# Patient Record
Sex: MALE | Race: WHITE | Employment: FULL TIME | URBAN - METROPOLITAN AREA
[De-identification: names, ages, dates, MRNs, and addresses within clinical notes are randomized per-mention and may not be internally consistent; named-entity substitution may affect disease eponyms.]

---

## 2017-03-14 DIAGNOSIS — J30.9 ALLERGIC RHINITIS, UNSPECIFIED ALLERGIC RHINITIS TRIGGER, UNSPECIFIED RHINITIS SEASONALITY: Primary | ICD-10-CM

## 2017-03-14 NOTE — TELEPHONE ENCOUNTER
desloratadine (CLARINEX) 5 MG tablet      Last Written Prescription Date: 06/10/16  Last Fill Quantity: 90,  # refills: 2   Last Office Visit with [facility], [facility] or [facility] prescribing provider: 08/01/16

## 2017-03-17 RX ORDER — DESLORATADINE 5 MG/1
TABLET ORAL
Qty: 90 TABLET | Refills: 1 | Status: SHIPPED | OUTPATIENT
Start: 2017-03-17 | End: 2017-09-11

## 2017-03-17 NOTE — TELEPHONE ENCOUNTER
Prescription approved per [facility] Refill Protocol.  Asuncion Alcocer RN

## 2017-05-31 ENCOUNTER — TELEPHONE (OUTPATIENT)
Dept: FAMILY MEDICINE | Facility: CLINIC | Age: 62
End: 2017-05-31

## 2017-05-31 ENCOUNTER — OFFICE VISIT (OUTPATIENT)
Dept: FAMILY MEDICINE | Facility: CLINIC | Age: 62
End: 2017-05-31
Payer: COMMERCIAL

## 2017-05-31 ENCOUNTER — RADIANT APPOINTMENT (OUTPATIENT)
Dept: GENERAL RADIOLOGY | Facility: CLINIC | Age: 62
End: 2017-05-31
Payer: COMMERCIAL

## 2017-05-31 VITALS
OXYGEN SATURATION: 95 % | HEIGHT: 70 IN | HEART RATE: 114 BPM | SYSTOLIC BLOOD PRESSURE: 116 MMHG | WEIGHT: 271 LBS | DIASTOLIC BLOOD PRESSURE: 75 MMHG | TEMPERATURE: 101.9 F | BODY MASS INDEX: 38.8 KG/M2

## 2017-05-31 DIAGNOSIS — R50.9 FEVER, UNSPECIFIED: ICD-10-CM

## 2017-05-31 DIAGNOSIS — I10 HYPERTENSION, GOAL BELOW 140/90: ICD-10-CM

## 2017-05-31 DIAGNOSIS — E66.812 OBESITY, CLASS II, BMI 35-39.9: ICD-10-CM

## 2017-05-31 DIAGNOSIS — J84.10 GRANULOMATOUS LUNG DISEASE (H): Primary | ICD-10-CM

## 2017-05-31 DIAGNOSIS — J84.10 GRANULOMATOUS LUNG DISEASE (H): ICD-10-CM

## 2017-05-31 DIAGNOSIS — J18.9 PNEUMONIA OF LEFT LUNG DUE TO INFECTIOUS ORGANISM, UNSPECIFIED PART OF LUNG: Primary | ICD-10-CM

## 2017-05-31 PROCEDURE — 99214 OFFICE O/P EST MOD 30 MIN: CPT | Performed by: PREVENTIVE MEDICINE

## 2017-05-31 PROCEDURE — 71020 XR CHEST 2 VW: CPT

## 2017-05-31 RX ORDER — BENZONATATE 100 MG/1
100 CAPSULE ORAL 3 TIMES DAILY PRN
Qty: 20 CAPSULE | Refills: 0 | Status: SHIPPED | OUTPATIENT
Start: 2017-05-31 | End: 2017-07-11

## 2017-05-31 RX ORDER — AZITHROMYCIN 250 MG/1
TABLET, FILM COATED ORAL
Qty: 6 TABLET | Refills: 0 | Status: ON HOLD | OUTPATIENT
Start: 2017-05-31 | End: 2017-06-30

## 2017-05-31 ASSESSMENT — PAIN SCALES - GENERAL: PAINLEVEL: NO PAIN (0)

## 2017-05-31 NOTE — TELEPHONE ENCOUNTER
Patient was in clinic today but not feeling well so sent copy of ACT form with him and will call in 4 weeks to go over the phone.  Alexandre CHERRY

## 2017-05-31 NOTE — PROGRESS NOTES
SUBJECTIVE:                                                    Arturo Rahman is a 61 year old male who presents to clinic today for the following health issues:    I have reviewed and agree with the documentation by the MA. I updated the history as indicated.  Charlee Crane MD MPH    RESPIRATORY SYMPTOMS      Duration: x 1.5 weeks    Description  sore throat, cough, wheezing, fever, chills, headache, fatigue/malaise and myalgias    Severity: severe    Accompanying signs and symptoms: None    History (predisposing factors):  asthma    Precipitating or alleviating factors: None    Therapies tried and outcome:  OTC NSAID OTC cold and cough     Started with coughing and progressed to cough. Developed into chills. Very fatigued. Fever and chills. Achy all over. No rash. No diarrhea, no emesis, no abdominal pain. No sick contacts. No travel. Tmax at home 100.2 F    Hypertension Follow-up      Outpatient blood pressures are not being checked.    Low Salt Diet: low salt       Problem list and histories reviewed & adjusted, as indicated.  Additional history: as documented    Patient Active Problem List   Diagnosis     Asthma, moderate persistent     Granulomatous lung disease (H)     GERD (gastroesophageal reflux disease)     FH: colon cancer     Hyperlipidemia LDL goal <130     Allergic rhinitis     Advanced directives, counseling/discussion     Hypertension, goal below 140/90     Non morbid obesity due to excess calories     Obesity, Class II, BMI 35-39.9 (H)     Past Surgical History:   Procedure Laterality Date     COLONOSCOPY  11/08    + polyp, +FH, repeat in 5 years     TONSILLECTOMY       VASECTOMY         Social History   Substance Use Topics     Smoking status: Former Smoker     Quit date: 10/28/1991     Smokeless tobacco: Never Used     Alcohol use Yes      Comment: rare     Family History   Problem Relation Age of Onset     Arthritis Mother      Hypertension Father      CEREBROVASCULAR DISEASE Father       Hypertension Sister      Cancer - colorectal Sister      dx age 50     Asthma Daughter      Allergies Daughter          Current Outpatient Prescriptions   Medication Sig Dispense Refill     azithromycin (ZITHROMAX) 250 MG tablet Two tablets first day, then one tablet daily for four days. 6 tablet 0     benzonatate (TESSALON) 100 MG capsule Take 1 capsule (100 mg) by mouth 3 times daily as needed 20 capsule 0     desloratadine (CLARINEX) 5 MG tablet TAKE 1 TABLET (5 MG) BY MOUTH ONCE DAILY 90 tablet 1     fluticasone-salmeterol (ADVAIR DISKUS) 250-50 MCG/DOSE diskus inhaler Inhale 1 puff into the lungs 2 times daily 3 Inhaler 0     losartan-hydrochlorothiazide (HYZAAR) 100-25 MG per tablet Take 1 tablet by mouth daily 90 tablet 1     albuterol (PROAIR HFA/PROVENTIL HFA/VENTOLIN HFA) 108 (90 BASE) MCG/ACT Inhaler Inhale 2 puffs into the lungs every 6 hours as needed for shortness of breath / dyspnea 1 Inhaler 11     simvastatin (ZOCOR) 10 MG tablet Take 1 tablet (10 mg) by mouth At Bedtime 90 tablet 3     esomeprazole (NEXIUM) 40 MG capsule Take 1 capsule (40 mg) by mouth every morning (before breakfast) 90 capsule 2     montelukast (SINGULAIR) 10 MG tablet Take 1 tablet (10 mg) by mouth daily 90 tablet 2     Allergies   Allergen Reactions     Lisinopril Cough     BP Readings from Last 3 Encounters:   05/31/17 116/75   08/01/16 123/84   03/21/16 130/90    Wt Readings from Last 3 Encounters:   05/31/17 271 lb (122.9 kg)   08/01/16 271 lb 6.4 oz (123.1 kg)   03/21/16 274 lb (124.3 kg)                    Reviewed and updated as needed this visit by clinical staff  Tobacco  Allergies       Reviewed and updated as needed this visit by Provider         ROS:  Constitutional, neuro, ENT, endocrine, pulmonary, cardiac, gastrointestinal, genitourinary, musculoskeletal, integument and psychiatric systems are negative, except as otherwise noted.    OBJECTIVE:                                                    /75  Pulse  "114  Temp 101.9  F (38.8  C) (Oral)  Ht 5' 10\" (1.778 m)  Wt 271 lb (122.9 kg)  SpO2 95%  BMI 38.88 kg/m2  Body mass index is 38.88 kg/(m^2).  GENERAL APPEARANCE: healthy and alert  EYES: Eyes grossly normal to inspection and conjunctivae and sclerae normal  HENT: ear canals and TM's normal, nose and mouth without ulcers or lesions and no pharyngeal exudates or pus points, no uvular deviation   NECK: no adenopathy and trachea midline and normal to palpation  RESP: Decreased breath sounds left lung base   CV: regular rates and rhythm and normal S1 S2, no S3 or S4  ABDOMEN: No rebound or guarding   MS: extremities normal- no gross deformities noted  SKIN: no suspicious lesions or rashes  NEURO: Normal strength and tone, mentation intact and speech normal  PSYCH: mentation appears normal    Diagnostic test results:  Diagnostic Test Results:  No results found for this or any previous visit (from the past 24 hour(s)).     Chest X ray:  My independent review of the images shows pre existing granulomatous disease. Increased haziness on left lung field compared to previous X ray, possible infiltrate. Final radiology reading is pending  Charlee Crane MD MPH       ASSESSMENT/PLAN:                                                    1. Pneumonia of left lung due to infectious organism, unspecified part of lung  -ER precautions reviewed in detail, if chest pain, shortness of breath, hemoptysis then needs to be seen in ER   - azithromycin (ZITHROMAX) 250 MG tablet; Two tablets first day, then one tablet daily for four days.  Dispense: 6 tablet; Refill: 0  - benzonatate (TESSALON) 100 MG capsule; Take 1 capsule (100 mg) by mouth 3 times daily as needed  Dispense: 20 capsule; Refill: 0  -Hydration and monitor temperature   -Await final reading for chest X ray     2. Granulomatous lung disease (H)  - XR Chest 2 Views; Future  - azithromycin (ZITHROMAX) 250 MG tablet; Two tablets first day, then one tablet daily for four days.  " Dispense: 6 tablet; Refill: 0  - benzonatate (TESSALON) 100 MG capsule; Take 1 capsule (100 mg) by mouth 3 times daily as needed  Dispense: 20 capsule; Refill: 0    3. Obesity, Class II, BMI 35-39.9 (H)  -Weight stable    4. Fever, unspecified  - XR Chest 2 Views; Future  -Tylenol or Ibuprofen as needed     5. Hypertension, goal below 140/90  -At goal  -Continue current medication   -Avoid decongestants     I ended our visit today by discussing the patient's diagnoses and recommended treatment. Please refer to today's diagnoses and orders for further details. I briefly discussed the pathophysiology of these conditions and outlined their expected course. I discussed the warning symptoms and signs that indicate an atypical course that would need urgent or emergent care. I also discussed self care strategies for symptom relief.  Patient voiced complete understanding of plan of care and was in full agreement to proceed. Common side effects of medications prescribed at this visit were discussed with the patient. Severe side effects, including current applicable black box warnings, were discussed.       Follow up with Provider - If not improving in 5 days otherwise as scheduled with PCP      Charlee Crane MD MPH    [facility]

## 2017-05-31 NOTE — NURSING NOTE
Patient due for ACT form but is ill today so sent with him and will call in 4 weeks to go over the phone.  Alexandre CHERRY

## 2017-05-31 NOTE — NURSING NOTE
"Chief Complaint   Patient presents with     Cough       Initial /75  Pulse 114  Temp 101.9  F (38.8  C) (Oral)  Ht 5' 10\" (1.778 m)  Wt 271 lb (122.9 kg)  SpO2 95%  BMI 38.88 kg/m2 Estimated body mass index is 38.88 kg/(m^2) as calculated from the following:    Height as of this encounter: 5' 10\" (1.778 m).    Weight as of this encounter: 271 lb (122.9 kg).  Medication Reconciliation: complete   Alexandre CHERRY        "

## 2017-05-31 NOTE — PATIENT INSTRUCTIONS
At [facility], we strive to deliver an exceptional experience to you, every time we see you.    If you receive a survey in the mail, please send us back your thoughts. We really do value your feedback.    Thank you for visiting [facility]    Normal or non-critical lab and imaging results will be communicated to you by MyChart, letter or phone within 7 days.  If you do not hear from us within 10 days, please call the clinic. If you have a critical or abnormal lab result, we will notify you by phone as soon as possible.     If you have any questions regarding your visit please contact:     Team Malathi/Spirit  Clinic Hours Telephone Number   Dr. Dominick Dumont   7am-7pm  Monday through Thursday  7am-5pm Friday [phone]  Sue MCDUFFIE RN   Pharmacy 8:30am-9pm Monday-Friday    9am-5pm Saturday-Sunday [phone]   Urgent Care 11am-9pm Monday-Friday        9am-5pm Saturday-Sunday [phone]     After hours, weekend or if you need to make an appointment with your primary provider please call [phone].   After Hours nurse advise: call [facility]: [phone]    Use AquaHydratehart (secure email communication and access to your chart) to send your primary care provider a message or make an appointment. Ask someone on your Team how to sign up for [employer]. To log on to Izzui or for more information in [place] please visit the website at [email or web address].   As of October 8, 2013, all password changes, disabled accounts, or ID changes in [employer]/MyHealth will be done by our Access Services Department.   If you need help with your account or password, call: [phone]. Clinic staff no longer has the ability to change passwords.

## 2017-05-31 NOTE — LETTER
[address] [address]  [phone]  Dept: [phone]      5/31/2017    Re: Arturo STERLING Lacey      TO WHOM IT MAY CONCERN:    Arturo Rahman  was seen on 5/31/17.  Please excuse him  until 6/2/17 due to illness.    Leeann Crane MD  [facility]

## 2017-05-31 NOTE — MR AVS SNAPSHOT
After Visit Summary   5/31/2017    Arturo Rahman    MRN: [id number]           Patient Information     Date Of Birth          1955        Visit Information        Provider Department      5/31/2017 3:20 PM Charlee Crane MD [facility]        Today's Diagnoses     Pneumonia of left lung due to infectious organism, unspecified part of lung    -  1    Granulomatous lung disease (H)        Obesity, Class II, BMI 35-39.9 (H)        Fever, unspecified        Hypertension, goal below 140/90          Care Instructions    At [facility], we strive to deliver an exceptional experience to you, every time we see you.    If you receive a survey in the mail, please send us back your thoughts. We really do value your feedback.    Thank you for visiting [facility]    Normal or non-critical lab and imaging results will be communicated to you by MyChart, letter or phone within 7 days.  If you do not hear from us within 10 days, please call the clinic. If you have a critical or abnormal lab result, we will notify you by phone as soon as possible.     If you have any questions regarding your visit please contact:     Team Malathi/Spirit  Clinic Hours Telephone Number   Dr. Dominick Dumont   7am-7pm  Monday through Thursday  7am-5pm Friday [phone]  Sue MCDUFFIE RN   Pharmacy 8:30am-9pm Monday-Friday    9am-5pm Saturday-Sunday [phone]   Urgent Care 11am-9pm Monday-Friday        9am-5pm Saturday-Sunday [phone]     After hours, weekend or if you need to make an appointment with your primary provider please call [phone].   After Hours nurse advise: call [facility]: [phone]    Use GetAutoBidshart (secure email communication and access to your chart) to send your primary care provider a message or make an appointment. Ask  someone on your Team how to sign up for [employer]. To log on to [place] or for more information in ibabybox please visit the website at [email or web address].   As of October 8, 2013, all password changes, disabled accounts, or ID changes in [employer]/MyHealth will be done by our Access Services Department.   If you need help with your account or password, call: [phone]. Clinic staff no longer has the ability to change passwords.             Follow-ups after your visit        Follow-up notes from your care team     Return if symptoms worsen or fail to improve.      Who to contact     If you have questions or need follow up information about today's clinic visit or your schedule please contact [facility] directly at [phone].  Normal or non-critical lab and imaging results will be communicated to you by Overdoghart, letter or phone within 4 business days after the clinic has received the results. If you do not hear from us within 7 days, please contact the clinic through [employer] or phone. If you have a critical or abnormal lab result, we will notify you by phone as soon as possible.  Submit refill requests through [employer] or call your pharmacy and they will forward the refill request to us. Please allow 3 business days for your refill to be completed.          Additional Information About Your Visit        [employer] Information     [employer] gives you secure access to your electronic health record. If you see a primary care provider, you can also send messages to your care team and make appointments. If you have questions, please call your primary care clinic.  If you do not have a primary care provider, please call [phone] and they will assist you.        Care EveryWhere ID     This is your Care EveryWhere ID. This could be used by other organizations to access your [place] medical records  [id number]        Your Vitals Were     Pulse Temperature Height Pulse Oximetry BMI (Body Mass  "Index)       114 101.9  F (38.8  C) (Oral) 5' 10\" (1.778 m) 95% 38.88 kg/m2        Blood Pressure from Last 3 Encounters:   05/31/17 116/75   08/01/16 123/84   03/21/16 130/90    Weight from Last 3 Encounters:   05/31/17 271 lb (122.9 kg)   08/01/16 271 lb 6.4 oz (123.1 kg)   03/21/16 274 lb (124.3 kg)                 Today's Medication Changes          These changes are accurate as of: 5/31/17  5:18 PM.  If you have any questions, ask your nurse or doctor.               Start taking these medicines.        Dose/Directions    azithromycin 250 MG tablet   Commonly known as:  ZITHROMAX   Used for:  Pneumonia of left lung due to infectious organism, unspecified part of lung, Granulomatous lung disease (H)   Started by:  Charlee Crane MD        Two tablets first day, then one tablet daily for four days.   Quantity:  6 tablet   Refills:  0       benzonatate 100 MG capsule   Commonly known as:  TESSALON   Used for:  Granulomatous lung disease (H), Pneumonia of left lung due to infectious organism, unspecified part of lung   Started by:  Charlee Crane MD        Dose:  100 mg   Take 1 capsule (100 mg) by mouth 3 times daily as needed   Quantity:  20 capsule   Refills:  0         Stop taking these medicines if you haven't already. Please contact your care team if you have questions.     predniSONE 10 MG tablet   Commonly known as:  DELTASONE   Stopped by:  Charlee Crane MD                Where to get your medicines      These medications were sent to [facility] [place], [place] - [address]  [address] [facility] [address]     Phone:  [phone]     azithromycin 250 MG tablet    benzonatate 100 MG capsule                Primary Care Provider Office Phone # Fax #    Martina Leung [phone]       [facility] [address]  [facility] [address]        Thank you!     Thank you for choosing [facility]  for your care. Our " goal is always to provide you with excellent care. Hearing back from our patients is one way we can continue to improve our services. Please take a few minutes to complete the written survey that you may receive in the mail after your visit with us. Thank you!             Your Updated Medication List - Protect others around you: Learn how to safely use, store and throw away your medicines at www.disposemymeds.org.          This list is accurate as of: 5/31/17  5:18 PM.  Always use your most recent med list.                   Brand Name Dispense Instructions for use    albuterol 108 (90 BASE) MCG/ACT Inhaler    PROAIR HFA/PROVENTIL HFA/VENTOLIN HFA    1 Inhaler    Inhale 2 puffs into the lungs every 6 hours as needed for shortness of breath / dyspnea       azithromycin 250 MG tablet    ZITHROMAX    6 tablet    Two tablets first day, then one tablet daily for four days.       benzonatate 100 MG capsule    TESSALON    20 capsule    Take 1 capsule (100 mg) by mouth 3 times daily as needed       desloratadine 5 MG tablet    CLARINEX    90 tablet    TAKE 1 TABLET (5 MG) BY MOUTH ONCE DAILY       esomeprazole 40 MG CR capsule    nexIUM    90 capsule    Take 1 capsule (40 mg) by mouth every morning (before breakfast)       fluticasone-salmeterol 250-50 MCG/DOSE diskus inhaler    ADVAIR DISKUS    3 Inhaler    Inhale 1 puff into the lungs 2 times daily       losartan-hydrochlorothiazide 100-25 MG per tablet    HYZAAR    90 tablet    Take 1 tablet by mouth daily       montelukast 10 MG tablet    SINGULAIR    90 tablet    Take 1 tablet (10 mg) by mouth daily       simvastatin 10 MG tablet    ZOCOR    90 tablet    Take 1 tablet (10 mg) by mouth At Bedtime

## 2017-06-02 NOTE — PROGRESS NOTES
Arturo,     Chest X ray did show a hazy change on the left side as we had discussed in clinic. Please complete the full course of antibiotics. We also would need to do a CT scan of the chest if your symptoms do not improve. Other changes on the X ray are stable compared to previous X rays.    Please do not hesitate to call us at [phone] if you have any questions or concerns.    Thank you,    Charlee Crane MD MPH

## 2017-06-11 DIAGNOSIS — I10 HYPERTENSION, GOAL BELOW 140/90: ICD-10-CM

## 2017-06-13 RX ORDER — LOSARTAN POTASSIUM AND HYDROCHLOROTHIAZIDE 25; 100 MG/1; MG/1
TABLET ORAL
Qty: 90 TABLET | Refills: 0 | Status: SHIPPED | OUTPATIENT
Start: 2017-06-13 | End: 2017-09-11

## 2017-06-13 NOTE — TELEPHONE ENCOUNTER
Prescription approved per [facility] Refill Protocol.  Jenny Adames RN

## 2017-06-20 ENCOUNTER — TELEPHONE (OUTPATIENT)
Dept: FAMILY MEDICINE | Facility: CLINIC | Age: 62
End: 2017-06-20

## 2017-06-20 DIAGNOSIS — K21.00 GASTROESOPHAGEAL REFLUX DISEASE WITH ESOPHAGITIS: ICD-10-CM

## 2017-06-20 RX ORDER — ESOMEPRAZOLE MAGNESIUM 40 MG/1
40 CAPSULE, DELAYED RELEASE ORAL
Qty: 90 CAPSULE | Refills: 2 | Status: SHIPPED | OUTPATIENT
Start: 2017-06-20 | End: 2018-03-10

## 2017-06-20 NOTE — TELEPHONE ENCOUNTER
Prescription approved per [facility] Refill Protocol.  Patient notified Rx sent.   Asuncion Alcocer RN

## 2017-06-20 NOTE — TELEPHONE ENCOUNTER
..Reason for Call:   one med was missed being refilled, over one week    Detailed comments:  Esomeprazole/ nexium 40 mg tab  Cub [phone]      Phone Number Patient can be reached at: Home number on file [phone] (home)    Best Time: anytime    Can we leave a detailed message on this number? YES    Call taken on 6/20/2017 at 7:23 AM by Samara Mejia

## 2017-06-21 NOTE — TELEPHONE ENCOUNTER
Spoke to patient his ACT score is 15 but  is still not feeling 100% from last visit so routed to provider to advise.  Alexandre CHERRY

## 2017-06-21 NOTE — TELEPHONE ENCOUNTER
Since he is not feeling better, we would need to proceed with a CT scan of the Chest. I have placed the order for this and he can call [phone] to make an appointment for this. He should also follow up with his PCP Dr. Leung.  Thanks,  Charlee Crane MD MPH

## 2017-06-21 NOTE — TELEPHONE ENCOUNTER
Advised patient of provider notation below.  Patient states he is off work the week of July 3rd so he may not be able to have CT until this time.  I advised to have CT done as soon as he is able, however if symptoms worsen or has new concerning symptoms, call the clinic back to discuss.  Pt verbalized understanding of plan.  Lam Lara RN

## 2017-06-22 ASSESSMENT — ASTHMA QUESTIONNAIRES: ACT_TOTALSCORE: 15

## 2017-06-26 ENCOUNTER — RADIANT APPOINTMENT (OUTPATIENT)
Dept: CT IMAGING | Facility: CLINIC | Age: 62
End: 2017-06-26
Payer: COMMERCIAL

## 2017-06-26 DIAGNOSIS — J84.10 GRANULOMATOUS LUNG DISEASE (H): ICD-10-CM

## 2017-06-26 LAB — RADIOLOGIST FLAGS: ABNORMAL

## 2017-06-26 PROCEDURE — 71260 CT THORAX DX C+: CPT | Performed by: RADIOLOGY

## 2017-06-26 RX ORDER — IOPAMIDOL 755 MG/ML
125 INJECTION, SOLUTION INTRAVASCULAR ONCE
Status: COMPLETED | OUTPATIENT
Start: 2017-06-26 | End: 2017-06-26

## 2017-06-26 RX ADMIN — IOPAMIDOL 125 ML: 755 INJECTION, SOLUTION INTRAVASCULAR at 15:51

## 2017-06-27 ENCOUNTER — TELEPHONE (OUTPATIENT)
Dept: FAMILY MEDICINE | Facility: CLINIC | Age: 62
End: 2017-06-27

## 2017-06-27 DIAGNOSIS — R91.8 ENDOBRONCHIAL MASS: Primary | ICD-10-CM

## 2017-06-27 NOTE — PROGRESS NOTES
Please CALL patient:    Dear Arturo Rahman,    CT scan of the chest is showing a lesion in the left upper chest area. This needs to be further evaluated by a Lung Specialist.  I have placed a referral for a Pulmonary Specialist. Please call [phone] to make an appointment for this. If you are unable to get in to see them in 2-3 weeks, please call me so I can do a Provider to Provider review at the [facility] and try and have you seen sooner.     If you develop any worsening shortness of breath, difficulty breathing, then please go in to the emergency room.     Thanks,    Charlee Crane MD MPH

## 2017-06-27 NOTE — TELEPHONE ENCOUNTER
Patient called back. He had reviewed the information already via [employer]. Reviewed the results again that are below.    Abi Wray RN, [facility]

## 2017-06-27 NOTE — TELEPHONE ENCOUNTER
Returned call to patient - patient reports that the soonest he can get in to see the pulmonologist in [place] is in September.  Routing update back to provider to review and advise.  Shyanne Gutierrez RN

## 2017-06-27 NOTE — TELEPHONE ENCOUNTER
Patient called the phone # for the Pulmonologist and he cannot get in soon enough and wants to discuss.

## 2017-06-27 NOTE — TELEPHONE ENCOUNTER
Notes Recorded by Charlee Crane MD on 6/27/2017 at 8:16 AM  Please CALL patient:    Dear Arturo STERLING Lacey,    CT scan of the chest is showing a lesion in the left upper chest area. This needs to be further evaluated by a Lung Specialist.  I have placed a referral for a Pulmonary Specialist. Please call [phone] to make an appointment for this. If you are unable to get in to see them in 2-3 weeks, please call me so I can do a Provider to Provider review at the [facility] and try and have you seen sooner.     If you develop any worsening shortness of breath, difficulty breathing, then please go in to the emergency room.     Thanks,    Charlee Crane MD MPH    This writer attempted to contact Arturo on 06/27/17    Reason for call abnormal results and left message to return call.    When patient calls back, please contact clinic RN team. If no one available, document that pt called and route to care team. routine priority.      Abi Wray, RN

## 2017-06-28 ENCOUNTER — TELEPHONE (OUTPATIENT)
Dept: PULMONOLOGY | Facility: CLINIC | Age: 62
End: 2017-06-28

## 2017-06-28 ENCOUNTER — HOSPITAL ENCOUNTER (OUTPATIENT)
Facility: CLINIC | Age: 62
End: 2017-06-28
Attending: INTERNAL MEDICINE | Admitting: INTERNAL MEDICINE

## 2017-06-28 NOTE — TELEPHONE ENCOUNTER
Called pt to discuss CT findings and plan for bronchoscopy. Offered clinic visit versus bronchoscopy.  Offered bronchoscopy tomorrow. Pt unable to make that appointment. He will contact Pulmonary Office when he can come in for bronchoscopy. Pt given contact number.     Albino Crump MD  Pulmonary and Critical Care  [facility]  Pager:  [phone]

## 2017-06-28 NOTE — TELEPHONE ENCOUNTER
I called the LUNG team at the [facility]. They will be calling the patient with an appointment. The appointment will most likely be this week or next week.     Thanks,    Charlee Crane MD MPH

## 2017-06-29 NOTE — TELEPHONE ENCOUNTER
Bronchoscopy scheduled for Friday 6/30/17 at 10:30am (9:30am check-in) with Dr. Crump in endoscopy. Time, location, and prep reviewed with patient. Will e-mail patient details of tomorrows procedure, map, and bronch instruction sheet.

## 2017-06-30 ENCOUNTER — HOSPITAL ENCOUNTER (OUTPATIENT)
Facility: CLINIC | Age: 62
Discharge: HOME | End: 2017-06-30
Attending: INTERNAL MEDICINE | Admitting: INTERNAL MEDICINE
Payer: COMMERCIAL

## 2017-06-30 VITALS
DIASTOLIC BLOOD PRESSURE: 91 MMHG | OXYGEN SATURATION: 99 % | SYSTOLIC BLOOD PRESSURE: 104 MMHG | WEIGHT: 270 LBS | RESPIRATION RATE: 15 BRPM | BODY MASS INDEX: 38.65 KG/M2 | HEIGHT: 70 IN

## 2017-06-30 DIAGNOSIS — R91.8 ENDOBRONCHIAL MASS: Primary | ICD-10-CM

## 2017-06-30 LAB — COPATH REPORT: NORMAL

## 2017-06-30 PROCEDURE — 88305 TISSUE EXAM BY PATHOLOGIST: CPT | Performed by: INTERNAL MEDICINE

## 2017-06-30 PROCEDURE — 31625 BRONCHOSCOPY W/BIOPSY(S): CPT | Performed by: INTERNAL MEDICINE

## 2017-06-30 PROCEDURE — 40000428 ZZHCL STATISTIC R-RUSH PROCESSING: Performed by: INTERNAL MEDICINE

## 2017-06-30 PROCEDURE — 99153 MOD SED SAME PHYS/QHP EA: CPT | Performed by: INTERNAL MEDICINE

## 2017-06-30 PROCEDURE — 00000155 ZZHCL STATISTIC H-CELL BLOCK W/STAIN: Performed by: INTERNAL MEDICINE

## 2017-06-30 PROCEDURE — 88173 CYTOPATH EVAL FNA REPORT: CPT | Performed by: INTERNAL MEDICINE

## 2017-06-30 PROCEDURE — 88172 CYTP DX EVAL FNA 1ST EA SITE: CPT | Performed by: INTERNAL MEDICINE

## 2017-06-30 PROCEDURE — 99152 MOD SED SAME PHYS/QHP 5/>YRS: CPT | Performed by: INTERNAL MEDICINE

## 2017-06-30 PROCEDURE — 25000128 H RX IP 250 OP 636: Performed by: INTERNAL MEDICINE

## 2017-06-30 PROCEDURE — 25000125 ZZHC RX 250: Performed by: INTERNAL MEDICINE

## 2017-06-30 PROCEDURE — 25000132 ZZH RX MED GY IP 250 OP 250 PS 637: Performed by: INTERNAL MEDICINE

## 2017-06-30 RX ORDER — LEVOFLOXACIN 500 MG/1
500 TABLET, FILM COATED ORAL DAILY
Qty: 7 TABLET | Refills: 0 | Status: SHIPPED | OUTPATIENT
Start: 2017-06-30 | End: 2017-07-11

## 2017-06-30 RX ORDER — DIPHENHYDRAMINE HYDROCHLORIDE 50 MG/ML
INJECTION INTRAMUSCULAR; INTRAVENOUS PRN
Status: DISCONTINUED | OUTPATIENT
Start: 2017-06-30 | End: 2017-06-30 | Stop reason: HOSPADM

## 2017-06-30 RX ORDER — FENTANYL CITRATE 50 UG/ML
INJECTION, SOLUTION INTRAMUSCULAR; INTRAVENOUS PRN
Status: DISCONTINUED | OUTPATIENT
Start: 2017-06-30 | End: 2017-06-30 | Stop reason: HOSPADM

## 2017-06-30 RX ORDER — ALBUTEROL SULFATE 0.83 MG/ML
SOLUTION RESPIRATORY (INHALATION) PRN
Status: DISCONTINUED | OUTPATIENT
Start: 2017-06-30 | End: 2017-06-30 | Stop reason: HOSPADM

## 2017-06-30 RX ORDER — LIDOCAINE HYDROCHLORIDE AND EPINEPHRINE 10; 10 MG/ML; UG/ML
INJECTION, SOLUTION INFILTRATION; PERINEURAL PRN
Status: DISCONTINUED | OUTPATIENT
Start: 2017-06-30 | End: 2017-06-30 | Stop reason: HOSPADM

## 2017-06-30 NOTE — DISCHARGE INSTRUCTIONS
Discharge Instructions after Bronchoscopy    Activity  ___ You had medicine to relax and for pain. You may feel dizzy or sleepy.  For 24 hours:    Do not drive or use heavy equipment.    Do not make important decisions.    Do not drink any alcohol.    Diet  ___ When you can swallow easily, you may go back to your regular diet, medicines  and light exercise.    Follow-up  ___ We took small tissue or fluid samples to study. We will call you with the results in about 10 business days.    Call right away if you have:    Unusual chest pain    Temperature above 100.6  F (37.5  C)    Coughing that does not stop.    If you have severe pain, bleeding, or shortness of breath, go to an emergency room.    If you have questions, call:  Monday to Friday, 7 a.m. to 4:30 p.m.  Endoscopy: [phone] (We may have to call you back)    After hours:  Hospital: [phone] (Ask for the pulmonary fellow on call)

## 2017-06-30 NOTE — PROCEDURES
Procedure(s):    Bronchoscopy  Endobronchial biopsy with Stephen Needle, Cryo and  Biopsy (1 sites biopsied, see below for details)    Indication:  HELIO endobronchial lesion    Attending of Record:     Albino Crump MD    Trainees Present:   Trent Alberto MD     Medications:    9 ml 4% lidocaine  9 ml 1% lidocaine  1 spray(s) hurricaine spray  5 mg versed  250 mcg fentanyl  50 mg benadryl    Sedation Time:  Total sedation time was 35 minutes of continuous bedside 1:1 monitoring.     Time Out:  Performed    The patient's medical record has been reviewed.  The indication for the procedure was reviewed.  The necessary history and physical examination was performed and reviewed.  The risks, benefits and alternatives of the procedure were discussed with the the patient in detail and he had the opportunity to ask questions.  I discussed in particular the potential complications including risks of minor or life-threatening bleeding and/or infection, respiratory failure, vocal cord trauma / paralysis, pneumothorax, and discomfort. Sedation risks were also discussed including abnormal heart rhythms, low blood pressure, and respiratory failure. All questions were answered to the best of my ability.  Verbal and written informed consent was obtained.  The proposed procedure and the patient's identification were verified prior to the procedure by the physician and the nurse, respiratory therapist, technician, resident physician (resident / fellow).    The patient was assessed for the adequacy for the procedure and to receive medications.   Mental Status:  Alert and oriented x 3  Airway examination:  Class I (Complete visualization of soft palate)  Pulmonary:  Clear to ausculation bilaterally  CV:  RRR, no murmurs or gallops  ASA Grade:  (I)  Completely healthy fit patient    After clinical evaluation and reviewing the indication, risks, alternatives and benefits of the procedure the patient was deemed to be in satisfactory  condition to undergo the procedure.      Immediately before administration of medications the patient was re-assessed for adequacy to receive sedatives including the heart rate, respiratory rate, mental status, oxygen saturation, blood pressure and adequacy of pulmonary ventilation. These same parameters were continuously monitored throughout the procedure.    Maneuvers / Procedure:     The bronchoscope was inserted through the mouth, the cords were anesthetized with lidocaine. Upper airway structures, vocal cords (anatomy and function) appear to be normal.  The patient was orally intubated with a # 8.5 ETT.  The ETT was inserted with a glidescope.      Airway Examination:  A complete airway examination was performed from the distal trachea to the subsegmental level in each lobe of both lungs with exceptions/pertinent findings noted as follows normal mucosa and anatomy; except for HELIO endobronchial lesion                Endobronchial Biopsies:  Two Sites - The bronchoscope was inserted.  Topical epinephrine was administered.  The biopsy forceps were introduced and endobronchial biopsies were obtained from LC2.  5 biopsies obtained with 19-gauge needle, 4 biopsies with foreceps and 3 biopsies with cryo.     Pertinent Images / special notes:         Biopsy with 19-gauge, foreceps and cryo          Post-biopsy      Any disposable equipment was visually inspected and deemed to be intact immediately post procedure.      Recommendations:     -->  Successful bronchoscopy and biopsy of endobronchial lesion  -->  Await path and cytology  --> Will schedule repeat bronchoscopy and debulking in OR       Trent Alberto MD  Pulmonary, Allergy, Critical Care and Sleep Medicine  [facility]  [phone]         I was present for the entire viewing portion of the endoscopy / bronchoscopy procedure for patient Arturo Rahman. This includes the time-out for patient verification and procedure verification.  I was present for  the entire procedure from scope insertion to scope withdrawal.  I have reviewed the above report and agree with the findings, interpretation, and recommendations.  Any changes have been made direction in the note above before signing.     Albino Crump MD  Pulmonary and Critical Care  [facility]  Pager:  [phone]

## 2017-06-30 NOTE — IP AVS SNAPSHOT
[facility], [place], Endoscopy    [address] [address]    Phone:  [phone]                                       After Visit Summary   6/30/2017    Arturo Rahman    MRN: [id number]           After Visit Summary Signature Page     I have received my discharge instructions, and my questions have been answered. I have discussed any challenges I see with this plan with the nurse or doctor.    ..........................................................................................................................................  Patient/Patient Representative Signature      ..........................................................................................................................................  Patient Representative Print Name and Relationship to Patient    ..................................................               ................................................  Date                                            Time    ..........................................................................................................................................  Reviewed by Signature/Title    ...................................................              ..............................................  Date                                                            Time

## 2017-06-30 NOTE — OR NURSING
Procedure: Bronchoscopy with endobronchial biopsies cryo, holguin needle and biopsy forceps  Sedation: Conscious sedation  O2: 2-6 LPM NC and 100% fiO2 via 8.5 ETT. RR 14-18. 2 LPM NC post, extubated without complications  Tolerated: VS stable during and post procedure. No abd or chest pain noted.   Specimens: Specimen(s) handled by Pathology and RT  Report: Given to recovery RN  Pt to recovery area in stable condition, accompanied by RN    Jenny Anderson RN

## 2017-06-30 NOTE — IP AVS SNAPSHOT
MRN:[id number]                      After Visit Summary   6/30/2017    Arturo Rahman    MRN: [id number]           Thank you!     Thank you for choosing [facility] for your care. Our goal is always to provide you with excellent care. Hearing back from our patients is one way we can continue to improve our services. Please take a few minutes to complete the written survey that you may receive in the mail after you visit with us. Thank you!        Patient Information     Date Of Birth          1955        About your hospital stay     You were admitted on:  June 30, 2017 You last received care in the:  [facility], Endoscopy    You were discharged on:  June 30, 2017       Who to Call     For medical emergencies, please call 911.  For non-urgent questions about your medical care, please call your primary care provider or clinic, [phone]  For questions related to your surgery, please call your surgery clinic        Attending Provider     Provider Specialty    Albino Crump MD Pulmonary       Primary Care Provider Office Phone # Fax #    Martina Kaleigh Leung [phone]      Further instructions from your care team       Discharge Instructions after Bronchoscopy    Activity  ___ You had medicine to relax and for pain. You may feel dizzy or sleepy.  For 24 hours:    Do not drive or use heavy equipment.    Do not make important decisions.    Do not drink any alcohol.    Diet  ___ When you can swallow easily, you may go back to your regular diet, medicines  and light exercise.    Follow-up  ___ We took small tissue or fluid samples to study. We will call you with the results in about 10 business days.    Call right away if you have:    Unusual chest pain    Temperature above 100.6  F (37.5  C)    Coughing that does not stop.    If you have severe pain, bleeding, or shortness of breath, go to an emergency room.    If you have questions, call:  Monday to Friday, 7 a.m.  "to 4:30 p.m.  Endoscopy: [phone] (We may have to call you back)    After hours:  Hospital: [phone] (Ask for the pulmonary fellow on call)    Pending Results     Date and Time Order Name Status Description    6/30/2017 1058 Surgical pathology exam In process             Admission Information     Date & Time Provider Department Dept. Phone    6/30/2017 Albino Crump MD [facility], [facility], Endoscopy [phone]      Your Vitals Were     Blood Pressure Respirations Height Weight Pulse Oximetry BMI (Body Mass Index)    109/78 10 1.778 m (5' 10\") 122.5 kg (270 lb) 96% 38.74 kg/m2      [facility] Information     [facility] gives you secure access to your electronic health record. If you see a primary care provider, you can also send messages to your care team and make appointments. If you have questions, please call your primary care clinic.  If you do not have a primary care provider, please call [phone] and they will assist you.        Care EveryWhere ID     This is your Care EveryWhere ID. This could be used by other organizations to access your [facility] medical records  [id number]        Equal Access to Services     INGA MCCRACKEN : Hadii grey Medrano, waaxda luyoselinadaha, qaybta kaalmada sobia, earl barrow. So [facility] [phone].    ATENCIÓN: Si habla español, tiene a santana disposición servicios gratuitos de asistencia lingüística. Llame al [phone].    We comply with applicable federal civil rights laws and [place] laws. We do not discriminate on the basis of race, color, national origin, age, disability sex, sexual orientation or gender identity.               Review of your medicines      START taking        Dose / Directions    levofloxacin 500 MG tablet   Commonly known as:  LEVAQUIN   Used for:  Endobronchial mass        Dose:  500 mg   Take 1 tablet (500 mg) by mouth daily   Quantity:  7 tablet   Refills:  0         CONTINUE these medicines which have NOT " CHANGED        Dose / Directions    albuterol 108 (90 BASE) MCG/ACT Inhaler   Commonly known as:  PROAIR HFA/PROVENTIL HFA/VENTOLIN HFA   Used for:  Moderate persistent asthma without complication        Dose:  2 puff   Inhale 2 puffs into the lungs every 6 hours as needed for shortness of breath / dyspnea   Quantity:  1 Inhaler   Refills:  11       benzonatate 100 MG capsule   Commonly known as:  TESSALON   Used for:  Granulomatous lung disease (H), Pneumonia of left lung due to infectious organism, unspecified part of lung        Dose:  100 mg   Take 1 capsule (100 mg) by mouth 3 times daily as needed   Quantity:  20 capsule   Refills:  0       desloratadine 5 MG tablet   Commonly known as:  CLARINEX   Used for:  Allergic rhinitis, unspecified allergic rhinitis trigger, unspecified rhinitis seasonality        TAKE 1 TABLET (5 MG) BY MOUTH ONCE DAILY   Quantity:  90 tablet   Refills:  1       esomeprazole 40 MG CR capsule   Commonly known as:  nexIUM   Used for:  Gastroesophageal reflux disease with esophagitis        Dose:  40 mg   Take 1 capsule (40 mg) by mouth every morning (before breakfast)   Quantity:  90 capsule   Refills:  2       fluticasone-salmeterol 250-50 MCG/DOSE diskus inhaler   Commonly known as:  ADVAIR DISKUS   Used for:  Moderate persistent asthma without complication        Dose:  1 puff   Inhale 1 puff into the lungs 2 times daily   Quantity:  3 Inhaler   Refills:  0       losartan-hydrochlorothiazide 100-25 MG per tablet   Commonly known as:  HYZAAR   Used for:  Hypertension, goal below 140/90        TAKE ONE TABLET BY MOUTH ONE TIME DAILY   Quantity:  90 tablet   Refills:  0       montelukast 10 MG tablet   Commonly known as:  SINGULAIR   Used for:  Moderate persistent asthma without complication        Dose:  1 tablet   Take 1 tablet (10 mg) by mouth daily   Quantity:  90 tablet   Refills:  2       simvastatin 10 MG tablet   Commonly known as:  ZOCOR   Used for:  Hyperlipidemia LDL goal  <130        Dose:  10 mg   Take 1 tablet (10 mg) by mouth At Bedtime   Quantity:  90 tablet   Refills:  3            Where to get your medicines      These medications were sent to [id number] - [place], [place] - [address]  [address] [address]     Phone:  [phone]     levofloxacin 500 MG tablet                Protect others around you: Learn how to safely use, store and throw away your medicines at www.disposemymeds.org.             Medication List: This is a list of all your medications and when to take them. Check marks below indicate your daily home schedule. Keep this list as a reference.      Medications           Morning Afternoon Evening Bedtime As Needed    albuterol 108 (90 BASE) MCG/ACT Inhaler   Commonly known as:  PROAIR HFA/PROVENTIL HFA/VENTOLIN HFA   Inhale 2 puffs into the lungs every 6 hours as needed for shortness of breath / dyspnea                                benzonatate 100 MG capsule   Commonly known as:  TESSALON   Take 1 capsule (100 mg) by mouth 3 times daily as needed                                desloratadine 5 MG tablet   Commonly known as:  CLARINEX   TAKE 1 TABLET (5 MG) BY MOUTH ONCE DAILY                                esomeprazole 40 MG CR capsule   Commonly known as:  nexIUM   Take 1 capsule (40 mg) by mouth every morning (before breakfast)                                fluticasone-salmeterol 250-50 MCG/DOSE diskus inhaler   Commonly known as:  ADVAIR DISKUS   Inhale 1 puff into the lungs 2 times daily                                levofloxacin 500 MG tablet   Commonly known as:  LEVAQUIN   Take 1 tablet (500 mg) by mouth daily                                losartan-hydrochlorothiazide 100-25 MG per tablet   Commonly known as:  HYZAAR   TAKE ONE TABLET BY MOUTH ONE TIME DAILY                                montelukast 10 MG tablet   Commonly known as:  SINGULAIR   Take 1 tablet (10 mg) by mouth daily                                 simvastatin 10 MG tablet   Commonly known as:  ZOCOR   Take 1 tablet (10 mg) by mouth At Bedtime

## 2017-07-05 LAB — COPATH REPORT: NORMAL

## 2017-07-06 ENCOUNTER — TELEPHONE (OUTPATIENT)
Dept: OTHER | Facility: CLINIC | Age: 62
End: 2017-07-06

## 2017-07-06 DIAGNOSIS — J98.09 BRONCHIAL OBSTRUCTION: Primary | ICD-10-CM

## 2017-07-06 NOTE — TELEPHONE ENCOUNTER
Called pt. Reviewed pathology and cytology. Plan for debulking July 19.     Albino Crump MD  Pulmonary and Critical Care  [facility]  Pager:  [phone]

## 2017-07-11 ENCOUNTER — OFFICE VISIT (OUTPATIENT)
Dept: FAMILY MEDICINE | Facility: CLINIC | Age: 62
End: 2017-07-11
Payer: COMMERCIAL

## 2017-07-11 VITALS
BODY MASS INDEX: 39.37 KG/M2 | WEIGHT: 275 LBS | HEIGHT: 70 IN | HEART RATE: 60 BPM | TEMPERATURE: 99.2 F | SYSTOLIC BLOOD PRESSURE: 106 MMHG | DIASTOLIC BLOOD PRESSURE: 86 MMHG | OXYGEN SATURATION: 95 %

## 2017-07-11 DIAGNOSIS — E66.01 MORBID OBESITY DUE TO EXCESS CALORIES (H): ICD-10-CM

## 2017-07-11 DIAGNOSIS — R91.8 ENDOBRONCHIAL MASS: ICD-10-CM

## 2017-07-11 DIAGNOSIS — J45.40 MODERATE PERSISTENT ASTHMA WITHOUT COMPLICATION: ICD-10-CM

## 2017-07-11 DIAGNOSIS — J84.10 GRANULOMATOUS LUNG DISEASE (H): ICD-10-CM

## 2017-07-11 DIAGNOSIS — Z01.818 PREOP GENERAL PHYSICAL EXAM: Primary | ICD-10-CM

## 2017-07-11 DIAGNOSIS — R73.9 ELEVATED BLOOD SUGAR: ICD-10-CM

## 2017-07-11 DIAGNOSIS — E78.5 HYPERLIPIDEMIA LDL GOAL <130: ICD-10-CM

## 2017-07-11 DIAGNOSIS — Z11.59 NEED FOR HEPATITIS C SCREENING TEST: ICD-10-CM

## 2017-07-11 DIAGNOSIS — I10 HYPERTENSION, GOAL BELOW 140/90: ICD-10-CM

## 2017-07-11 LAB
ERYTHROCYTE [DISTWIDTH] IN BLOOD BY AUTOMATED COUNT: 15.6 % (ref 10–15)
HBA1C MFR BLD: 6.1 % (ref 4.3–6)
HCT VFR BLD AUTO: 43.7 % (ref 40–53)
HGB BLD-MCNC: 14.2 G/DL (ref 13.3–17.7)
MCH RBC QN AUTO: 28.3 PG (ref 26.5–33)
MCHC RBC AUTO-ENTMCNC: 32.5 G/DL (ref 31.5–36.5)
MCV RBC AUTO: 87 FL (ref 78–100)
PLATELET # BLD AUTO: 260 10E9/L (ref 150–450)
RBC # BLD AUTO: 5.01 10E12/L (ref 4.4–5.9)
WBC # BLD AUTO: 11.2 10E9/L (ref 4–11)

## 2017-07-11 PROCEDURE — 80061 LIPID PANEL: CPT | Performed by: FAMILY MEDICINE

## 2017-07-11 PROCEDURE — 36415 COLL VENOUS BLD VENIPUNCTURE: CPT | Performed by: FAMILY MEDICINE

## 2017-07-11 PROCEDURE — 83036 HEMOGLOBIN GLYCOSYLATED A1C: CPT | Performed by: FAMILY MEDICINE

## 2017-07-11 PROCEDURE — 93000 ELECTROCARDIOGRAM COMPLETE: CPT | Performed by: FAMILY MEDICINE

## 2017-07-11 PROCEDURE — 99214 OFFICE O/P EST MOD 30 MIN: CPT | Performed by: FAMILY MEDICINE

## 2017-07-11 PROCEDURE — 86803 HEPATITIS C AB TEST: CPT | Performed by: FAMILY MEDICINE

## 2017-07-11 PROCEDURE — 85027 COMPLETE CBC AUTOMATED: CPT | Performed by: FAMILY MEDICINE

## 2017-07-11 PROCEDURE — 80053 COMPREHEN METABOLIC PANEL: CPT | Performed by: FAMILY MEDICINE

## 2017-07-11 ASSESSMENT — PAIN SCALES - GENERAL: PAINLEVEL: NO PAIN (0)

## 2017-07-11 NOTE — PROGRESS NOTES
[address] [address]  [phone]  Dept: [phone]    PRE-OP EVALUATION:  Today's date: 2017    Arturo Rahman (: 1955) presents for pre-operative evaluation assessment as requested by Dr. Arturo Rahman.  He requires evaluation and anesthesia risk assessment prior to undergoing surgery/procedure for treatment of non-cancerous endobronchial mass .  Proposed procedure: Flexible, Possible Rigid, Bronchoscopy, Tumor Debulking with Cryoprobe and/or Argon Plasma Coagulation    Date of Surgery/ Procedure: 17  Time of Surgery/ Procedure: 9:10am  Hospital/Surgical Facility: [facility]  Primary Physician: Martina Leung  Type of Anesthesia Anticipated: General    Patient has a Health Care Directive or Living Will:  NO-declined a healthcare directive packet    1. NO - Do you have a history of heart attack, stroke, stent, bypass or surgery on an artery in the head, neck, heart or legs?  2. NO - Do you ever have any pain or discomfort in your chest?  3. NO - Do you have a history of  Heart Failure?  4. YES - Are you troubled by shortness of breath when: walking on the level, up a slight hill or at night? Shortness of breath all the time except just sitting.  5. NO - Do you currently have a cold, bronchitis or other respiratory infection?  6. YES - Do you have a cough, shortness of breath or wheezing? Coughing is better after treated for pneumonia.   7. NO - Do you sometimes get pains in the calves of your legs when you walk?  8. YES - Do you or anyone in your family have previous history of blood clots? Patient's father had strokes.   9. NO - Do you or does anyone in your family have a serious bleeding problem such as prolonged bleeding following surgeries or cuts?  10. NO - Have you ever had problems with anemia or been told to take iron pills?  11. NO - Have you had any abnormal blood loss such as black, tarry or  bloody stools, or abnormal vaginal bleeding?  12. NO - Have you ever had a blood transfusion?  13. NO - Have you or any of your relatives ever had problems with anesthesia?  14. NO - Do you have sleep apnea, excessive snoring or daytime drowsiness?  15. NO - Do you have any prosthetic heart valves?  16. NO - Do you have prosthetic joints?  17. NO - Is there any chance that you may be pregnant?      HPI:                                                      Brief HPI related to upcoming procedure: had pneumonia with an abnormal chest xray and CT scan showed mass in the left lung. Worsening of shortness of breath over the last 18-24 months.       HYPERTENSION - Patient has longstanding history of mod-severe HTN , currently denies any symptoms referable to elevated blood pressure. Specifically denies chest pain, palpitations, dyspnea, orthopnea, PND or peripheral edema. Blood pressure readings have been in normal range. Current medication regimen is as listed below. Patient denies any side effects of medication.                                                                                                                                                                                          .  HYPERLIPIDEMIA - Patient has a long history of significant Hyperlipidemia requiring medication for treatment with recent good control. Patient reports no problems or side effects with the medication.                                                                                                                                                       .  ASTHMA - Patient has a longstanding history of moderate-severe Asthma . Patient has been doing well overall noting SOB and COUGH and continues on medication regimen consisting of inhalers without adverse reactions or side effects.                                                                                                                                                .    MEDICAL HISTORY:                                                      Patient Active Problem List    Diagnosis Date Noted     Endobronchial mass 06/27/2017     Priority: Medium     Obesity, Class II, BMI 35-39.9 05/31/2017     Priority: Medium     Non morbid obesity due to excess calories 03/21/2016     Priority: Medium     Hypertension, goal below 140/90 03/11/2014     Priority: Medium     Advanced directives, counseling/discussion 12/21/2012     Priority: Medium     Advance Care Planning:   ACP Review and Resources Provided:  Reviewed chart for advance care plan.  Arturo STERLING Paulquirino has no plan or code status on file. Discussed available resources on 03/11/2014. Confirmed code status reflects current choices pending further ACP discussions.  Confirmed/documented designated decision maker(s). See permanent comments section of demographics in clinical tab.   Added by Torrie Larry on 3/11/2014  Discussed advance care planning with patient; information given to patient to review.  Catherine Russell MA   12/21/2012   Discussed advance care planning with patient; however, patient declined at this time. March 11, 2014  Diann Ortiz MA         Allergic rhinitis 09/19/2012     Priority: Medium     Problem list name updated by automated process. Provider to review       Hyperlipidemia LDL goal <130 10/31/2010     Priority: Medium     Asthma, moderate persistent      Priority: Medium     Granulomatous lung disease (H)      Priority: Medium     GERD (gastroesophageal reflux disease)      Priority: Medium     FH: colon cancer      Priority: Medium     colonoscopy q 5yr.  last 2008        Past Medical History:   Diagnosis Date     Allergies      Asthma, moderate persistent      FH: colon cancer     colonoscopy q 5yr.  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     HTN (hypertension)      Hyperlipidemia      Past Surgical History:   Procedure Laterality Date      COLONOSCOPY  11/08    + polyp, +FH, repeat in 5 years     TONSILLECTOMY       VASECTOMY       Current Outpatient Prescriptions   Medication Sig Dispense Refill     levofloxacin (LEVAQUIN) 500 MG tablet Take 1 tablet (500 mg) by mouth daily 7 tablet 0     esomeprazole (NEXIUM) 40 MG CR capsule Take 1 capsule (40 mg) by mouth every morning (before breakfast) 90 capsule 2     losartan-hydrochlorothiazide (HYZAAR) 100-25 MG per tablet TAKE ONE TABLET BY MOUTH ONE TIME DAILY  90 tablet 0     benzonatate (TESSALON) 100 MG capsule Take 1 capsule (100 mg) by mouth 3 times daily as needed 20 capsule 0     desloratadine (CLARINEX) 5 MG tablet TAKE 1 TABLET (5 MG) BY MOUTH ONCE DAILY 90 tablet 1     fluticasone-salmeterol (ADVAIR DISKUS) 250-50 MCG/DOSE diskus inhaler Inhale 1 puff into the lungs 2 times daily 3 Inhaler 0     albuterol (PROAIR HFA/PROVENTIL HFA/VENTOLIN HFA) 108 (90 BASE) MCG/ACT Inhaler Inhale 2 puffs into the lungs every 6 hours as needed for shortness of breath / dyspnea 1 Inhaler 11     simvastatin (ZOCOR) 10 MG tablet Take 1 tablet (10 mg) by mouth At Bedtime 90 tablet 3     montelukast (SINGULAIR) 10 MG tablet Take 1 tablet (10 mg) by mouth daily 90 tablet 2     OTC products: None, except as noted above    Allergies   Allergen Reactions     Lisinopril Cough      Latex Allergy: NO    Social History   Substance Use Topics     Smoking status: Former Smoker     Quit date: 10/28/1991     Smokeless tobacco: Never Used     Alcohol use Yes      Comment: rare     History   Drug Use No       REVIEW OF SYSTEMS:                                                    Constitutional, neuro, ENT, endocrine, pulmonary, cardiac, gastrointestinal, genitourinary, musculoskeletal, integument and psychiatric systems are negative, except as otherwise noted. Some sore throat from the bronchoscopy    EXAM:                                                    /86 (BP Location: Left arm, Patient Position: Chair, Cuff Size: Adult  "Large)  Pulse 60  Temp 99.2  F (37.3  C) (Oral)  Ht 5' 9.75\" (1.772 m)  Wt 275 lb (124.7 kg)  SpO2 95%  BMI 39.74 kg/m2    GENERAL APPEARANCE: healthy, alert and no distress     EYES: EOMI,  PERRL     HENT: ear canals and TM's normal and nose and mouth without ulcers or lesions     NECK: no adenopathy, no asymmetry, masses, or scars and thyroid normal to palpation     RESP: lungs clear to auscultation - no rales, rhonchi or wheezes     CV: regular rates and rhythm, normal S1 S2, no S3 or S4 and no murmur, click or rub     ABDOMEN:  soft, nontender, no HSM or masses and bowel sounds normal     MS: extremities normal- no gross deformities noted, no evidence of inflammation in joints, FROM in all extremities.     SKIN: no suspicious lesions or rashes     NEURO: Normal strength and tone, sensory exam grossly normal, mentation intact and speech normal     PSYCH: mentation appears normal. and affect normal/bright     LYMPHATICS: No axillary, cervical, or supraclavicular nodes    DIAGNOSTICS:                                                    EKG: appears normal, NSR, normal axis, normal intervals, no acute ST/T changes c/w ischemia, no LVH by voltage criteria, unchanged from previous tracings    Recent Labs   Lab Test 07/22/16 03/21/16   1351  12/01/15   1552   HGB   --   14.3  14.9   PLT   --   203  237   INR  1.0   --    --    NA   --   140  138   POTASSIUM  4.2  3.2*  3.8   CR  1.13  1.30*  1.45*   A1C   --   6.4*  6.3*        IMPRESSION:                                                    Reason for surgery/procedure: non-cancerous endobronchial mass .  Proposed procedure: Flexible, Possible Rigid, Bronchoscopy, Tumor Debulking with Cryoprobe and/or Argon Plasma Coagulation  Diagnosis/reason for consult: cardiac and anesthesia risk assessment     The proposed surgical procedure is considered INTERMEDIATE risk.    REVISED CARDIAC RISK INDEX  The patient has the following serious cardiovascular risks for " perioperative complications such as (MI, PE, VFib and 3  AV Block):  No serious cardiac risks  INTERPRETATION: 0 risks: Class I (very low risk - 0.4% complication rate)    The patient has the following additional risks for perioperative complications:  No identified additional risks      ICD-10-CM    1. Preop general physical exam Z01.818 EKG 12-lead complete w/read - Clinics   2. Endobronchial mass R22.2 Approved for surgery and anesthesia   3. Moderate persistent asthma without complication J45.40 Stable but some symptoms worse due to pneumonia and mass   4. Granulomatous lung disease (H) J84.10 stable   5. Hyperlipidemia LDL goal <130 E78.5 Lipid panel reflex to direct LDL   6. Hypertension, goal below 140/90- well controlled on medications  I10 Comprehensive metabolic panel     CBC with platelets   7. Need for hepatitis C screening test Z11.59 Hepatitis C Screen Reflex to HCV RNA Quant and Genotype   8. Morbid obesity due to excess calories (H) E66.01 Weight loss counseling done    9. Elevated blood sugar R73.9 Hemoglobin A1c       RECOMMENDATIONS:                                                      --Consult hospital rounder / IM to assist post-op medical management    --Patient is to take all scheduled medications on the day of surgery EXCEPT for modifications listed below.    APPROVAL GIVEN to proceed with proposed procedure, without further diagnostic evaluation       Signed Electronically by: Martina Leung MD    Copy of this evaluation report is provided to requesting physician.    [facility] Preop Guidelines

## 2017-07-11 NOTE — NURSING NOTE
"Chief Complaint   Patient presents with     Pre-Op Exam       Initial /86 (BP Location: Left arm, Patient Position: Chair, Cuff Size: Adult Large)  Pulse 60  Temp 99.2  F (37.3  C) (Oral)  Ht 5' 9.75\" (1.772 m)  Wt 275 lb (124.7 kg)  SpO2 95%  BMI 39.74 kg/m2 Estimated body mass index is 39.74 kg/(m^2) as calculated from the following:    Height as of this encounter: 5' 9.75\" (1.772 m).    Weight as of this encounter: 275 lb (124.7 kg).  Medication Reconciliation: complete     Jevon Whiting CMA    "

## 2017-07-11 NOTE — PATIENT INSTRUCTIONS
How to contact your care team: [phone] Pharmacy [phone]   MD REY JONES PA-C CHRIS JONES, PA-C NAM HO, MD JONATHAN BATES, MD ARVIN VOCAL, MD    Clinic hours M-Th 7am-7pm Fri 7am-5pm.   Urgent care M-F 11am-9pm  Sat/Sun 9am-5pm.   Pharmacy   Mon-Th:  8:00am-8pm   Fri:  8:00am-6:00pm  Sat/Sun  8:00am-5:00 pm           Before Your Surgery      Call your surgeon if there is any change in your health. This includes signs of a cold or flu (such as a sore throat, runny nose, cough, rash or fever).    Do not smoke, drink alcohol or take over the counter medicine (unless your surgeon or primary care doctor tells you to) for the 24 hours before and after surgery.    If you take prescribed drugs: Follow your doctor s orders about which medicines to take and which to stop until after surgery.    Eating and drinking prior to surgery: follow the instructions from your surgeon    Take a shower or bath the night before surgery. Use the soap your surgeon gave you to gently clean your skin. If you do not have soap from your surgeon, use your regular soap. Do not shave or scrub the surgery site.  Wear clean pajamas and have clean sheets on your bed.

## 2017-07-11 NOTE — MR AVS SNAPSHOT
After Visit Summary   7/11/2017    Arturo Rahman    MRN: [id number]           Patient Information     Date Of Birth          1955        Visit Information        Provider Department      7/11/2017 6:40 PM Martina Leung MD [facility]        Today's Diagnoses     Preop general physical exam    -  1    Endobronchial mass        Moderate persistent asthma without complication        Granulomatous lung disease (H)        Hyperlipidemia LDL goal <130        Hypertension, goal below 140/90        Need for hepatitis C screening test        Morbid obesity due to excess calories (H)        Elevated blood sugar          Care Instructions    How to contact your care team: [phone] Pharmacy [phone]   MD REY JONES PA-C CHRIS JONES, PA-C NAM HO, MD JONATHAN BATES, MD ARVIN VOCAL, MD    Clinic hours M-Th 7am-7pm Fri 7am-5pm.   Urgent care M-F 11am-9pm  Sat/Sun 9am-5pm.   Pharmacy   Mon-Th:  8:00am-8pm   Fri:  8:00am-6:00pm  Sat/Sun  8:00am-5:00 pm           Before Your Surgery      Call your surgeon if there is any change in your health. This includes signs of a cold or flu (such as a sore throat, runny nose, cough, rash or fever).    Do not smoke, drink alcohol or take over the counter medicine (unless your surgeon or primary care doctor tells you to) for the 24 hours before and after surgery.    If you take prescribed drugs: Follow your doctor s orders about which medicines to take and which to stop until after surgery.    Eating and drinking prior to surgery: follow the instructions from your surgeon    Take a shower or bath the night before surgery. Use the soap your surgeon gave you to gently clean your skin. If you do not have soap from your surgeon, use your regular soap. Do not shave or scrub the surgery site.  Wear clean pajamas and have clean sheets on your bed.           Follow-ups after your visit        Follow-up notes from your  "care team     Return in about 4 weeks (around 8/8/2017) for Shots, medication follow up, Weight check.      Your next 10 appointments already scheduled     Jul 19, 2017   Procedure with Albino Crump MD   [facility], [place], Same Day Surgery (--)    [address]  [place] [id number] [phone]              Who to contact     If you have questions or need follow up information about today's clinic visit or your schedule please contact [facility] directly at [phone].  Normal or non-critical lab and imaging results will be communicated to you by [employer], letter or phone within 4 business days after the clinic has received the results. If you do not hear from us within 7 days, please contact the clinic through [employer] or phone. If you have a critical or abnormal lab result, we will notify you by phone as soon as possible.  Submit refill requests through [employer] or call your pharmacy and they will forward the refill request to us. Please allow 3 business days for your refill to be completed.          Additional Information About Your Visit        [employer] Information     [employer] gives you secure access to your electronic health record. If you see a primary care provider, you can also send messages to your care team and make appointments. If you have questions, please call your primary care clinic.  If you do not have a primary care provider, please call [phone] and they will assist you.        Care EveryWhere ID     This is your Care EveryWhere ID. This could be used by other organizations to access your [place] medical records  [id number]        Your Vitals Were     Pulse Temperature Height Pulse Oximetry BMI (Body Mass Index)       60 99.2  F (37.3  C) (Oral) 5' 9.75\" (1.772 m) 95% 39.74 kg/m2        Blood Pressure from Last 3 Encounters:   07/11/17 106/86   06/30/17 (!) 104/91   05/31/17 116/75    Weight from Last 3 Encounters:   07/11/17 275 lb (124.7 kg)   06/30/17 270 lb " (122.5 kg)   05/31/17 271 lb (122.9 kg)              We Performed the Following     CBC with platelets     Comprehensive metabolic panel     EKG 12-lead complete w/read - Clinics     Hemoglobin A1c     Hepatitis C Screen Reflex to HCV RNA Quant and Genotype     Lipid panel reflex to direct LDL        Primary Care Provider Office Phone # Fax #    Martina Kaleigh Leung [phone]       [facility] [address]  [facility] [address]        Equal Access to Services     INGA MCCRACKEN : Hadii aad ku hadasho Soomaali, waaxda luqadaha, qaybta kaalmada adeegyada, waxay idiin hayaan adeeg kharash la'karuna . So [facility] [phone].    ATENCIÓN: Si habla español, tiene a santana disposición servicios gratuitos de asistencia lingüística. Llame al [phone].    We comply with applicable federal civil rights laws and [place] laws. We do not discriminate on the basis of race, color, national origin, age, disability sex, sexual orientation or gender identity.            Thank you!     Thank you for choosing [facility]  for your care. Our goal is always to provide you with excellent care. Hearing back from our patients is one way we can continue to improve our services. Please take a few minutes to complete the written survey that you may receive in the mail after your visit with us. Thank you!             Your Updated Medication List - Protect others around you: Learn how to safely use, store and throw away your medicines at www.disposemymeds.org.          This list is accurate as of: 7/11/17  7:14 PM.  Always use your most recent med list.                   Brand Name Dispense Instructions for use Diagnosis    albuterol 108 (90 BASE) MCG/ACT Inhaler    PROAIR HFA/PROVENTIL HFA/VENTOLIN HFA    1 Inhaler    Inhale 2 puffs into the lungs every 6 hours as needed for shortness of breath / dyspnea    Moderate persistent asthma without complication       desloratadine 5 MG tablet     CLARINEX    90 tablet    TAKE 1 TABLET (5 MG) BY MOUTH ONCE DAILY    Allergic rhinitis, unspecified allergic rhinitis trigger, unspecified rhinitis seasonality       esomeprazole 40 MG CR capsule    nexIUM    90 capsule    Take 1 capsule (40 mg) by mouth every morning (before breakfast)    Gastroesophageal reflux disease with esophagitis       fluticasone-salmeterol 250-50 MCG/DOSE diskus inhaler    ADVAIR DISKUS    3 Inhaler    Inhale 1 puff into the lungs 2 times daily    Moderate persistent asthma without complication       losartan-hydrochlorothiazide 100-25 MG per tablet    HYZAAR    90 tablet    TAKE ONE TABLET BY MOUTH ONE TIME DAILY    Hypertension, goal below 140/90       montelukast 10 MG tablet    SINGULAIR    90 tablet    Take 1 tablet (10 mg) by mouth daily    Moderate persistent asthma without complication       simvastatin 10 MG tablet    ZOCOR    90 tablet    Take 1 tablet (10 mg) by mouth At Bedtime    Hyperlipidemia LDL goal <130

## 2017-07-12 LAB
ALBUMIN SERPL-MCNC: 3.4 G/DL (ref 3.4–5)
ALP SERPL-CCNC: 92 U/L (ref 40–150)
ALT SERPL W P-5'-P-CCNC: 37 U/L (ref 0–70)
ANION GAP SERPL CALCULATED.3IONS-SCNC: 9 MMOL/L (ref 3–14)
AST SERPL W P-5'-P-CCNC: 17 U/L (ref 0–45)
BILIRUB SERPL-MCNC: 0.4 MG/DL (ref 0.2–1.3)
BUN SERPL-MCNC: 20 MG/DL (ref 7–30)
CALCIUM SERPL-MCNC: 9.1 MG/DL (ref 8.5–10.1)
CHLORIDE SERPL-SCNC: 102 MMOL/L (ref 94–109)
CHOLEST SERPL-MCNC: 155 MG/DL
CO2 SERPL-SCNC: 30 MMOL/L (ref 20–32)
CREAT SERPL-MCNC: 1.21 MG/DL (ref 0.66–1.25)
GFR SERPL CREATININE-BSD FRML MDRD: 61 ML/MIN/1.7M2
GLUCOSE SERPL-MCNC: 98 MG/DL (ref 70–99)
HCV AB SERPL QL IA: NORMAL
HDLC SERPL-MCNC: 46 MG/DL
LDLC SERPL CALC-MCNC: 88 MG/DL
NONHDLC SERPL-MCNC: 109 MG/DL
POTASSIUM SERPL-SCNC: 3.8 MMOL/L (ref 3.4–5.3)
PROT SERPL-MCNC: 7.2 G/DL (ref 6.8–8.8)
SODIUM SERPL-SCNC: 141 MMOL/L (ref 133–144)
TRIGL SERPL-MCNC: 104 MG/DL

## 2017-07-12 ASSESSMENT — ASTHMA QUESTIONNAIRES: ACT_TOTALSCORE: 7

## 2017-07-16 NOTE — PROGRESS NOTES
Dear Arturo    Your test results are attached. I am happy to let you know that they are stable.    The blood sugar is normal and you do not have diabetes. The cholesterol looks great! The kidneys are healthy. The liver test was good. The test for hepatitis C was normal. The complete blood count was normal and you do not have anemia or signs of infection.      Please contact me by MyChart if you have any questions about your labs or management.    Martina Leung MD

## 2017-07-18 ENCOUNTER — ANESTHESIA EVENT (OUTPATIENT)
Dept: SURGERY | Facility: CLINIC | Age: 62
End: 2017-07-18
Payer: COMMERCIAL

## 2017-07-19 ENCOUNTER — ANESTHESIA (OUTPATIENT)
Dept: SURGERY | Facility: CLINIC | Age: 62
End: 2017-07-19
Payer: COMMERCIAL

## 2017-07-19 ENCOUNTER — HOSPITAL ENCOUNTER (OUTPATIENT)
Facility: CLINIC | Age: 62
Discharge: HOME | End: 2017-07-19
Attending: INTERNAL MEDICINE | Admitting: INTERNAL MEDICINE
Payer: COMMERCIAL

## 2017-07-19 ENCOUNTER — SURGERY (OUTPATIENT)
Age: 62
End: 2017-07-19

## 2017-07-19 VITALS
HEIGHT: 70 IN | TEMPERATURE: 98 F | RESPIRATION RATE: 16 BRPM | SYSTOLIC BLOOD PRESSURE: 133 MMHG | DIASTOLIC BLOOD PRESSURE: 95 MMHG | BODY MASS INDEX: 38.95 KG/M2 | WEIGHT: 272.05 LBS | OXYGEN SATURATION: 96 %

## 2017-07-19 DIAGNOSIS — Q85.9 HAMARTOMA (H): Primary | ICD-10-CM

## 2017-07-19 PROCEDURE — 36000064 ZZH SURGERY LEVEL 4 EA 15 ADDTL MIN - UMMC: Performed by: INTERNAL MEDICINE

## 2017-07-19 PROCEDURE — 25000132 ZZH RX MED GY IP 250 OP 250 PS 637: Performed by: STUDENT IN AN ORGANIZED HEALTH CARE EDUCATION/TRAINING PROGRAM

## 2017-07-19 PROCEDURE — 25000125 ZZHC RX 250: Performed by: ANESTHESIOLOGY

## 2017-07-19 PROCEDURE — 37000008 ZZH ANESTHESIA TECHNICAL FEE, 1ST 30 MIN: Performed by: INTERNAL MEDICINE

## 2017-07-19 PROCEDURE — 27210794 ZZH OR GENERAL SUPPLY STERILE: Performed by: INTERNAL MEDICINE

## 2017-07-19 PROCEDURE — 25000128 H RX IP 250 OP 636: Performed by: ANESTHESIOLOGY

## 2017-07-19 PROCEDURE — 25000128 H RX IP 250 OP 636: Performed by: INTERNAL MEDICINE

## 2017-07-19 PROCEDURE — 40000170 ZZH STATISTIC PRE-PROCEDURE ASSESSMENT II: Performed by: INTERNAL MEDICINE

## 2017-07-19 PROCEDURE — 36000062 ZZH SURGERY LEVEL 4 1ST 30 MIN - UMMC: Performed by: INTERNAL MEDICINE

## 2017-07-19 PROCEDURE — C9399 UNCLASSIFIED DRUGS OR BIOLOG: HCPCS | Performed by: ANESTHESIOLOGY

## 2017-07-19 PROCEDURE — 88305 TISSUE EXAM BY PATHOLOGIST: CPT | Performed by: INTERNAL MEDICINE

## 2017-07-19 PROCEDURE — 25000125 ZZHC RX 250: Performed by: STUDENT IN AN ORGANIZED HEALTH CARE EDUCATION/TRAINING PROGRAM

## 2017-07-19 PROCEDURE — 71000027 ZZH RECOVERY PHASE 2 EACH 15 MINS: Performed by: INTERNAL MEDICINE

## 2017-07-19 PROCEDURE — 37000009 ZZH ANESTHESIA TECHNICAL FEE, EACH ADDTL 15 MIN: Performed by: INTERNAL MEDICINE

## 2017-07-19 PROCEDURE — 71000014 ZZH RECOVERY PHASE 1 LEVEL 2 FIRST HR: Performed by: INTERNAL MEDICINE

## 2017-07-19 PROCEDURE — 25000128 H RX IP 250 OP 636: Performed by: STUDENT IN AN ORGANIZED HEALTH CARE EDUCATION/TRAINING PROGRAM

## 2017-07-19 PROCEDURE — 25000565 ZZH ISOFLURANE, EA 15 MIN: Performed by: INTERNAL MEDICINE

## 2017-07-19 RX ORDER — ONDANSETRON 4 MG/1
4 TABLET, ORALLY DISINTEGRATING ORAL EVERY 30 MIN PRN
Status: DISCONTINUED | OUTPATIENT
Start: 2017-07-19 | End: 2017-07-19 | Stop reason: HOSPADM

## 2017-07-19 RX ORDER — ONDANSETRON 2 MG/ML
4 INJECTION INTRAMUSCULAR; INTRAVENOUS EVERY 30 MIN PRN
Status: DISCONTINUED | OUTPATIENT
Start: 2017-07-19 | End: 2017-07-19 | Stop reason: HOSPADM

## 2017-07-19 RX ORDER — HYDROMORPHONE HYDROCHLORIDE 1 MG/ML
.3-.5 INJECTION, SOLUTION INTRAMUSCULAR; INTRAVENOUS; SUBCUTANEOUS EVERY 10 MIN PRN
Status: DISCONTINUED | OUTPATIENT
Start: 2017-07-19 | End: 2017-07-19 | Stop reason: HOSPADM

## 2017-07-19 RX ORDER — CEFAZOLIN SODIUM 1 G/3ML
INJECTION, POWDER, FOR SOLUTION INTRAMUSCULAR; INTRAVENOUS PRN
Status: DISCONTINUED | OUTPATIENT
Start: 2017-07-19 | End: 2017-07-19

## 2017-07-19 RX ORDER — ONDANSETRON 2 MG/ML
INJECTION INTRAMUSCULAR; INTRAVENOUS PRN
Status: DISCONTINUED | OUTPATIENT
Start: 2017-07-19 | End: 2017-07-19

## 2017-07-19 RX ORDER — PROPOFOL 10 MG/ML
INJECTION, EMULSION INTRAVENOUS PRN
Status: DISCONTINUED | OUTPATIENT
Start: 2017-07-19 | End: 2017-07-19

## 2017-07-19 RX ORDER — NALOXONE HYDROCHLORIDE 0.4 MG/ML
.1-.4 INJECTION, SOLUTION INTRAMUSCULAR; INTRAVENOUS; SUBCUTANEOUS
Status: DISCONTINUED | OUTPATIENT
Start: 2017-07-19 | End: 2017-07-19 | Stop reason: HOSPADM

## 2017-07-19 RX ORDER — FENTANYL CITRATE 50 UG/ML
INJECTION, SOLUTION INTRAMUSCULAR; INTRAVENOUS PRN
Status: DISCONTINUED | OUTPATIENT
Start: 2017-07-19 | End: 2017-07-19

## 2017-07-19 RX ORDER — ALBUTEROL SULFATE 90 UG/1
AEROSOL, METERED RESPIRATORY (INHALATION) PRN
Status: DISCONTINUED | OUTPATIENT
Start: 2017-07-19 | End: 2017-07-19

## 2017-07-19 RX ORDER — FENTANYL CITRATE 50 UG/ML
25-50 INJECTION, SOLUTION INTRAMUSCULAR; INTRAVENOUS
Status: DISCONTINUED | OUTPATIENT
Start: 2017-07-19 | End: 2017-07-19 | Stop reason: HOSPADM

## 2017-07-19 RX ORDER — ALBUTEROL SULFATE 0.83 MG/ML
2.5 SOLUTION RESPIRATORY (INHALATION) EVERY 4 HOURS PRN
Status: DISCONTINUED | OUTPATIENT
Start: 2017-07-19 | End: 2017-07-19 | Stop reason: HOSPADM

## 2017-07-19 RX ORDER — PROPOFOL 10 MG/ML
INJECTION, EMULSION INTRAVENOUS CONTINUOUS PRN
Status: DISCONTINUED | OUTPATIENT
Start: 2017-07-19 | End: 2017-07-19

## 2017-07-19 RX ORDER — SODIUM CHLORIDE, SODIUM LACTATE, POTASSIUM CHLORIDE, CALCIUM CHLORIDE 600; 310; 30; 20 MG/100ML; MG/100ML; MG/100ML; MG/100ML
INJECTION, SOLUTION INTRAVENOUS CONTINUOUS
Status: DISCONTINUED | OUTPATIENT
Start: 2017-07-19 | End: 2017-07-19 | Stop reason: HOSPADM

## 2017-07-19 RX ORDER — SODIUM CHLORIDE, SODIUM LACTATE, POTASSIUM CHLORIDE, CALCIUM CHLORIDE 600; 310; 30; 20 MG/100ML; MG/100ML; MG/100ML; MG/100ML
INJECTION, SOLUTION INTRAVENOUS CONTINUOUS PRN
Status: DISCONTINUED | OUTPATIENT
Start: 2017-07-19 | End: 2017-07-19

## 2017-07-19 RX ADMIN — CEFAZOLIN 2 G: 1 INJECTION, POWDER, FOR SOLUTION INTRAMUSCULAR; INTRAVENOUS at 09:40

## 2017-07-19 RX ADMIN — PROPOFOL 30 MG: 10 INJECTION, EMULSION INTRAVENOUS at 09:37

## 2017-07-19 RX ADMIN — SUCCINYLCHOLINE CHLORIDE 100 MG: 20 INJECTION, SOLUTION INTRAMUSCULAR; INTRAVENOUS at 09:33

## 2017-07-19 RX ADMIN — PROPOFOL 50 MG: 10 INJECTION, EMULSION INTRAVENOUS at 09:35

## 2017-07-19 RX ADMIN — EPINEPHRINE 10 ML: 1 INJECTION INTRAMUSCULAR; INTRAVENOUS; SUBCUTANEOUS at 10:18

## 2017-07-19 RX ADMIN — ALBUTEROL SULFATE 6 PUFF: 90 AEROSOL, METERED RESPIRATORY (INHALATION) at 10:24

## 2017-07-19 RX ADMIN — FENTANYL CITRATE 100 MCG: 50 INJECTION, SOLUTION INTRAMUSCULAR; INTRAVENOUS at 09:33

## 2017-07-19 RX ADMIN — SODIUM CHLORIDE, POTASSIUM CHLORIDE, SODIUM LACTATE AND CALCIUM CHLORIDE: 600; 310; 30; 20 INJECTION, SOLUTION INTRAVENOUS at 09:29

## 2017-07-19 RX ADMIN — SUGAMMADEX 200 MG: 100 INJECTION, SOLUTION INTRAVENOUS at 10:20

## 2017-07-19 RX ADMIN — PROPOFOL 150 MG: 10 INJECTION, EMULSION INTRAVENOUS at 09:33

## 2017-07-19 RX ADMIN — ROCURONIUM BROMIDE 40 MG: 10 INJECTION INTRAVENOUS at 09:35

## 2017-07-19 RX ADMIN — ALBUTEROL SULFATE 2.5 MG: 2.5 SOLUTION RESPIRATORY (INHALATION) at 11:06

## 2017-07-19 RX ADMIN — PROPOFOL 150 MCG/KG/MIN: 10 INJECTION, EMULSION INTRAVENOUS at 09:42

## 2017-07-19 RX ADMIN — ONDANSETRON 4 MG: 2 INJECTION INTRAMUSCULAR; INTRAVENOUS at 10:17

## 2017-07-19 NOTE — IP AVS SNAPSHOT
MRN:[id number]                      After Visit Summary   7/19/2017    Arturo Rahman    MRN: [id number]           Thank you!     Thank you for choosing [facility] for your care. Our goal is always to provide you with excellent care. Hearing back from our patients is one way we can continue to improve our services. Please take a few minutes to complete the written survey that you may receive in the mail after you visit with us. Thank you!        Patient Information     Date Of Birth          1955        About your hospital stay     You were admitted on:  July 19, 2017 You last received care in the:  Post Anesthesia Care Unit [facility]    You were discharged on:  July 19, 2017       Who to Call     For medical emergencies, please call 911.  For non-urgent questions about your medical care, please call your primary care provider or clinic, [phone]  For questions related to your surgery, please call your surgery clinic        Attending Provider     Provider Specialty    Albino Crump MD Pulmonary       Primary Care Provider Office Phone # Fax #    Martina Kaleigh Leung [phone]      Further instructions from your care team       [facility]  Same-Day Surgery   Adult Discharge Orders & Instructions     For 24 hours after surgery    1. Get plenty of rest.  A responsible adult must stay with you for at least 24 hours after you leave the hospital.   2. Do not drive or use heavy equipment.  If you have weakness or tingling, don't drive or use heavy equipment until this feeling goes away.  3. Do not drink alcohol.  4. Avoid strenuous or risky activities.  Ask for help when climbing stairs.   5. You may feel lightheaded.  IF so, sit for a few minutes before standing.  Have someone help you get up.   6. If you have nausea (feel sick to your stomach): Drink only clear liquids such as apple juice, ginger ale, broth or 7-Up.  Rest  may also help.  Be sure to drink enough fluids.  Move to a regular diet as you feel able.  7. You may have a slight fever. Call the doctor if your fever is over 100 F (37.7 C) (taken under the tongue) or lasts longer than 24 hours.  8. You may have a dry mouth, a sore throat, muscle aches or trouble sleeping.  These should go away after 24 hours.  9. Do not make important or legal decisions.   Call your doctor for any of the followin.  Signs of infection (fever, growing tenderness at the surgery site, a large amount of drainage or bleeding, severe pain, foul-smelling drainage, redness, swelling).    2. It has been over 8 to 10 hours since surgery and you are still not able to urinate (pass water).    3.  Headache for over 24 hours.    To contact a doctor, call Dr Crump's office at [phone] or:        [phone] and ask for the resident on call for pulmonology (answered 24 hours a day)      Emergency Department:    [facility]: [phone]       (TTY for hearing impaired: [phone])          In addition to any other recommendations provided to you by nursing staff or your doctor, here are some additional post procedure recommendations:     Bronchoscopy: Following your bronchoscopy please follow these general guidelines.   1.  You may cough up a small amount of blood which should be dark in color. If you cough up bright red blood or larger amounts of blood then you should seek immediate medical attention.   2.  Monitor for increasing shortness of breath, chest pressure, or chest pain.   3.  Monitor for low grade fever which is normal up to 100.5 but this fever should not persistent for greater than 24 hours.  Unless other contra-indications exist you may treat with tylenol. If fever persists seek medical attention.   4.  If you have any concerns there is a pulmonologist on call 24 hours a day at [phone].   5.  During business hours and if there is no emergency you can also call the  "[facility] Office at [phone].  6.  Have CT scan in 6 months with clinic visit with Dr. Crump          Pending Results     Date and Time Order Name Status Description    7/19/2017 0958 Surgical pathology exam In process             Admission Information     Date & Time Provider Department Dept. Phone    7/19/2017 Albino Crump MD Post Anesthesia Care Unit [facility] [phone]      Your Vitals Were     Blood Pressure Temperature Respirations Height Weight Pulse Oximetry    127/79 97.8  F (36.6  C) (Axillary) 16 1.778 m (5' 10\") 123.4 kg (272 lb 0.8 oz) 100%    BMI (Body Mass Index)                   39.03 kg/m2           [employer] Information     [employer] gives you secure access to your electronic health record. If you see a primary care provider, you can also send messages to your care team and make appointments. If you have questions, please call your primary care clinic.  If you do not have a primary care provider, please call [phone] and they will assist you.        Care EveryWhere ID     This is your Care EveryWhere ID. This could be used by other organizations to access your [place] medical records  [id number]        Equal Access to Services     INGA MCCRACKEN AH: Sheri Medrano, waclair arcos, qamelissa kaalmada sobia, earl barrow. So [facility] [phone].    ATENCIÓN: Si habla español, tiene a santana disposición servicios gratuitos de asistencia lingüística. Llame al [phone].    We comply with applicable federal civil rights laws and [place] laws. We do not discriminate on the basis of race, color, national origin, age, disability sex, sexual orientation or gender identity.               Review of your medicines      CONTINUE these medicines which have NOT CHANGED        Dose / Directions    albuterol 108 (90 BASE) MCG/ACT Inhaler   Commonly known as:  PROAIR HFA/PROVENTIL HFA/VENTOLIN HFA   Used for:  Moderate " persistent asthma without complication        Dose:  2 puff   Inhale 2 puffs into the lungs every 6 hours as needed for shortness of breath / dyspnea   Quantity:  1 Inhaler   Refills:  11       desloratadine 5 MG tablet   Commonly known as:  CLARINEX   Used for:  Allergic rhinitis, unspecified allergic rhinitis trigger, unspecified rhinitis seasonality        TAKE 1 TABLET (5 MG) BY MOUTH ONCE DAILY   Quantity:  90 tablet   Refills:  1       esomeprazole 40 MG CR capsule   Commonly known as:  nexIUM   Used for:  Gastroesophageal reflux disease with esophagitis        Dose:  40 mg   Take 1 capsule (40 mg) by mouth every morning (before breakfast)   Quantity:  90 capsule   Refills:  2       fluticasone-salmeterol 250-50 MCG/DOSE diskus inhaler   Commonly known as:  ADVAIR DISKUS   Used for:  Moderate persistent asthma without complication        Dose:  1 puff   Inhale 1 puff into the lungs 2 times daily   Quantity:  3 Inhaler   Refills:  0       losartan-hydrochlorothiazide 100-25 MG per tablet   Commonly known as:  HYZAAR   Used for:  Hypertension, goal below 140/90        TAKE ONE TABLET BY MOUTH ONE TIME DAILY   Quantity:  90 tablet   Refills:  0       montelukast 10 MG tablet   Commonly known as:  SINGULAIR   Used for:  Moderate persistent asthma without complication        Dose:  1 tablet   Take 1 tablet (10 mg) by mouth daily   Quantity:  90 tablet   Refills:  2       simvastatin 10 MG tablet   Commonly known as:  ZOCOR   Used for:  Hyperlipidemia LDL goal <130        Dose:  10 mg   Take 1 tablet (10 mg) by mouth At Bedtime   Quantity:  90 tablet   Refills:  3                Protect others around you: Learn how to safely use, store and throw away your medicines at www.disposemymeds.org.             Medication List: This is a list of all your medications and when to take them. Check marks below indicate your daily home schedule. Keep this list as a reference.      Medications           Morning Afternoon Evening  Bedtime As Needed    albuterol 108 (90 BASE) MCG/ACT Inhaler   Commonly known as:  PROAIR HFA/PROVENTIL HFA/VENTOLIN HFA   Inhale 2 puffs into the lungs every 6 hours as needed for shortness of breath / dyspnea   Last time this was given:  6 puffs on 7/19/2017 10:24 AM                                desloratadine 5 MG tablet   Commonly known as:  CLARINEX   TAKE 1 TABLET (5 MG) BY MOUTH ONCE DAILY                                esomeprazole 40 MG CR capsule   Commonly known as:  nexIUM   Take 1 capsule (40 mg) by mouth every morning (before breakfast)                                fluticasone-salmeterol 250-50 MCG/DOSE diskus inhaler   Commonly known as:  ADVAIR DISKUS   Inhale 1 puff into the lungs 2 times daily                                losartan-hydrochlorothiazide 100-25 MG per tablet   Commonly known as:  HYZAAR   TAKE ONE TABLET BY MOUTH ONE TIME DAILY                                montelukast 10 MG tablet   Commonly known as:  SINGULAIR   Take 1 tablet (10 mg) by mouth daily                                simvastatin 10 MG tablet   Commonly known as:  ZOCOR   Take 1 tablet (10 mg) by mouth At Bedtime

## 2017-07-19 NOTE — PROGRESS NOTES
Order placed for ct    Albino Crump MD  Pulmonary and Critical Care  [facility]  Pager:  [phone]

## 2017-07-19 NOTE — ADDENDUM NOTE
Addendum  created 07/19/17 1148 by Abdiel Pabon MD    Anesthesia Review and Sign - Ready for Procedure

## 2017-07-19 NOTE — OR NURSING
Dr. Pabon at bedside to assess pt. MD states to give neb for wheezing and send to phase 2. MD will place sign out when able.

## 2017-07-19 NOTE — ANESTHESIA PREPROCEDURE EVALUATION
Anesthesia Evaluation     . Pt has had prior anesthetic. Type: General           ROS/MED HX    ENT/Pulmonary:     (+)CALDERON risk factors Moderate Persistent asthma Treatment: Inhaler daily,  , . .    Neurologic:  - neg neurologic ROS     Cardiovascular:     (+) Dyslipidemia, hypertension----. : . . . :. . Previous cardiac testing date:results:date: results:ECG reviewed date: results:NSR date: results:          METS/Exercise Tolerance:  1 - Eating, dressing   Hematologic:  - neg hematologic  ROS       Musculoskeletal:  - neg musculoskeletal ROS       GI/Hepatic:     (+) GERD       Renal/Genitourinary:  - ROS Renal section negative       Endo:     (+) Obesity, .      Psychiatric:  - neg psychiatric ROS       Infectious Disease:         Malignancy:         Other:                     Physical Exam  Normal systems: dental    Airway   Mallampati: III  TM distance: >3 FB  Neck ROM: full    Dental     Cardiovascular   Rhythm and rate: regular and normal      Pulmonary    breath sounds clear to auscultation                    Anesthesia Plan      History & Physical Review  History and physical reviewed and following examination; no interval change.    ASA Status:  3 .    NPO Status:  > 8 hours    Plan for General, ETT and RSI with Intravenous induction. Maintenance will be TIVA.    PONV prophylaxis:  Ondansetron (or other 5HT-3) and Dexamethasone or Solumedrol  Additional equipment: Videolaryngoscope and 2nd IV ASA3  Pre-op/intraoperative albuterol  TIVA with propofol  8.5 ETT    Fer Escobedo MD, CA-1    Plan discussed with Dr. Pabon, attending anesthesiologist.  Final plan to be determined on day of procedure by attending anesthesiologist.        Postoperative Care  Postoperative pain management:  IV analgesics and Multi-modal analgesia.      Consents  Anesthetic plan, risks, benefits and alternatives discussed with:  Patient and Spouse..            Anesthesia Pre-op Note    Arturo Rahman is a 61 year old male with  past medical as described below is scheduled for Procedure(s):  Flexible, Possible Rigid, Bronchoscopy, Tumor Debulking with Cryoprobe and/or Argon Plasma Coagulation - Wound Class: II-Clean Contaminated   - Wound Class: II-Clean Contaminated    Past Medical History:   Diagnosis Date     Allergies      Asthma, moderate persistent      FH: colon cancer     colonoscopy q 5yr.  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     HTN (hypertension)      Hyperlipidemia      Past Surgical History:   Procedure Laterality Date     COLONOSCOPY  11/08    + polyp, +FH, repeat in 5 years     TONSILLECTOMY       VASECTOMY       Family History   Problem Relation Age of Onset     Arthritis Mother      Hypertension Father      CEREBROVASCULAR DISEASE Father      Asthma Daughter      Allergies Daughter      Hypertension Sister      Cancer - colorectal Sister      dx age 50     Social History     Social History     Marital status:      Spouse name: N/A     Number of children: 2     Years of education: N/A     Occupational History      [employer]     Social History Main Topics     Smoking status: Former Smoker     Quit date: 10/28/1991     Smokeless tobacco: Never Used     Alcohol use Yes      Comment: rare     Drug use: No     Sexual activity: Yes     Partners: Female     Birth control/ protection: None     Other Topics Concern     Not on file     Social History Narrative     No current outpatient prescriptions on file.     Current Facility-Administered Medications   Medication     No Pre Procedure Antibiotic Needed        Allergies   Allergen Reactions     Lisinopril Cough         Lab:     CBC RESULTS:   Recent Labs   Lab Test  07/11/17 1919   WBC  11.2*   RBC  5.01   HGB  14.2   HCT  43.7   MCV  87   MCH  28.3   MCHC  32.5   RDW  15.6*   PLT  260     Recent Labs   Lab Test  07/11/17 1919 07/22/16 03/21/16   1351   NA  141   --   140   POTASSIUM  3.8   4.2  3.2*   CHLORIDE  102   --   103   CO2  30   --   31   ANIONGAP  9   --   6   GLC  98  156*  136*   BUN  20   --   16   CR  1.21  1.13  1.30*   TATIANNA  9.1   --   8.4*     The laboratory has evaluated your INR and PTT and the results are as listed below.    Lab Results   Component Value Date    INR 1.0 07/22/2016     No results found for: PTT    Comments:     Physician Attestation   I saw and evaluated Arturo Rahman. I have reviewed and edited the documentation of the training physician (resident/fellow) and agree with the history, physical, and plan.     I personally reviewed the vital signs, medications and labs.      Abdiel Pabon MD, PhD  Date of Service (when I saw the patient): 07/19/17    SAME DAY SURGERY [address] [address]  [phone]  Dept: [phone]                                                     .

## 2017-07-19 NOTE — DISCHARGE INSTRUCTIONS
[facility]  Same-Day Surgery   Adult Discharge Orders & Instructions     For 24 hours after surgery    1. Get plenty of rest.  A responsible adult must stay with you for at least 24 hours after you leave the hospital.   2. Do not drive or use heavy equipment.  If you have weakness or tingling, don't drive or use heavy equipment until this feeling goes away.  3. Do not drink alcohol.  4. Avoid strenuous or risky activities.  Ask for help when climbing stairs.   5. You may feel lightheaded.  IF so, sit for a few minutes before standing.  Have someone help you get up.   6. If you have nausea (feel sick to your stomach): Drink only clear liquids such as apple juice, ginger ale, broth or 7-Up.  Rest may also help.  Be sure to drink enough fluids.  Move to a regular diet as you feel able.  7. You may have a slight fever. Call the doctor if your fever is over 100 F (37.7 C) (taken under the tongue) or lasts longer than 24 hours.  8. You may have a dry mouth, a sore throat, muscle aches or trouble sleeping.  These should go away after 24 hours.  9. Do not make important or legal decisions.   Call your doctor for any of the followin.  Signs of infection (fever, growing tenderness at the surgery site, a large amount of drainage or bleeding, severe pain, foul-smelling drainage, redness, swelling).    2. It has been over 8 to 10 hours since surgery and you are still not able to urinate (pass water).    3.  Headache for over 24 hours.    To contact a doctor, call Dr Crump's office at [phone] or:        [phone] and ask for the resident on call for pulmonology (answered 24 hours a day)      Emergency Department:    [facility]: [phone]       (TTY for hearing impaired: [phone])          In addition to any other recommendations provided to you by nursing staff or your doctor, here are some additional post procedure recommendations:     Bronchoscopy: Following  your bronchoscopy please follow these general guidelines.   1.  You may cough up a small amount of blood which should be dark in color. If you cough up bright red blood or larger amounts of blood then you should seek immediate medical attention.   2.  Monitor for increasing shortness of breath, chest pressure, or chest pain.   3.  Monitor for low grade fever which is normal up to 100.5 but this fever should not persistent for greater than 24 hours.  Unless other contra-indications exist you may treat with tylenol. If fever persists seek medical attention.   4.  If you have any concerns there is a pulmonologist on call 24 hours a day at [phone].   5.  During business hours and if there is no emergency you can also call the [facility] Office at [phone].  6.  Have CT scan in 6 months with clinic visit with Dr. Crump

## 2017-07-19 NOTE — PROCEDURES
Procedure(s):    Bronchoscopy  Tumor Debulking  APC    Indication:  Endobronchial mass / hamartoma    Attending of Record:     Albino Crump MD    Trainees Present:   Liat Gonzalez MD    Medications:    General Anesthesia - See anesthesia flowsheet for details    Sedation Time:  Per Anesthesia Care Provider    Time Out:  Performed    The patient's medical record has been reviewed.  The indication for the procedure was reviewed.  The necessary history and physical examination was performed and reviewed.  The risks, benefits and alternatives of the procedure were discussed with the the patient in detail and he had the opportunity to ask questions.  I discussed in particular the potential complications including risks of minor or life-threatening bleeding and/or infection, respiratory failure, vocal cord trauma / paralysis, pneumothorax, and discomfort. Sedation risks were also discussed including abnormal heart rhythms, low blood pressure, and respiratory failure. All questions were answered to the best of my ability.  Verbal and written informed consent was obtained.  The proposed procedure and the patient's identification were verified prior to the procedure by the physician and the nurse, technician, resident physician (resident / fellow).    The patient was assessed for the adequacy for the procedure and to receive medications.   Mental Status:  Alert and oriented x 3  Airway examination:  n/a  Pulmonary:  Clear to ausculation bilaterally  CV:  RRR, no murmurs or gallops  ASA Grade:  (II)  Mild systemic disease    After clinical evaluation and reviewing the indication, risks, alternatives and benefits of the procedure the patient was deemed to be in satisfactory condition to undergo the procedure.           Maneuvers / Procedure:     The bronchoscope was inserted through the mouth via ETT.      Airway Examination:  A complete airway examination was performed from the distal trachea to the subsegmental  level in each lobe of both lungs with exceptions/pertinent findings noted as follows :     There right side to the subsegmental level had normal mucosa and increased thin secretions throughout. The left side had an obstructing lesion to the left upper lobe (98% obstruction).  Airway distal to the obstructing lesion could be partially visualized.  The 2.4 mm cryoprobe was then inserted and several passes made to debulk the obstructing lesion after application of 5 ml of epinephrine x 2.  There was minimal bleeding.  After the vast majority of the tumor was removed the broad based stalk of the tumor was investigated.  There was not a clear delineation of the stalk of the broad based tumor and the bronchial wall therefore further debulking was deferred.  APC performed over the remaining stalk of the tumor.  The bronchoscope (therapeutic) could easily pass distal to the area of the lesion.                Pertinent Images / special notes:     Obstructing Left Upper Lobe Lesion:       Left Upper Lobe Airway after Debulking:       After APC:       Any disposable equipment was visually inspected and deemed to be intact immediately post procedure.      Recommendations:     -->  6 month follow up CT.       I was present for the entire viewing portion of the endoscopy / bronchoscopy procedure for patient Arturo Rahman. This includes the time-out for patient verification and procedure verification.  I was present for the entire procedure from scope insertion to scope withdrawal.  I have reviewed the above report and agree with the findings, interpretation, and recommendations.  Any changes have been made direction in the note above before signing.     Albino Crump MD  Pulmonary and Critical Care  [facility]  Pager:  [phone]

## 2017-07-19 NOTE — ANESTHESIA CARE TRANSFER NOTE
Patient: Arturo Rahman    Procedure(s):   Bronchoscopy, Tumor Debulking with Cryoprobe and Argon Plasma Coagulation - Wound Class: II-Clean Contaminated   - Wound Class: II-Clean Contaminated    Diagnosis: Examine Airway, Clear Tissue out of Airway to Improve Breathing  Diagnosis Additional Information: No value filed.    Anesthesia Type:   General, ETT, RSI     Note:  Airway :Face Mask  Patient transferred to:PACU  Comments: Transferred to PACU.  O2 via facemask, VSS.  Appears comfortable.  Wheeze present R>L.  Albuterol given prior to transport.  Report given to RN.        Vitals: (Last set prior to Anesthesia Care Transfer)    CRNA VITALS  7/19/2017 0959 - 7/19/2017 1037      7/19/2017             Pulse: 75    Temp 2: (!)  20.1  C (68.2  F)    SpO2: 99 %    Resp Rate (observed): 13    Resp Rate (set): 10                Electronically Signed By: Fer Escobedo MD  July 19, 2017  10:37 AM

## 2017-07-19 NOTE — ANESTHESIA POSTPROCEDURE EVALUATION
Patient: Arturo Rahman    Procedure(s):   Bronchoscopy, Tumor Debulking with Cryoprobe and Argon Plasma Coagulation - Wound Class: II-Clean Contaminated   - Wound Class: II-Clean Contaminated    Diagnosis:Examine Airway, Clear Tissue out of Airway to Improve Breathing  Diagnosis Additional Information: No value filed.    Anesthesia Type:  General, ETT, RSI    Note:  Anesthesia Post Evaluation    Patient location during evaluation: PACU  Patient participation: Able to fully participate in evaluation  Level of consciousness: awake and alert  Pain management: adequate  Airway patency: patent  Cardiovascular status: blood pressure returned to baseline  Respiratory status: acceptable  Hydration status: acceptable  PONV: none             Last vitals:  Vitals:    07/19/17 1100 07/19/17 1110 07/19/17 1131   BP: 117/79 127/79 (!) 132/98   Resp: 16 16 16   Temp: 36.6  C (97.8  F) 36.6  C (97.8  F) 36.6  C (97.9  F)   SpO2: 99% 100% 99%         Electronically Signed By: Abdiel Pabon MD  July 19, 2017  11:47 AM

## 2017-07-21 LAB — COPATH REPORT: NORMAL

## 2017-08-08 ENCOUNTER — OFFICE VISIT (OUTPATIENT)
Dept: FAMILY MEDICINE | Facility: CLINIC | Age: 62
End: 2017-08-08
Payer: COMMERCIAL

## 2017-08-08 VITALS
OXYGEN SATURATION: 93 % | HEIGHT: 70 IN | DIASTOLIC BLOOD PRESSURE: 89 MMHG | HEART RATE: 93 BPM | WEIGHT: 280 LBS | SYSTOLIC BLOOD PRESSURE: 130 MMHG | BODY MASS INDEX: 40.09 KG/M2 | TEMPERATURE: 99 F

## 2017-08-08 DIAGNOSIS — J45.40 MODERATE PERSISTENT ASTHMA WITHOUT COMPLICATION: ICD-10-CM

## 2017-08-08 DIAGNOSIS — H02.9 EYELID LESION: ICD-10-CM

## 2017-08-08 DIAGNOSIS — Z23 NEED FOR HEPATITIS B VACCINATION: ICD-10-CM

## 2017-08-08 DIAGNOSIS — E78.5 HYPERLIPIDEMIA LDL GOAL <130: ICD-10-CM

## 2017-08-08 DIAGNOSIS — I10 HYPERTENSION, GOAL BELOW 140/90: ICD-10-CM

## 2017-08-08 DIAGNOSIS — J84.10 GRANULOMATOUS LUNG DISEASE (H): ICD-10-CM

## 2017-08-08 DIAGNOSIS — Q85.9 HAMARTOMA OF LEFT LUNG (H): Primary | ICD-10-CM

## 2017-08-08 DIAGNOSIS — E66.01 MORBID OBESITY DUE TO EXCESS CALORIES (H): ICD-10-CM

## 2017-08-08 DIAGNOSIS — Z23 NEED FOR PROPHYLACTIC VACCINATION AGAINST STREPTOCOCCUS PNEUMONIAE (PNEUMOCOCCUS): ICD-10-CM

## 2017-08-08 PROBLEM — E66.812 OBESITY, CLASS II, BMI 35-39.9: Status: RESOLVED | Noted: 2017-05-31 | Resolved: 2017-08-08

## 2017-08-08 PROCEDURE — 90746 HEPB VACCINE 3 DOSE ADULT IM: CPT | Performed by: FAMILY MEDICINE

## 2017-08-08 PROCEDURE — 90472 IMMUNIZATION ADMIN EACH ADD: CPT | Performed by: FAMILY MEDICINE

## 2017-08-08 PROCEDURE — 99214 OFFICE O/P EST MOD 30 MIN: CPT | Mod: 25 | Performed by: FAMILY MEDICINE

## 2017-08-08 PROCEDURE — 90732 PPSV23 VACC 2 YRS+ SUBQ/IM: CPT | Performed by: FAMILY MEDICINE

## 2017-08-08 PROCEDURE — 90471 IMMUNIZATION ADMIN: CPT | Performed by: FAMILY MEDICINE

## 2017-08-08 ASSESSMENT — PAIN SCALES - GENERAL: PAINLEVEL: NO PAIN (0)

## 2017-08-08 NOTE — NURSING NOTE
"Chief Complaint   Patient presents with     Surgical Followup       Initial /89 (BP Location: Right arm, Patient Position: Chair, Cuff Size: Adult Large)  Pulse 93  Temp 99  F (37.2  C) (Oral)  Ht 5' 9.75\" (1.772 m)  Wt 280 lb (127 kg)  SpO2 93%  BMI 40.46 kg/m2 Estimated body mass index is 40.46 kg/(m^2) as calculated from the following:    Height as of this encounter: 5' 9.75\" (1.772 m).    Weight as of this encounter: 280 lb (127 kg).  Medication Reconciliation: complete     Jevon Whiting CMA    "

## 2017-08-08 NOTE — PATIENT INSTRUCTIONS
How to contact your care team: [phone] Pharmacy [phone]   MD REY JONES PA-C CHRIS JONES, PA-C NAM HO, MD JONATHAN BATES, MD ARVIN VOCAL, MD    Clinic hours M-Th 7am-7pm Fri 7am-5pm.   Urgent care M-F 11am-9pm  Sat/Sun 9am-5pm.   Pharmacy   Mon-Th:  8:00am-8pm   Fri:  8:00am-6:00pm  Sat/Sun  8:00am-5:00 pm       Exercise for a Healthier Heart  You may wonder how you can improve the health of your heart. If you re thinking about exercise, you re on the right track. You don t need to become an athlete, but you do need a certain amount of brisk exercise to help strengthen your heart. If you have been diagnosed with a heart condition, your doctor may recommend exercise to help stabilize your condition. To help make exercise a habit, choose safe, fun activities.     Exercise with a friend. When activity is fun, you're more likely to stick with it.     Be sure to check with your healthcare provider before starting an exercise program.   Why exercise?  Exercising regularly offers many healthy rewards. It can help you do all of the following:    Improve your blood cholesterol level to help prevent further heart trouble    Lower your blood pressure to help prevent a stroke or heart attack    Control diabetes, or reduce your risk of getting this disease    Improve your heart and lung function    Reach and maintain a healthy weight    Make your muscles stronger and more limber so you can stay active    Prevent falls and fractures by slowing the loss of bone mass (osteoporosis)    Manage stress better    Reduce your blood pressure    Improve your sense of self and your body image  Exercise tips  Ease into your routine. Set small goals. Then build on them.  Exercise on most days. Aim for a total of 150 or more minutes of moderate to  vigorous intensity activity each week. Consider 40 minutes, 3 to 4 times a week. For best results, activity should last for 40 minutes on average. It is OK to  work up to the 40 minute period over time. Examples of moderate-intensity activity is walking 1 mile in 15 minutes or 30 to 45 minutes of yard work.  Step up your daily activity level. Along with your exercise program, try being more active throughout the day. Walk instead of drive. Do more household tasks or yard work.  Choose one or more activities you enjoy. Walking is one of the easiest things you can do. You can also try swimming, riding a bike, dancing, or taking an exercise class.  Stop exercising and call your doctor if you:    Have chest pain or feel dizzy or lightheaded    Feel burning, tightness, pressure, or heaviness in your chest, neck, shoulders, back, or arms    Have unusual shortness of breath    Have increased joint or muscle pain    Have palpitations or an irregular heartbeat   Date Last Reviewed: 5/1/2016 2000-2017 [employer]. [address] [place] [address], [place] [address]. All rights reserved. This information is not intended as a substitute for professional medical care. Always follow your healthcare professional's instructions.
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
## 2017-08-08 NOTE — MR AVS SNAPSHOT
After Visit Summary   8/8/2017    Arturo Rahman    MRN: [id number]           Patient Information     Date Of Birth          1955        Visit Information        Provider Department      8/8/2017 6:40 PM Martina Leung MD [facility]        Today's Diagnoses     Hamartoma of left lung (H)    -  1    Moderate persistent asthma without complication        Hypertension, goal below 140/90        Hyperlipidemia LDL goal <130        Granulomatous lung disease (H)        Eyelid lesion          Care Instructions    How to contact your care team: [phone] Pharmacy [phone]   MD REY JONES, PAJIM ATKINS MD JONATHAN BATES, MD ARVIN VOCAL, MD    Clinic hours M-Th 7am-7pm Fri 7am-5pm.   Urgent care M-F 11am-9pm  Sat/Sun 9am-5pm.   Pharmacy   Mon-Th:  8:00am-8pm   Fri:  8:00am-6:00pm  Sat/Sun  8:00am-5:00 pm       Exercise for a Healthier Heart  You may wonder how you can improve the health of your heart. If you re thinking about exercise, you re on the right track. You don t need to become an athlete, but you do need a certain amount of brisk exercise to help strengthen your heart. If you have been diagnosed with a heart condition, your doctor may recommend exercise to help stabilize your condition. To help make exercise a habit, choose safe, fun activities.     Exercise with a friend. When activity is fun, you're more likely to stick with it.     Be sure to check with your healthcare provider before starting an exercise program.   Why exercise?  Exercising regularly offers many healthy rewards. It can help you do all of the following:    Improve your blood cholesterol level to help prevent further heart trouble    Lower your blood pressure to help prevent a stroke or heart attack    Control diabetes, or reduce your risk of getting this disease    Improve your heart and lung function    Reach and maintain a healthy  weight    Make your muscles stronger and more limber so you can stay active    Prevent falls and fractures by slowing the loss of bone mass (osteoporosis)    Manage stress better    Reduce your blood pressure    Improve your sense of self and your body image  Exercise tips  Ease into your routine. Set small goals. Then build on them.  Exercise on most days. Aim for a total of 150 or more minutes of moderate to  vigorous intensity activity each week. Consider 40 minutes, 3 to 4 times a week. For best results, activity should last for 40 minutes on average. It is OK to work up to the 40 minute period over time. Examples of moderate-intensity activity is walking 1 mile in 15 minutes or 30 to 45 minutes of yard work.  Step up your daily activity level. Along with your exercise program, try being more active throughout the day. Walk instead of drive. Do more household tasks or yard work.  Choose one or more activities you enjoy. Walking is one of the easiest things you can do. You can also try swimming, riding a bike, dancing, or taking an exercise class.  Stop exercising and call your doctor if you:    Have chest pain or feel dizzy or lightheaded    Feel burning, tightness, pressure, or heaviness in your chest, neck, shoulders, back, or arms    Have unusual shortness of breath    Have increased joint or muscle pain    Have palpitations or an irregular heartbeat   Date Last Reviewed: 5/1/2016 2000-2017 [employer]. [address] [place] [id number]. All rights reserved. This information is not intended as a substitute for professional medical care. Always follow your healthcare professional's instructions.                Follow-ups after your visit        Additional Services     DERMATOLOGY REFERRAL       Your provider has referred you to: [facility] [phone]   [email or web address]    Please be aware that coverage of these services is  subject to the terms and limitations of your health insurance plan.  Call member services at your health plan with any benefit or coverage questions.      Please bring the following with you to your appointment:    (1) Any X-Rays, CTs or MRIs which have been performed.  Contact the facility where they were done to arrange for  prior to your scheduled appointment.    (2) List of current medications  (3) This referral request   (4) Any documents/labs given to you for this referral                  Follow-up notes from your care team     Return in about 3 months (around 11/8/2017) for medication follow up, Physical Exam.      Your next 10 appointments already scheduled     Dec 11, 2017  3:00 PM CST   CT CHEST W/O CONTRAST with MGCT1   [facility])    [address] [address] [place] [address] [phone]           Please bring any scans or X-rays taken at other hospitals, if similar tests were done. Also bring a list of your medicines, including vitamins, minerals and over-the-counter drugs. It is safest to leave personal items at home.  Be sure to tell your doctor:   If you have any allergies.   If there s any chance you are pregnant.   If you are breastfeeding.   If you have any special needs.  You do not need to do anything special to prepare.  Please wear loose clothing, such as a sweat suit or jogging clothes. Avoid snaps, zippers and other metal. We may ask you to undress and put on a hospital gown.            Dec 11, 2017  3:30 PM CST   Return Visit with Albino Crump MD   [facility])    [address] [address] [facility] [address] [phone]              Who to contact     If you have questions or need follow up information about today's clinic visit or your schedule please contact [facility]ARETT CELESTE directly at [phone].  Normal or non-critical lab and imaging results will  "be communicated to you by MyChart, letter or phone within 4 business days after the clinic has received the results. If you do not hear from us within 7 days, please contact the clinic through [facility] or phone. If you have a critical or abnormal lab result, we will notify you by phone as soon as possible.  Submit refill requests through [employer] or call your pharmacy and they will forward the refill request to us. Please allow 3 business days for your refill to be completed.          Additional Information About Your Visit        [facility] Information     [employer] gives you secure access to your electronic health record. If you see a primary care provider, you can also send messages to your care team and make appointments. If you have questions, please call your primary care clinic.  If you do not have a primary care provider, please call [phone] and they will assist you.        Care EveryWhere ID     This is your Care EveryWhere ID. This could be used by other organizations to access your [facility] medical records  [id number]        Your Vitals Were     Pulse Temperature Height Pulse Oximetry BMI (Body Mass Index)       93 99  F (37.2  C) (Oral) 5' 9.75\" (1.772 m) 93% 40.46 kg/m2        Blood Pressure from Last 3 Encounters:   08/08/17 130/89   07/19/17 (!) 133/95   07/11/17 106/86    Weight from Last 3 Encounters:   08/08/17 280 lb (127 kg)   07/19/17 272 lb 0.8 oz (123.4 kg)   07/11/17 275 lb (124.7 kg)              We Performed the Following     DERMATOLOGY REFERRAL        Primary Care Provider Office Phone # Fax #    Martinanilda Leung [phone]       [address]  [facility] [address]        Equal Access to Services     [facility]: Hadjohnny Medrano, waclair arcos, ivett kaalmada sobia, earl barrow. So [facility] [phone].    ATENCIÓN: Si habla español, tiene a santana disposición servicios gratuitos de asistencia lingüística. Llame al " [phone].    We comply with applicable federal civil rights laws and [place] laws. We do not discriminate on the basis of race, color, national origin, age, disability sex, sexual orientation or gender identity.            Thank you!     Thank you for choosing [facility]  for your care. Our goal is always to provide you with excellent care. Hearing back from our patients is one way we can continue to improve our services. Please take a few minutes to complete the written survey that you may receive in the mail after your visit with us. Thank you!             Your Updated Medication List - Protect others around you: Learn how to safely use, store and throw away your medicines at www.disposemymeds.org.          This list is accurate as of: 8/8/17  6:48 PM.  Always use your most recent med list.                   Brand Name Dispense Instructions for use Diagnosis    albuterol 108 (90 BASE) MCG/ACT Inhaler    PROAIR HFA/PROVENTIL HFA/VENTOLIN HFA    1 Inhaler    Inhale 2 puffs into the lungs every 6 hours as needed for shortness of breath / dyspnea    Moderate persistent asthma without complication       desloratadine 5 MG tablet    CLARINEX    90 tablet    TAKE 1 TABLET (5 MG) BY MOUTH ONCE DAILY    Allergic rhinitis, unspecified allergic rhinitis trigger, unspecified rhinitis seasonality       esomeprazole 40 MG CR capsule    nexIUM    90 capsule    Take 1 capsule (40 mg) by mouth every morning (before breakfast)    Gastroesophageal reflux disease with esophagitis       fluticasone-salmeterol 250-50 MCG/DOSE diskus inhaler    ADVAIR DISKUS    3 Inhaler    Inhale 1 puff into the lungs 2 times daily    Moderate persistent asthma without complication       losartan-hydrochlorothiazide 100-25 MG per tablet    HYZAAR    90 tablet    TAKE ONE TABLET BY MOUTH ONE TIME DAILY    Hypertension, goal below 140/90       montelukast 10 MG tablet    SINGULAIR    90 tablet    Take 1 tablet (10 mg) by mouth  daily    Moderate persistent asthma without complication       simvastatin 10 MG tablet    ZOCOR    90 tablet    Take 1 tablet (10 mg) by mouth At Bedtime    Hyperlipidemia LDL goal <130

## 2017-08-09 ASSESSMENT — ASTHMA QUESTIONNAIRES: ACT_TOTALSCORE: 19

## 2017-09-11 DIAGNOSIS — J45.40 MODERATE PERSISTENT ASTHMA WITHOUT COMPLICATION: ICD-10-CM

## 2017-09-11 DIAGNOSIS — J30.9 ALLERGIC RHINITIS: ICD-10-CM

## 2017-09-11 DIAGNOSIS — I10 HYPERTENSION, GOAL BELOW 140/90: ICD-10-CM

## 2017-09-12 NOTE — TELEPHONE ENCOUNTER
losartan-hydrochlorothiazide (HYZAAR) 100-25 MG per tablet      Last Written Prescription Date: 6/13/17  Last Fill Quantity: 90, # refills: 0  Last Office Visit with [facility], [facility] or  Health prescribing provider: 8/8/17       Potassium   Date Value Ref Range Status   07/11/2017 3.8 3.4 - 5.3 mmol/L Final     Creatinine   Date Value Ref Range Status   07/11/2017 1.21 0.66 - 1.25 mg/dL Final     BP Readings from Last 3 Encounters:   08/08/17 130/89   07/19/17 (!) 133/95   07/11/17 106/86         fluticasone-salmeterol (ADVAIR DISKUS) 250-50 MCG/DOSE diskus inhaler       Last Written Prescription Date: 12/27/16  Last Fill Quantity: 3, # refills: 0    Last Office Visit with [facility], [facility] or [facility] prescribing provider:  8/8/17   Future Office Visit:       Date of Last Asthma Action Plan Letter:   Asthma Action Plan Q1 Year    Topic Date Due     Asthma Action Plan - yearly  12/01/2016      Asthma Control Test:   ACT Total Scores 8/8/2017   ACT TOTAL SCORE -   ASTHMA ER VISITS -   ASTHMA HOSPITALIZATIONS -   ACT TOTAL SCORE (Goal Greater than or Equal to 20) 19   In the past 12 months, how many times did you visit the emergency room for your asthma without being admitted to the hospital? 0   In the past 12 months, how many times were you hospitalized overnight because of your asthma? 0       Date of Last Spirometry Test:   No results found for this or any previous visit.      montelukast (SINGULAIR) 10 MG tablet      Last Written Prescription Date: 9/9/16  Last Fill Quantity: 90,  # refills: 2   Last Office Visit with [facility] [place] [facility], [facility] or  Health prescribing provider: 8/8/17      desloratadine (CLARINEX) 5 MG tablet      Last Written Prescription Date: 3/17/17  Last Fill Quantity: 90,  # refills: 0   Last Office Visit with [facility] [place] [facility], [facility] or  Health prescribing provider: 8/8/17      Stephanie Patrick   Radiology

## 2017-09-13 RX ORDER — DESLORATADINE 5 MG/1
TABLET ORAL
Qty: 90 TABLET | Refills: 2 | Status: SHIPPED | OUTPATIENT
Start: 2017-09-13 | End: 2018-06-07

## 2017-09-13 RX ORDER — MONTELUKAST SODIUM 10 MG/1
TABLET ORAL
Qty: 90 TABLET | Refills: 1 | Status: SHIPPED | OUTPATIENT
Start: 2017-09-13 | End: 2018-03-10

## 2017-09-13 RX ORDER — LOSARTAN POTASSIUM AND HYDROCHLOROTHIAZIDE 25; 100 MG/1; MG/1
TABLET ORAL
Qty: 90 TABLET | Refills: 2 | Status: SHIPPED | OUTPATIENT
Start: 2017-09-13 | End: 2018-06-07

## 2017-12-08 DIAGNOSIS — E78.5 HYPERLIPIDEMIA LDL GOAL <130: ICD-10-CM

## 2017-12-08 NOTE — TELEPHONE ENCOUNTER
Requested Prescriptions   Pending Prescriptions Disp Refills     simvastatin (ZOCOR) 10 MG tablet [Pharmacy Med Name: Simvastatin Oral Tablet 10 MG]  Last Written Prescription Date:  12/15/16  Last Fill Quantity: 90,  # refills: 3   Last Office Visit with ROBBY, JENNIFER or [facility] prescribing provider:  08/08/17   Future Office Visit:    Next 5 appointments (look out 90 days)     Dec 11, 2017  3:30 PM CST   [facility] Visit with Albino Crump MD   [facility]    [address] [place] [id number]   [phone]                90 tablet 2     Sig: take 1 tablet by mouth at bedtime    Statins Protocol Passed    12/8/2017 10:16 AM       Passed - LDL on file in past 12 months    Recent Labs   Lab Test  07/11/17   1919   LDL  88            Passed - No abnormal creatine kinase in past 12 months    No lab results found.         Passed - Recent or future visit with authorizing provider    Patient had office visit in the last year or has a visit in the next 30 days with authorizing provider.  See chart review.              Passed - Patient is age 18 or older

## 2017-12-11 ENCOUNTER — OFFICE VISIT (OUTPATIENT)
Dept: PULMONOLOGY | Facility: CLINIC | Age: 62
End: 2017-12-11
Payer: COMMERCIAL

## 2017-12-11 ENCOUNTER — RADIANT APPOINTMENT (OUTPATIENT)
Dept: CT IMAGING | Facility: CLINIC | Age: 62
End: 2017-12-11
Attending: INTERNAL MEDICINE
Payer: COMMERCIAL

## 2017-12-11 VITALS
OXYGEN SATURATION: 96 % | DIASTOLIC BLOOD PRESSURE: 85 MMHG | HEART RATE: 100 BPM | SYSTOLIC BLOOD PRESSURE: 124 MMHG | WEIGHT: 270 LBS | BODY MASS INDEX: 39.02 KG/M2

## 2017-12-11 DIAGNOSIS — Q85.9 HAMARTOMA (H): ICD-10-CM

## 2017-12-11 DIAGNOSIS — R06.00 DYSPNEA, UNSPECIFIED TYPE: Primary | ICD-10-CM

## 2017-12-11 PROCEDURE — 71250 CT THORAX DX C-: CPT | Performed by: RADIOLOGY

## 2017-12-11 PROCEDURE — 99214 OFFICE O/P EST MOD 30 MIN: CPT | Performed by: INTERNAL MEDICINE

## 2017-12-11 RX ORDER — SIMVASTATIN 10 MG
TABLET ORAL
Qty: 90 TABLET | Refills: 1 | Status: SHIPPED | OUTPATIENT
Start: 2017-12-11 | End: 2018-06-07

## 2017-12-11 NOTE — PATIENT INSTRUCTIONS
1.  Plan for bronchoscopy in Feb / March  2.  Have breathing tests to assess lung function.   3.  Have echocardiogram of the lung (ultrasound)  4.  Follow up in 1-2 months after these tests completed.

## 2017-12-11 NOTE — NURSING NOTE
"Arturo Rahman's goals for this visit include: Lung Nodule  He requests these members of his care team be copied on today's visit information: yes    PCP: Martina Leung    Referring Provider:  No referring provider defined for this encounter.    Chief Complaint   Patient presents with     Lung Nodule       Initial /85 (BP Location: Left arm, Patient Position: Chair, Cuff Size: Adult Large)  Pulse 100  Wt 122.5 kg (270 lb)  SpO2 96%  BMI 39.02 kg/m2 Estimated body mass index is 39.02 kg/(m^2) as calculated from the following:    Height as of 8/8/17: 1.772 m (5' 9.75\").    Weight as of this encounter: 122.5 kg (270 lb).  Medication Reconciliation: complete    Do you need any medication refills at today's visit? no    "

## 2017-12-11 NOTE — TELEPHONE ENCOUNTER
Last office visit with PCP 8/8/17.  Refilled per [facility] RN Refill Protocol.  Zoila Espinosa RN

## 2017-12-11 NOTE — PROGRESS NOTES
Pulmonary Clinic     We have been asked by Dr. Leung to evaluate this patient in regards to   Chief Complaint   Patient presents with     Lung Nodule         HPI:      this is a 62-year-old male who I initially saw in consultation  For endobronchial lesion and postobstructive pneumonia concerning for malignancy.  He had persistent pneumonia and a CT scan which demonstrated a possible endobronchial lesion with postoperative atelectasis.   He underwent a bronchoscopy in July 2017 for endobronchial mass reduction for hamartoma.  We were able to free the majority of the endobronchial lesion but the lingula continue to have some obstruction and for this who recommended a short-term follow-up with most likely repeat tumor debulking/hamartoma  Debulking to follow.  Since her last visit the patient has continued to have some dyspnea on exertion and limitation of physical exertion though overall has been doing well.  He has had no other persistent infections.    His CT scan today demonstrates improvement in the lingula atelectasis but some persistent endobronchial lesion, again which is attrributted to the hamartoma.       Review of Systems:   10 point ROS performed with pertinent +/- noted in the HPI.  The remainder of the ROS was otherwise negative.        Pertinent Medications     Current Outpatient Prescriptions   Medication     simvastatin (ZOCOR) 10 MG tablet     montelukast (SINGULAIR) 10 MG tablet     desloratadine (CLARINEX) 5 MG tablet     losartan-hydrochlorothiazide (HYZAAR) 100-25 MG per tablet     ADVAIR DISKUS 250-50 MCG/DOSE diskus inhaler     esomeprazole (NEXIUM) 40 MG CR capsule     albuterol (PROAIR HFA/PROVENTIL HFA/VENTOLIN HFA) 108 (90 BASE) MCG/ACT Inhaler     No current facility-administered medications for this visit.         Allergies:      Allergies   Allergen Reactions     Lisinopril Cough          Past Medical Hx:   Patient Active Problem List    Hamartoma of left lung (H)         Priority:  Medium [2]         Date Noted: 08/08/2017      Morbid obesity (H)         Priority: Medium [2]         Date Noted: 07/11/2017      Endobronchial mass         Priority: Medium [2]         Date Noted: 06/27/2017      Hypertension, goal below 140/90         Priority: Medium [2]         Date Noted: 03/11/2014      Advanced directives, counseling/discussion         Priority: Medium [2]         Date Noted: 12/21/2012            Advance Care Planning:             ACP Review and Resources Provided:  Reviewed chart            for advance care plan.  Arturo STERLING Paulquirino has no            plan or code status on file. Discussed available            resources on 03/11/2014. Confirmed code status            reflects current choices pending further ACP            discussions.  Confirmed/documented designated            decision maker(s). See permanent comments section            of demographics in clinical tab.             Added by Torrie Larry on 3/11/2014            Discussed advance care planning with patient;            information given to patient to review.            Catherine Russell MA             12/21/2012             Discussed advance care planning with patient;            however, patient declined at             this time. March 11, 2014            Diann Ortiz MA                  Allergic rhinitis         Priority: Medium [2]         Date Noted: 09/19/2012            Problem list name updated by automated process.            Provider to review      Hyperlipidemia LDL goal <130         Priority: Medium [2]         Date Noted: 10/31/2010      Asthma, moderate persistent         Priority: Medium [2]      Granulomatous lung disease (H)         Priority: Medium [2]      GERD (gastroesophageal reflux disease)         Priority: Medium [2]      FH: colon cancer         Priority: Medium [2]            colonoscopy q 5yr.  last 2008           Family Hx:     Family History   Problem Relation Age of Onset     Arthritis Mother       Hypertension Father      CEREBROVASCULAR DISEASE Father      Asthma Daughter      Allergies Daughter      Hypertension Sister      Cancer - colorectal Sister      dx age 50          Objective   Vitals:  /85 (BP Location: Left arm, Patient Position: Chair, Cuff Size: Adult Large)  Pulse 100  Wt 122.5 kg (270 lb)  SpO2 96%  BMI 39.02 kg/m2    General:  Adult male;appears stated age; no acute distress; the patient is a good historian  HEENT:  NCAT; EOMI; No icterus; no injection; MMM  Neck: Supple, full range of motion, no lymphadenopathy  Pulm: CTAB, normal to percussion  CV: RRR, no murmurs, rubs or gallops  Abdomen: Obese, soft, NT, ND, +BS        Labs / Imaging/Studies     Imaging:   CT:   1. Residual area of nodular soft tissue thickening within the lingular  bronchus concerning for residual mass. Recommend direct visualization.  There is postobstructive mild atelectasis of the lingula, decreased  since prior.  2. Sequela of prior chronic granulomatous disease.         Assessment and Plan:   Assessment:   This is a 62-year-old male with an endobronchial hamartoma now status post partial resection with some residual lesion does remain.  There has been significant improvement in the postoperative atelectasis but there remains some persistent hamartoma and atelectasis of the lingular.  We discussed continued conservative management versus repeat a bronchoscopy with endobronchial tumor debulking.  Plan at this point will be to repeat a bronchoscopy with cryoprobe tumor debulking of the lingula.     In regards to his persistent dyspnea I would like to get an echocardiogram as well as pulmonary function tests to better assess his pulmonary function and cardiac fxn.     1. Dyspnea, unspecified type  See above  - General PFT Lab (Please always keep checked); Future  - Pulmonary Function Test; Future  - Echocardiogram Complete; Future    I spent 30 minutes with direct face to face interaction with this patient  and provided at least 50% of this time counseling and coordinating care for hamartoma of airway and dyspnea on exertion as noted above in the assessment and plan.        Albino Crump MD  Pulmonary and Critical Care  [facility]  Pager:  [phone]

## 2017-12-11 NOTE — MR AVS SNAPSHOT
After Visit Summary   12/11/2017    Arturo Rahman    MRN: [id number]           Patient Information     Date Of Birth          1955        Visit Information        Provider Department      12/11/2017 3:30 PM Albino Crump MD [facility]        Today's Diagnoses     Dyspnea, unspecified type    -  1      Care Instructions    1.  Plan for bronchoscopy in Feb / March  2.  Have breathing tests to assess lung function.   3.  Have echocardiogram of the lung (ultrasound)  4.  Follow up in 1-2 months after these tests completed.           Follow-ups after your visit        Your next 10 appointments already scheduled     Dec 20, 2017  2:30 PM CST   Ech Complete with MGECHR1, MG ECHO TECH   [facility])    [address] [place] [address] [phone]           1. Please bring or wear a comfortable two-piece outfit. 2. You may eat, drink and take your normal medicines. 3. For any questions that cannot be answered, please contact the ordering physician            Dec 20, 2017  3:30 PM CST   Office Visit with PFT LAB   [facility])    [address] [place] [address] [phone]           Bring a current list of meds and any records pertaining to this visit. For Physicals, please bring immunization records and any forms needing to be filled out. Please arrive 10 minutes early to complete paperwork.              Future tests that were ordered for you today     Open Future Orders        Priority Expected Expires Ordered    Echocardiogram Complete Routine  12/11/2018 12/11/2017    General PFT Lab (Please always keep checked) Routine  12/11/2018 12/11/2017    Pulmonary Function Test Routine  2/27/2027 12/11/2017            Who to contact     If you have questions or need follow up information about today's clinic visit or your schedule please contact [facility] directly at [phone].  Normal or non-critical lab and imaging results will be communicated to you by Element Robothart, letter or phone within 4 business days after the clinic has received the results. If you do not hear from us within 7 days, please contact the clinic through [employer] or phone. If you have a critical or abnormal lab result, we will notify you by phone as soon as possible.  Submit refill requests through DNAnexus or call your pharmacy and they will forward the refill request to us. Please allow 3 business days for your refill to be completed.          Additional Information About Your Visit        DNAnexus Information     DNAnexus gives you secure access to your electronic health record. If you see a primary care provider, you can also send messages to your care team and make appointments. If you have questions, please call your primary care clinic.  If you do not have a primary care provider, please call [phone] and they will assist you.      DNAnexus is an electronic gateway that provides easy, online access to your medical records. With DNAnexus, you can request a clinic appointment, read your test results, renew a prescription or communicate with your care team.     To access your existing account, please contact your [facility] or call [phone] for assistance.        Care EveryWhere ID     This is your Care EveryWhere ID. This could be used by other organizations to access your [facility] medical records  [id number]        Your Vitals Were     Pulse Pulse Oximetry BMI (Body Mass Index)             100 96% 39.02 kg/m2          Blood Pressure from Last 3 Encounters:   12/11/17 124/85   08/08/17 130/89   07/19/17 (!) 133/95    Weight from Last 3 Encounters:   12/11/17 122.5 kg (270 lb)   08/08/17 127 kg (280 lb)   07/19/17 123.4 kg (272 lb 0.8 oz)               Primary Care Provider Office Phone # Fax #    Martina Leung [phone]  [phone]       [address]  [place] [address]        Equal Access to Services     INGA MCCRACKEN : Hadii aad ku hadquinno Sojamesali, waaxda luqadaha, qaybta kaalmada adeegyada, earl jonathanin hayaalisy loyaestrella price demetrio barrow. So [facility] [phone].    ATENCIÓN: Si habla español, tiene a santana disposición servicios gratuitos de asistencia lingüística. Llame al [phone].    We comply with applicable federal civil rights laws and [place] laws. We do not discriminate on the basis of race, color, national origin, age, disability, sex, sexual orientation, or gender identity.            Thank you!     Thank you for choosing [facility]  for your care. Our goal is always to provide you with excellent care. Hearing back from our patients is one way we can continue to improve our services. Please take a few minutes to complete the written survey that you may receive in the mail after your visit with us. Thank you!             Your Updated Medication List - Protect others around you: Learn how to safely use, store and throw away your medicines at www.disposemymeds.org.          This list is accurate as of: 12/11/17  4:11 PM.  Always use your most recent med list.                   Brand Name Dispense Instructions for use Diagnosis    ADVAIR DISKUS 250-50 MCG/DOSE diskus inhaler   Generic drug:  fluticasone-salmeterol     3 Inhaler    inhale 1 puff into the lungs 2 times a day    Moderate persistent asthma without complication       albuterol 108 (90 BASE) MCG/ACT Inhaler    PROAIR HFA/PROVENTIL HFA/VENTOLIN HFA    1 Inhaler    Inhale 2 puffs into the lungs every 6 hours as needed for shortness of breath / dyspnea    Moderate persistent asthma without complication       desloratadine 5 MG tablet    CLARINEX    90 tablet    TAKE ONE TABLET BY MOUTH ONE TIME DAILY    Allergic rhinitis       esomeprazole 40 MG CR capsule    nexIUM    90 capsule    Take 1 capsule (40 mg) by mouth every morning (before  breakfast)    Gastroesophageal reflux disease with esophagitis       losartan-hydrochlorothiazide 100-25 MG per tablet    HYZAAR    90 tablet    TAKE ONE TABLET BY MOUTH ONE TIME DAILY    Hypertension, goal below 140/90       montelukast 10 MG tablet    SINGULAIR    90 tablet    TAKE ONE TABLET BY MOUTH ONE TIME DAILY    Moderate persistent asthma without complication       simvastatin 10 MG tablet    ZOCOR    90 tablet    take 1 tablet by mouth at bedtime    Hyperlipidemia LDL goal <130

## 2017-12-12 DIAGNOSIS — Q85.9 HAMARTOMA OF LUNG (H): Primary | ICD-10-CM

## 2017-12-20 ENCOUNTER — RADIANT APPOINTMENT (OUTPATIENT)
Dept: CARDIOLOGY | Facility: CLINIC | Age: 62
End: 2017-12-20
Attending: INTERNAL MEDICINE
Payer: COMMERCIAL

## 2017-12-20 ENCOUNTER — OFFICE VISIT (OUTPATIENT)
Dept: NURSING | Facility: CLINIC | Age: 62
End: 2017-12-20
Payer: COMMERCIAL

## 2017-12-20 DIAGNOSIS — R06.00 DYSPNEA, UNSPECIFIED TYPE: ICD-10-CM

## 2017-12-20 LAB
DLCOUNC-%PRED-PRE: 75 %
DLCOUNC-PRE: 21.34 ML/MIN/MMHG
DLCOUNC-PRED: 28.18 ML/MIN/MMHG
ERV-%PRED-PRE: 27 %
ERV-PRE: 0.15 L
ERV-PRED: 0.53 L
EXPTIME-PRE: 7.04 SEC
FEF2575-%PRED-POST: 113 %
FEF2575-%PRED-PRE: 100 %
FEF2575-POST: 3.35 L/SEC
FEF2575-PRE: 2.96 L/SEC
FEF2575-PRED: 2.94 L/SEC
FEFMAX-%PRED-PRE: 74 %
FEFMAX-PRE: 6.81 L/SEC
FEFMAX-PRED: 9.1 L/SEC
FEV1-%PRED-PRE: 87 %
FEV1-PRE: 3.07 L
FEV1FEV6-PRE: 84 %
FEV1FEV6-PRED: 79 %
FEV1FVC-PRE: 84 %
FEV1FVC-PRED: 78 %
FEV1SVC-PRE: 76 %
FEV1SVC-PRED: 72 %
FIFMAX-PRE: 4.38 L/SEC
FRCPLETH-%PRED-PRE: 59 %
FRCPLETH-PRE: 2.12 L
FRCPLETH-PRED: 3.58 L
FVC-%PRED-PRE: 80 %
FVC-PRE: 3.67 L
FVC-PRED: 4.56 L
IC-%PRED-PRE: 88 %
IC-PRE: 3.87 L
IC-PRED: 4.37 L
RVPLETH-%PRED-PRE: 81 %
RVPLETH-PRE: 1.97 L
RVPLETH-PRED: 2.41 L
TLCPLETH-%PRED-PRE: 85 %
TLCPLETH-PRE: 5.99 L
TLCPLETH-PRED: 7.02 L
VA-%PRED-PRE: 75 %
VA-PRE: 5.07 L
VC-%PRED-PRE: 82 %
VC-PRE: 4.02 L
VC-PRED: 4.9 L

## 2017-12-20 PROCEDURE — 94060 EVALUATION OF WHEEZING: CPT | Performed by: INTERNAL MEDICINE

## 2017-12-20 PROCEDURE — 94729 DIFFUSING CAPACITY: CPT | Performed by: INTERNAL MEDICINE

## 2017-12-20 PROCEDURE — 93306 TTE W/DOPPLER COMPLETE: CPT

## 2017-12-20 PROCEDURE — 94726 PLETHYSMOGRAPHY LUNG VOLUMES: CPT | Performed by: INTERNAL MEDICINE

## 2017-12-20 NOTE — MR AVS SNAPSHOT
After Visit Summary   12/20/2017    Arturo Rahman    MRN: [id number]           Patient Information     Date Of Birth          1955        Visit Information        Provider Department      12/20/2017 3:30 PM PFT LAB [facility]        Today's Diagnoses     Dyspnea, unspecified type           Follow-ups after your visit        Your next 10 appointments already scheduled     Dec 22, 2017  7:40 AM CST   Office Visit with Martina Leung MD   [facility]    [address] [facility] [address] [phone]           Bring a current list of meds and any records pertaining to this visit. For Physicals, please bring immunization records and any forms needing to be filled out. Please arrive 10 minutes early to complete paperwork.            Jan 11, 2018   Procedure with Albino Crump MD   [facility] [place], Same Day Surgery (--)    [address]  [place] [address] [phone]              Who to contact     If you have questions or need follow up information about today's clinic visit or your schedule please contact [facility] directly at [phone].  Normal or non-critical lab and imaging results will be communicated to you by MyChart, letter or phone within 4 business days after the clinic has received the results. If you do not hear from us within 7 days, please contact the clinic through MyChart or phone. If you have a critical or abnormal lab result, we will notify you by phone as soon as possible.  Submit refill requests through Klip or call your pharmacy and they will forward the refill request to us. Please allow 3 business days for your refill to be completed.          Additional Information About Your Visit        Lion[employer] Information     Klip gives you secure access to your electronic health record. If you see a primary care provider, you can also send messages to  your care team and make appointments. If you have questions, please call your primary care clinic.  If you do not have a primary care provider, please call [phone] and they will assist you.      DxNA is an electronic gateway that provides easy, online access to your medical records. With DxNA, you can request a clinic appointment, read your test results, renew a prescription or communicate with your care team.     To access your existing account, please contact your [facility] or call [phone] for assistance.        Care EveryWhere ID     This is your Care EveryWhere ID. This could be used by other organizations to access your [facility] medical records  [id number]         Blood Pressure from Last 3 Encounters:   12/11/17 124/85   08/08/17 130/89   07/19/17 (!) 133/95    Weight from Last 3 Encounters:   12/11/17 122.5 kg (270 lb)   08/08/17 127 kg (280 lb)   07/19/17 123.4 kg (272 lb 0.8 oz)              We Performed the Following     General PFT Lab (Please always keep checked)     HC DIFFUSING CAPACITY     HC PLETHYSMOGRAPHY LUNG VOLUMES W/WO AIRWAY RESIST     Pulmonary Function Test     RESPIRATORY FLOW VOLUME LOOP        Primary Care Provider Office Phone # Fax #    Martina Kaleigh Leung [phone]       [address]  [facility] [address]        Equal Access to Services     INGA MCCRACKEN AH: Hadii aad ku hadasho Soomaali, waaxda luqadaha, qaybta kaalmada adeegyada, earl barrow. So [facility] [phone].    ATENCIÓN: Si habla español, tiene a santana disposición servicios gratuitos de asistencia lingüística. Llame al [phone].    We comply with applicable federal civil rights laws and [place] laws. We do not discriminate on the basis of race, color, national origin, age, disability, sex, sexual orientation, or gender identity.            Thank you!     Thank you for choosing [facility]  for your care. Our  goal is always to provide you with excellent care. Hearing back from our patients is one way we can continue to improve our services. Please take a few minutes to complete the written survey that you may receive in the mail after your visit with us. Thank you!             Your Updated Medication List - Protect others around you: Learn how to safely use, store and throw away your medicines at www.disposemymeds.org.          This list is accurate as of: 12/20/17  4:13 PM.  Always use your most recent med list.                   Brand Name Dispense Instructions for use Diagnosis    ADVAIR DISKUS 250-50 MCG/DOSE diskus inhaler   Generic drug:  fluticasone-salmeterol     3 Inhaler    inhale 1 puff into the lungs 2 times a day    Moderate persistent asthma without complication       albuterol 108 (90 BASE) MCG/ACT Inhaler    PROAIR HFA/PROVENTIL HFA/VENTOLIN HFA    1 Inhaler    Inhale 2 puffs into the lungs every 6 hours as needed for shortness of breath / dyspnea    Moderate persistent asthma without complication       desloratadine 5 MG tablet    CLARINEX    90 tablet    TAKE ONE TABLET BY MOUTH ONE TIME DAILY    Allergic rhinitis       esomeprazole 40 MG CR capsule    nexIUM    90 capsule    Take 1 capsule (40 mg) by mouth every morning (before breakfast)    Gastroesophageal reflux disease with esophagitis       losartan-hydrochlorothiazide 100-25 MG per tablet    HYZAAR    90 tablet    TAKE ONE TABLET BY MOUTH ONE TIME DAILY    Hypertension, goal below 140/90       montelukast 10 MG tablet    SINGULAIR    90 tablet    TAKE ONE TABLET BY MOUTH ONE TIME DAILY    Moderate persistent asthma without complication       simvastatin 10 MG tablet    ZOCOR    90 tablet    take 1 tablet by mouth at bedtime    Hyperlipidemia LDL goal <130

## 2017-12-22 ENCOUNTER — OFFICE VISIT (OUTPATIENT)
Dept: FAMILY MEDICINE | Facility: CLINIC | Age: 62
End: 2017-12-22
Payer: COMMERCIAL

## 2017-12-22 VITALS
OXYGEN SATURATION: 94 % | HEART RATE: 76 BPM | HEIGHT: 68 IN | WEIGHT: 278 LBS | BODY MASS INDEX: 42.13 KG/M2 | DIASTOLIC BLOOD PRESSURE: 84 MMHG | SYSTOLIC BLOOD PRESSURE: 116 MMHG | RESPIRATION RATE: 20 BRPM | TEMPERATURE: 98.4 F

## 2017-12-22 DIAGNOSIS — J45.40 MODERATE PERSISTENT ASTHMA WITHOUT COMPLICATION: ICD-10-CM

## 2017-12-22 DIAGNOSIS — Z01.818 PREOP GENERAL PHYSICAL EXAM: Primary | ICD-10-CM

## 2017-12-22 DIAGNOSIS — E66.01 MORBID OBESITY (H): ICD-10-CM

## 2017-12-22 DIAGNOSIS — I10 HYPERTENSION, GOAL BELOW 140/90: ICD-10-CM

## 2017-12-22 DIAGNOSIS — J84.10 GRANULOMATOUS LUNG DISEASE (H): ICD-10-CM

## 2017-12-22 DIAGNOSIS — E78.5 HYPERLIPIDEMIA LDL GOAL <130: ICD-10-CM

## 2017-12-22 DIAGNOSIS — Q85.9 HAMARTOMA OF LEFT LUNG (H): ICD-10-CM

## 2017-12-22 LAB
ALBUMIN SERPL-MCNC: 3.1 G/DL (ref 3.4–5)
ANION GAP SERPL CALCULATED.3IONS-SCNC: 5 MMOL/L (ref 3–14)
BUN SERPL-MCNC: 23 MG/DL (ref 7–30)
CALCIUM SERPL-MCNC: 8.7 MG/DL (ref 8.5–10.1)
CHLORIDE SERPL-SCNC: 106 MMOL/L (ref 94–109)
CO2 SERPL-SCNC: 31 MMOL/L (ref 20–32)
CREAT SERPL-MCNC: 1.13 MG/DL (ref 0.66–1.25)
ERYTHROCYTE [DISTWIDTH] IN BLOOD BY AUTOMATED COUNT: 15.2 % (ref 10–15)
GFR SERPL CREATININE-BSD FRML MDRD: 66 ML/MIN/1.7M2
GLUCOSE SERPL-MCNC: 113 MG/DL (ref 70–99)
HCT VFR BLD AUTO: 43.3 % (ref 40–53)
HGB BLD-MCNC: 14.1 G/DL (ref 13.3–17.7)
MCH RBC QN AUTO: 28.3 PG (ref 26.5–33)
MCHC RBC AUTO-ENTMCNC: 32.6 G/DL (ref 31.5–36.5)
MCV RBC AUTO: 87 FL (ref 78–100)
PHOSPHATE SERPL-MCNC: 3.2 MG/DL (ref 2.5–4.5)
PLATELET # BLD AUTO: 219 10E9/L (ref 150–450)
POTASSIUM SERPL-SCNC: 3.9 MMOL/L (ref 3.4–5.3)
RBC # BLD AUTO: 4.98 10E12/L (ref 4.4–5.9)
SODIUM SERPL-SCNC: 142 MMOL/L (ref 133–144)
WBC # BLD AUTO: 7 10E9/L (ref 4–11)

## 2017-12-22 PROCEDURE — 80069 RENAL FUNCTION PANEL: CPT | Performed by: FAMILY MEDICINE

## 2017-12-22 PROCEDURE — 85027 COMPLETE CBC AUTOMATED: CPT | Performed by: FAMILY MEDICINE

## 2017-12-22 PROCEDURE — 36415 COLL VENOUS BLD VENIPUNCTURE: CPT | Performed by: FAMILY MEDICINE

## 2017-12-22 PROCEDURE — 99214 OFFICE O/P EST MOD 30 MIN: CPT | Performed by: FAMILY MEDICINE

## 2017-12-22 PROCEDURE — 93000 ELECTROCARDIOGRAM COMPLETE: CPT | Performed by: FAMILY MEDICINE

## 2017-12-22 ASSESSMENT — PAIN SCALES - GENERAL: PAINLEVEL: NO PAIN (0)

## 2017-12-22 NOTE — PATIENT INSTRUCTIONS
At [facility], we strive to deliver an exceptional experience to you, every time we see you.  If you receive a survey in the mail, please send us back your thoughts. We really do value your feedback.    Based on your medical history, these are the current health maintenance/preventive care services that you are due for (some may have been done at this visit.)  Health Maintenance Due   Topic Date Due     ASTHMA ACTION PLAN Q1 YR  12/01/2016     INFLUENZA VACCINE (SYSTEM ASSIGNED)  09/01/2017         Suggested websites for health information:  Www.Figleaves.com.Scandit : Up to date and easily searchable information on multiple topics.  Www.[employer].gov : medication info, interactive tutorials, watch real surgeries online  Www.familydoctor.org : good info from the Academy of Family Physicians  Www.cdc.gov : public health info, travel advisories, epidemics (H1N1)  Www.aap.org : children's health info, normal development, vaccinations  Www.health.[facility].[place].us : [place] dept of health, public health issues in [place], N1N1    Your care team:                            Family Medicine Internal Medicine   MD Jae An MD Shantel Branch-Fleming, MD Katya Georgiev PA-C Nam Ho, MD Pediatrics   JIM Brady, PEREZ Dumont APRN CNP   MD Savnanah Mcfarland MD Deborah Mielke, MD Kim Thein, APRN [facility]      Clinic hours: Monday - Thursday 7 am-7 pm; Fridays 7 am-5 pm.   Urgent care: Monday - Friday 11 am-9 pm; Saturday and Sunday 9 am-5 pm.  Pharmacy : Monday -Thursday 8 am-8 pm; Friday 8 am-6 pm; Saturday and Sunday 9 am-5 pm.     Clinic: [phone]   Pharmacy: [phone]    Before Your Surgery      Call your surgeon if there is any change in your health. This includes signs of a cold or flu (such as a sore throat, runny nose, cough, rash or fever).    Do not smoke, drink alcohol or take over the counter medicine (unless your surgeon or primary  care doctor tells you to) for the 24 hours before and after surgery.    If you take prescribed drugs: Follow your doctor s orders about which medicines to take and which to stop until after surgery.    Eating and drinking prior to surgery: follow the instructions from your surgeon    Take a shower or bath the night before surgery. Use the soap your surgeon gave you to gently clean your skin. If you do not have soap from your surgeon, use your regular soap. Do not shave or scrub the surgery site.  Wear clean pajamas and have clean sheets on your bed.

## 2017-12-22 NOTE — PROGRESS NOTES
[address] [address]  [phone]  Dept: [phone]    PRE-OP EVALUATION:  Today's date: 2017    Arturo Rahman (: 1955) presents for pre-operative evaluation assessment as requested by Dr. Arturo Rahman.  He requires evaluation and anesthesia risk assessment prior to undergoing surgery/procedure for treatment of residual hamartoma of left lung with blockage of bronchial tube .  Proposed procedure: Flexible Bronchoscopy Tumor Debulking With Cryoprobe and/or Argon Plasma Coagulation    Date of Surgery/ Procedure: 18  Time of Surgery/ Procedure: 10:30am  Hospital/Surgical Facility: [facility]  Fax number for surgical facility: NA  Primary Physician: Martina Leung  Type of Anesthesia Anticipated: General    Patient has a Health Care Directive or Living Will:  NO    1. NO - Do you have a history of heart attack, stroke, stent, bypass or surgery on an artery in the head, neck, heart or legs?  2. NO - Do you ever have any pain or discomfort in your chest?  3. NO - Do you have a history of  Heart Failure?  4. YES - Are you troubled by shortness of breath when: walking on the level, up a slight hill or at night?  5. NO - Do you currently have a cold, bronchitis or other respiratory infection?  6. YES - Do you have a cough, shortness of breath or wheezing? Asthma symptoms with some wheezing but symptoms are stable controlled mostly with inhaler use. Echocardiogram and pulmonary function tests done.   7. NO - Do you sometimes get pains in the calves of your legs when you walk?  8. NO - Do you or anyone in your family have previous history of blood clots?  9. NO - Do you or does anyone in your family have a serious bleeding problem such as prolonged bleeding following surgeries or cuts?  10. NO - Have you ever had problems with anemia or been told to take iron pills?  11. NO - Have you had any abnormal blood loss  such as black, tarry or bloody stools, or abnormal vaginal bleeding?  12. NO - Have you ever had a blood transfusion?  13. NO - Have you or any of your relatives ever had problems with anesthesia?  14. NO - Do you have sleep apnea, excessive snoring or daytime drowsiness?  15. NO - Do you have any prosthetic heart valves?  16. NO - Do you have prosthetic joints?  17. NO - Is there any chance that you may be pregnant?        HPI:                                                      Brief HPI related to upcoming procedure: Hamartoma with residual tumor in lungs that needs to be removed. Lungs and heart stable per PFT and echocardiogram reports but still having shortness of breath with activity due to asthma.       HYPERTENSION - Patient has longstanding history of mod-severe HTN , currently denies any symptoms referable to elevated blood pressure. Specifically denies chest pain, palpitations, dyspnea, orthopnea, PND or peripheral edema. Blood pressure readings have been in normal range. Current medication regimen is as listed below. Patient denies any side effects of medication.                                                                                                                                                                                          .  HYPERLIPIDEMIA - Patient has a long history of significant Hyperlipidemia requiring medication for treatment with recent good control. Patient reports no problems or side effects with the medication.                                                                                                                                                       .  ASTHMA - Patient has a longstanding history of moderate-severe Asthma . Patient has been doing well overall noting SOB and COOK and continues on medication regimen consisting of inhalers without adverse reactions or side effects.                                                                                                                                                .    MEDICAL HISTORY:                                                    Patient Active Problem List    Diagnosis Date Noted     Hamartoma of left lung (H) 08/08/2017     Priority: Medium     Morbid obesity (H) 07/11/2017     Priority: Medium     Endobronchial mass 06/27/2017     Priority: Medium     Hypertension, goal below 140/90 03/11/2014     Priority: Medium     Advanced directives, counseling/discussion 12/21/2012     Priority: Medium     Advance Care Planning:   ACP Review and Resources Provided:  Reviewed chart for advance care plan.  Arturo STERLING Lacey has no plan or code status on file. Discussed available resources on 03/11/2014. Confirmed code status reflects current choices pending further ACP discussions.  Confirmed/documented designated decision maker(s). See permanent comments section of demographics in clinical tab.   Added by Torrie Larry on 3/11/2014  Discussed advance care planning with patient; information given to patient to review.  Catherine Russell MA   12/21/2012   Discussed advance care planning with patient; however, patient declined at this time. March 11, 2014  Diann Ortiz MA         Allergic rhinitis 09/19/2012     Priority: Medium     Problem list name updated by automated process. Provider to review       Hyperlipidemia LDL goal <130 10/31/2010     Priority: Medium     Asthma, moderate persistent      Priority: Medium     Granulomatous lung disease (H)      Priority: Medium     GERD (gastroesophageal reflux disease)      Priority: Medium     FH: colon cancer      Priority: Medium     colonoscopy q 5yr.  last 2008        Past Medical History:   Diagnosis Date     Allergies      Asthma, moderate persistent      FH: colon cancer     colonoscopy q 5yr.  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     HTN (hypertension)      Hyperlipidemia      Past Surgical History:    Procedure Laterality Date     BRONCHOSCOPY FLEXIBLE AND RIGID N/A 7/19/2017    Procedure: BRONCHOSCOPY FLEXIBLE AND RIGID;   Bronchoscopy, Tumor Debulking with Cryoprobe and Argon Plasma Coagulation;  Surgeon: Albino Crump MD;  Location: [facility] OR     COLONOSCOPY  11/08    + polyp, +FH, repeat in 5 years     COMBINED ENDOSCOPY UPPER WITH ARGON PLASMA COAGULATOR (APC) N/A 7/19/2017    Procedure: COMBINED ENDOSCOPY UPPER WITH ARGON PLASMA COAGULATOR (APC);;  Surgeon: Albino Crump MD;  Location: [facility] OR     TONSILLECTOMY       VASECTOMY       Current Outpatient Prescriptions   Medication Sig Dispense Refill     simvastatin (ZOCOR) 10 MG tablet take 1 tablet by mouth at bedtime  90 tablet 1     montelukast (SINGULAIR) 10 MG tablet TAKE ONE TABLET BY MOUTH ONE TIME DAILY  90 tablet 1     desloratadine (CLARINEX) 5 MG tablet TAKE ONE TABLET BY MOUTH ONE TIME DAILY  90 tablet 2     losartan-hydrochlorothiazide (HYZAAR) 100-25 MG per tablet TAKE ONE TABLET BY MOUTH ONE TIME DAILY  90 tablet 2     ADVAIR DISKUS 250-50 MCG/DOSE diskus inhaler inhale 1 puff into the lungs 2 times a day  3 Inhaler 1     esomeprazole (NEXIUM) 40 MG CR capsule Take 1 capsule (40 mg) by mouth every morning (before breakfast) 90 capsule 2     albuterol (PROAIR HFA/PROVENTIL HFA/VENTOLIN HFA) 108 (90 BASE) MCG/ACT Inhaler Inhale 2 puffs into the lungs every 6 hours as needed for shortness of breath / dyspnea 1 Inhaler 11     OTC products: None, except as noted above    Allergies   Allergen Reactions     Lisinopril Cough      Latex Allergy: NO    Social History   Substance Use Topics     Smoking status: Former Smoker     Quit date: 10/28/1991     Smokeless tobacco: Never Used     Alcohol use Yes      Comment: rare     History   Drug Use No       REVIEW OF SYSTEMS:                                                    Constitutional, neuro, ENT, endocrine, pulmonary, cardiac, gastrointestinal, genitourinary, musculoskeletal, integument and  "psychiatric systems are negative, except as otherwise noted.      EXAM:                                                    /84 (BP Location: Right arm, Patient Position: Chair, Cuff Size: Adult Large)  Pulse 76  Temp 98.4  F (36.9  C) (Oral)  Resp 20  Ht 5' 8.25\" (1.734 m)  Wt 278 lb (126.1 kg)  SpO2 94%  BMI 41.96 kg/m2    GENERAL APPEARANCE: healthy, alert and no distress, obese     EYES: EOMI,  PERRL     HENT: ear canals and TM's normal and nose and mouth without ulcers or lesions     NECK: no adenopathy, no asymmetry, masses, or scars and thyroid normal to palpation     RESP: lungs clear to auscultation - no rales, rhonchi or wheezes     CV: regular rates and rhythm, normal S1 S2, no S3 or S4 and no murmur, click or rub     ABDOMEN:  soft, nontender, no HSM or masses and bowel sounds normal     MS: extremities normal- no gross deformities noted, no evidence of inflammation in joints, FROM in all extremities.     SKIN: no suspicious lesions or rashes     NEURO: Normal strength and tone, sensory exam grossly normal, mentation intact and speech normal     PSYCH: mentation appears normal. and affect normal/bright     LYMPHATICS: No axillary, cervical, or supraclavicular nodes    DIAGNOSTICS:                                                    EKG: appears normal, NSR, normal axis, normal intervals, no acute ST/T changes c/w ischemia, no LVH by voltage criteria, unchanged from previous tracings    Recent Labs   Lab Test  07/11/17   1919 07/22/16 03/21/16   1351   HGB  14.2   --   14.3   PLT  260   --   203   INR   --   1.0   --    NA  141   --   140   POTASSIUM  3.8  4.2  3.2*   CR  1.21  1.13  1.30*   A1C  6.1*   --   6.4*        IMPRESSION:                                                    Reason for surgery/procedure: residual hamartoma of left lung .  Proposed procedure: Flexible Bronchoscopy Tumor Debulking With Cryoprobe and/or Argon Plasma Coagulation  Diagnosis/reason for consult: cardiac and " anesthesia risk assessment     The proposed surgical procedure is considered INTERMEDIATE risk.    REVISED CARDIAC RISK INDEX  The patient has the following serious cardiovascular risks for perioperative complications such as (MI, PE, VFib and 3  AV Block):  No serious cardiac risks  INTERPRETATION: 0 risks: Class I (very low risk - 0.4% complication rate)    The patient has the following additional risks for perioperative complications:  No identified additional risks      ICD-10-CM    1. Preop general physical exam Z01.818 EKG 12-lead complete w/read - Clinics normal, approved for surgery and anesthesia without further diagnostic evaluation.   2. Hamartoma of left lung (H) Q85.9 Procedure for removal scheduled.    3. Moderate persistent asthma without complication J45.40 Continue to use inhalers as prescribed.    4. Granulomatous lung disease (H) J84.10 stable   5. Hypertension, goal below 140/90- well controlled on medications  I10 Renal panel     CBC with platelets   6. Hyperlipidemia LDL goal <130 E78.5 Stable    7. Morbid obesity (H) E66.01 Weight loss counseling done        RECOMMENDATIONS:                                                      --Consult hospital rounder / IM to assist post-op medical management if needed    --Patient is to take all scheduled medications on the day of surgery EXCEPT for modifications listed below.    ACE Inhibitor or Angiotensin Receptor Blocker (ARB) Use  Ace inhibitor or Angiotensin Receptor Blocker (ARB) OK to take before surgery because renal function is good.         APPROVAL GIVEN to proceed with proposed procedure, without further diagnostic evaluation       Signed Electronically by: Martina Leung MD    Copy of this evaluation report is provided to requesting physician.    [place] Preop Guidelines

## 2017-12-22 NOTE — MR AVS SNAPSHOT
After Visit Summary   12/22/2017    Arturo Rahman    MRN: [id number]           Patient Information     Date Of Birth          1955        Visit Information        Provider Department      12/22/2017 7:40 AM Martina Leung MD [facility]        Today's Diagnoses     Preop general physical exam    -  1    Hamartoma of left lung (H)        Moderate persistent asthma without complication        Granulomatous lung disease (H)        Hypertension, goal below 140/90        Hyperlipidemia LDL goal <130        Morbid obesity (H)          Care Instructions    At [facility], we strive to deliver an exceptional experience to you, every time we see you.  If you receive a survey in the mail, please send us back your thoughts. We really do value your feedback.    Based on your medical history, these are the current health maintenance/preventive care services that you are due for (some may have been done at this visit.)  Health Maintenance Due   Topic Date Due     ASTHMA ACTION PLAN Q1 YR  12/01/2016     INFLUENZA VACCINE (SYSTEM ASSIGNED)  09/01/2017         Suggested websites for health information:  Www.Insmed : Up to date and easily searchable information on multiple topics.  Www.medlineplus.gov : medication info, interactive tutorials, watch real surgeries online  Www.familydoctor.org : good info from the Academy of Family Physicians  Www.cdc.gov : public health info, travel advisories, epidemics (H1N1)  Www.aap.org : children's health info, normal development, vaccinations  Www.health.[facility].[place].us : [place] dept of health, public health issues in [place], N1N1    Your care team:                            Family Medicine Internal Medicine   MD Jae An MD Shantel Branch-Fleming, MD Katya Georgiev PA-C Nam Ho, MD Pediatrics   JIM Brady, CNP China Dumont APRN CNP   MD Savannah Mcfarland MD    MD Kaleigh Bañuelos APRN [facility]      Clinic hours: Monday - Thursday 7 am-7 pm; Fridays 7 am-5 pm.   Urgent care: Monday - Friday 11 am-9 pm; Saturday and Sunday 9 am-5 pm.  Pharmacy : Monday -Thursday 8 am-8 pm; Friday 8 am-6 pm; Saturday and Sunday 9 am-5 pm.     Clinic: [phone]   Pharmacy: [phone]    Before Your Surgery      Call your surgeon if there is any change in your health. This includes signs of a cold or flu (such as a sore throat, runny nose, cough, rash or fever).    Do not smoke, drink alcohol or take over the counter medicine (unless your surgeon or primary care doctor tells you to) for the 24 hours before and after surgery.    If you take prescribed drugs: Follow your doctor s orders about which medicines to take and which to stop until after surgery.    Eating and drinking prior to surgery: follow the instructions from your surgeon    Take a shower or bath the night before surgery. Use the soap your surgeon gave you to gently clean your skin. If you do not have soap from your surgeon, use your regular soap. Do not shave or scrub the surgery site.  Wear clean pajamas and have clean sheets on your bed.           Follow-ups after your visit        Follow-up notes from your care team     Return in about 6 months (around 6/22/2018) for Physical Exam, Lab Work, medication follow up.      Your next 10 appointments already scheduled     Jan 11, 2018   Procedure with Albino Crump MD   [facility], [place], Same Day Surgery (--)    [address] [address] [phone]              Who to contact     If you have questions or need follow up information about today's clinic visit or your schedule please contact [facility] directly at [phone].  Normal or non-critical lab and imaging results will be communicated to you by MyChart, letter or phone within 4 business days after the clinic has received the results. If you do not hear from us within 7  "days, please contact the clinic through Qoof or phone. If you have a critical or abnormal lab result, we will notify you by phone as soon as possible.  Submit refill requests through Qoof or call your pharmacy and they will forward the refill request to us. Please allow 3 business days for your refill to be completed.          Additional Information About Your Visit        [employer] Information     Qoof gives you secure access to your electronic health record. If you see a primary care provider, you can also send messages to your care team and make appointments. If you have questions, please call your primary care clinic.  If you do not have a primary care provider, please call [phone] and they will assist you.        Care EveryWhere ID     This is your Care EveryWhere ID. This could be used by other organizations to access your [place] medical records  [id number]        Your Vitals Were     Pulse Temperature Respirations Height Pulse Oximetry BMI (Body Mass Index)    76 98.4  F (36.9  C) (Oral) 20 5' 8.25\" (1.734 m) 94% 41.96 kg/m2       Blood Pressure from Last 3 Encounters:   12/22/17 116/84   12/11/17 124/85   08/08/17 130/89    Weight from Last 3 Encounters:   12/22/17 278 lb (126.1 kg)   12/11/17 270 lb (122.5 kg)   08/08/17 280 lb (127 kg)              We Performed the Following     CBC with platelets     EKG 12-lead complete w/read - Clinics     Renal panel        Primary Care Provider Office Phone # Fax #    Martina Kaleigh Leung [phone]       [address]  [facility] [address]        Equal Access to Services     [facility]: Hadii grey Medrano, waaxda luqadaha, qaybta kaalearl sam. So [facility] [phone].    ATENCIÓN: Si habla español, tiene a santana disposición servicios gratuitos de asistencia lingüística. Eric al [phone].    We comply with applicable federal civil rights laws and [place] laws. We do " not discriminate on the basis of race, color, national origin, age, disability, sex, sexual orientation, or gender identity.            Thank you!     Thank you for choosing [facility]  for your care. Our goal is always to provide you with excellent care. Hearing back from our patients is one way we can continue to improve our services. Please take a few minutes to complete the written survey that you may receive in the mail after your visit with us. Thank you!             Your Updated Medication List - Protect others around you: Learn how to safely use, store and throw away your medicines at www.disposemymeds.org.          This list is accurate as of: 12/22/17  8:22 AM.  Always use your most recent med list.                   Brand Name Dispense Instructions for use Diagnosis    ADVAIR DISKUS 250-50 MCG/DOSE diskus inhaler   Generic drug:  fluticasone-salmeterol     3 Inhaler    inhale 1 puff into the lungs 2 times a day    Moderate persistent asthma without complication       albuterol 108 (90 BASE) MCG/ACT Inhaler    PROAIR HFA/PROVENTIL HFA/VENTOLIN HFA    1 Inhaler    Inhale 2 puffs into the lungs every 6 hours as needed for shortness of breath / dyspnea    Moderate persistent asthma without complication       desloratadine 5 MG tablet    CLARINEX    90 tablet    TAKE ONE TABLET BY MOUTH ONE TIME DAILY    Allergic rhinitis       esomeprazole 40 MG CR capsule    nexIUM    90 capsule    Take 1 capsule (40 mg) by mouth every morning (before breakfast)    Gastroesophageal reflux disease with esophagitis       losartan-hydrochlorothiazide 100-25 MG per tablet    HYZAAR    90 tablet    TAKE ONE TABLET BY MOUTH ONE TIME DAILY    Hypertension, goal below 140/90       montelukast 10 MG tablet    SINGULAIR    90 tablet    TAKE ONE TABLET BY MOUTH ONE TIME DAILY    Moderate persistent asthma without complication       simvastatin 10 MG tablet    ZOCOR    90 tablet    take 1 tablet by mouth at bedtime     Hyperlipidemia LDL goal <130

## 2017-12-26 NOTE — PROGRESS NOTES
Dear Arturo    Your test results are attached. I am happy to let you know that they are stable.    The blood sugar is normal and you do not have diabetes. The kidneys are healthy. The hemoglobin looks good.     Please contact me by [employer] if you have any questions about your labs or management.    Martina Leung MD

## 2018-01-11 ENCOUNTER — ANESTHESIA (OUTPATIENT)
Dept: SURGERY | Facility: CLINIC | Age: 63
End: 2018-01-11
Payer: COMMERCIAL

## 2018-01-11 ENCOUNTER — HOSPITAL ENCOUNTER (OUTPATIENT)
Facility: CLINIC | Age: 63
Discharge: HOME HEALTH | End: 2018-01-11
Attending: INTERNAL MEDICINE | Admitting: INTERNAL MEDICINE
Payer: COMMERCIAL

## 2018-01-11 ENCOUNTER — ANESTHESIA EVENT (OUTPATIENT)
Dept: SURGERY | Facility: CLINIC | Age: 63
End: 2018-01-11
Payer: COMMERCIAL

## 2018-01-11 ENCOUNTER — APPOINTMENT (OUTPATIENT)
Dept: GENERAL RADIOLOGY | Facility: CLINIC | Age: 63
End: 2018-01-11
Attending: INTERNAL MEDICINE
Payer: COMMERCIAL

## 2018-01-11 VITALS
SYSTOLIC BLOOD PRESSURE: 135 MMHG | HEIGHT: 68 IN | DIASTOLIC BLOOD PRESSURE: 89 MMHG | BODY MASS INDEX: 42.47 KG/M2 | RESPIRATION RATE: 16 BRPM | TEMPERATURE: 98.1 F | OXYGEN SATURATION: 99 % | HEART RATE: 97 BPM | WEIGHT: 280.2 LBS

## 2018-01-11 LAB — GLUCOSE BLDC GLUCOMTR-MCNC: 124 MG/DL (ref 70–99)

## 2018-01-11 PROCEDURE — 25000125 ZZHC RX 250: Performed by: NURSE ANESTHETIST, CERTIFIED REGISTERED

## 2018-01-11 PROCEDURE — 40000170 ZZH STATISTIC PRE-PROCEDURE ASSESSMENT II: Performed by: INTERNAL MEDICINE

## 2018-01-11 PROCEDURE — 71000013 ZZH RECOVERY PHASE 1 LEVEL 1 EA ADDTL HR: Performed by: INTERNAL MEDICINE

## 2018-01-11 PROCEDURE — 40000985 XR CHEST PORT 1 VW

## 2018-01-11 PROCEDURE — C9399 UNCLASSIFIED DRUGS OR BIOLOG: HCPCS | Performed by: NURSE ANESTHETIST, CERTIFIED REGISTERED

## 2018-01-11 PROCEDURE — 36000059 ZZH SURGERY LEVEL 3 EA 15 ADDTL MIN UMMC: Performed by: INTERNAL MEDICINE

## 2018-01-11 PROCEDURE — 37000009 ZZH ANESTHESIA TECHNICAL FEE, EACH ADDTL 15 MIN: Performed by: INTERNAL MEDICINE

## 2018-01-11 PROCEDURE — 88305 TISSUE EXAM BY PATHOLOGIST: CPT | Performed by: INTERNAL MEDICINE

## 2018-01-11 PROCEDURE — 25000128 H RX IP 250 OP 636: Performed by: NURSE ANESTHETIST, CERTIFIED REGISTERED

## 2018-01-11 PROCEDURE — 25000132 ZZH RX MED GY IP 250 OP 250 PS 637: Performed by: NURSE ANESTHETIST, CERTIFIED REGISTERED

## 2018-01-11 PROCEDURE — 82962 GLUCOSE BLOOD TEST: CPT

## 2018-01-11 PROCEDURE — 25000128 H RX IP 250 OP 636: Performed by: INTERNAL MEDICINE

## 2018-01-11 PROCEDURE — 36000061 ZZH SURGERY LEVEL 3 W FLUORO 1ST 30 MIN - UMMC: Performed by: INTERNAL MEDICINE

## 2018-01-11 PROCEDURE — 71000012 ZZH RECOVERY PHASE 1 LEVEL 1 FIRST HR: Performed by: INTERNAL MEDICINE

## 2018-01-11 PROCEDURE — 37000008 ZZH ANESTHESIA TECHNICAL FEE, 1ST 30 MIN: Performed by: INTERNAL MEDICINE

## 2018-01-11 PROCEDURE — 71000027 ZZH RECOVERY PHASE 2 EACH 15 MINS: Performed by: INTERNAL MEDICINE

## 2018-01-11 RX ORDER — FENTANYL CITRATE 50 UG/ML
25-50 INJECTION, SOLUTION INTRAMUSCULAR; INTRAVENOUS
Status: DISCONTINUED | OUTPATIENT
Start: 2018-01-11 | End: 2018-01-11 | Stop reason: HOSPADM

## 2018-01-11 RX ORDER — SODIUM CHLORIDE, SODIUM LACTATE, POTASSIUM CHLORIDE, CALCIUM CHLORIDE 600; 310; 30; 20 MG/100ML; MG/100ML; MG/100ML; MG/100ML
INJECTION, SOLUTION INTRAVENOUS CONTINUOUS PRN
Status: DISCONTINUED | OUTPATIENT
Start: 2018-01-11 | End: 2018-01-11

## 2018-01-11 RX ORDER — NALOXONE HYDROCHLORIDE 0.4 MG/ML
.1-.4 INJECTION, SOLUTION INTRAMUSCULAR; INTRAVENOUS; SUBCUTANEOUS
Status: DISCONTINUED | OUTPATIENT
Start: 2018-01-11 | End: 2018-01-11 | Stop reason: HOSPADM

## 2018-01-11 RX ORDER — PROPOFOL 10 MG/ML
INJECTION, EMULSION INTRAVENOUS CONTINUOUS PRN
Status: DISCONTINUED | OUTPATIENT
Start: 2018-01-11 | End: 2018-01-11

## 2018-01-11 RX ORDER — SODIUM CHLORIDE, SODIUM LACTATE, POTASSIUM CHLORIDE, CALCIUM CHLORIDE 600; 310; 30; 20 MG/100ML; MG/100ML; MG/100ML; MG/100ML
INJECTION, SOLUTION INTRAVENOUS CONTINUOUS
Status: DISCONTINUED | OUTPATIENT
Start: 2018-01-11 | End: 2018-01-11 | Stop reason: HOSPADM

## 2018-01-11 RX ORDER — LIDOCAINE HYDROCHLORIDE 10 MG/ML
INJECTION, SOLUTION INFILTRATION; PERINEURAL PRN
Status: DISCONTINUED | OUTPATIENT
Start: 2018-01-11 | End: 2018-01-11

## 2018-01-11 RX ORDER — PROPOFOL 10 MG/ML
INJECTION, EMULSION INTRAVENOUS PRN
Status: DISCONTINUED | OUTPATIENT
Start: 2018-01-11 | End: 2018-01-11

## 2018-01-11 RX ORDER — ONDANSETRON 2 MG/ML
4 INJECTION INTRAMUSCULAR; INTRAVENOUS EVERY 30 MIN PRN
Status: DISCONTINUED | OUTPATIENT
Start: 2018-01-11 | End: 2018-01-11 | Stop reason: HOSPADM

## 2018-01-11 RX ORDER — ONDANSETRON 4 MG/1
4 TABLET, ORALLY DISINTEGRATING ORAL EVERY 30 MIN PRN
Status: DISCONTINUED | OUTPATIENT
Start: 2018-01-11 | End: 2018-01-11 | Stop reason: HOSPADM

## 2018-01-11 RX ORDER — FENTANYL CITRATE 50 UG/ML
INJECTION, SOLUTION INTRAMUSCULAR; INTRAVENOUS PRN
Status: DISCONTINUED | OUTPATIENT
Start: 2018-01-11 | End: 2018-01-11

## 2018-01-11 RX ORDER — ONDANSETRON 2 MG/ML
INJECTION INTRAMUSCULAR; INTRAVENOUS PRN
Status: DISCONTINUED | OUTPATIENT
Start: 2018-01-11 | End: 2018-01-11

## 2018-01-11 RX ORDER — METOPROLOL TARTRATE 1 MG/ML
1-2 INJECTION, SOLUTION INTRAVENOUS EVERY 5 MIN PRN
Status: DISCONTINUED | OUTPATIENT
Start: 2018-01-11 | End: 2018-01-11 | Stop reason: HOSPADM

## 2018-01-11 RX ORDER — CITRIC ACID/SODIUM CITRATE 334-500MG
SOLUTION, ORAL ORAL PRN
Status: DISCONTINUED | OUTPATIENT
Start: 2018-01-11 | End: 2018-01-11

## 2018-01-11 RX ADMIN — ROCURONIUM BROMIDE 50 MG: 10 INJECTION INTRAVENOUS at 10:40

## 2018-01-11 RX ADMIN — MIDAZOLAM 2 MG: 1 INJECTION INTRAMUSCULAR; INTRAVENOUS at 10:17

## 2018-01-11 RX ADMIN — LIDOCAINE HYDROCHLORIDE 100 MG: 10 INJECTION, SOLUTION INFILTRATION; PERINEURAL at 10:40

## 2018-01-11 RX ADMIN — FENTANYL CITRATE 50 MCG: 50 INJECTION, SOLUTION INTRAMUSCULAR; INTRAVENOUS at 10:58

## 2018-01-11 RX ADMIN — ROCURONIUM BROMIDE 10 MG: 10 INJECTION INTRAVENOUS at 11:23

## 2018-01-11 RX ADMIN — PROPOFOL 160 MG: 10 INJECTION, EMULSION INTRAVENOUS at 10:40

## 2018-01-11 RX ADMIN — SODIUM CITRATE AND CITRIC ACID MONOHYDRATE 30 ML: 500; 334 SOLUTION ORAL at 10:35

## 2018-01-11 RX ADMIN — FENTANYL CITRATE 100 MCG: 50 INJECTION, SOLUTION INTRAMUSCULAR; INTRAVENOUS at 10:40

## 2018-01-11 RX ADMIN — PROPOFOL 150 MCG/KG/MIN: 10 INJECTION, EMULSION INTRAVENOUS at 10:40

## 2018-01-11 RX ADMIN — SUGAMMADEX 140 MG: 100 INJECTION, SOLUTION INTRAVENOUS at 11:25

## 2018-01-11 RX ADMIN — SODIUM CHLORIDE, POTASSIUM CHLORIDE, SODIUM LACTATE AND CALCIUM CHLORIDE: 600; 310; 30; 20 INJECTION, SOLUTION INTRAVENOUS at 10:00

## 2018-01-11 RX ADMIN — ONDANSETRON 4 MG: 2 INJECTION INTRAMUSCULAR; INTRAVENOUS at 10:17

## 2018-01-11 NOTE — IP AVS SNAPSHOT
Post Anesthesia Care Unit [address] [address]    Phone:  [phone]                                       After Visit Summary   1/11/2018    Arturo Rahman    MRN: [id number]           After Visit Summary Signature Page     I have received my discharge instructions, and my questions have been answered. I have discussed any challenges I see with this plan with the nurse or doctor.    ..........................................................................................................................................  Patient/Patient Representative Signature      ..........................................................................................................................................  Patient Representative Print Name and Relationship to Patient    ..................................................               ................................................  Date                                            Time    ..........................................................................................................................................  Reviewed by Signature/Title    ...................................................              ..............................................  Date                                                            Time

## 2018-01-11 NOTE — PROGRESS NOTES
CXR reviewed. No ptx.     Albino Crump MD  Pulmonary and Critical Care  [facility]  Pager:  [phone]

## 2018-01-11 NOTE — PROCEDURES
Procedure(s):    Bronchoscopy, Flexible and Rigid  Tumor Debulking    Indication:  Hamartoma, airway obstruction    Attending of Record:     MD Trent Rodriguez MD Erhan Dincer, MD    Trainees Present:   None    Medications:    General Anesthesia - See anesthesia flowsheet for details    Sedation Time:  Per Anesthesia Care Provider    Time Out:  Performed    The patient's medical record has been reviewed.  The indication for the procedure was reviewed.  The necessary history and physical examination was performed and reviewed.  The risks, benefits and alternatives of the procedure were discussed with the the patient in detail and he had the opportunity to ask questions.  I discussed in particular the potential complications including risks of minor or life-threatening bleeding and/or infection, respiratory failure, vocal cord trauma / paralysis, pneumothorax, and discomfort. Sedation risks were also discussed including abnormal heart rhythms, low blood pressure, and respiratory failure. All questions were answered to the best of my ability.  Verbal and written informed consent was obtained.  The proposed procedure and the patient's identification were verified prior to the procedure by the physician and the nurse, technician.    The patient was assessed for the adequacy for the procedure and to receive medications.   Mental Status:  Alert and oriented x 3  Airway examination:  Class III (Visualization of only the base of uvula)  Pulmonary:  Clear to ausculation bilaterally  CV:  RRR, no murmurs or gallops  ASA Grade:  (I)  Completely healthy fit patient    After clinical evaluation and reviewing the indication, risks, alternatives and benefits of the procedure the patient was deemed to be in satisfactory condition to undergo the procedure.      Immediately before administration of medications the patient was re-assessed for adequacy to receive sedatives including the heart rate, respiratory rate,  mental status, oxygen saturation, blood pressure and adequacy of pulmonary ventilation. These same parameters were continuously monitored throughout the procedure.    Maneuvers / Procedure:     The rigid bronchoscope was inserted through the mouth (8.5) and advanced into the trachea.  Oral packing was placed and the nares were packed for airway seal.  Next the therapeutic bronchoscope was inserted and there was near complete obstruction of the lingula.  Topical epinephrine was injected topically on the lingula and the cryoprobe was used to debulk the suspected hamartoma. Samples were sent to pathology. Mild bleeding throughout the case and the airways were therapeutically suctioned.     Pertinent Images / special notes:     Tumor obstructing lingula:       Post debulking:       Any disposable equipment was visually inspected and deemed to be intact immediately post procedure.      Recommendations:     -->  CXR post procedure  -->  Follow up in 3-6 months in clinic      Albino Crump MD  Pulmonary and Critical Care  [facility]  Pager:  [phone]

## 2018-01-11 NOTE — OR NURSING
"Writer spoke with Dr. Crump, he stated, \"His chest x ray looks good.\" Pt will proceed to Phase II recovery.   "

## 2018-01-11 NOTE — ANESTHESIA POSTPROCEDURE EVALUATION
Patient: Arturo Rahman    Procedure(s):  Flexible Bronchoscopy, Rigid Bronchoscopy, Tumor Debulking With Cryoprobe - Wound Class: II-Clean Contaminated    Diagnosis:Hamartoma Of Lung   Diagnosis Additional Information: No value filed.    Anesthesia Type:  General, RSI, ETT    Note:  Anesthesia Post Evaluation    Patient location during evaluation: PACU  Patient participation: Able to fully participate in evaluation  Level of consciousness: awake and alert  Pain management: adequate  Airway patency: patent  Cardiovascular status: acceptable  Respiratory status: acceptable  Hydration status: acceptable  PONV: none     Anesthetic complications: None          Last vitals:  Vitals:    01/11/18 1215 01/11/18 1230 01/11/18 1245   BP: 107/75 112/74 109/74   Pulse:      Resp: 16 16 16   Temp: 36.7  C (98.1  F)  36.7  C (98  F)   SpO2: 100% 99% 100%         Electronically Signed By: Julienne Winchester MD  January 11, 2018  1:14 PM

## 2018-01-11 NOTE — ANESTHESIA PREPROCEDURE EVALUATION
Anesthesia Evaluation     . Pt has had prior anesthetic. Type: General and MAC           ROS/MED HX    ENT/Pulmonary:       Neurologic:  - neg neurologic ROS     Cardiovascular:         METS/Exercise Tolerance:     Hematologic:  - neg hematologic  ROS       Musculoskeletal:  - neg musculoskeletal ROS       GI/Hepatic:         Renal/Genitourinary:  - ROS Renal section negative       Endo:         Psychiatric:         Infectious Disease:         Malignancy:      - no malignancy   Other:    - neg other ROS                 Physical Exam  Normal systems: cardiovascular, pulmonary and dental    Airway   Mallampati: III  TM distance: >3 FB  Neck ROM: full    Dental     Cardiovascular       Pulmonary                     Anesthesia Plan      History & Physical Review  History and physical reviewed and following examination; no interval change.    ASA Status:  3 .    NPO Status:  > 8 hours    Plan for General, RSI and ETT with Propofol induction. Maintenance will be Balanced.           Postoperative Care      Consents  Anesthetic plan, risks, benefits and alternatives discussed with:  Patient and Spouse.  Use of blood products discussed: No .   .                          .

## 2018-01-11 NOTE — IP AVS SNAPSHOT
MRN:[id number]                      After Visit Summary   1/11/2018    Arturo Rahman    MRN: [id number]           Thank you!     Thank you for choosing [facility] for your care. Our goal is always to provide you with excellent care. Hearing back from our patients is one way we can continue to improve our services. Please take a few minutes to complete the written survey that you may receive in the mail after you visit with us. Thank you!        Patient Information     Date Of Birth          1955        About your hospital stay     You were admitted on:  January 11, 2018 You last received care in the:  Post Anesthesia Care Unit [facility]    You were discharged on:  January 11, 2018       Who to Call     For medical emergencies, please call 911.  For non-urgent questions about your medical care, please call your primary care provider or clinic, [phone]  For questions related to your surgery, please call your surgery clinic        Attending Provider     Provider Specialty    Albino Crump MD Pulmonary       Primary Care Provider Office Phone # Fax #    Martina Kaleigh Leung [phone]      Further instructions from your care team       [facility]  Same-Day Surgery   Adult Discharge Orders & Instructions     For 24 hours after surgery    1. Get plenty of rest.  A responsible adult must stay with you for at least 24 hours after you leave the hospital.   2. Do not drive or use heavy equipment.  If you have weakness or tingling, don't drive or use heavy equipment until this feeling goes away.  3. Do not drink alcohol.  4. Avoid strenuous or risky activities.  Ask for help when climbing stairs.   5. You may feel lightheaded.  IF so, sit for a few minutes before standing.  Have someone help you get up.   6. If you have nausea (feel sick to your stomach): Drink only clear liquids such as apple juice, ginger ale, broth or 7-Up.   Rest may also help.  Be sure to drink enough fluids.  Move to a regular diet as you feel able.  7. You may have a slight fever. Call the doctor if your fever is over 100 F (37.7 C) (taken under the tongue) or lasts longer than 24 hours.  8. You may have a dry mouth, a sore throat, muscle aches or trouble sleeping.  These should go away after 24 hours.  9. Do not make important or legal decisions.   Call your doctor for any of the followin.  Signs of infection (fever, growing tenderness at the surgery site, a large amount of drainage or bleeding, severe pain, foul-smelling drainage, redness, swelling).    2. It has been over 8 to 10 hours since surgery and you are still not able to urinate (pass water).    3.  Headache for over 24 hours.    4.  Numbness, tingling or weakness the day after surgery (if you had spinal anesthesia).    In addition to any other recommendations provided to you by nursing staff or your doctor, here are some additional post procedure recommendations:     Bronchoscopy: Following your bronchoscopy please follow these general guidelines.   1.  You may cough up a small amount of blood which should be dark in color. If you cough up bright red blood or larger amounts of blood then you should seek immediate medical attention.   2.  Monitor for increasing shortness of breath, chest pressure, or chest pain.   3.  Monitor for low grade fever which is normal up to 100.5 but this fever should not persistent for greater than 24 hours.  Unless other contra-indications exist you may treat with tylenol. If fever persists seek medical attention.   4.  If you have any concerns there is a pulmonologist on call 24 hours a day at [phone].   5.  During business hours and if there is no emergency you can also call the [facility] Office at [phone].          Pending Results     Date and Time Order Name Status Description    2018 1207 XR Chest Port 1 View In process      "1/11/2018 1102 Surgical pathology exam In process             Admission Information     Date & Time Provider Department Dept. Phone    1/11/2018 Albino Crump MD Post Anesthesia Care Unit [facility] [phone]      Your Vitals Were     Blood Pressure Pulse Temperature Respirations Height Weight    109/74 97 98.1  F (36.7  C) (Oral) 16 1.734 m (5' 8.27\") 127.1 kg (280 lb 3.3 oz)    Pulse Oximetry BMI (Body Mass Index)                100% 42.27 kg/m2          MyChart Information     [employer] gives you secure access to your electronic health record. If you see a primary care provider, you can also send messages to your care team and make appointments. If you have questions, please call your primary care clinic.  If you do not have a primary care provider, please call [phone] and they will assist you.        Care EveryWhere ID     This is your Care EveryWhere ID. This could be used by other organizations to access your [place] medical records  [id number]        Equal Access to Services     INGA MCCRACKEN : Hadii grey Medrano, waaxda luqadaha, qaybta kaalsammy ramsay, earl barrow. So [facility] [phone].    ATENCIÓN: Si habla español, tiene a santana disposición servicios gratuitos de asistencia lingüística. Llame al [phone].    We comply with applicable federal civil rights laws and [place] laws. We do not discriminate on the basis of race, color, national origin, age, disability, sex, sexual orientation, or gender identity.               Review of your medicines      CONTINUE these medicines which have NOT CHANGED        Dose / Directions    ADVAIR DISKUS 250-50 MCG/DOSE diskus inhaler   Used for:  Moderate persistent asthma without complication   Generic drug:  fluticasone-salmeterol        inhale 1 puff into the lungs 2 times a day   Quantity:  3 Inhaler   Refills:  1       albuterol 108 (90 BASE) MCG/ACT Inhaler   Commonly known as:  PROAIR HFA/PROVENTIL " HFA/VENTOLIN HFA   Used for:  Moderate persistent asthma without complication        Dose:  2 puff   Inhale 2 puffs into the lungs every 6 hours as needed for shortness of breath / dyspnea   Quantity:  1 Inhaler   Refills:  11       desloratadine 5 MG tablet   Commonly known as:  CLARINEX   Used for:  Allergic rhinitis        TAKE ONE TABLET BY MOUTH ONE TIME DAILY   Quantity:  90 tablet   Refills:  2       esomeprazole 40 MG CR capsule   Commonly known as:  nexIUM   Used for:  Gastroesophageal reflux disease with esophagitis        Dose:  40 mg   Take 1 capsule (40 mg) by mouth every morning (before breakfast)   Quantity:  90 capsule   Refills:  2       losartan-hydrochlorothiazide 100-25 MG per tablet   Commonly known as:  HYZAAR   Used for:  Hypertension, goal below 140/90        TAKE ONE TABLET BY MOUTH ONE TIME DAILY   Quantity:  90 tablet   Refills:  2       montelukast 10 MG tablet   Commonly known as:  SINGULAIR   Used for:  Moderate persistent asthma without complication        TAKE ONE TABLET BY MOUTH ONE TIME DAILY   Quantity:  90 tablet   Refills:  1       MULTIVITAL PO        Dose:  1 tablet   Take 1 tablet by mouth daily   Refills:  0       simvastatin 10 MG tablet   Commonly known as:  ZOCOR   Used for:  Hyperlipidemia LDL goal <130        take 1 tablet by mouth at bedtime   Quantity:  90 tablet   Refills:  1                Protect others around you: Learn how to safely use, store and throw away your medicines at www.disposemymeds.org.             Medication List: This is a list of all your medications and when to take them. Check marks below indicate your daily home schedule. Keep this list as a reference.      Medications           Morning Afternoon Evening Bedtime As Needed    ADVAIR DISKUS 250-50 MCG/DOSE diskus inhaler   inhale 1 puff into the lungs 2 times a day   Generic drug:  fluticasone-salmeterol                                albuterol 108 (90 BASE) MCG/ACT Inhaler   Commonly known as:   PROAIR HFA/PROVENTIL HFA/VENTOLIN HFA   Inhale 2 puffs into the lungs every 6 hours as needed for shortness of breath / dyspnea                                desloratadine 5 MG tablet   Commonly known as:  CLARINEX   TAKE ONE TABLET BY MOUTH ONE TIME DAILY                                esomeprazole 40 MG CR capsule   Commonly known as:  nexIUM   Take 1 capsule (40 mg) by mouth every morning (before breakfast)                                losartan-hydrochlorothiazide 100-25 MG per tablet   Commonly known as:  HYZAAR   TAKE ONE TABLET BY MOUTH ONE TIME DAILY                                montelukast 10 MG tablet   Commonly known as:  SINGULAIR   TAKE ONE TABLET BY MOUTH ONE TIME DAILY                                MULTIVITAL PO   Take 1 tablet by mouth daily                                simvastatin 10 MG tablet   Commonly known as:  ZOCOR   take 1 tablet by mouth at bedtime

## 2018-01-11 NOTE — DISCHARGE INSTRUCTIONS
[facility] [place]  Same-Day Surgery   Adult Discharge Orders & Instructions     For 24 hours after surgery    1. Get plenty of rest.  A responsible adult must stay with you for at least 24 hours after you leave the hospital.   2. Do not drive or use heavy equipment.  If you have weakness or tingling, don't drive or use heavy equipment until this feeling goes away.  3. Do not drink alcohol.  4. Avoid strenuous or risky activities.  Ask for help when climbing stairs.   5. You may feel lightheaded.  IF so, sit for a few minutes before standing.  Have someone help you get up.   6. If you have nausea (feel sick to your stomach): Drink only clear liquids such as apple juice, ginger ale, broth or 7-Up.  Rest may also help.  Be sure to drink enough fluids.  Move to a regular diet as you feel able.  7. You may have a slight fever. Call the doctor if your fever is over 100 F (37.7 C) (taken under the tongue) or lasts longer than 24 hours.  8. You may have a dry mouth, a sore throat, muscle aches or trouble sleeping.  These should go away after 24 hours.  9. Do not make important or legal decisions.   Call your doctor for any of the followin.  Signs of infection (fever, growing tenderness at the surgery site, a large amount of drainage or bleeding, severe pain, foul-smelling drainage, redness, swelling).    2. It has been over 8 to 10 hours since surgery and you are still not able to urinate (pass water).    3.  Headache for over 24 hours.    4.  Numbness, tingling or weakness the day after surgery (if you had spinal anesthesia).    In addition to any other recommendations provided to you by nursing staff or your doctor, here are some additional post procedure recommendations:     Bronchoscopy: Following your bronchoscopy please follow these general guidelines.   1.  You may cough up a small amount of blood which should be dark in color. If you cough up bright red blood or larger amounts  of blood then you should seek immediate medical attention.   2.  Monitor for increasing shortness of breath, chest pressure, or chest pain.   3.  Monitor for low grade fever which is normal up to 100.5 but this fever should not persistent for greater than 24 hours.  Unless other contra-indications exist you may treat with tylenol. If fever persists seek medical attention.   4.  If you have any concerns there is a pulmonologist on call 24 hours a day at [phone].   5.  During business hours and if there is no emergency you can also call the [facility] Office at [phone].

## 2018-01-11 NOTE — ANESTHESIA CARE TRANSFER NOTE
Patient: Arturo Rahman    Procedure(s):  Flexible Bronchoscopy, Rigid Bronchoscopy, Tumor Debulking With Cryoprobe - Wound Class: II-Clean Contaminated    Diagnosis: Hamartoma Of Lung   Diagnosis Additional Information: No value filed.    Anesthesia Type:   General, RSI, ETT     Note:  Airway :Face Mask  Patient transferred to:PACU  Comments: Spont resp, VSS, report to RNHandoff Report: Identifed the Patient, Identified the Reponsible Provider, Reviewed the pertinent medical history, Discussed the surgical course, Reviewed Intra-OP anesthesia mangement and issues during anesthesia, Set expectations for post-procedure period and Allowed opportunity for questions and acknowledgement of understanding      Vitals: (Last set prior to Anesthesia Care Transfer)    CRNA VITALS  1/11/2018 1106 - 1/11/2018 1147      1/11/2018             Pulse: 77    SpO2: 94 %                Electronically Signed By: JOSH Velazquez CRNA  January 11, 2018  11:47 AM

## 2018-01-14 ENCOUNTER — OFFICE VISIT (OUTPATIENT)
Dept: URGENT CARE | Facility: URGENT CARE | Age: 63
End: 2018-01-14
Payer: COMMERCIAL

## 2018-01-14 VITALS
OXYGEN SATURATION: 97 % | DIASTOLIC BLOOD PRESSURE: 92 MMHG | SYSTOLIC BLOOD PRESSURE: 138 MMHG | TEMPERATURE: 97.8 F | WEIGHT: 279 LBS | BODY MASS INDEX: 42.09 KG/M2 | HEART RATE: 104 BPM

## 2018-01-14 DIAGNOSIS — H69.93 DYSFUNCTION OF BOTH EUSTACHIAN TUBES: Primary | ICD-10-CM

## 2018-01-14 PROCEDURE — 99213 OFFICE O/P EST LOW 20 MIN: CPT | Performed by: PHYSICIAN ASSISTANT

## 2018-01-14 RX ORDER — FLUTICASONE PROPIONATE 50 MCG
1 SPRAY, SUSPENSION (ML) NASAL
Qty: 1 BOTTLE | Refills: 1 | Status: SHIPPED | OUTPATIENT
Start: 2018-01-14 | End: 2018-09-21

## 2018-01-14 ASSESSMENT — ENCOUNTER SYMPTOMS
FEVER: 0
BLURRED VISION: 0
EYE REDNESS: 0
WHEEZING: 0
SHORTNESS OF BREATH: 0
COUGH: 1
ABDOMINAL PAIN: 0
CHILLS: 0
EYE DISCHARGE: 0
NAUSEA: 0
DIARRHEA: 0
MYALGIAS: 0
HEADACHES: 0
VOMITING: 0
SORE THROAT: 0
PALPITATIONS: 0

## 2018-01-14 NOTE — MR AVS SNAPSHOT
After Visit Summary   1/14/2018    Arturo Rahman    MRN: [id number]           Patient Information     Date Of Birth          1955        Visit Information        Provider Department      1/14/2018 4:15 PM Kalie Candelaria PA-C [facility]        Today's Diagnoses     Dysfunction of both eustachian tubes    -  1       Follow-ups after your visit        Follow-up notes from your care team     Return if symptoms worsen or fail to improve.      Who to contact     If you have questions or need follow up information about today's clinic visit or your schedule please contact [facility] directly at [phone].  Normal or non-critical lab and imaging results will be communicated to you by MyChart, letter or phone within 4 business days after the clinic has received the results. If you do not hear from us within 7 days, please contact the clinic through [email or web address] or phone. If you have a critical or abnormal lab result, we will notify you by phone as soon as possible.  Submit refill requests through [employer] or call your pharmacy and they will forward the refill request to us. Please allow 3 business days for your refill to be completed.          Additional Information About Your Visit        MyChart Information     [employer] gives you secure access to your electronic health record. If you see a primary care provider, you can also send messages to your care team and make appointments. If you have questions, please call your primary care clinic.  If you do not have a primary care provider, please call [phone] and they will assist you.        Care EveryWhere ID     This is your Care EveryWhere ID. This could be used by other organizations to access your [facility] medical records  [id number]        Your Vitals Were     Pulse Temperature Pulse Oximetry BMI (Body Mass Index)          104 97.8  F (36.6  C) (Oral) 97% 42.09 kg/m2         Blood Pressure from Last 3  Encounters:   01/14/18 (!) 138/92   01/11/18 135/89   12/22/17 116/84    Weight from Last 3 Encounters:   01/14/18 279 lb (126.6 kg)   01/11/18 280 lb 3.3 oz (127.1 kg)   12/22/17 278 lb (126.1 kg)              Today, you had the following     No orders found for display         Today's Medication Changes          These changes are accurate as of: 1/14/18  5:55 PM.  If you have any questions, ask your nurse or doctor.               Start taking these medicines.        Dose/Directions    fluticasone 50 MCG/ACT spray   Commonly known as:  FLONASE   Used for:  Dysfunction of both eustachian tubes   Started by:  Kalie Candelaria PA-C        Dose:  1 spray   Spray 1 spray into both nostrils 2 times daily   Quantity:  1 Bottle   Refills:  1            Where to get your medicines      These medications were sent to [id number] - [place], [place] - [address]  [address] [address]     Phone:  [phone]     fluticasone 50 MCG/ACT spray                Primary Care Provider Office Phone # Fax #    Martina Kaleigh Leung [phone]       [address]  [facility] [address]        Equal Access to Services     LORETTA MCCRACKEN AH: Hadii grey ku hadasho Soomaali, waaxda luqadaha, qaybta kaalmada adeegyada, earl jordan haykaruna barrow. So [facility] [phone].    ATENCIÓN: Si habla español, tiene a santana disposición servicios gratuitos de asistencia lingüística. Llame al [phone].    We comply with applicable federal civil rights laws and [place] laws. We do not discriminate on the basis of race, color, national origin, age, disability, sex, sexual orientation, or gender identity.            Thank you!     Thank you for choosing [facility]  for your care. Our goal is always to provide you with excellent care. Hearing back from our patients is one way we can continue to improve our services. Please take a few minutes to complete the written  survey that you may receive in the mail after your visit with us. Thank you!             Your Updated Medication List - Protect others around you: Learn how to safely use, store and throw away your medicines at www.[employer] Medoraemymeds.org.          This list is accurate as of: 1/14/18  5:55 PM.  Always use your most recent med list.                   Brand Name Dispense Instructions for use Diagnosis    ADVAIR DISKUS 250-50 MCG/DOSE diskus inhaler   Generic drug:  fluticasone-salmeterol     3 Inhaler    inhale 1 puff into the lungs 2 times a day    Moderate persistent asthma without complication       albuterol 108 (90 BASE) MCG/ACT Inhaler    PROAIR HFA/PROVENTIL HFA/VENTOLIN HFA    1 Inhaler    Inhale 2 puffs into the lungs every 6 hours as needed for shortness of breath / dyspnea    Moderate persistent asthma without complication       desloratadine 5 MG tablet    CLARINEX    90 tablet    TAKE ONE TABLET BY MOUTH ONE TIME DAILY    Allergic rhinitis       esomeprazole 40 MG CR capsule    nexIUM    90 capsule    Take 1 capsule (40 mg) by mouth every morning (before breakfast)    Gastroesophageal reflux disease with esophagitis       fluticasone 50 MCG/ACT spray    FLONASE    1 Bottle    Spray 1 spray into both nostrils 2 times daily    Dysfunction of both eustachian tubes       losartan-hydrochlorothiazide 100-25 MG per tablet    HYZAAR    90 tablet    TAKE ONE TABLET BY MOUTH ONE TIME DAILY    Hypertension, goal below 140/90       montelukast 10 MG tablet    SINGULAIR    90 tablet    TAKE ONE TABLET BY MOUTH ONE TIME DAILY    Moderate persistent asthma without complication       MULTIVITAL PO      Take 1 tablet by mouth daily        simvastatin 10 MG tablet    ZOCOR    90 tablet    take 1 tablet by mouth at bedtime    Hyperlipidemia LDL goal <130

## 2018-01-14 NOTE — NURSING NOTE
"Chief Complaint   Patient presents with     URI     1 week       Initial BP (!) 138/92 (BP Location: Left arm, Patient Position: Chair, Cuff Size: Adult Large)  Pulse 104  Temp 97.8  F (36.6  C) (Oral)  Wt 279 lb (126.6 kg)  SpO2 97%  BMI 42.09 kg/m2 Estimated body mass index is 42.09 kg/(m^2) as calculated from the following:    Height as of 1/11/18: 5' 8.27\" (1.734 m).    Weight as of this encounter: 279 lb (126.6 kg).  Medication Reconciliation: complete   Abbey Hargrove CMA      "

## 2018-01-14 NOTE — PROGRESS NOTES
SUBJECTIVE:   Arturo Rahman is a 62 year old male presenting with a chief complaint of   Chief Complaint   Patient presents with     URI     1 week   .    Onset of symptoms was 1 week(s) ago.  Course of illness is worsening.    Severity moderate  Current and Associated symptoms: runny nose, cough - non-productive, ear pain and popping bilateral, sore throat and sinus drainage  Treatment measures tried include Tylenol/Ibuprofen and Decongestants.  Predisposing factors include None.        Review of Systems   Constitutional: Negative for chills, fever and malaise/fatigue.   HENT: Positive for congestion and ear pain. Negative for sore throat.    Eyes: Negative for blurred vision, discharge and redness.   Respiratory: Positive for cough. Negative for shortness of breath and wheezing.    Cardiovascular: Negative for chest pain and palpitations.   Gastrointestinal: Negative for abdominal pain, diarrhea, nausea and vomiting.   Musculoskeletal: Negative for joint pain and myalgias.   Skin: Negative for rash.   Neurological: Negative for headaches.         Past Medical History:   Diagnosis Date     Allergies      Asthma, moderate persistent      FH: colon cancer     colonoscopy q 5yr.  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     HTN (hypertension)      Hyperlipidemia      Current Outpatient Prescriptions   Medication Sig Dispense Refill     fluticasone (FLONASE) 50 MCG/ACT spray Spray 1 spray into both nostrils 2 times daily 1 Bottle 1     Multiple Vitamins-Minerals (MULTIVITAL PO) Take 1 tablet by mouth daily       simvastatin (ZOCOR) 10 MG tablet take 1 tablet by mouth at bedtime  90 tablet 1     montelukast (SINGULAIR) 10 MG tablet TAKE ONE TABLET BY MOUTH ONE TIME DAILY  90 tablet 1     desloratadine (CLARINEX) 5 MG tablet TAKE ONE TABLET BY MOUTH ONE TIME DAILY  90 tablet 2     losartan-hydrochlorothiazide (HYZAAR) 100-25 MG per tablet TAKE ONE  TABLET BY MOUTH ONE TIME DAILY  90 tablet 2     ADVAIR DISKUS 250-50 MCG/DOSE diskus inhaler inhale 1 puff into the lungs 2 times a day  3 Inhaler 1     esomeprazole (NEXIUM) 40 MG CR capsule Take 1 capsule (40 mg) by mouth every morning (before breakfast) 90 capsule 2     albuterol (PROAIR HFA/PROVENTIL HFA/VENTOLIN HFA) 108 (90 BASE) MCG/ACT Inhaler Inhale 2 puffs into the lungs every 6 hours as needed for shortness of breath / dyspnea 1 Inhaler 11     Social History   Substance Use Topics     Smoking status: Former Smoker     Quit date: 10/28/1991     Smokeless tobacco: Never Used     Alcohol use Yes      Comment: rare       OBJECTIVE  BP (!) 138/92 (BP Location: Left arm, Patient Position: Chair, Cuff Size: Adult Large)  Pulse 104  Temp 97.8  F (36.6  C) (Oral)  Wt 279 lb (126.6 kg)  SpO2 97%  BMI 42.09 kg/m2    Physical Exam   Constitutional: He is well-developed, well-nourished, and in no distress.   HENT:   Head: Normocephalic.   Right Ear: Tympanic membrane and ear canal normal.   Left Ear: Tympanic membrane and ear canal normal.   Mouth/Throat: Oropharynx is clear and moist.   Eyes: Conjunctivae are normal. Pupils are equal, round, and reactive to light.   Cardiovascular: Normal rate, regular rhythm and normal heart sounds.    Pulmonary/Chest: Effort normal and breath sounds normal.   Skin: No rash noted.   Psychiatric:   Alert and cooperative       Labs:  No results found for this or any previous visit (from the past 24 hour(s)).    X-Ray was not done.    ASSESSMENT:      ICD-10-CM    1. Dysfunction of both eustachian tubes H69.83 fluticasone (FLONASE) 50 MCG/ACT spray        Medical Decision Making:    Differential Diagnosis:  URI Adult/Peds:  Acute right otitis media, Acute left otitis media, Viral upper respiratory illness and eustachian tube dysfunction    Serious Comorbid Conditions:  Adult:  None    PLAN:    Prescribed flonase x 1-2 weeks. Continue with supportive care. Get plenty of rest and  push fluids. Can use Tylenol and/or ibuprofen as needed for pain and/or fever control. Allow 7-10 days for symptoms to improve. Follow up as needed.      Followup:    If not improving or if condition worsens, follow up with your Primary Care Provider    There are no Patient Instructions on file for this visit.

## 2018-01-15 LAB — COPATH REPORT: NORMAL

## 2018-02-02 ENCOUNTER — OFFICE VISIT (OUTPATIENT)
Dept: FAMILY MEDICINE | Facility: CLINIC | Age: 63
End: 2018-02-02
Payer: COMMERCIAL

## 2018-02-02 VITALS
HEIGHT: 68 IN | SYSTOLIC BLOOD PRESSURE: 123 MMHG | WEIGHT: 282 LBS | OXYGEN SATURATION: 96 % | TEMPERATURE: 98.9 F | DIASTOLIC BLOOD PRESSURE: 85 MMHG | BODY MASS INDEX: 42.74 KG/M2 | RESPIRATION RATE: 20 BRPM | HEART RATE: 84 BPM

## 2018-02-02 DIAGNOSIS — J01.10 ACUTE NON-RECURRENT FRONTAL SINUSITIS: Primary | ICD-10-CM

## 2018-02-02 DIAGNOSIS — E66.01 MORBID OBESITY (H): ICD-10-CM

## 2018-02-02 DIAGNOSIS — I10 HYPERTENSION, GOAL BELOW 140/90: ICD-10-CM

## 2018-02-02 DIAGNOSIS — H69.93 DYSFUNCTION OF BOTH EUSTACHIAN TUBES: ICD-10-CM

## 2018-02-02 DIAGNOSIS — J45.40 MODERATE PERSISTENT ASTHMA WITHOUT COMPLICATION: ICD-10-CM

## 2018-02-02 PROCEDURE — 99214 OFFICE O/P EST MOD 30 MIN: CPT | Performed by: FAMILY MEDICINE

## 2018-02-02 RX ORDER — DOXYCYCLINE 100 MG/1
100 CAPSULE ORAL 2 TIMES DAILY
Qty: 20 CAPSULE | Refills: 0 | Status: SHIPPED | OUTPATIENT
Start: 2018-02-02 | End: 2018-07-12

## 2018-02-02 NOTE — NURSING NOTE
"Chief Complaint   Patient presents with     URI       Initial /85 (BP Location: Right arm, Patient Position: Sitting, Cuff Size: Adult Large)  Pulse 84  Temp 98.9  F (37.2  C) (Oral)  Resp 20  Ht 5' 8.25\" (1.734 m)  Wt 282 lb (127.9 kg)  SpO2 96%  BMI 42.56 kg/m2 Estimated body mass index is 42.56 kg/(m^2) as calculated from the following:    Height as of this encounter: 5' 8.25\" (1.734 m).    Weight as of this encounter: 282 lb (127.9 kg).  Medication Reconciliation: romero Leigh        "

## 2018-02-02 NOTE — PROGRESS NOTES
SUBJECTIVE:   Arturo Rahman is a 62 year old male who presents to clinic today for the following health issues:      Acute Illness   Acute illness concerns: Possible Sinus infection, started 4-5 days before his surgery with nose feeling plugged.   Onset: about a month    Fever: no    Chills/Sweats: YES- chills    Headache (location?): YES    Sinus Pressure:no    Conjunctivitis:  no    Ear Pain: no    Rhinorrhea: YES    Congestion: YES    Sore Throat: no     Cough: no    Wheeze: no    Decreased Appetite: no    Nausea: no    Vomiting: no    Diarrhea:  no    Dysuria/Freq.: no    Fatigue/Achiness: no    Sick/Strep Exposure: no     Therapies Tried and outcome: flonase but that didn't seem to help    Ears have trouble decompressing when landing and will be flying in 4-5 days. Discussed ways to decrease pressure build up like pseudoephedrine and wearing ear plugs for descent.     Hypertension Follow-up      Outpatient blood pressures are not being checked.    Low Salt Diet: no added salt    Asthma Follow-Up    Was ACT completed today?    Yes    ACT Total Scores 2/2/2018   ACT TOTAL SCORE -   ASTHMA ER VISITS -   ASTHMA HOSPITALIZATIONS -   ACT TOTAL SCORE (Goal Greater than or Equal to 20) 20   In the past 12 months, how many times did you visit the emergency room for your asthma without being admitted to the hospital? 0   In the past 12 months, how many times were you hospitalized overnight because of your asthma? 0       Recent asthma triggers that patient is dealing with: None         Problem list and histories reviewed & adjusted, as indicated.  Additional history: as documented    Patient Active Problem List   Diagnosis     Asthma, moderate persistent     Granulomatous lung disease (H)     GERD (gastroesophageal reflux disease)     FH: colon cancer     Hyperlipidemia LDL goal <130     Allergic rhinitis     Advanced directives, counseling/discussion     Hypertension, goal below 140/90     Endobronchial mass      Morbid obesity (H)     Hamartoma of left lung (H)     Past Surgical History:   Procedure Laterality Date     BRONCHOSCOPY FLEXIBLE AND RIGID N/A 7/19/2017    Procedure: BRONCHOSCOPY FLEXIBLE AND RIGID;   Bronchoscopy, Tumor Debulking with Cryoprobe and Argon Plasma Coagulation;  Surgeon: Albino Crump MD;  Location: [facility] OR     BRONCHOSCOPY FLEXIBLE,L CRYOBIOPSY N/A 1/11/2018    Procedure: BRONCHOSCOPY FLEXIBLE, CRYOBIOPSY;  Flexible Bronchoscopy, Rigid Bronchoscopy, Tumor Debulking With Cryoprobe;  Surgeon: Albino Crump MD;  Location: [facility] OR     COLONOSCOPY  11/08    + polyp, +FH, repeat in 5 years     COMBINED ENDOSCOPY UPPER WITH ARGON PLASMA COAGULATOR (APC) N/A 7/19/2017    Procedure: COMBINED ENDOSCOPY UPPER WITH ARGON PLASMA COAGULATOR (APC);;  Surgeon: Albino Crump MD;  Location: [facility] OR     TONSILLECTOMY       VASECTOMY         Social History   Substance Use Topics     Smoking status: Former Smoker     Quit date: 10/28/1991     Smokeless tobacco: Never Used     Alcohol use Yes      Comment: rare     Family History   Problem Relation Age of Onset     Arthritis Mother      Hypertension Father      CEREBROVASCULAR DISEASE Father      Asthma Daughter      Allergies Daughter      Hypertension Sister      Cancer - colorectal Sister      dx age 50         Current Outpatient Prescriptions   Medication Sig Dispense Refill     doxycycline (VIBRAMYCIN) 100 MG capsule Take 1 capsule (100 mg) by mouth 2 times daily 20 capsule 0     fluticasone (FLONASE) 50 MCG/ACT spray Spray 1 spray into both nostrils 2 times daily 1 Bottle 1     Multiple Vitamins-Minerals (MULTIVITAL PO) Take 1 tablet by mouth daily       simvastatin (ZOCOR) 10 MG tablet take 1 tablet by mouth at bedtime  90 tablet 1     montelukast (SINGULAIR) 10 MG tablet TAKE ONE TABLET BY MOUTH ONE TIME DAILY  90 tablet 1     desloratadine (CLARINEX) 5 MG tablet TAKE ONE TABLET BY MOUTH ONE TIME DAILY  90 tablet 2      losartan-hydrochlorothiazide (HYZAAR) 100-25 MG per tablet TAKE ONE TABLET BY MOUTH ONE TIME DAILY  90 tablet 2     ADVAIR DISKUS 250-50 MCG/DOSE diskus inhaler inhale 1 puff into the lungs 2 times a day  3 Inhaler 1     esomeprazole (NEXIUM) 40 MG CR capsule Take 1 capsule (40 mg) by mouth every morning (before breakfast) 90 capsule 2     albuterol (PROAIR HFA/PROVENTIL HFA/VENTOLIN HFA) 108 (90 BASE) MCG/ACT Inhaler Inhale 2 puffs into the lungs every 6 hours as needed for shortness of breath / dyspnea 1 Inhaler 11     Allergies   Allergen Reactions     Lisinopril Cough     Recent Labs   Lab Test  12/22/17   0827  07/11/17   1919   03/21/16   1351  12/01/15   1552   06/19/13   1017  10/23/12   0710  09/07/11   1036   A1C   --   6.1*   --   6.4*  6.3*   --    --    --    --    LDL   --   88   --    --    --    --    --   104  110   HDL   --   46   --    --    --    --    --   36*  41   TRIG   --   104   --    --    --    --    --   102  91   ALT   --   37   --    --   40   --    --   49  26   CR  1.13  1.21   < >  1.30*  1.45*   < >  1.14  1.12  1.13   GFRESTIMATED  66  61   < >  57*  50*   < >  67  68  68   GFRESTBLACK  80  74   < >  69  60*   < >  81  82  82   POTASSIUM  3.9  3.8   < >  3.2*  3.8   < >  4.0  3.6  4.0   TSH   --    --    --    --    --    --   0.71   --    --     < > = values in this interval not displayed.      BP Readings from Last 3 Encounters:   02/02/18 123/85   01/14/18 (!) 138/92   01/11/18 135/89    Wt Readings from Last 3 Encounters:   02/02/18 282 lb (127.9 kg)   01/14/18 279 lb (126.6 kg)   01/11/18 280 lb 3.3 oz (127.1 kg)                  Labs reviewed in EPIC    Reviewed and updated as needed this visit by clinical staff  Tobacco  Allergies  Meds  Med Hx  Surg Hx  Fam Hx  Soc Hx      Reviewed and updated as needed this visit by Provider         ROS:  Constitutional, HEENT, cardiovascular, pulmonary, gi and gu systems are negative, except as otherwise noted.    OBJECTIVE:     BP  "123/85 (BP Location: Right arm, Patient Position: Sitting, Cuff Size: Adult Large)  Pulse 84  Temp 98.9  F (37.2  C) (Oral)  Resp 20  Ht 5' 8.25\" (1.734 m)  Wt 282 lb (127.9 kg)  SpO2 96%  BMI 42.56 kg/m2  Body mass index is 42.56 kg/(m^2).  GENERAL: mild acutely ill, alert and no distress  EYES: Eyes grossly normal to inspection, PERRL and conjunctivae and sclerae normal  HENT: ear canals and TM's normal, nose and mouth without ulcers or lesions, throat is erythematous with no exudate. Sinuses tender to percussion over maxillary sinus area. Nasal discharge present.  NECK: anterior cervical adenopathy, no asymmetry, masses, or scars and thyroid normal to palpation  RESP: lungs clear to auscultation - no rales, rhonchi or wheezes  CV: regular rate and rhythm, normal S1 S2, no S3 or S4, no murmur, click or rub, no peripheral edema and peripheral pulses strong  ABDOMEN: soft, nontender, no hepatosplenomegaly, no masses and bowel sounds normal  MS: no gross musculoskeletal defects noted, no edema  SKIN: no suspicious lesions or rashes  NEURO: Normal strength and tone, mentation intact and speech normal  PSYCH: mentation appears normal, affect normal/bright     Diagnostic Test Results:  none     ASSESSMENT/PLAN:         Tobacco Cessation:   reports that he quit smoking about 26 years ago. He has never used smokeless tobacco.      BMI:   Estimated body mass index is 42.56 kg/(m^2) as calculated from the following:    Height as of this encounter: 5' 8.25\" (1.734 m).    Weight as of this encounter: 282 lb (127.9 kg).   Weight management plan: Discussed healthy diet and exercise guidelines and patient will follow up in 3 months in clinic to re-evaluate.        ICD-10-CM    1. Acute non-recurrent frontal sinusitis J01.10 doxycycline (VIBRAMYCIN) 100 MG capsule twice a day for 10 days for empiric treatment. Discussed risks and benefits.    2. Hypertension, goal below 140/90 I10 Well controlled on medications    3. " Moderate persistent asthma without complication J45.40 Much better now that hamartoma has been removed. ACT at 20    4. Morbid obesity (H) E66.01 Weight loss counseling done    5. Dysfunction of both eustachian tubes H69.83 Dysfunction while flying. Recommend Claritin-D and ear plugs.        FUTURE APPOINTMENTS:       - Follow-up visit in 3-4 months or sooner if any questions or concerns.   Work on weight loss  Regular exercise  See Patient Instructions    Martina Leung MD  [facility]

## 2018-02-02 NOTE — MR AVS SNAPSHOT
After Visit Summary   2/2/2018    rAturo Rahman    MRN: [id number]           Patient Information     Date Of Birth          1955        Visit Information        Provider Department      2/2/2018 11:00 AM Martina Leung MD [facility]        Today's Diagnoses     Acute non-recurrent frontal sinusitis    -  1    Hypertension, goal below 140/90        Moderate persistent asthma without complication        Morbid obesity (H)        Dysfunction of both eustachian tubes           Follow-ups after your visit        Follow-up notes from your care team     Return in about 3 months (around 5/2/2018) for medication follow up, recheck.      Who to contact     If you have questions or need follow up information about today's clinic visit or your schedule please contact [facility] directly at [phone].  Normal or non-critical lab and imaging results will be communicated to you by MyChart, letter or phone within 4 business days after the clinic has received the results. If you do not hear from us within 7 days, please contact the clinic through MyChart or phone. If you have a critical or abnormal lab result, we will notify you by phone as soon as possible.  Submit refill requests through [employer] [facility] or call your pharmacy and they will forward the refill request to us. Please allow 3 business days for your refill to be completed.          Additional Information About Your Visit        MyChart Information     [employer] gives you secure access to your electronic health record. If you see a primary care provider, you can also send messages to your care team and make appointments. If you have questions, please call your primary care clinic.  If you do not have a primary care provider, please call [phone] and they will assist you.        Care EveryWhere ID     This is your Care EveryWhere ID. This could be used by other organizations to access your [facility]  "records  [id number]        Your Vitals Were     Pulse Temperature Respirations Height Pulse Oximetry BMI (Body Mass Index)    84 98.9  F (37.2  C) (Oral) 20 5' 8.25\" (1.734 m) 96% 42.56 kg/m2       Blood Pressure from Last 3 Encounters:   02/02/18 123/85   01/14/18 (!) 138/92   01/11/18 135/89    Weight from Last 3 Encounters:   02/02/18 282 lb (127.9 kg)   01/14/18 279 lb (126.6 kg)   01/11/18 280 lb 3.3 oz (127.1 kg)              Today, you had the following     No orders found for display         Today's Medication Changes          These changes are accurate as of 2/2/18  7:59 PM.  If you have any questions, ask your nurse or doctor.               Start taking these medicines.        Dose/Directions    doxycycline 100 MG capsule   Commonly known as:  VIBRAMYCIN   Used for:  Acute non-recurrent frontal sinusitis   Started by:  Martina eLung MD        Dose:  100 mg   Take 1 capsule (100 mg) by mouth 2 times daily   Quantity:  20 capsule   Refills:  0            Where to get your medicines      These medications were sent to [id number] - [place], [place] - [address] [facility]  [address] [place] [address]     Phone:  [phone]     doxycycline 100 MG capsule                Primary Care Provider Office Phone # Fax #    Martina Leung [phone] [address]  [facility] [address]        Equal Access to Services     [facility] AH: Hadii aad ku hadasho Sojamesali, waaxda luqadaha, qaybta kaalmada adeegyada, earl barrow. So [facility] [phone].    ATENCIÓN: Si habla español, tiene a santana disposición servicios gratuitos de asistencia lingüística. Llame al [phone].    We comply with applicable federal civil rights laws and [place] laws. We do not discriminate on the basis of race, color, national origin, age, disability, sex, sexual orientation, or gender identity.            Thank you!     Thank you for choosing [facility]  [facility]. Our goal is always to provide you with excellent care. Hearing back from our patients is one way we can continue to improve our services. Please take a few minutes to complete the written survey that you may receive in the mail after your visit with us. Thank you!             Your Updated Medication List - Protect others around you: Learn how to safely use, store and throw away your medicines at [email or web address].          This list is accurate as of 2/2/18  7:59 PM.  Always use your most recent med list.                   Brand Name Dispense Instructions for use Diagnosis    ADVAIR DISKUS 250-50 MCG/DOSE diskus inhaler   Generic drug:  fluticasone-salmeterol     3 Inhaler    inhale 1 puff into the lungs 2 times a day    Moderate persistent asthma without complication       albuterol 108 (90 BASE) MCG/ACT Inhaler    PROAIR HFA/PROVENTIL HFA/VENTOLIN HFA    1 Inhaler    Inhale 2 puffs into the lungs every 6 hours as needed for shortness of breath / dyspnea    Moderate persistent asthma without complication       desloratadine 5 MG tablet    CLARINEX    90 tablet    TAKE ONE TABLET BY MOUTH ONE TIME DAILY    Allergic rhinitis       doxycycline 100 MG capsule    VIBRAMYCIN    20 capsule    Take 1 capsule (100 mg) by mouth 2 times daily    Acute non-recurrent frontal sinusitis       esomeprazole 40 MG CR capsule    nexIUM    90 capsule    Take 1 capsule (40 mg) by mouth every morning (before breakfast)    Gastroesophageal reflux disease with esophagitis       fluticasone 50 MCG/ACT spray    FLONASE    1 Bottle    Spray 1 spray into both nostrils 2 times daily    Dysfunction of both eustachian tubes       losartan-hydrochlorothiazide 100-25 MG per tablet    HYZAAR    90 tablet    TAKE ONE TABLET BY MOUTH ONE TIME DAILY    Hypertension, goal below 140/90       montelukast 10 MG tablet    SINGULAIR    90 tablet    TAKE ONE TABLET BY MOUTH ONE TIME DAILY    Moderate persistent  asthma without complication       MULTIVITAL PO      Take 1 tablet by mouth daily        simvastatin 10 MG tablet    ZOCOR    90 tablet    take 1 tablet by mouth at bedtime    Hyperlipidemia LDL goal <130

## 2018-02-03 ASSESSMENT — ASTHMA QUESTIONNAIRES: ACT_TOTALSCORE: 20

## 2018-03-10 DIAGNOSIS — J45.40 MODERATE PERSISTENT ASTHMA WITHOUT COMPLICATION: ICD-10-CM

## 2018-03-10 DIAGNOSIS — K21.00 GASTROESOPHAGEAL REFLUX DISEASE WITH ESOPHAGITIS: ICD-10-CM

## 2018-03-10 NOTE — TELEPHONE ENCOUNTER
"Requested Prescriptions   Pending Prescriptions Disp Refills     esomeprazole (NEXIUM) 40 MG CR capsule [Pharmacy Med Name: Esomeprazole Magnesium Oral Capsule Delayed Release 40 MG] 90 capsule 1    Last Written Prescription Date:  6/20/17  Last Fill Quantity: 90,  # refills: 2   Last Office Visit with [facility], [facility] or [facility] prescribing provider:  2/2/2018     Future Office Visit:      Sig: TAKE ONE CAPSULE BY MOUTH IN THE MORNING BEFORE BREAKFAST    PPI Protocol Passed    3/10/2018 10:41 AM       Passed - Not on Clopidogrel (unless Pantoprazole ordered)       Passed - No diagnosis of osteoporosis on record       Passed - Recent (12 mo) or future (30 days) visit within the authorizing provider's specialty    Patient had office visit in the last 12 months or has a visit in the next 30 days with authorizing provider or within the authorizing provider's specialty.  See \"Patient Info\" tab in inbasket, or \"Choose Columns\" in Meds & Orders section of the refill encounter.           Passed - Patient is age 18 or older        montelukast (SINGULAIR) 10 MG tablet [Pharmacy Med Name: Montelukast Sodium Oral Tablet 10 MG] 90 tablet 0    Last Written Prescription Date:  9/13/17  Last Fill Quantity: 90,  # refills: 1   Last Office Visit with [facility], [facility] or [facility] prescribing provider:  2/2/2018     Future Office Visit:      Sig: TAKE ONE TABLET BY MOUTH ONE TIME DAILY.    Leukotriene Inhibitors Protocol Passed    3/10/2018 10:41 AM       Passed - Patient is age 12 or older    If patient is under 16, ok to refill using age based dosing.          Passed - Asthma control assessment score within normal limits in last 6 months    Please review ACT score.          Passed - Recent (6 mo) or future (30 days) visit within the authorizing provider's specialty    Patient had office visit in the last 6 months or has a visit in the next 30 days with authorizing provider or within the authorizing provider's specialty.  See \"Patient Info\" tab in " "inbasket, or \"Choose Columns\" in Meds & Orders section of the refill encounter.              "

## 2018-03-13 ENCOUNTER — TELEPHONE (OUTPATIENT)
Dept: PULMONOLOGY | Facility: CLINIC | Age: 63
End: 2018-03-13

## 2018-03-13 RX ORDER — MONTELUKAST SODIUM 10 MG/1
TABLET ORAL
Qty: 90 TABLET | Refills: 2 | Status: SHIPPED | OUTPATIENT
Start: 2018-03-13 | End: 2018-12-08

## 2018-03-13 RX ORDER — ESOMEPRAZOLE MAGNESIUM 40 MG/1
CAPSULE, DELAYED RELEASE ORAL
Qty: 90 CAPSULE | Refills: 2 | Status: SHIPPED | OUTPATIENT
Start: 2018-03-13 | End: 2018-12-08

## 2018-03-13 NOTE — TELEPHONE ENCOUNTER
"Patient returned call to clinic and states he did not receive a call regarding bronchoscopy results, he only was able to \"look at some on MyChart\". Discussed with patient Dr. Crump's recommendations per LOV note and pt in agreement with this plan. Pt scheduled for follow up with Dr. Crump 4/23/18 per pt request. Patient states he would also like to discuss PFT and echocardiogram results at follow up.     Discussed with pt that message will be sent to Dr. Crump to make him aware of appt arranged to discuss testing results and follow up plan, and that our clinic will contact him if Dr. Crump has any recommendations prior to appt. Patient in agreement with this plan and denies any further questions or concerns at this time.    Janet SUN RN, BSN  Pulmonary Care Coordinator     "

## 2018-03-13 NOTE — TELEPHONE ENCOUNTER
Prescription approved per [facility] Refill Protocol.    Tito Mirza RN, BSN

## 2018-03-13 NOTE — TELEPHONE ENCOUNTER
Upon review of patient's chart, Dr. Crump recommended follow up appt 1-2 months after bronchoscopy procedure completed 1/11/18 during LOV 12/11/17. No appt scheduled at this time and no documentation of patient being contacted since bronchoscopy. Attempted to contact patient to discuss and possibly schedule follow up and left message for call back to clinic.    Janet SUN RN, BSN  Pulmonary Care Coordinator

## 2018-04-23 ENCOUNTER — OFFICE VISIT (OUTPATIENT)
Dept: PULMONOLOGY | Facility: CLINIC | Age: 63
End: 2018-04-23
Payer: COMMERCIAL

## 2018-04-23 VITALS
HEART RATE: 85 BPM | RESPIRATION RATE: 18 BRPM | BODY MASS INDEX: 43.14 KG/M2 | SYSTOLIC BLOOD PRESSURE: 127 MMHG | OXYGEN SATURATION: 96 % | WEIGHT: 285.8 LBS | DIASTOLIC BLOOD PRESSURE: 88 MMHG

## 2018-04-23 DIAGNOSIS — Q85.9 HAMARTOMA (H): Primary | ICD-10-CM

## 2018-04-23 PROCEDURE — 99214 OFFICE O/P EST MOD 30 MIN: CPT | Performed by: INTERNAL MEDICINE

## 2018-04-23 ASSESSMENT — PAIN SCALES - GENERAL: PAINLEVEL: NO PAIN (0)

## 2018-04-23 NOTE — PATIENT INSTRUCTIONS
1.  Increase your physical activity and exercise  2.  Have a CT scan in September 2018 for follow up with CT scan of the chest.   3.  Follow up with Dr. Alberto.

## 2018-04-23 NOTE — MR AVS SNAPSHOT
After Visit Summary   4/23/2018    Arturo Rahman    MRN: [id number]           Patient Information     Date Of Birth          1955        Visit Information        Provider Department      4/23/2018 3:30 PM Albino Crump MD [facility]        Today's Diagnoses     Hamartoma (H)    -  1      Care Instructions    1.  Increase your physical activity and exercise  2.  Have a CT scan in September 2018 for follow up with CT scan of the chest.   3.  Follow up with Dr. Alberto.           Follow-ups after your visit        Follow-up notes from your care team     Return in about 5 months (around 9/17/2018).      Future tests that were ordered for you today     Open Future Orders        Priority Expected Expires Ordered    CT Chest w/o contrast Routine  4/23/2019 4/23/2018            Who to contact     If you have questions or need follow up information about today's clinic visit or your schedule please contact [facility] directly at [phone].  Normal or non-critical lab and imaging results will be communicated to you by [employer], letter or phone within 4 business days after the clinic has received the results. If you do not hear from us within 7 days, please contact the clinic through BBE[employer] or phone. If you have a critical or abnormal lab result, we will notify you by phone as soon as possible.  Submit refill requests through [employer] or call your pharmacy and they will forward the refill request to us. Please allow 3 business days for your refill to be completed.          Additional Information About Your Visit        [employer] Information     [employer] gives you secure access to your electronic health record. If you see a primary care provider, you can also send messages to your care team and make appointments. If you have questions, please call your primary care clinic.  If you do not have a primary care provider, please call [phone] and they will assist  you.      Tebla is an electronic gateway that provides easy, online access to your medical records. With Te[employer], you can request a clinic appointment, read your test results, renew a prescription or communicate with your care team.     To access your existing account, please contact your [facility] or call [phone] for assistance.        Care EveryWhere ID     This is your Care EveryWhere ID. This could be used by other organizations to access your [facility] medical records  [id number]        Your Vitals Were     Pulse Respirations Pulse Oximetry BMI (Body Mass Index)          85 18 96% 43.14 kg/m2         Blood Pressure from Last 3 Encounters:   04/23/18 127/88   02/02/18 123/85   01/14/18 (!) 138/92    Weight from Last 3 Encounters:   04/23/18 129.6 kg (285 lb 12.8 oz)   02/02/18 127.9 kg (282 lb)   01/14/18 126.6 kg (279 lb)               Primary Care Provider Office Phone # Fax #    Martina Kaleigh Leung [phone]       [id number] [facility] [address]        Equal Access to Services     [facility]: Hadii aad ku hadasho Soomaali, waaxda luqadaha, qaybta kaalmada adeegyada, waxay idiin haymacn artis atkinson . So [facility] [phone].    ATENCIÓN: Si habla español, tiene a santana disposición servicios gratuitos de asistencia lingüística. Llame al [phone].    We comply with applicable federal civil rights laws and [place] laws. We do not discriminate on the basis of race, color, national origin, age, disability, sex, sexual orientation, or gender identity.            Thank you!     Thank you for choosing [facility]  for your care. Our goal is always to provide you with excellent care. Hearing back from our patients is one way we can continue to improve our services. Please take a few minutes to complete the written survey that you may receive in the mail after your visit with us. Thank you!             Your Updated  Medication List - Protect others around you: Learn how to safely use, store and throw away your medicines at www.disposemymeds.org.          This list is accurate as of 4/23/18  3:40 PM.  Always use your most recent med list.                   Brand Name Dispense Instructions for use Diagnosis    ADVAIR DISKUS 250-50 MCG/DOSE diskus inhaler   Generic drug:  fluticasone-salmeterol     3 Inhaler    inhale 1 puff into the lungs 2 times a day    Moderate persistent asthma without complication       albuterol 108 (90 Base) MCG/ACT Inhaler    PROAIR HFA/PROVENTIL HFA/VENTOLIN HFA    1 Inhaler    Inhale 2 puffs into the lungs every 6 hours as needed for shortness of breath / dyspnea    Moderate persistent asthma without complication       desloratadine 5 MG tablet    CLARINEX    90 tablet    TAKE ONE TABLET BY MOUTH ONE TIME DAILY    Allergic rhinitis       doxycycline 100 MG capsule    VIBRAMYCIN    20 capsule    Take 1 capsule (100 mg) by mouth 2 times daily    Acute non-recurrent frontal sinusitis       esomeprazole 40 MG CR capsule    nexIUM    90 capsule    TAKE ONE CAPSULE BY MOUTH IN THE MORNING BEFORE BREAKFAST    Gastroesophageal reflux disease with esophagitis       fluticasone 50 MCG/ACT spray    FLONASE    1 Bottle    Spray 1 spray into both nostrils 2 times daily    Dysfunction of both eustachian tubes       losartan-hydrochlorothiazide 100-25 MG per tablet    HYZAAR    90 tablet    TAKE ONE TABLET BY MOUTH ONE TIME DAILY    Hypertension, goal below 140/90       montelukast 10 MG tablet    SINGULAIR    90 tablet    TAKE ONE TABLET BY MOUTH ONE TIME DAILY.    Moderate persistent asthma without complication       MULTIVITAL PO      Take 1 tablet by mouth daily        simvastatin 10 MG tablet    ZOCOR    90 tablet    take 1 tablet by mouth at bedtime    Hyperlipidemia LDL goal <130

## 2018-04-23 NOTE — PROGRESS NOTES
Pulmonary Clinic     We have been asked by Dr. 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  His most recent tumor debulking was performed in January 2018.         Patient had a echocardiogram in December which was essentially negative.        Review of Systems:   10 point ROS performed with pertinent +/- noted in the HPI.  The remainder of the ROS was otherwise negative.        Pertinent Medications     Current Outpatient Prescriptions   Medication     ADVAIR DISKUS 250-50 MCG/DOSE diskus inhaler     albuterol (PROAIR HFA/PROVENTIL HFA/VENTOLIN HFA) 108 (90 BASE) MCG/ACT Inhaler     desloratadine (CLARINEX) 5 MG tablet     doxycycline (VIBRAMYCIN) 100 MG capsule     esomeprazole (NEXIUM) 40 MG CR capsule     fluticasone (FLONASE) 50 MCG/ACT spray     losartan-hydrochlorothiazide (HYZAAR) 100-25 MG per tablet     montelukast (SINGULAIR) 10 MG tablet     Multiple Vitamins-Minerals (MULTIVITAL PO)     simvastatin (ZOCOR) 10 MG tablet     No current facility-administered medications for this visit.         Allergies:      Allergies   Allergen Reactions     Lisinopril Cough          Past Medical Hx:       Hamartoma of left lung (H)      Morbid obesity (H)      Hypertension, goal below 140/90      Allergic rhinitis      Hyperlipidemia LDL goal <130      Asthma, moderate persistent      Granulomatous lung disease (H)      GERD (gastroesophageal reflux disease)      FH: colon cancer            colonoscopy q 5yr.  last 2008       Family Hx:     Family History   Problem  Relation Age of Onset     Arthritis Mother      Hypertension Father      CEREBROVASCULAR DISEASE Father      Asthma Daughter      Allergies Daughter      Hypertension Sister      Cancer - colorectal Sister      dx age 50          Objective   Vitals:  Wt 129.6 kg (285 lb 12.8 oz)  BMI 43.14 kg/m2    General:  Adult male;appears stated age; no acute distress; the patient is a good historian  HEENT:  NCAT; EOMI; No icterus; no injection; MMM  Pulm: CTAB, normal to percussion  CV: RRR, no murmurs, rubs or gallops  Abdomen: Obese, soft, NT, ND, +BS        Labs / Imaging/Studies     Imaging:   No new imaging         Assessment and Plan:   Assessment: This is a 18-year-old male with a history of endobronchial hamartom who biopsy but with significant bleeding had difficult time removing the entire lesion.  He had a follow-up CT scan and follow-up procedure with more definitive had a partial resection of the time of resection of the endobronchial hamartoma.  He has had some slight increase in his respiratory status but overall does continue to have some dyspnea in particular with exertion but he is overweight and he is working on increasing his exercise tolerance.  He had a echocardiogram for further evaluation which was not of concern.  At this time I recommend he have follow-up imaging in September 2018.  If stable with a patent airway will likely not need any additional follow-up.    1. Hamartoma (H)  See above  - CT Chest w/o contrast; Future    I spent 250 minutes with direct face to face interaction with this patient and provided at least 50% of this time counseling and coordinating care for hamartoma of airway and dyspnea on exertion as noted above in the assessment and plan.        Albino Crump MD  Pulmonary and Critical Care  [facility]  Pager:  [phone]

## 2018-04-23 NOTE — NURSING NOTE
"Arturo Rahman's goals for this visit include: follow up post Bronch and review test results  He requests these members of his care team be copied on today's visit information:     PCP: Martina Leung    Referring Provider:  No referring provider defined for this encounter.    Chief Complaint   Patient presents with     Follow Up For     post Bronch and review test results       Initial /88 (BP Location: Left arm, Patient Position: Sitting, Cuff Size: Adult Large)  Pulse 85  Resp 18  Wt 129.6 kg (285 lb 12.8 oz)  SpO2 96%  BMI 43.14 kg/m2 Estimated body mass index is 43.14 kg/(m^2) as calculated from the following:    Height as of 2/2/18: 1.734 m (5' 8.25\").    Weight as of this encounter: 129.6 kg (285 lb 12.8 oz).  Medication Reconciliation: complete    Do you need any medication refills at today's visit? No    Moni Tinsley LPN      "

## 2018-06-07 DIAGNOSIS — E78.5 HYPERLIPIDEMIA LDL GOAL <130: ICD-10-CM

## 2018-06-07 DIAGNOSIS — I10 HYPERTENSION, GOAL BELOW 140/90: ICD-10-CM

## 2018-06-07 DIAGNOSIS — J30.9 ALLERGIC RHINITIS: ICD-10-CM

## 2018-06-07 DIAGNOSIS — J45.40 MODERATE PERSISTENT ASTHMA WITHOUT COMPLICATION: ICD-10-CM

## 2018-06-07 NOTE — TELEPHONE ENCOUNTER
"Requested Prescriptions   Pending Prescriptions Disp Refills     losartan-hydrochlorothiazide (HYZAAR) 100-25 MG per tablet [Pharmacy Med Name: Losartan Potassium-HCTZ Oral Tablet 100-25 MG]  Last Written Prescription Date:  09/13/17  Last Fill Quantity: 90,  # refills: 2   Last Office Visit with [facility] or  Health prescribing provider:  02/02/18   Future Office Visit:    90 tablet 1     Sig: TAKE ONE TABLET BY MOUTH ONE TIME DAILY    Angiotensin-II Receptors Passed    6/7/2018  9:24 AM       Passed - Blood pressure under 140/90 in past 12 months    BP Readings from Last 3 Encounters:   04/23/18 127/88   02/02/18 123/85   01/14/18 (!) 138/92                Passed - Recent (12 mo) or future (30 days) visit within the authorizing provider's specialty    Patient had office visit in the last 12 months or has a visit in the next 30 days with authorizing provider or within the authorizing provider's specialty.  See \"Patient Info\" tab in inbasket, or \"Choose Columns\" in Meds & Orders section of the refill encounter.           Passed - Patient is age 18 or older       Passed - Normal serum creatinine on file in past 12 months    Recent Labs   Lab Test  12/22/17   0827   CR  1.13            Passed - Normal serum potassium on file in past 12 months    Recent Labs   Lab Test  12/22/17   0827   POTASSIUM  3.9                    simvastatin (ZOCOR) 10 MG tablet [Pharmacy Med Name: Simvastatin Oral Tablet 10 MG]  Last Written Prescription Date:  12/11/17  Last Fill Quantity: 90,  # refills: 1   Last Office Visit with [facility] or [facility] prescribing provider:  02/02/18   Future Office Visit:    90 tablet 0     Sig: take 1 tablet by mouth at bedtime    Statins Protocol Passed    6/7/2018  9:24 AM       Passed - LDL on file in past 12 months    Recent Labs   Lab Test  07/11/17   1919   LDL  88            Passed - No abnormal creatine kinase in past 12 months    Recent Labs   Lab Test  10/23/12   0710   CKT  109               " "Passed - Recent (12 mo) or future (30 days) visit within the authorizing provider's specialty    Patient had office visit in the last 12 months or has a visit in the next 30 days with authorizing provider or within the authorizing provider's specialty.  See \"Patient Info\" tab in inbasket, or \"Choose Columns\" in Meds & Orders section of the refill encounter.           Passed - Patient is age 18 or older        desloratadine (CLARINEX) 5 MG tablet [Pharmacy Med Name: Desloratadine Oral Tablet 5 MG]  Last Written Prescription Date:  09/13/17  Last Fill Quantity: 90,  # refills: 2   Last Office Visit with [facility], [facility] or [facility] prescribing provider:  02/02/18   Future Office Visit:    90 tablet 1     Sig: TAKE ONE TABLET BY MOUTH ONE TIME DAILY.    Antihistamines Protocol Passed    6/7/2018  9:24 AM       Passed - Patient is 3-64 years of age    Apply weight-based dosing for peds patients age 3 - 12 years of age.    Forward request to provider for patients under the age of 3 or over the age of 64.         Passed - Recent (12 mo) or future (30 days) visit within the authorizing provider's specialty    Patient had office visit in the last 12 months or has a visit in the next 30 days with authorizing provider or within the authorizing provider's specialty.  See \"Patient Info\" tab in inbasket, or \"Choose Columns\" in Meds & Orders section of the refill encounter.            VENTOLIN  (90 Base) MCG/ACT Inhaler [Pharmacy Med Name: Ventolin HFA Inhalation Aerosol Solution 108 (90 Base) MCG/ACT]  Last Written Prescription Date:  12/15/16  Last Fill Quantity: 1,  # refills: 11   Last Office Visit with [facility], [facility] or  [employer] prescribing provider:  02/02/18   Future Office Visit:    18 g 10     Sig: inhale 2 puffs by mouth every 6 hours as needed for shortness of breath/dyspnea    Asthma Maintenance Inhalers - Anticholinergics Passed    6/7/2018  9:24 AM       Passed - Patient is age 12 years or older       Passed - Asthma " "control assessment score within normal limits in last 6 months    Please review ACT score.          Passed - Recent (6 mo) or future (30 days) visit within the authorizing provider's specialty    Patient had office visit in the last 6 months or has a visit in the next 30 days with authorizing provider or within the authorizing provider's specialty.  See \"Patient Info\" tab in inbasket, or \"Choose Columns\" in Meds & Orders section of the refill encounter.              "

## 2018-06-11 RX ORDER — SIMVASTATIN 10 MG
TABLET ORAL
Qty: 30 TABLET | Refills: 0 | Status: SHIPPED | OUTPATIENT
Start: 2018-06-11 | End: 2018-07-12

## 2018-06-11 RX ORDER — ALBUTEROL SULFATE 90 UG/1
AEROSOL, METERED RESPIRATORY (INHALATION)
Qty: 18 G | Refills: 2 | Status: SHIPPED | OUTPATIENT
Start: 2018-06-11 | End: 2019-08-19

## 2018-06-11 RX ORDER — DESLORATADINE 5 MG/1
TABLET ORAL
Qty: 90 TABLET | Refills: 1 | Status: SHIPPED | OUTPATIENT
Start: 2018-06-11 | End: 2018-07-12

## 2018-06-11 RX ORDER — LOSARTAN POTASSIUM AND HYDROCHLOROTHIAZIDE 25; 100 MG/1; MG/1
TABLET ORAL
Qty: 90 TABLET | Refills: 1 | Status: SHIPPED | OUTPATIENT
Start: 2018-06-11 | End: 2018-12-08

## 2018-06-11 NOTE — TELEPHONE ENCOUNTER
Prescriptions approved per [facility] Refill Protocol.  Simvastatin filled as 30 day refill, due for fasting lipids by 7/11/18.    Maddy Lynn RN  [facility] Triage

## 2018-07-12 ENCOUNTER — OFFICE VISIT (OUTPATIENT)
Dept: FAMILY MEDICINE | Facility: CLINIC | Age: 63
End: 2018-07-12
Payer: COMMERCIAL

## 2018-07-12 ENCOUNTER — RADIANT APPOINTMENT (OUTPATIENT)
Dept: GENERAL RADIOLOGY | Facility: CLINIC | Age: 63
End: 2018-07-12
Attending: FAMILY MEDICINE
Payer: COMMERCIAL

## 2018-07-12 VITALS
SYSTOLIC BLOOD PRESSURE: 124 MMHG | WEIGHT: 286 LBS | BODY MASS INDEX: 43.35 KG/M2 | DIASTOLIC BLOOD PRESSURE: 89 MMHG | OXYGEN SATURATION: 94 % | TEMPERATURE: 98.5 F | HEART RATE: 91 BPM | HEIGHT: 68 IN | RESPIRATION RATE: 18 BRPM

## 2018-07-12 DIAGNOSIS — E66.01 MORBID OBESITY (H): ICD-10-CM

## 2018-07-12 DIAGNOSIS — Q85.9 HAMARTOMA OF LEFT LUNG (H): ICD-10-CM

## 2018-07-12 DIAGNOSIS — M25.561 RIGHT KNEE PAIN, UNSPECIFIED CHRONICITY: ICD-10-CM

## 2018-07-12 DIAGNOSIS — I10 HYPERTENSION, GOAL BELOW 140/90: ICD-10-CM

## 2018-07-12 DIAGNOSIS — J45.40 MODERATE PERSISTENT ASTHMA WITHOUT COMPLICATION: Primary | ICD-10-CM

## 2018-07-12 DIAGNOSIS — Z12.11 SPECIAL SCREENING FOR MALIGNANT NEOPLASMS, COLON: ICD-10-CM

## 2018-07-12 DIAGNOSIS — E78.5 HYPERLIPIDEMIA LDL GOAL <130: ICD-10-CM

## 2018-07-12 DIAGNOSIS — D23.5 BENIGN NEOPLASM OF SKIN OF TRUNK, EXCEPT SCROTUM: ICD-10-CM

## 2018-07-12 DIAGNOSIS — J30.1 CHRONIC SEASONAL ALLERGIC RHINITIS DUE TO POLLEN: ICD-10-CM

## 2018-07-12 DIAGNOSIS — J84.10 GRANULOMATOUS LUNG DISEASE (H): ICD-10-CM

## 2018-07-12 PROBLEM — G89.29 CHRONIC PAIN OF RIGHT KNEE: Status: ACTIVE | Noted: 2018-07-12

## 2018-07-12 PROBLEM — R91.8 ENDOBRONCHIAL MASS: Status: RESOLVED | Noted: 2017-06-27 | Resolved: 2018-07-12

## 2018-07-12 LAB
ALBUMIN SERPL-MCNC: 3.2 G/DL (ref 3.4–5)
ANION GAP SERPL CALCULATED.3IONS-SCNC: 7 MMOL/L (ref 3–14)
BUN SERPL-MCNC: 26 MG/DL (ref 7–30)
CALCIUM SERPL-MCNC: 9 MG/DL (ref 8.5–10.1)
CHLORIDE SERPL-SCNC: 106 MMOL/L (ref 94–109)
CHOLEST SERPL-MCNC: 141 MG/DL
CO2 SERPL-SCNC: 29 MMOL/L (ref 20–32)
CREAT SERPL-MCNC: 1.25 MG/DL (ref 0.66–1.25)
ERYTHROCYTE [DISTWIDTH] IN BLOOD BY AUTOMATED COUNT: 15.7 % (ref 10–15)
GFR SERPL CREATININE-BSD FRML MDRD: 59 ML/MIN/1.7M2
GLUCOSE SERPL-MCNC: 120 MG/DL (ref 70–99)
HCT VFR BLD AUTO: 43.6 % (ref 40–53)
HDLC SERPL-MCNC: 45 MG/DL
HGB BLD-MCNC: 13.9 G/DL (ref 13.3–17.7)
LDLC SERPL CALC-MCNC: 81 MG/DL
MCH RBC QN AUTO: 27.9 PG (ref 26.5–33)
MCHC RBC AUTO-ENTMCNC: 31.9 G/DL (ref 31.5–36.5)
MCV RBC AUTO: 88 FL (ref 78–100)
NONHDLC SERPL-MCNC: 96 MG/DL
PHOSPHATE SERPL-MCNC: 3.5 MG/DL (ref 2.5–4.5)
PLATELET # BLD AUTO: 246 10E9/L (ref 150–450)
POTASSIUM SERPL-SCNC: 3.9 MMOL/L (ref 3.4–5.3)
RBC # BLD AUTO: 4.98 10E12/L (ref 4.4–5.9)
SODIUM SERPL-SCNC: 142 MMOL/L (ref 133–144)
TRIGL SERPL-MCNC: 75 MG/DL
WBC # BLD AUTO: 8 10E9/L (ref 4–11)

## 2018-07-12 PROCEDURE — 80061 LIPID PANEL: CPT | Performed by: FAMILY MEDICINE

## 2018-07-12 PROCEDURE — 99214 OFFICE O/P EST MOD 30 MIN: CPT | Performed by: FAMILY MEDICINE

## 2018-07-12 PROCEDURE — 36415 COLL VENOUS BLD VENIPUNCTURE: CPT | Performed by: FAMILY MEDICINE

## 2018-07-12 PROCEDURE — 73562 X-RAY EXAM OF KNEE 3: CPT | Mod: RT

## 2018-07-12 PROCEDURE — 85027 COMPLETE CBC AUTOMATED: CPT | Performed by: FAMILY MEDICINE

## 2018-07-12 PROCEDURE — 80069 RENAL FUNCTION PANEL: CPT | Performed by: FAMILY MEDICINE

## 2018-07-12 RX ORDER — DESLORATADINE 5 MG/1
5 TABLET ORAL 2 TIMES DAILY PRN
Qty: 180 TABLET | Refills: 1 | Status: SHIPPED | OUTPATIENT
Start: 2018-07-12 | End: 2018-12-08

## 2018-07-12 RX ORDER — SIMVASTATIN 10 MG
10 TABLET ORAL AT BEDTIME
Qty: 90 TABLET | Refills: 3 | Status: SHIPPED | OUTPATIENT
Start: 2018-07-12 | End: 2019-06-14

## 2018-07-12 RX ORDER — MELOXICAM 15 MG/1
15 TABLET ORAL DAILY
Qty: 30 TABLET | Refills: 1 | Status: SHIPPED | OUTPATIENT
Start: 2018-07-12 | End: 2018-11-02

## 2018-07-12 ASSESSMENT — PAIN SCALES - GENERAL: PAINLEVEL: MILD PAIN (2)

## 2018-07-12 NOTE — PATIENT INSTRUCTIONS
At [facility], we strive to deliver an exceptional experience to you, every time we see you.  If you receive a survey in the mail, please send us back your thoughts. We really do value your feedback.    Based on your medical history, these are the current health maintenance/preventive care services that you are due for (some may have been done at this visit.)  Health Maintenance Due   Topic Date Due     HIV SCREEN (SYSTEM ASSIGNED)  10/10/1975     ASTHMA ACTION PLAN Q1 YR  12/01/2016     LIPID MONITORING Q1 YEAR  07/11/2018     ASTHMA CONTROL TEST Q6 MOS  08/02/2018         Suggested websites for health information:  Www.[facility].org : Up to date and easily searchable information on multiple topics.  Www.medlineplus.gov : medication info, interactive tutorials, watch real surgeries online  Www.familydoctor.org : good info from the Academy of Family Physicians  Www.cdc.gov : public health info, travel advisories, epidemics (H1N1)  Www.aap.org : children's health info, normal development, vaccinations  Www.health.[facility].[place].us : [place] dept of health, public health issues in [place], N1N1    Your care team:                            Family Medicine Internal Medicine   MD Jae An MD Shantel Branch-Fleming, MD Katya Georgiev PA-C Nam Ho, MD Pediatrics   JIM Brady, PEREZ Dumont APRJERRY CNP   MD Savannah Mcfarland MD Deborah Mielke, MD Kim Thein, APRN [facility]      Clinic hours: Monday - Thursday 7 am-7 pm; Fridays 7 am-5 pm.   Urgent care: Monday - Friday 11 am-9 pm; Saturday and Sunday 9 am-5 pm.  Pharmacy : Monday -Thursday 8 am-8 pm; Friday 8 am-6 pm; Saturday and Sunday 9 am-5 pm.     Clinic: [phone]   Pharmacy: [phone]    RICE     Rest an injury, elevate it, and use ice and compression as directed.   RICE stands for rest, ice, compression, and elevation. These can limit pain and swelling after an injury. RICE may be  recommended to help treat fractures, sprains, strains, and bruises or bumps.   Home care  The following explain the details of RICE:    Rest. Limit the use of the injured body part. This helps prevent further damage to the body part and gives it time to heal. In some cases, you may need a sling, brace, splint, or cast to help keep the body part still until it has healed.    Ice. Applying ice right after an injury helps relieve pain and swelling. Wrap a bag of ice in a thin towel. Then, place it over the injured area. Do this for 10 to 15 minutes every 3 to 4 hours. Continue for the next 1 to 3 days or until your symptoms improve. Never put ice directly on your skin or ice an area longer than 15 minutes at a time.    Compression. Putting pressure on an injury helps reduce swelling and provides support. Wrap the injured area firmly with an elastic bandage/wrap. Make sure not to wrap the bandage too tightly or you will cut off blood flow to the injured area. If your bandage loosens, rewrap it.    Elevation. Keeping an injury raised above the level of your heart reduces swelling, pain, and throbbing. For instance, if you have a broken leg, it may help to rest your leg on several pillows when sitting or lying down. Try to keep the injured area elevated for at least 2 to 3 hours per day.  Follow-up care  Follow up with your healthcare provider, or as advised.  When to seek medical advice  Call your healthcare provider right away if any of these occur:    Fever of 100.4 F (38 C) or higher, or as directed by your healthcare provider    Increased pain or swelling in the injured body part    Injured body part becomes cold, blue, numb, or tingly    Signs of infection. These include warmth in the skin, redness, drainage, or bad smell coming from the injured body part.  Date Last Reviewed: 1/18/2016 2000-2017 [employer]. [address] [place] [address], [place] [address]. All rights reserved. This information is not  intended as a substitute for professional medical care. Always follow your healthcare professional's instructions.

## 2018-07-12 NOTE — LETTER
My Asthma Action Plan  Name: Arturo Rahman   YOB: 1955  Date: 7/12/2018   My doctor: Martina Leung MD   My clinic: [facility]        My Control Medicine: Montelukast (Singulair) -  10 mg daily  My Rescue Medicine: Albuterol (Proair/Ventolin/Proventil) inhaler 2 puffs bid as needed.    My Asthma Severity: mild persistent  Avoid your asthma triggers: upper respiratory infections, pollens and humidity  None            GREEN ZONE   Good Control    I feel good    No cough or wheeze    Can work, sleep and play without asthma symptoms       Take your asthma control medicine every day.     1. If exercise triggers your asthma, take your rescue medication    15 minutes before exercise or sports, and    During exercise if you have asthma symptoms  2. Spacer to use with inhaler: If you have a spacer, make sure to use it with your inhaler             YELLOW ZONE Getting Worse  I have ANY of these:    I do not feel good    Cough or wheeze    Chest feels tight    Wake up at night   1. Keep taking your Green Zone medications  2. Start taking your rescue medicine:    every 20 minutes for up to 1 hour. Then every 4 hours for 24-48 hours.  3. If you stay in the Yellow Zone for more than 12-24 hours, contact your doctor.  4. If you do not return to the Green Zone in 12-24 hours or you get worse, start taking your oral steroid medicine if prescribed by your provider.           RED ZONE Medical Alert - Get Help  I have ANY of these:    I feel awful    Medicine is not helping    Breathing getting harder    Trouble walking or talking    Nose opens wide to breathe       1. Take your rescue medicine NOW  2. If your provider has prescribed an oral steroid medicine, start taking it NOW  3. Call your doctor NOW  4. If you are still in the Red Zone after 20 minutes and you have not reached your doctor:    Take your rescue medicine again and    Call 911 or go to the emergency room right away    See  your regular doctor within 2 weeks of an Emergency Room or Urgent Care visit for follow-up treatment.          Annual Reminders:  Meet with Asthma Educator,  Flu Shot in the Fall, consider Pneumonia Vaccination for patients with asthma (aged 19 and older).    Pharmacy: [id number] [address]                      Asthma Triggers  How To Control Things That Make Your Asthma Worse    Triggers are things that make your asthma worse.  Look at the list below to help you find your triggers and what you can do about them.  You can help prevent asthma flare-ups by staying away from your triggers.      Trigger                                                          What you can do   Cigarette Smoke  Tobacco smoke can make asthma worse. Do not allow smoking in your home, car or around you.  Be sure no one smokes at a child s day care or school.  If you smoke, ask your health care provider for ways to help you quit.  Ask family members to quit too.  Ask your health care provider for a referral to Quit Plan to help you quit smoking, or call 9-634-150-PLAN.     Colds, Flu, Bronchitis  These are common triggers of asthma. Wash your hands often.  Don t touch your eyes, nose or mouth.  Get a flu shot every year.     Dust Mites  These are tiny bugs that live in cloth or carpet. They are too small to see. Wash sheets and blankets in hot water every week.   Encase pillows and mattress in dust mite proof covers.  Avoid having carpet if you can. If you have carpet, vacuum weekly.   Use a dust mask and HEPA vacuum.   Pollen and Outdoor Mold  Some people are allergic to trees, grass, or weed pollen, or molds. Try to keep your windows closed.  Limit time out doors when pollen count is high.   Ask you health care provider about taking medicine during allergy season.     Animal Dander  Some people are allergic to skin flakes, urine or saliva from pets with fur or feathers. Keep pets with fur or feathers  out of your home.    If you can t keep the pet outdoors, then keep the pet out of your bedroom.  Keep the bedroom door closed.  Keep pets off cloth furniture and away from stuffed toys.     Mice, Rats, and Cockroaches  Some people are allergic to the waste from these pests.   Cover food and garbage.  Clean up spills and food crumbs.  Store grease in the refrigerator.   Keep food out of the bedroom.   Indoor Mold  This can be a trigger if your home has high moisture. Fix leaking faucets, pipes, or other sources of water.   Clean moldy surfaces.  Dehumidify basement if it is damp and smelly.   Smoke, Strong Odors, and Sprays  These can reduce air quality. Stay away from strong odors and sprays, such as perfume, powder, hair spray, paints, smoke incense, paint, cleaning products, candles and new carpet.   Exercise or Sports  Some people with asthma have this trigger. Be active!  Ask your doctor about taking medicine before sports or exercise to prevent symptoms.    Warm up for 5-10 minutes before and after sports or exercise.     Other Triggers of Asthma  Cold air:  Cover your nose and mouth with a scarf.  Sometimes laughing or crying can be a trigger.  Some medicines and food can trigger asthma.

## 2018-07-12 NOTE — PROGRESS NOTES
SUBJECTIVE:   Arturo Rahman is a 62 year old male who presents to clinic today for the following health issues:    ENT Symptoms             Symptoms: cc Present Absent Comment   Fever/Chills   x    Fatigue   x    Muscle Aches   x    Eye Irritation  x     Sneezing   x    Nasal Mario Alberto/Drg   x    Sinus Pressure/Pain   x    Loss of smell   x    Dental pain   x    Sore Throat   x    Swollen Glands   x    Ear Pain/Fullness  x  Bilateral ear echoing, Hx of ear infection with same symptom in the past, chewing causes more noise.    Cough   x    Wheeze   x    Chest Pain   x    Shortness of breath   x    Rash   x    Other   x      Symptom duration:  1 week   Symptom severity:  1week   Treatments tried:  Flonase   Contacts:  None         Joint Pain Right knee    Onset: years of lose knee caps but worse in past week.     Description:   Location: Right knee  Character: Dull ache with intermittent stabbing pains in past week.     Intensity: mild, 2/10    Progression of Symptoms: same    Accompanying Signs & Symptoms:  Other symptoms: none    History:     Previous similar pain: no but Hx of knee cap issue    Precipitating factors:   Trauma or overuse: no     Alleviating factors:  Improved by: nothing    Therapies Tried and outcome: Ibuprofen did not help much.     Hyperlipidemia Follow-Up      Rate your low fat/cholesterol diet?: good    Taking statin?  Yes, no muscle aches from statin    Other lipid medications/supplements?:  none    Hypertension Follow-up      Outpatient blood pressures are not being checked.    Low Salt Diet: no added salt    Asthma Follow-Up    Was ACT completed today?    Yes    ACT Total Scores 2/2/2018   ACT TOTAL SCORE -   ASTHMA ER VISITS -   ASTHMA HOSPITALIZATIONS -   ACT TOTAL SCORE (Goal Greater than or Equal to 20) 20   In the past 12 months, how many times did you visit the emergency room for your asthma without being admitted to the hospital? 0   In the past 12 months, how many times were you  hospitalized overnight because of your asthma? 0       Recent asthma triggers that patient is dealing with: None          Problem list and histories reviewed & adjusted, as indicated.  Additional history: as documented    Patient Active Problem List   Diagnosis     Asthma, moderate persistent     Granulomatous lung disease (H)     GERD (gastroesophageal reflux disease)     FH: colon cancer     Hyperlipidemia LDL goal <130     Allergic rhinitis     Advanced directives, counseling/discussion     Hypertension, goal below 140/90     Morbid obesity (H)     Hamartoma of left lung (H)     Past Surgical History:   Procedure Laterality Date     BRONCHOSCOPY FLEXIBLE AND RIGID N/A 7/19/2017    Procedure: BRONCHOSCOPY FLEXIBLE AND RIGID;   Bronchoscopy, Tumor Debulking with Cryoprobe and Argon Plasma Coagulation;  Surgeon: Albino Crump MD;  Location: [facility] OR     BRONCHOSCOPY FLEXIBLE,L CRYOBIOPSY N/A 1/11/2018    Procedure: BRONCHOSCOPY FLEXIBLE, CRYOBIOPSY;  Flexible Bronchoscopy, Rigid Bronchoscopy, Tumor Debulking With Cryoprobe;  Surgeon: Albino Crump MD;  Location: [facility] OR     COLONOSCOPY  11/08    + polyp, +FH, repeat in 5 years     COMBINED ENDOSCOPY UPPER WITH ARGON PLASMA COAGULATOR (APC) N/A 7/19/2017    Procedure: COMBINED ENDOSCOPY UPPER WITH ARGON PLASMA COAGULATOR (APC);;  Surgeon: Albino Crump MD;  Location: [facility] OR     TONSILLECTOMY       VASECTOMY         Social History   Substance Use Topics     Smoking status: Former Smoker     Quit date: 10/28/1991     Smokeless tobacco: Never Used     Alcohol use Yes      Comment: rare     Family History   Problem Relation Age of Onset     Arthritis Mother      Hypertension Father      Cerebrovascular Disease Father      Asthma Daughter      Allergies Daughter      Hypertension Sister      Cancer - colorectal Sister      dx age 50         Current Outpatient Prescriptions   Medication Sig Dispense Refill     desloratadine (CLARINEX) 5 MG tablet TAKE ONE  TABLET BY MOUTH ONE TIME DAILY. 90 tablet 1     esomeprazole (NEXIUM) 40 MG CR capsule TAKE ONE CAPSULE BY MOUTH IN THE MORNING BEFORE BREAKFAST 90 capsule 2     fluticasone (FLONASE) 50 MCG/ACT spray Spray 1 spray into both nostrils 2 times daily 1 Bottle 1     losartan-hydrochlorothiazide (HYZAAR) 100-25 MG per tablet TAKE ONE TABLET BY MOUTH ONE TIME DAILY 90 tablet 1     montelukast (SINGULAIR) 10 MG tablet TAKE ONE TABLET BY MOUTH ONE TIME DAILY. 90 tablet 2     Multiple Vitamins-Minerals (MULTIVITAL PO) Take 1 tablet by mouth daily       simvastatin (ZOCOR) 10 MG tablet take 1 tablet by mouth at bedtime 30 tablet 0     VENTOLIN  (90 Base) MCG/ACT Inhaler inhale 2 puffs by mouth every 6 hours as needed for shortness of breath/dyspnea 18 g 2     Allergies   Allergen Reactions     Lisinopril Cough     Recent Labs   Lab Test  12/22/17   0827  07/11/17   1919   03/21/16   1351  12/01/15   1552   06/19/13   1017  10/23/12   0710  09/07/11   1036   A1C   --   6.1*   --   6.4*  6.3*   --    --    --    --    LDL   --   88   --    --    --    --    --   104  110   HDL   --   46   --    --    --    --    --   36*  41   TRIG   --   104   --    --    --    --    --   102  91   ALT   --   37   --    --   40   --    --   49  26   CR  1.13  1.21   < >  1.30*  1.45*   < >  1.14  1.12  1.13   GFRESTIMATED  66  61   < >  57*  50*   < >  67  68  68   GFRESTBLACK  80  74   < >  69  60*   < >  81  82  82   POTASSIUM  3.9  3.8   < >  3.2*  3.8   < >  4.0  3.6  4.0   TSH   --    --    --    --    --    --   0.71   --    --     < > = values in this interval not displayed.      BP Readings from Last 3 Encounters:   07/12/18 124/89   04/23/18 127/88   02/02/18 123/85    Wt Readings from Last 3 Encounters:   07/12/18 286 lb (129.7 kg)   04/23/18 285 lb 12.8 oz (129.6 kg)   02/02/18 282 lb (127.9 kg)                  Labs reviewed in EPIC    Reviewed and updated as needed this visit by clinical staff       Reviewed and updated as  "needed this visit by Provider         ROS:  Constitutional, HEENT, cardiovascular, pulmonary, gi and gu systems are negative, except as otherwise noted.    OBJECTIVE:     /89 (BP Location: Right arm, Patient Position: Chair, Cuff Size: Adult Large)  Pulse 91  Temp 98.5  F (36.9  C) (Oral)  Resp 18  Ht 5' 8.25\" (1.734 m)  Wt 286 lb (129.7 kg)  SpO2 94%  BMI 43.17 kg/m2  Body mass index is 43.17 kg/(m^2).  GENERAL: healthy, alert, well nourished, well hydrated, no distress, obese  HENT: ear canals- normal; TMs- normal; Nose- normal; Mouth- no ulcers, no lesions, throat is clear with no erythema or exudate.   NECK: no tenderness, no adenopathy, no asymmetry, no masses, no stiffness; thyroid- normal to palpation  RESP: lungs clear to auscultation - no rales, no rhonchi, no wheezes  CV: regular rates and rhythm, normal S1 S2, no S3 or S4 and no murmur, no click or rub -  ABDOMEN: soft, no tenderness, no  hepatosplenomegaly, no masses, normal bowel sounds  MS: extremities- no gross deformities noted, no edema  SKIN: no suspicious lesions, no rashes  NEURO: strength and tone- normal, sensory exam- grossly normal, mentation- intact, speech- normal, reflexes- symmetric  BACK: no CVA tenderness, no paralumbar tenderness  PSYCH: Alert and oriented times 3; speech- coherent , normal rate and volume; able to articulate logical thoughts, able to abstract reason, no tangential thoughts, no hallucinations or delusions, affect- normal     Diagnostic Test Results:  Results for orders placed or performed during the hospital encounter of 01/11/18   XR Chest Port 1 View    Narrative    Exam: XR CHEST PORT 1 VW, 1/11/2018 12:36 PM    Indication: eval for left ptx, s/p endobronchial debulking;     Comparison: 5/31/2017. Correlations made with CT dated 12/11/2017    Findings:   Single portable view of the chest. Cardiac mediastinal silhouette is  large appearing likely related to technique. The upper abdomen " "is  unremarkable.    The left costophrenic sulcus is partially collimated out of view. No  visible pleural effusion. No pneumothorax. No focal airspace  abnormalities.      Impression    Impression: No pneumothorax. No acute cardial pulmonary findings.    I have personally reviewed the examination and initial interpretation  and I agree with the findings.    NELL CHAVES MD   Glucose by meter   Result Value Ref Range    Glucose 124 (H) 70 - 99 mg/dL   Surgical pathology exam   Result Value Ref Range    Copath Report       Patient Name: VALERIE CHO  MR#: [id number]  Specimen #:   Collected: 1/11/2018  Received: 1/11/2018  Reported: 1/15/2018 09:32  Ordering Phy(s): PANDA TORRES    For improved result formatting, select 'View Enhanced Report Format' under   Linked Documents section.    SPECIMEN(S):  Lingular mass    FINAL DIAGNOSIS:  Lung, lingular mass:  - Benign pulmonary hamartoma    I have personally reviewed all specimens  and/or slides, including the   listed special stains, and used them  with my medical judgement to determine or confirm the final diagnosis.    Electronically signed out by:  Ramonita Ruffin M.D., PhD, [facility]    CLINICAL HISTORY:  62-year-old male with lung lesion.  GROSS:  The specimen is received in formalin with proper patient identification,   labeled \"lingular mass\", and consists  of multiple irregular portions of variegated tan-pink to red, glistening   soft tissue, ranging from 0.2 cm to  0.7 cm in greatest dimension coming collectively 1.2 x 0.7 x 0.3 c m.  The   largest portion is bisected, and the  specimen is wrapped and entirely submitted in cassette A1. (Dictated by:   Rosalie Sosa 1/11/2018 04:53 PM)    MICROSCOPIC:  Microscopic examination was performed.    CPT Codes:  A: 66758-GF6    TESTING LAB LOCATION:  [facility], [address]   [id number] [phone]    COLLECTION " "SITE:  Client: [facility], [place]  Location: [facility])           ASSESSMENT/PLAN:         Tobacco Cessation:   reports that he quit smoking about 26 years ago. He has never used smokeless tobacco.      BMI:   Estimated body mass index is 43.17 kg/(m^2) as calculated from the following:    Height as of this encounter: 5' 8.25\" (1.734 m).    Weight as of this encounter: 286 lb (129.7 kg).   Weight management plan: Discussed healthy diet and exercise guidelines and patient will follow up in 3 months in clinic to re-evaluate.        ICD-10-CM    1. Moderate persistent asthma without complication J45.40 Doing much better now that hamartoma has been removed. Taken off Advair by pulmonary. Using Ventolin as needed. ASTHMA ACTION PLAN completed.    2. Granulomatous lung disease (H) J84.10 Stable    3. Hyperlipidemia LDL goal <130 E78.5 Lipid panel reflex to direct LDL Fasting     simvastatin (ZOCOR) 10 MG tablet   4. Hypertension, goal below 140/90 I10 CBC with platelets     Renal panel   5. Hamartoma of left lung (H) Q85.9 Removed surgically.    6. Morbid obesity (H) E66.01 Weight loss counseling done, exercise difficult with right knee pain.    7. Special screening for malignant neoplasms, colon Z12.11 GASTROENTEROLOGY ADULT REF PROCEDURE ONLY [facility] [phone]; [facility]   8. Benign neoplasm of skin of trunk, except scrotum D23.5 DERMATOLOGY REFERRAL for mole check.    9. Right knee pain, unspecified chronicity M25.561 ORTHOPEDICS ADULT REFERRAL- chronic and acute right knee pain seems to be related to meniscus damage.      XR Knee Right 3 Views     [facility], AND CHIROPRACTIC REFERRAL     meloxicam (MOBIC) 15 MG tablet daily but stop any ibuprofen or naproxen use   10. Chronic seasonal allergic rhinitis due to pollen J30.1 desloratadine (CLARINEX) 5 MG tablet- may take twice a day if needed in hot weather.        FUTURE LABS:       - Schedule fasting labs in 3 " months  FUTURE APPOINTMENTS:       - Follow-up visit in 3 months or sooner if any questions or concerns.   Work on weight loss  Regular exercise  See Patient Instructions    Martina Leung MD  [facility]

## 2018-07-12 NOTE — NURSING NOTE
Composite Cancer Quality Initiative    Colon Cancer Initiative satisfied?  NO - Referral ordered for colonoscopy by provider     Updated by: Jevon Whiting CMA

## 2018-07-12 NOTE — MR AVS SNAPSHOT
After Visit Summary   7/12/2018    Arturo Rahman    MRN: [id number]           Patient Information     Date Of Birth          1955        Visit Information        Provider Department      7/12/2018 7:00 AM Martina Leung MD [facility]        Today's Diagnoses     Screening for HIV (human immunodeficiency virus)    -  1    Moderate persistent asthma without complication        Granulomatous lung disease (H)        Hyperlipidemia LDL goal <130        Hypertension, goal below 140/90        Hamartoma of left lung (H)        Morbid obesity (H)        Special screening for malignant neoplasms, colon        Benign neoplasm of skin of trunk, except scrotum        Right knee pain, unspecified chronicity        Chronic seasonal allergic rhinitis due to pollen          Care Instructions    At [facility], we strive to deliver an exceptional experience to you, every time we see you.  If you receive a survey in the mail, please send us back your thoughts. We really do value your feedback.    Based on your medical history, these are the current health maintenance/preventive care services that you are due for (some may have been done at this visit.)  Health Maintenance Due   Topic Date Due     HIV SCREEN (SYSTEM ASSIGNED)  10/10/1975     ASTHMA ACTION PLAN Q1 YR  12/01/2016     LIPID MONITORING Q1 YEAR  07/11/2018     ASTHMA CONTROL TEST Q6 MOS  08/02/2018         Suggested websites for health information:  Www.7Summits.org : Up to date and easily searchable information on multiple topics.  Www.medlineplus.gov : medication info, interactive tutorials, watch real surgeries online  Www.familydoctor.org : good info from the Academy of Family Physicians  Www.cdc.gov : public health info, travel advisories, epidemics (H1N1)  Www.aap.org : children's health info, normal development, vaccinations  Www.health.state.[place].us : [place] dept of health, public health issues in [place],  N1N1    Your care team:                            Family Medicine Internal Medicine   MD Jae An MD Shantel Branch-Fleming, MD Katya Georgiev PA-C Nam Ho, MD Pediatrics   JIM Brady, PEREZ Dumont APRN MD Savannah Nuñez MD Deborah Mielke, MD Kim Thein, APRN [facility]      Clinic hours: Monday - Thursday 7 am-7 pm; Fridays 7 am-5 pm.   Urgent care: Monday - Friday 11 am-9 pm; Saturday and Sunday 9 am-5 pm.  Pharmacy : Monday -Thursday 8 am-8 pm; Friday 8 am-6 pm; Saturday and Sunday 9 am-5 pm.     Clinic: [phone]   Pharmacy: [phone]    RICE     Rest an injury, elevate it, and use ice and compression as directed.   RICE stands for rest, ice, compression, and elevation. These can limit pain and swelling after an injury. RICE may be recommended to help treat fractures, sprains, strains, and bruises or bumps.   Home care  The following explain the details of RICE:    Rest. Limit the use of the injured body part. This helps prevent further damage to the body part and gives it time to heal. In some cases, you may need a sling, brace, splint, or cast to help keep the body part still until it has healed.    Ice. Applying ice right after an injury helps relieve pain and swelling. Wrap a bag of ice in a thin towel. Then, place it over the injured area. Do this for 10 to 15 minutes every 3 to 4 hours. Continue for the next 1 to 3 days or until your symptoms improve. Never put ice directly on your skin or ice an area longer than 15 minutes at a time.    Compression. Putting pressure on an injury helps reduce swelling and provides support. Wrap the injured area firmly with an elastic bandage/wrap. Make sure not to wrap the bandage too tightly or you will cut off blood flow to the injured area. If your bandage loosens, rewrap it.    Elevation. Keeping an injury raised above the level of your heart reduces swelling, pain, and throbbing.  For instance, if you have a broken leg, it may help to rest your leg on several pillows when sitting or lying down. Try to keep the injured area elevated for at least 2 to 3 hours per day.  Follow-up care  Follow up with your healthcare provider, or as advised.  When to seek medical advice  Call your healthcare provider right away if any of these occur:    Fever of 100.4 F (38 C) or higher, or as directed by your healthcare provider    Increased pain or swelling in the injured body part    Injured body part becomes cold, blue, numb, or tingly    Signs of infection. These include warmth in the skin, redness, drainage, or bad smell coming from the injured body part.  Date Last Reviewed: 1/18/2016 2000-2017 [employer]. [address] [place] [address]. All rights reserved. This information is not intended as a substitute for professional medical care. Always follow your healthcare professional's instructions.                Follow-ups after your visit        Additional Services     DERMATOLOGY REFERRAL       Your provider has referred you to: [facility]: [facility] [place] [phone]   [email or web address]    Please be aware that coverage of these services is subject to the terms and limitations of your health insurance plan.  Call member services at your health plan with any benefit or coverage questions.      Please bring the following with you to your appointment:    (1) Any X-Rays, CTs or MRIs which have been performed.  Contact the facility where they were done to arrange for  prior to your scheduled appointment.    (2) List of current medications  (3) This referral request   (4) Any documents/labs given to you for this referral            GASTROENTEROLOGY ADULT REF PROCEDURE ONLY [facility] [phone]; [facility]       Last Lab Result: Creatinine (mg/dL)       Date                      Value                 12/22/2017               1.13             ----------  Body mass index is 43.17 kg/(m^2).      Patient will be contacted to schedule procedure.     Please be aware that coverage of these services is subject to the terms and limitations of your health insurance plan.  Call member services at your health plan with any benefit or coverage questions.  Any procedures must be performed at a [place] facility OR coordinated by your clinic's referral office.    Please bring the following with you to your appointment:    (1) Any X-Rays, CTs or MRIs which have been performed.  Contact the facility where they were done to arrange for  prior to your scheduled appointment.    (2) List of current medications   (3) This referral request   (4) Any documents/labs given to you for this referral            [facility] PT, HAND, AND CHIROPRACTIC REFERRAL       **This order will print in the [facility] Scheduling Office**    Physical Therapy, Hand Therapy and Chiropractic Care are available through:    *[facility]  *[facility]  *[facility]    Call one number to schedule at any of the above locations: [phone].    Your provider has referred you to: Physical Therapy at [facility] or [facility]    Indication/Reason for Referral: Knee Pain Right  Onset of Illness: 1 week worsening pain but several years problems with knee cap and chronic pain.   Therapy Orders: Evaluate and Treat  Special Programs: None  Special Request: None    Pilar Quezada      Additional Comments for the Therapist or Chiropractor: referred to orthopedics for evaluation.    Please be aware that coverage of these services is subject to the terms and limitations of your health insurance plan.  Call member services at your health plan with any benefit or coverage questions.      Please bring the following to your appointment:    *Your personal calendar for scheduling future appointments  *Comfortable clothing             ORTHOPEDICS ADULT REFERRAL       Your provider has referred you to: [facility] [phone]    [email or web address]    Please be aware that coverage of these services is subject to the terms and limitations of your health insurance plan.  Call member services at your health plan with any benefit or coverage questions.      Please bring the following to your appointment:    >>   Any x-rays, CTs or MRIs which have been performed.  Contact the facility where they were done to arrange for  prior to your scheduled appointment.    >>   List of current medications   >>   This referral request   >>   Any documents/labs given to you for this referral                  Follow-up notes from your care team     Return in about 6 months (around 1/12/2019) for BP Recheck.      Your next 10 appointments already scheduled     Sep 10, 2018  3:15 PM CDT   CT CHEST W/O CONTRAST with [facility]   [facility]    [address] [place] [id number] [phone]           Please bring any scans or X-rays taken at other hospitals, if similar tests were done. Also bring a list of your medicines, including vitamins, minerals and over-the-counter drugs. It is safest to leave personal items at home.  Be sure to tell your doctor:   If you have any allergies.   If there s any chance you are pregnant.   If you are breastfeeding.  You do not need to do anything special to prepare for this exam.  Please wear loose clothing, such as a sweat suit or jogging clothes. Avoid snaps, zippers and other metal. We may ask you to undress and put on a hospital gown.            Sep 10, 2018  3:30 PM CDT   New Visit with Trent Alberto MD   [facility]    [id number] [place] [address] [phone]              Future tests that were ordered for you today      "Open Future Orders        Priority Expected Expires Ordered    XR Knee Right 3 Views Routine 7/12/2018 7/12/2019 7/12/2018            Who to contact     If you have questions or need follow up information about today's clinic visit or your schedule please contact [facility] directly at [phone].  Normal or non-critical lab and imaging results will be communicated to you by MyChart, letter or phone within 4 business days after the clinic has received the results. If you do not hear from us within 7 days, please contact the clinic through [employer] or phone. If you have a critical or abnormal lab result, we will notify you by phone as soon as possible.  Submit refill requests through [employer] or call your pharmacy and they will forward the refill request to us. Please allow 3 business days for your refill to be completed.          Additional Information About Your Visit        MyChart Information     [employer] gives you secure access to your electronic health record. If you see a primary care provider, you can also send messages to your care team and make appointments. If you have questions, please call your primary care clinic.  If you do not have a primary care provider, please call [phone] and they will assist you.        Care EveryWhere ID     This is your Care EveryWhere ID. This could be used by other organizations to access your [facility] medical records  [id number]        Your Vitals Were     Pulse Temperature Respirations Height Pulse Oximetry BMI (Body Mass Index)    91 98.5  F (36.9  C) (Oral) 18 5' 8.25\" (1.734 m) 94% 43.17 kg/m2       Blood Pressure from Last 3 Encounters:   07/12/18 124/89   04/23/18 127/88   02/02/18 123/85    Weight from Last 3 Encounters:   07/12/18 286 lb (129.7 kg)   04/23/18 285 lb 12.8 oz (129.6 kg)   02/02/18 282 lb (127.9 kg)              We Performed the Following     CBC with platelets     DERMATOLOGY REFERRAL     GASTROENTEROLOGY ADULT REF PROCEDURE " ONLY [facility] [phone]; [facility]     [facility] PT, HAND, AND CHIROPRACTIC REFERRAL     Lipid panel reflex to direct LDL Fasting     ORTHOPEDICS ADULT REFERRAL     Renal panel          Today's Medication Changes          These changes are accurate as of 7/12/18  7:39 AM.  If you have any questions, ask your nurse or doctor.               Start taking these medicines.        Dose/Directions    meloxicam 15 MG tablet   Commonly known as:  MOBIC   Used for:  Right knee pain, unspecified chronicity   Started by:  Martina Leung MD        Dose:  15 mg   Take 1 tablet (15 mg) by mouth daily   Quantity:  30 tablet   Refills:  1         These medicines have changed or have updated prescriptions.        Dose/Directions    desloratadine 5 MG tablet   Commonly known as:  CLARINEX   This may have changed:  See the new instructions.   Used for:  Chronic seasonal allergic rhinitis due to pollen   Changed by:  Martina Leung MD        Dose:  5 mg   Take 1 tablet (5 mg) by mouth 2 times daily as needed for allergies   Quantity:  180 tablet   Refills:  1       simvastatin 10 MG tablet   Commonly known as:  ZOCOR   This may have changed:  See the new instructions.   Used for:  Hyperlipidemia LDL goal <130   Changed by:  Martina Leung MD        Dose:  10 mg   Take 1 tablet (10 mg) by mouth At Bedtime   Quantity:  90 tablet   Refills:  3            Where to get your medicines      These medications were sent to [id number] - [place], [place] - [address]  [address] [address]     Phone:  [phone]     desloratadine 5 MG tablet    meloxicam 15 MG tablet    simvastatin 10 MG tablet                Primary Care Provider Office Phone # Fax #    Martina Leung [phone]       [address]  [facility] [address]        Equal Access to Services     INGA MCCRACKEN AH: haritha Edgar, ivett ayala  earl ramsayestrella guyaan ah. Thalia [facility] [phone].    ATENCIÓN: Si sandra childers, tiene a santana disposición servicios gratuitos de asistencia lingüística. Eric al [phone].    We comply with applicable federal civil rights laws and [place] laws. We do not discriminate on the basis of race, color, national origin, age, disability, sex, sexual orientation, or gender identity.            Thank you!     Thank you for choosing [facility]  for your care. Our goal is always to provide you with excellent care. Hearing back from our patients is one way we can continue to improve our services. Please take a few minutes to complete the written survey that you may receive in the mail after your visit with us. Thank you!             Your Updated Medication List - Protect others around you: Learn how to safely use, store and throw away your medicines at www.disposemymeds.org.          This list is accurate as of 7/12/18  7:39 AM.  Always use your most recent med list.                   Brand Name Dispense Instructions for use Diagnosis    desloratadine 5 MG tablet    CLARINEX    180 tablet    Take 1 tablet (5 mg) by mouth 2 times daily as needed for allergies    Chronic seasonal allergic rhinitis due to pollen       esomeprazole 40 MG CR capsule    nexIUM    90 capsule    TAKE ONE CAPSULE BY MOUTH IN THE MORNING BEFORE BREAKFAST    Gastroesophageal reflux disease with esophagitis       fluticasone 50 MCG/ACT spray    FLONASE    1 Bottle    Spray 1 spray into both nostrils 2 times daily    Dysfunction of both eustachian tubes       losartan-hydrochlorothiazide 100-25 MG per tablet    HYZAAR    90 tablet    TAKE ONE TABLET BY MOUTH ONE TIME DAILY    Hypertension, goal below 140/90       meloxicam 15 MG tablet    MOBIC    30 tablet    Take 1 tablet (15 mg) by mouth daily    Right knee pain, unspecified chronicity       montelukast 10 MG tablet    SINGULAIR    90 tablet    TAKE ONE  TABLET BY MOUTH ONE TIME DAILY.    Moderate persistent asthma without complication       MULTIVITAL PO      Take 1 tablet by mouth daily        simvastatin 10 MG tablet    ZOCOR    90 tablet    Take 1 tablet (10 mg) by mouth At Bedtime    Hyperlipidemia LDL goal <130       VENTOLIN  (90 Base) MCG/ACT Inhaler   Generic drug:  albuterol     18 g    inhale 2 puffs by mouth every 6 hours as needed for shortness of breath/dyspnea    Moderate persistent asthma without complication

## 2018-07-13 ASSESSMENT — ASTHMA QUESTIONNAIRES: ACT_TOTALSCORE: 21

## 2018-07-14 NOTE — PROGRESS NOTES
Dear Arturo    Your test results are attached. I am happy to let you know that they are stable.    The blood sugar is normal and you do not have diabetes but it is in the pre-diabetes range. Kidney test is stable. The cholesterol looks great! We can recheck labs in 1 year.     Please contact me by Bauzaarhart if you have any questions about your labs or management.    Martina Leung MD

## 2018-07-14 NOTE — PROGRESS NOTES
Dear Arturo Rahman,    Your test results are attached.    The x ray shows some effusion. Dr. Augustin will follow up with you on your knee exam and treatment.    Please call me if you have any questions about these test results or about your care.    Sincerely,    Martina Leung MD

## 2018-07-16 ENCOUNTER — HEALTH MAINTENANCE LETTER (OUTPATIENT)
Age: 63
End: 2018-07-16

## 2018-07-18 ENCOUNTER — OFFICE VISIT (OUTPATIENT)
Dept: ORTHOPEDICS | Facility: CLINIC | Age: 63
End: 2018-07-18
Payer: COMMERCIAL

## 2018-07-18 VITALS
WEIGHT: 290.2 LBS | BODY MASS INDEX: 43.98 KG/M2 | DIASTOLIC BLOOD PRESSURE: 102 MMHG | SYSTOLIC BLOOD PRESSURE: 155 MMHG | HEIGHT: 68 IN

## 2018-07-18 DIAGNOSIS — M25.561 ACUTE PAIN OF RIGHT KNEE: Primary | ICD-10-CM

## 2018-07-18 PROCEDURE — 99204 OFFICE O/P NEW MOD 45 MIN: CPT | Performed by: ORTHOPAEDIC SURGERY

## 2018-07-18 ASSESSMENT — PAIN SCALES - GENERAL: PAINLEVEL: NO PAIN (1)

## 2018-07-18 NOTE — PROGRESS NOTES
CHIEF COMPLAINT:   Chief Complaint   Patient presents with     Right Knee - Pain     Right knee pain. Onset: beginning of July, 2018 with NKI. Will get occasional stabbing sensation in the knee with certain movements. He has been taking Mobic for 1 week and has noticed that it helps a little, but not too much.    .  Arturo Rahman is seen today in the [facility] for evaluation of right knee pain at the request of Martina Emerson        HISTORY OF PRESENT ILLNESS    Arturo Rahman is a 62 year old male seen for evaluation of ongoing right knee pain with no known injury.   Pain has been present for about 1 year, however worsened early July 2018. He reports stabbing pain of the medial and lateral aspects of the knee, intermittent. Constant throbbing. Aggravated with twisting of the knee. Has noticed some stiffness in the knee with walking. Today he has minimal pain, rated a 1/10.  He has been taking Mobic for pain for about 1 week now and has noticed some improvements. No other treatments    Present symptoms: minimal pain.    Pain severity: 1/10  Frequency of symptoms: frequently  Exacerbating Factors: weight bearing, twisting  Relieving Factors: rest, Mobic  Night Pain: No  Pain while at rest: No   Numbness or tingling: No   Patient has tried:     NSAIDS: Yes , Mobic     Physical Therapy: No      Activity modification: Yes      Bracing: No      Injections: No     Ice: No      Assistive device:  No     Other: one    Orthopaedic PMH: none    Other PMH:  has a past medical history of Allergies; Asthma, moderate persistent; FH: colon cancer; GERD (gastroesophageal reflux disease); Granulomatous lung disease (H); HTN (hypertension); and Hyperlipidemia.  Patient Active Problem List   Diagnosis     Asthma, moderate persistent     Granulomatous lung disease (H)     GERD (gastroesophageal reflux disease)     FH: colon cancer     Hyperlipidemia LDL goal <130     Allergic rhinitis      Advanced directives, counseling/discussion     Hypertension, goal below 140/90     Morbid obesity (H)     Hamartoma of left lung (H)     Right knee pain, unspecified chronicity       Surgical Hx:  has a past surgical history that includes tonsillectomy; colonoscopy (11/08); vasectomy; Bronchoscopy flexible and rigid (N/A, 7/19/2017); Combined Endoscopy Upper With Argon Plasma Coagulator (Apc) (N/A, 7/19/2017); and Bronchoscopy Flexible,L Cryobiopsy (N/A, 1/11/2018).    Medications:   Current Outpatient Prescriptions:      desloratadine (CLARINEX) 5 MG tablet, Take 1 tablet (5 mg) by mouth 2 times daily as needed for allergies, Disp: 180 tablet, Rfl: 1     esomeprazole (NEXIUM) 40 MG CR capsule, TAKE ONE CAPSULE BY MOUTH IN THE MORNING BEFORE BREAKFAST, Disp: 90 capsule, Rfl: 2     fluticasone (FLONASE) 50 MCG/ACT spray, Spray 1 spray into both nostrils 2 times daily, Disp: 1 Bottle, Rfl: 1     losartan-hydrochlorothiazide (HYZAAR) 100-25 MG per tablet, TAKE ONE TABLET BY MOUTH ONE TIME DAILY, Disp: 90 tablet, Rfl: 1     meloxicam (MOBIC) 15 MG tablet, Take 1 tablet (15 mg) by mouth daily, Disp: 30 tablet, Rfl: 1     montelukast (SINGULAIR) 10 MG tablet, TAKE ONE TABLET BY MOUTH ONE TIME DAILY., Disp: 90 tablet, Rfl: 2     Multiple Vitamins-Minerals (MULTIVITAL PO), Take 1 tablet by mouth daily, Disp: , Rfl:      simvastatin (ZOCOR) 10 MG tablet, Take 1 tablet (10 mg) by mouth At Bedtime, Disp: 90 tablet, Rfl: 3     VENTOLIN  (90 Base) MCG/ACT Inhaler, inhale 2 puffs by mouth every 6 hours as needed for shortness of breath/dyspnea, Disp: 18 g, Rfl: 2    Allergies:   Allergies   Allergen Reactions     Lisinopril Cough       Social Hx: .   reports that he quit smoking about 26 years ago. He has never used smokeless tobacco. He reports that he drinks alcohol. He reports that he does not use illicit drugs.    Family Hx: family history includes Allergies in his daughter; Arthritis in his  "mother; Asthma in his daughter; Cancer - colorectal in his sister; Cerebrovascular Disease in his father; Hypertension in his father and sister.    REVIEW OF SYSTEMS: 10 point ROS neg other than the symptoms noted above in the HPI and PMH. Notables include  CONSTITUTIONAL:NEGATIVE for fever, chills, change in weight  INTEGUMENTARY/SKIN: NEGATIVE for worrisome rashes, moles or lesions  MUSCULOSKELETAL:See HPI above  NEURO: NEGATIVE for weakness, dizziness or paresthesias    This document serves as a record of the services and decisions personally performed and made by Manoj Augustin MD. It was created on his behalf by Robyn Quarles, a trained medical scribe. The creation of this document is based the provider's statements to the medical scribe.    Scribe Robyn Quarles 4:27 PM 7/18/2018    PHYSICAL EXAM:  BP (!) 155/102  Ht 5' 8.25\" (1.734 m)  Wt 290 lb 3.2 oz (131.6 kg)  BMI 43.8 kg/m2   GENERAL APPEARANCE: healthy, alert, no distress  SKIN: no suspicious lesions or rashes  NEURO: Normal strength and tone, mentation intact and speech normal  PSYCH:  mentation appears normal and affect normal, not anxious  RESPIRATORY: No increased work of breathing.  HANDS: no clubbing, nail pitting.    BILATERAL LOWER EXTREMITIES:  Gait: normal  Alignment: varus  No gross deformities or masses.  No Quad atrophy, strength normal.  Intact sensation deep peroneal nerve, superficial peroneal nerve, med/lat tibial nerve, sural nerve, saphenous nerve  Intact EHL, EDL, TA, FHL, GS, quadriceps hamstrings and hip flexors  Toes warm and well perfused, brisk capillary refill. Palpable 2+ dp pulses.  Bilateral calf soft and nttp or squeeze.  No palpable popliteal lymphadenopathy.  DTRs: achilles 2+, patella 2+.  Edema: 2+ pitting, bilateral.   Hips with full, pain-free motion. No irritability with flexion, adduction, and internal rotation.    RIGHT KNEE EXAM:    Skin: intact, no ecchymosis or erythema  Squat: 100 %, tight     ROM: 1 hyperextension " to 115 flexion  Tight hamstrings on straight leg raise.  Effusion: none  Tender: iliotibial band, lateral joint line    NTTP medial  joint line, anterior or posterior knee  McMurrays: negative    MCL: stable, and non-painful at both 0 and 30 degrees knee flexion  Varus stress: stable, and non-painful at both 0 and 30 degrees knee flexion  Lachmans: neg, firm endpoint  Posterior Drawer stable  Patellofemoral joint:                Apprehension: negative              Crepitations: mild   Grind: positive      LEFT KNEE EXAM:    Skin: intact, no ecchymosis or erythema, callus over the anterior knee  ROM: 1 hyperextension to 115 flexion  Tight hamstrings on straight leg raise.  Effusion: none  Tender: NTTP med/lat joint line, anterior or posterior knee  McMurrays: negative    MCL: stable, and non-painful at both 0 and 30 degrees knee flexion  Varus stress: stable, and non-painful at both 0 and 30 degrees knee flexion  Lachmans: neg, firm endpoint  Posterior Drawer stable  Patellofemoral joint:                Apprehension: negative              Crepitations: minimal   Grind: negative     X-RAY:  3 views right knee from 7/12/2018 were reviewed in clinic today. On my review, no obvious fractures or dislocations. Joint spaces are preserved. No significant marginal osteophyte formation. Minimal chondrocalcinosis. No acute fracture or malalignment. Small joint effusion.      Impression:   62 year old male with acute on chronic right knee pain    Plan:   * Reviewed imaging with patient. Also, clinical exam findings.  * Discussed with patient that based on physical exam findings, it is not possible to completely rule out meniscus tear.  * An MRI of the right knee was ordered for further evaluation.     * Rest  * Activity modification - avoid activities that aggravate symptoms.  * NSAIDS - regular use for inflammation, with food, as long as no contra-indications. Please discuss with pcp if needed.  * Ice twice daily to three times  daily.  * Compression wrap  * Elevation of extremity to reduce swelling  * Tylenol as needed for pain  * Physical therapy ordered for strengthening and stretching  * Patient to follow up with Primary Care provider regarding elevated blood pressure     * After having the MRI, I would like the patient to call me so that we can discuss the results as well as how to proceed.       The information in this document, created by a scribe for me, accurately reflects the services I personally performed and the decisions made by me. I have reviewed and approved this document for accuracy.     Manoj Augustin M.D., M.S.  Dept. of Orthopaedic Surgery  [facility]

## 2018-07-18 NOTE — LETTER
7/18/2018         RE: Arturo Rahman  [address]  [place] [address]        Dear Colleague,    Thank you for referring your patient, Arturo Rahman, to the [facility]. Please see a copy of my visit note below.    CHIEF COMPLAINT:   Chief Complaint   Patient presents with     Right Knee - Pain     Right knee pain. Onset: beginning of July, 2018 with NKI. Will get occasional stabbing sensation in the knee with certain movements. He has been taking Mobic for 1 week and has noticed that it helps a little, but not too much.    .  Arturo Rahman is seen today in the [facility] for evaluation of right knee pain at the request of Martina Emerson        HISTORY OF PRESENT ILLNESS    Arturo Rahman is a 62 year old male seen for evaluation of ongoing right knee pain with no known injury.   Pain has been present for about 1 year, however worsened early July 2018. He reports stabbing pain of the medial and lateral aspects of the knee, intermittent. Constant throbbing. Aggravated with twisting of the knee. Has noticed some stiffness in the knee with walking. Today he has minimal pain, rated a 1/10.  He has been taking Mobic for pain for about 1 week now and has noticed some improvements. No other treatments    Present symptoms: minimal pain.    Pain severity: 1/10  Frequency of symptoms: frequently  Exacerbating Factors: weight bearing, twisting  Relieving Factors: rest, Mobic  Night Pain: No  Pain while at rest: No   Numbness or tingling: No   Patient has tried:     NSAIDS: Yes , Mobic     Physical Therapy: No      Activity modification: Yes      Bracing: No      Injections: No     Ice: No      Assistive device:  No     Other: one    Orthopaedic PMH: none    Other PMH:  has a past medical history of Allergies; Asthma, moderate persistent; FH: colon cancer; GERD (gastroesophageal reflux disease); Granulomatous lung disease (H); HTN  (hypertension); and Hyperlipidemia.  Patient Active Problem List   Diagnosis     Asthma, moderate persistent     Granulomatous lung disease (H)     GERD (gastroesophageal reflux disease)     FH: colon cancer     Hyperlipidemia LDL goal <130     Allergic rhinitis     Advanced directives, counseling/discussion     Hypertension, goal below 140/90     Morbid obesity (H)     Hamartoma of left lung (H)     Right knee pain, unspecified chronicity       Surgical Hx:  has a past surgical history that includes tonsillectomy; colonoscopy (11/08); vasectomy; Bronchoscopy flexible and rigid (N/A, 7/19/2017); Combined Endoscopy Upper With Argon Plasma Coagulator (Apc) (N/A, 7/19/2017); and Bronchoscopy Flexible,L Cryobiopsy (N/A, 1/11/2018).    Medications:   Current Outpatient Prescriptions:      desloratadine (CLARINEX) 5 MG tablet, Take 1 tablet (5 mg) by mouth 2 times daily as needed for allergies, Disp: 180 tablet, Rfl: 1     esomeprazole (NEXIUM) 40 MG CR capsule, TAKE ONE CAPSULE BY MOUTH IN THE MORNING BEFORE BREAKFAST, Disp: 90 capsule, Rfl: 2     fluticasone (FLONASE) 50 MCG/ACT spray, Spray 1 spray into both nostrils 2 times daily, Disp: 1 Bottle, Rfl: 1     losartan-hydrochlorothiazide (HYZAAR) 100-25 MG per tablet, TAKE ONE TABLET BY MOUTH ONE TIME DAILY, Disp: 90 tablet, Rfl: 1     meloxicam (MOBIC) 15 MG tablet, Take 1 tablet (15 mg) by mouth daily, Disp: 30 tablet, Rfl: 1     montelukast (SINGULAIR) 10 MG tablet, TAKE ONE TABLET BY MOUTH ONE TIME DAILY., Disp: 90 tablet, Rfl: 2     Multiple Vitamins-Minerals (MULTIVITAL PO), Take 1 tablet by mouth daily, Disp: , Rfl:      simvastatin (ZOCOR) 10 MG tablet, Take 1 tablet (10 mg) by mouth At Bedtime, Disp: 90 tablet, Rfl: 3     VENTOLIN  (90 Base) MCG/ACT Inhaler, inhale 2 puffs by mouth every 6 hours as needed for shortness of breath/dyspnea, Disp: 18 g, Rfl: 2    Allergies:   Allergies   Allergen Reactions     Lisinopril Cough       Social Hx: quality  ".   reports that he quit smoking about 26 years ago. He has never used smokeless tobacco. He reports that he drinks alcohol. He reports that he does not use illicit drugs.    Family Hx: family history includes Allergies in his daughter; Arthritis in his mother; Asthma in his daughter; Cancer - colorectal in his sister; Cerebrovascular Disease in his father; Hypertension in his father and sister.    REVIEW OF SYSTEMS: 10 point ROS neg other than the symptoms noted above in the HPI and PMH. Notables include  CONSTITUTIONAL:NEGATIVE for fever, chills, change in weight  INTEGUMENTARY/SKIN: NEGATIVE for worrisome rashes, moles or lesions  MUSCULOSKELETAL:See HPI above  NEURO: NEGATIVE for weakness, dizziness or paresthesias    This document serves as a record of the services and decisions personally performed and made by Manoj Augustin MD. It was created on his behalf by Robyn Quarles, a trained medical scribe. The creation of this document is based the provider's statements to the medical scribe.    Scribe Robyn Quarles 4:27 PM 7/18/2018    PHYSICAL EXAM:  BP (!) 155/102  Ht 5' 8.25\" (1.734 m)  Wt 290 lb 3.2 oz (131.6 kg)  BMI 43.8 kg/m2   GENERAL APPEARANCE: healthy, alert, no distress  SKIN: no suspicious lesions or rashes  NEURO: Normal strength and tone, mentation intact and speech normal  PSYCH:  mentation appears normal and affect normal, not anxious  RESPIRATORY: No increased work of breathing.  HANDS: no clubbing, nail pitting.    BILATERAL LOWER EXTREMITIES:  Gait: normal  Alignment: varus  No gross deformities or masses.  No Quad atrophy, strength normal.  Intact sensation deep peroneal nerve, superficial peroneal nerve, med/lat tibial nerve, sural nerve, saphenous nerve  Intact EHL, EDL, TA, FHL, GS, quadriceps hamstrings and hip flexors  Toes warm and well perfused, brisk capillary refill. Palpable 2+ dp pulses.  Bilateral calf soft and nttp or squeeze.  No palpable popliteal " lymphadenopathy.  DTRs: achilles 2+, patella 2+.  Edema: 2+ pitting, bilateral.   Hips with full, pain-free motion. No irritability with flexion, adduction, and internal rotation.    RIGHT KNEE EXAM:    Skin: intact, no ecchymosis or erythema  Squat: 100 %, tight     ROM: 1 hyperextension to 115 flexion  Tight hamstrings on straight leg raise.  Effusion: none  Tender: iliotibial band, lateral joint line    NTTP medial  joint line, anterior or posterior knee  McMurrays: negative    MCL: stable, and non-painful at both 0 and 30 degrees knee flexion  Varus stress: stable, and non-painful at both 0 and 30 degrees knee flexion  Lachmans: neg, firm endpoint  Posterior Drawer stable  Patellofemoral joint:                Apprehension: negative              Crepitations: mild   Grind: positive      LEFT KNEE EXAM:    Skin: intact, no ecchymosis or erythema, callus over the anterior knee  ROM: 1 hyperextension to 115 flexion  Tight hamstrings on straight leg raise.  Effusion: none  Tender: NTTP med/lat joint line, anterior or posterior knee  McMurrays: negative    MCL: stable, and non-painful at both 0 and 30 degrees knee flexion  Varus stress: stable, and non-painful at both 0 and 30 degrees knee flexion  Lachmans: neg, firm endpoint  Posterior Drawer stable  Patellofemoral joint:                Apprehension: negative              Crepitations: minimal   Grind: negative     X-RAY:  3 views right knee from 7/12/2018 were reviewed in clinic today. On my review, no obvious fractures or dislocations. Joint spaces are preserved. No significant marginal osteophyte formation. Minimal chondrocalcinosis. No acute fracture or malalignment. Small joint effusion.      Impression:   62 year old male with acute on chronic right knee pain    Plan:   * Reviewed imaging with patient. Also, clinical exam findings.  * Discussed with patient that based on physical exam findings, it is not possible to completely rule out meniscus tear.  * An MRI  of the right knee was ordered for further evaluation.     * Rest  * Activity modification - avoid activities that aggravate symptoms.  * NSAIDS - regular use for inflammation, with food, as long as no contra-indications. Please discuss with pcp if needed.  * Ice twice daily to three times daily.  * Compression wrap  * Elevation of extremity to reduce swelling  * Tylenol as needed for pain  * Physical therapy ordered for strengthening and stretching  * Patient to follow up with Primary Care provider regarding elevated blood pressure     * After having the MRI, I would like the patient to call me so that we can discuss the results as well as how to proceed.       The information in this document, created by a scribe for me, accurately reflects the services I personally performed and the decisions made by me. I have reviewed and approved this document for accuracy.     Manoj Augustin M.D., M.S.  Dept. of Orthopaedic Surgery  [facility]        Again, thank you for allowing me to participate in the care of your patient.        Sincerely,        Manoj Augustin MD

## 2018-07-18 NOTE — MR AVS SNAPSHOT
After Visit Summary   7/18/2018    Arturo Rahman    MRN: [id number]           Patient Information     Date Of Birth          1955        Visit Information        Provider Department      7/18/2018 4:00 PM Manoj Augustin MD [facility]        Today's Diagnoses     Acute pain of right knee    -  1      Care Instructions    Patient to follow up with Primary Care provider regarding elevated blood pressure.            Follow-ups after your visit        Follow-up notes from your care team     Return if symptoms worsen or fail to improve.      Your next 10 appointments already scheduled     Jul 23, 2018  7:00 AM CDT   MR KNEE RIGHT W/O CONTRAST with [facility]   [facility]    [address] [place] [id number] [phone]           Take your medicines as usual, unless your doctor tells you not to. Bring a list of your current medicines to your exam (including vitamins, minerals and over-the-counter drugs). Also bring the results of similar scans you may have had.  Please remove any body piercings and hair extensions before you arrive.  Follow your doctor s orders. If you do not, we may have to postpone your exam.  You may or may not receive IV contrast for this exam pending the discretion of the Radiologist.  You do not need to do anything special to prepare.  The MRI machine uses a strong magnet. Please wear clothes without metal (snaps, zippers). A sweatsuit works well, or we may give you a hospital gown.   **IMPORTANT** THE INSTRUCTIONS BELOW ARE ONLY FOR THOSE PATIENTS WHO HAVE BEEN PRESCRIBED SEDATION OR GENERAL ANESTHESIA DURING THEIR MRI PROCEDURE:  IF YOUR DOCTOR PRESCRIBED ORAL SEDATION (take medicine to help you relax during your exam):   You must get the medicine from your doctor (oral medication) before you arrive. Bring the medicine to the exam. Do not take it at home. You ll be told when to take it upon  arriving for your exam.   Arrive one hour early. Bring someone who can take you home after the test. Your medicine will make you sleepy. After the exam, you may not drive, take a bus or take a taxi by yourself.  IF YOUR DOCTOR PRESCRIBED IV SEDATION:   Arrive one hour early. Bring someone who can take you home after the test. Your medicine will make you sleepy. After the exam, you may not drive, take a bus or take a taxi by yourself.   No eating 6 hours before your exam. You may have clear liquids up until 4 hours before your exam. (Clear liquids include water, clear tea, black coffee and fruit juice without pulp.)  IF YOUR DOCTOR PRESCRIBED ANESTHESIA (be asleep for your exam):   Arrive 1 1/2 hours early. Bring someone who can take you home after the test. You may not drive, take a bus or take a taxi by yourself.   No eating 8 hours before your exam. You may have clear liquids up until 4 hours before your exam. (Clear liquids include water, clear tea, black coffee and fruit juice without pulp.)   You will spend four to five hours in the recovery room.  Please call the Imaging Department at your exam site with any questions.            Sep 10, 2018  3:15 PM CDT   CT CHEST W/O CONTRAST with MGCT1   [facility]    [address] [place] [id number] [phone]           Please bring any scans or X-rays taken at other hospitals, if similar tests were done. Also bring a list of your medicines, including vitamins, minerals and over-the-counter drugs. It is safest to leave personal items at home.  Be sure to tell your doctor:   If you have any allergies.   If there s any chance you are pregnant.   If you are breastfeeding.  You do not need to do anything special to prepare for this exam.  Please wear loose clothing, such as a sweat suit or jogging clothes. Avoid snaps, zippers and other metal. We may ask you to undress and put on a hospital gown.             "Sep 10, 2018  3:30 PM CDT   [facility]    [address] [place] [id number] [phone]              Future tests that were ordered for you today     Open Future Orders        Priority Expected Expires Ordered    MR Knee Right w/o Contrast Routine  7/18/2019 7/18/2018            Who to contact     If you have questions or need follow up information about today's clinic visit or your schedule please contact [facility] directly at [phone].  Normal or non-critical lab and imaging results will be communicated to you by My[employer], letter or phone within 4 business days after the clinic has received the results. If you do not hear from us within 7 days, please contact the clinic through [employer] or phone. If you have a critical or abnormal lab result, we will notify you by phone as soon as possible.  Submit refill requests through [employer] or call your pharmacy and they will forward the refill request to us. Please allow 3 business days for your refill to be completed.          Additional Information About Your Visit        MyTr[employer]hart Information     [employer] gives you secure access to your electronic health record. If you see a primary care provider, you can also send messages to your care team and make appointments. If you have questions, please call your primary care clinic.  If you do not have a primary care provider, please call [phone] and they will assist you.        Care EveryWhere ID     This is your Care EveryWhere ID. This could be used by other organizations to access your [facility] medical records  [id number]        Your Vitals Were     Height BMI (Body Mass Index)                5' 8.25\" (1.734 m) 43.8 kg/m2           Blood Pressure from Last 3 Encounters:   07/18/18 (!) 155/102   07/12/18 124/89   04/23/18 127/88    Weight from Last 3 Encounters:   07/18/18 290 lb 3.2 oz (131.6 kg) "   07/12/18 286 lb (129.7 kg)   04/23/18 285 lb 12.8 oz (129.6 kg)               Primary Care Provider Office Phone # Fax #    Martina Kaleigh Leung [phone]       [address]  [facility] [address]        Equal Access to Services     INGA MCCRACKEN : Hadii aad ku hadasho Soomaali, waaxda luqadaha, qaybta kaalmada adeegyada, waxay idiin hayaan adeeg kharash la'aan . So [facility] [phone].    ATENCIÓN: Si habla español, tiene a santana disposición servicios gratuitos de asistencia lingüística. Llame al [phone].    We comply with applicable federal civil rights laws and [place] laws. We do not discriminate on the basis of race, color, national origin, age, disability, sex, sexual orientation, or gender identity.            Thank you!     Thank you for choosing [facility]  for your care. Our goal is always to provide you with excellent care. Hearing back from our patients is one way we can continue to improve our services. Please take a few minutes to complete the written survey that you may receive in the mail after your visit with us. Thank you!             Your Updated Medication List - Protect others around you: Learn how to safely use, store and throw away your medicines at www.disposemymeds.org.          This list is accurate as of 7/18/18 11:59 PM.  Always use your most recent med list.                   Brand Name Dispense Instructions for use Diagnosis    desloratadine 5 MG tablet    CLARINEX    180 tablet    Take 1 tablet (5 mg) by mouth 2 times daily as needed for allergies    Chronic seasonal allergic rhinitis due to pollen       esomeprazole 40 MG CR capsule    nexIUM    90 capsule    TAKE ONE CAPSULE BY MOUTH IN THE MORNING BEFORE BREAKFAST    Gastroesophageal reflux disease with esophagitis       fluticasone 50 MCG/ACT spray    FLONASE    1 Bottle    Spray 1 spray into both nostrils 2 times daily    Dysfunction of both eustachian tubes        losartan-hydrochlorothiazide 100-25 MG per tablet    HYZAAR    90 tablet    TAKE ONE TABLET BY MOUTH ONE TIME DAILY    Hypertension, goal below 140/90       meloxicam 15 MG tablet    MOBIC    30 tablet    Take 1 tablet (15 mg) by mouth daily    Right knee pain, unspecified chronicity       montelukast 10 MG tablet    SINGULAIR    90 tablet    TAKE ONE TABLET BY MOUTH ONE TIME DAILY.    Moderate persistent asthma without complication       MULTIVITAL PO      Take 1 tablet by mouth daily        simvastatin 10 MG tablet    ZOCOR    90 tablet    Take 1 tablet (10 mg) by mouth At Bedtime    Hyperlipidemia LDL goal <130       VENTOLIN  (90 Base) MCG/ACT Inhaler   Generic drug:  albuterol     18 g    inhale 2 puffs by mouth every 6 hours as needed for shortness of breath/dyspnea    Moderate persistent asthma without complication

## 2018-07-23 ENCOUNTER — RADIANT APPOINTMENT (OUTPATIENT)
Dept: MRI IMAGING | Facility: CLINIC | Age: 63
End: 2018-07-23
Attending: PHYSICIAN ASSISTANT
Payer: COMMERCIAL

## 2018-07-23 DIAGNOSIS — M25.561 ACUTE PAIN OF RIGHT KNEE: ICD-10-CM

## 2018-07-23 PROCEDURE — 73721 MRI JNT OF LWR EXTRE W/O DYE: CPT | Mod: RT | Performed by: RADIOLOGY

## 2018-07-30 ENCOUNTER — MYC MEDICAL ADVICE (OUTPATIENT)
Dept: ORTHOPEDICS | Facility: CLINIC | Age: 63
End: 2018-07-30

## 2018-07-30 NOTE — TELEPHONE ENCOUNTER
* discussed injury with patient, what appears to be a complex medial meniscal tear on MRI, as well as some underlying arthritic changes, which is consistent with symptoms and physical examination findings.     * Discussed treatment options including nonoperative treatment with continued rest, ice, elevation, activity modification, NSAIDS and Physical Therapy, bracing and potential injections versus surgical treatment with arthroscopy and meniscal repair versus debridement, possible chondral debridement. Risks and benefits of each discussed in detail.    * in the setting of underlying chondrosis, predictability of arthroscopy is uncertain, unless mechanical symptoms present due to the meniscus tear.    * surgical risks discussed: bleeding, infection, pain, scar, damage to adjacent structures (nerve, vessels, cartilage), stiffness, post-traumatic arthritis, failure to relieve symptoms, recurrence of symptoms, blood clots (DVT), pulmonary emolism, risks of anesthesia and death. This surgery is not intended nor expected to alleviate arthritic pain symptoms, nor will it treat or correct underlying arthritic changes. Arthritis and symptoms related to arthritis could worsen with arthroscopy and meniscal and/or chondral debridement. Patient understands.    * understanding the risks of surgery, patient elects to consider his options but likely will call to set up appointment for an injection.    He was appreciative of the call back.    Manoj Augustin M.D., M.S.  Dept. of Orthopaedic Surgery  [facility]

## 2018-07-30 NOTE — TELEPHONE ENCOUNTER
Patient requesting call to discuss treatment options. Patient phone number [phone].    Radha Burgos RN

## 2018-07-31 ENCOUNTER — OFFICE VISIT (OUTPATIENT)
Dept: ORTHOPEDICS | Facility: CLINIC | Age: 63
End: 2018-07-31
Payer: COMMERCIAL

## 2018-07-31 VITALS
SYSTOLIC BLOOD PRESSURE: 132 MMHG | HEART RATE: 112 BPM | DIASTOLIC BLOOD PRESSURE: 93 MMHG | WEIGHT: 290 LBS | TEMPERATURE: 98.5 F | BODY MASS INDEX: 43.77 KG/M2 | OXYGEN SATURATION: 96 %

## 2018-07-31 DIAGNOSIS — S83.231D COMPLEX TEAR OF MEDIAL MENISCUS OF RIGHT KNEE AS CURRENT INJURY, SUBSEQUENT ENCOUNTER: ICD-10-CM

## 2018-07-31 DIAGNOSIS — M17.11 PRIMARY OSTEOARTHRITIS OF RIGHT KNEE: Primary | ICD-10-CM

## 2018-07-31 PROCEDURE — 20610 DRAIN/INJ JOINT/BURSA W/O US: CPT | Mod: RT | Performed by: ORTHOPAEDIC SURGERY

## 2018-07-31 PROCEDURE — 99213 OFFICE O/P EST LOW 20 MIN: CPT | Mod: 25 | Performed by: ORTHOPAEDIC SURGERY

## 2018-07-31 RX ORDER — METHYLPREDNISOLONE ACETATE 80 MG/ML
80 INJECTION, SUSPENSION INTRA-ARTICULAR; INTRALESIONAL; INTRAMUSCULAR; SOFT TISSUE
Status: DISCONTINUED | OUTPATIENT
Start: 2018-07-31 | End: 2021-06-10

## 2018-07-31 RX ORDER — LIDOCAINE HYDROCHLORIDE 10 MG/ML
4 INJECTION, SOLUTION EPIDURAL; INFILTRATION; INTRACAUDAL; PERINEURAL
Status: SHIPPED | OUTPATIENT
Start: 2018-07-31

## 2018-07-31 RX ADMIN — METHYLPREDNISOLONE ACETATE 80 MG: 80 INJECTION, SUSPENSION INTRA-ARTICULAR; INTRALESIONAL; INTRAMUSCULAR; SOFT TISSUE at 14:22

## 2018-07-31 RX ADMIN — LIDOCAINE HYDROCHLORIDE 4 ML: 10 INJECTION, SOLUTION EPIDURAL; INFILTRATION; INTRACAUDAL; PERINEURAL at 14:22

## 2018-07-31 ASSESSMENT — PAIN SCALES - GENERAL: PAINLEVEL: MODERATE PAIN (5)

## 2018-07-31 NOTE — PATIENT INSTRUCTIONS
Non-surgical treatment for knee arthritis includes:    * rest.  For acute flares. (Periodic).    * Activity modification - avoid impact activities or activities that aggravate symptoms.   Keeping joints moving may be the most important aspect of joint health.    * Tylenol as needed for pain, consider Tylenol arthritis or similar.  (no more than 3000mg of acetaminophen in a 24 hour period)    * NSAIDS (non-steroidal anti-inflammatory medications; e.g. Aleve, advil, motrin, ibuprofen, etc) - regular use for inflammation ( twice daily or three times daily), with food, as long as there are no contra-indications to using these medications. (if you have stomach ulcers, reflux, or kidney dysfunction, you should talk to your primary care provider to discuss whether these medications are right for your).  Please discuss other concerns with your primary care doctor if needed.    *Glucosamine-Chondroitin (1500 mg per day. Available over the counter)  has not been proven to help arthritis, yet some patients claim that it does help them with their arthritis pain.    * ice, 15-20 minutes at a time several times a day or as needed if there is swelling, or after an acute injury.  * Strengthening of quadriceps muscles  * Physical Therapy for strengthening, stretching and range of motion exercises of legs    * Weight loss: Weight loss:  Your body mass index is 43.77 kg/(m^2).. A healthy BMI is below 25.  Weight loss benefits, not only the current pain symptoms, but also overall health. Recommend a good diet plan that works for the patient, with the assistance of a dietician or primary care doctor as needed. Also, a good, low-impact exercise program for at least 20 minutes per day, 3 times per week, such as exercise bike, elliptical , or pool.    *DIET:  Although there is no set diet that will eliminate/cure arthritis, there are some foods that may help inflammation, which is the cause of the pain in arthritis, such as  "concentrated cherry juice, Tumeric, and Fish Oil.      Check out the Arthritis Foundation website for further diet suggestions to potentially reduce inflammation and pain:    http://www.arthritis.org/living-with-arthritis/arthritis-diet/best-foods-for-arthritis/    * Exercise: low impact such as stationary bike, elliptical, pool.  Some Knee exercises can be found at the Orthoinfo website:  https://orthoinfo.aaos.org/en/staying-healthy/knee-exercises/     * Injections: cortisone, versus viscosupplementation (hyaluronic acid, \"rooster comb\", \"gel shots\")  * Bracing: bracing the knee may offer some relief of symptoms when worn and provide some stability.  These can be over the counter, or custom-made \"\" braces that take the pressure off of the worn portion of the knee.  * over the counter supplements such as glucosamine-chondroitin sulfate may help with joint pain.  * topical ointments may help as well with pain    *Accupuncture may be helpful to control the pain.    *Surgical options include arthroscopy, osteotomy (correcting the alignment of the bone by cutting it), biologic resurfacing, cadaver partial transplant, partial or total knee replacement.  This should be discussed with Dr. Ortiz.    Cortisone Injections  Cortisone is a type of steroid. It can greatly reduce inflammation (swelling, redness, and irritation). Being injected with cortisone is simple and doesn t take long. But your doctor may ask you questions about your health. Certain medical conditions, such as diabetes, can be affected by cortisone.     Your pain may be relieved by a cortisone injection.    Why Have a Cortisone Injection?  Injecting cortisone can relieve pain for anything from a sports injury to arthritis. Your doctor may suggest an injection if rest, splints, or oral medication doesn t relieve your pain. Injecting cortisone is simpler than having surgery. And cortisone may provide the lasting pain relief that can help you get out " "and enjoy life again.  Getting the Injection  Your injection will  start by cleaning and numbing your skin at the injection site. Next, you ll be injected with local anesthetics (for short-term pain relief) and cortisone. The injection may last a few moments. A small bandage will be applied over the injection site. You ll then be ready to go home.  After Your Injection  After being injected, make sure you don t injure the treated region. But stay active. Enjoy a walk or some other mild activity. Just be careful not to strain the region that gave you trouble.  The Next Day or Two  Some patients feel more pain after being injected. This is normal, and it will go away soon. Applying ice for 20 minutes at a time to your injury may reduce the increased pain. Rest for the first day or two. You don t need to stay in bed. But avoid tasks that may strain the injured region.  If You Have Diabetes  Cortisone injections can cause blood sugar to be increased for several days after the injection. Follow your regular plan for what to do when your blood sugar is elevated.     You may have the area injected, in general, every three to four months.  This is the estimated amount of time that the body takes to metabolize the \"cortisone.\"  Getting injections too frequently may result in a softening of the cartilage and cause the joint to actually wear out more quickly.  "

## 2018-07-31 NOTE — PROGRESS NOTES
Patient to follow up with Primary Care provider regarding elevated blood pressure.    Chhaya Cormier, CHANDRIKA  7/31/2018  1:58 PM

## 2018-07-31 NOTE — PROGRESS NOTES
SUBJECTIVE:   Arturo Rahman is a 62 year old male with right knee pain and is a patient of Dr. Augustin. He discussed the MRI and options with Dr. Augustin and they decided that a steroid injection would be best. Since Dr. Augustin is out of town, he is here for the injection.     Present symptoms: minimal right knee pain   Symptoms occur when: weightbearing and twisting    Treatments tried to this point: Mobic for pain    Orthopedic PMH: None    Review of Systems:  Constitutional:  NEGATIVE for fever, chills, change in weight  Integumentary/Skin:  NEGATIVE for worrisome rashes, moles or lesions  Eyes:  NEGATIVE for vision changes or irritation  ENT/Mouth:  NEGATIVE for ear, mouth and throat problems  Resp:  NEGATIVE for significant cough or SOB  Breast:  NEGATIVE for masses, tenderness or discharge  CV:  NEGATIVE for chest pain, palpitations or peripheral edema  GI:  NEGATIVE for nausea, abdominal pain, heartburn, or change in bowel habits  :  Negative   Musculoskeletal:  See HPI above  Neuro:  NEGATIVE for weakness, dizziness or paresthesias  Endocrine:  NEGATIVE for temperature intolerance, skin/hair changes  Heme/allergy/immune:  NEGATIVE for bleeding problems  Psychiatric:  NEGATIVE for changes in mood or affect    Past Medical History:   Past Medical History:   Diagnosis Date     Allergies      Asthma, moderate persistent      FH: colon cancer     colonoscopy q 5yr.  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     HTN (hypertension)      Hyperlipidemia      Past Surgical History:   Past Surgical History:   Procedure Laterality Date     BRONCHOSCOPY FLEXIBLE AND RIGID N/A 7/19/2017    Procedure: BRONCHOSCOPY FLEXIBLE AND RIGID;   Bronchoscopy, Tumor Debulking with Cryoprobe and Argon Plasma Coagulation;  Surgeon: Albino Crump MD;  Location: [facility] OR     BRONCHOSCOPY FLEXIBLE,L CRYOBIOPSY N/A 1/11/2018    Procedure: BRONCHOSCOPY FLEXIBLE,  CRYOBIOPSY;  Flexible Bronchoscopy, Rigid Bronchoscopy, Tumor Debulking With Cryoprobe;  Surgeon: Albino Crump MD;  Location: [facility] OR     COLONOSCOPY  11/08    + polyp, +FH, repeat in 5 years     COMBINED ENDOSCOPY UPPER WITH ARGON PLASMA COAGULATOR (APC) N/A 7/19/2017    Procedure: COMBINED ENDOSCOPY UPPER WITH ARGON PLASMA COAGULATOR ([facility]);;  Surgeon: Albino Crump MD;  Location: [facility] OR     TONSILLECTOMY       VASECTOMY       Family History:   Family History   Problem Relation Age of Onset     Arthritis Mother      Hypertension Father      Cerebrovascular Disease Father      Asthma Daughter      Allergies Daughter      Hypertension Sister      Cancer - colorectal Sister      dx age 50     Social History:   Social History   Substance Use Topics     Smoking status: Former Smoker     Quit date: 10/28/1991     Smokeless tobacco: Never Used     Alcohol use Yes      Comment: rare     OBJECTIVE:  Physical Exam:  BP (!) 132/93 (BP Location: Left arm, Patient Position: Sitting, Cuff Size: Adult Large)  Pulse 112  Temp 98.5  F (36.9  C)  Wt 131.5 kg (290 lb)  SpO2 96%  BMI 43.77 kg/m2  General Appearance: healthy, alert and no distress   Skin: no suspicious lesions or rashes  Neuro: Normal strength and tone, mentation intact and speech normal  Vascular: good pulses, and cappillary refill   Lymph: no lymphadenopathy   Psych:  mentation appears normal and affect normal/bright  Resp: no increased work of breathing     Right Knee Exam:  Patellofemoral joint: mild crepitations in the patellofemoral joint.  Effusion: mild  ROM: 0-125 degrees   Tender: medial joint line and lateral joint line  Ligaments:   Lachman's: stable   Anterior/Posterior drawer: stable,   Varus/Valgus stress: stable to varus and valgus stress    MRI:    From 07/23/18 of the RIGHT KNEE was reviewed personally by myself and show:  1. Complex medial meniscus tear in the body and posterior horn with increased signal at the meniscocapsular  junction indicating possible meniscocapsular tear versus stress in this area from the tear of the body and posterior horn the medial meniscus.  2. Increased signal in the articular cartilage of the medial femoral condyle consistent with cartilage fissuring.   3. Irregularity of the lateral patellar facet cartilage. Abnormal signal over the medial patellar facet with evidence of cartilage thinning.   4. Moderate joint effusion.  5. 3.5 x 1 x 8 mm likely ganglion cyst tracking along the pcl.     ASSESSMENT:   Right knee osteoarthritis  Medial meniscus tear     PLAN:   In accordance with Dr. Augustin's plan, we decided to proceed with a steroid injection of the right knee today. With the patient's consent, right knee injected intra-articularly with 80mg of Depomedrol and 4cc of local anesthetic after sterile prep. The patient should follow up with Dr. Augustin when the injection wears off.     In addition, we emphasized that the patient should follow up with his PCP regarding elevated blood pressure.     Return to clinic: SHANTHI Ortiz MD  Dept. Orthopedic Surgery  [facility]     This document serves as a record of the services and decisions personally performed and made by Dr. VANDA Ortiz MD. It was created on his behalf by Ravindra Rahman, a trained medical scribe. The creation of this record is based on the provider's personal observations and the statements of the patient. This document has been checked and approved by the attending provider.   Ravindra Rahman July 31, 2018 2:17 PM

## 2018-07-31 NOTE — LETTER
7/31/2018         RE: Arturo Rahman  [address] [place] [address]        Dear Colleague,    Thank you for referring your patient, Arturo Rahman, to the [facility]. Please see a copy of my visit note below.    Patient to follow up with Primary Care provider regarding elevated blood pressure.    Chhaya Cormier, [facility]  7/31/2018  1:58 PM        SUBJECTIVE:   Arturo Rahman is a 62 year old male with right knee pain and is a patient of Dr. Augustin. He discussed the MRI and options with Dr. Augustin and they decided that a steroid injection would be best. Since Dr. Augustin is out of town, he is here for the injection.     Present symptoms: minimal right knee pain   Symptoms occur when: weightbearing and twisting    Treatments tried to this point: Mobic for pain    Orthopedic PMH: None    Review of Systems:  Constitutional:  NEGATIVE for fever, chills, change in weight  Integumentary/Skin:  NEGATIVE for worrisome rashes, moles or lesions  Eyes:  NEGATIVE for vision changes or irritation  ENT/Mouth:  NEGATIVE for ear, mouth and throat problems  Resp:  NEGATIVE for significant cough or SOB  Breast:  NEGATIVE for masses, tenderness or discharge  CV:  NEGATIVE for chest pain, palpitations or peripheral edema  GI:  NEGATIVE for nausea, abdominal pain, heartburn, or change in bowel habits  :  Negative   Musculoskeletal:  See HPI above  Neuro:  NEGATIVE for weakness, dizziness or paresthesias  Endocrine:  NEGATIVE for temperature intolerance, skin/hair changes  Heme/allergy/immune:  NEGATIVE for bleeding problems  Psychiatric:  NEGATIVE for changes in mood or affect    Past Medical History:   Past Medical History:   Diagnosis Date     Allergies      Asthma, moderate persistent      FH: colon cancer     colonoscopy q 5yr.  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     HTN (hypertension)      Hyperlipidemia      Past  Surgical History:   Past Surgical History:   Procedure Laterality Date     BRONCHOSCOPY FLEXIBLE AND RIGID N/A 7/19/2017    Procedure: BRONCHOSCOPY FLEXIBLE AND RIGID;   Bronchoscopy, Tumor Debulking with Cryoprobe and Argon Plasma Coagulation;  Surgeon: Albino Crump MD;  Location: [facility] OR     BRONCHOSCOPY FLEXIBLE,L CRYOBIOPSY N/A 1/11/2018    Procedure: BRONCHOSCOPY FLEXIBLE, CRYOBIOPSY;  Flexible Bronchoscopy, Rigid Bronchoscopy, Tumor Debulking With Cryoprobe;  Surgeon: Albino Crump MD;  Location: [facility] OR     COLONOSCOPY  11/08    + polyp, +FH, repeat in 5 years     COMBINED ENDOSCOPY UPPER WITH ARGON PLASMA COAGULATOR (APC) N/A 7/19/2017    Procedure: COMBINED ENDOSCOPY UPPER WITH ARGON PLASMA COAGULATOR (APC);;  Surgeon: Albino Crump MD;  Location: [facility] OR     TONSILLECTOMY       VASECTOMY       Family History:   Family History   Problem Relation Age of Onset     Arthritis Mother      Hypertension Father      Cerebrovascular Disease Father      Asthma Daughter      Allergies Daughter      Hypertension Sister      Cancer - colorectal Sister      dx age 50     Social History:   Social History   Substance Use Topics     Smoking status: Former Smoker     Quit date: 10/28/1991     Smokeless tobacco: Never Used     Alcohol use Yes      Comment: rare     OBJECTIVE:  Physical Exam:  BP (!) 132/93 (BP Location: Left arm, Patient Position: Sitting, Cuff Size: Adult Large)  Pulse 112  Temp 98.5  F (36.9  C)  Wt 131.5 kg (290 lb)  SpO2 96%  BMI 43.77 kg/m2  General Appearance: healthy, alert and no distress   Skin: no suspicious lesions or rashes  Neuro: Normal strength and tone, mentation intact and speech normal  Vascular: good pulses, and cappillary refill   Lymph: no lymphadenopathy   Psych:  mentation appears normal and affect normal/bright  Resp: no increased work of breathing     Right Knee Exam:  Patellofemoral joint: mild crepitations in the patellofemoral joint.  Effusion: mild  ROM:  0-125 degrees   Tender: medial joint line and lateral joint line  Ligaments:   Lachman's: stable   Anterior/Posterior drawer: stable,   Varus/Valgus stress: stable to varus and valgus stress    MRI:    From 07/23/18 of the RIGHT KNEE was reviewed personally by myself and show:  1. Complex medial meniscus tear in the body and posterior horn with increased signal at the meniscocapsular junction indicating possible meniscocapsular tear versus stress in this area from the tear of the body and posterior horn the medial meniscus.  2. Increased signal in the articular cartilage of the medial femoral condyle consistent with cartilage fissuring.   3. Irregularity of the lateral patellar facet cartilage. Abnormal signal over the medial patellar facet with evidence of cartilage thinning.   4. Moderate joint effusion.  5. 3.5 x 1 x 8 mm likely ganglion cyst tracking along the pcl.     ASSESSMENT:   Right knee OA    PLAN:   In accordance with Dr. Augustin's plan, we decided to proceed with a steroid injection of the right knee today. With the patient's consent, right knee injected intra-articularly with 80mg of Depomedrol and 4cc of local anesthetic after sterile prep. The patient should follow up with Dr. Augustin when the injection wears off.     In addition, we emphasized that the patient should follow up with his PCP regarding elevated blood pressure.     Return to clinic: SHANTHI Ortiz MD  Dept. Orthopedic Surgery  [facility]     This document serves as a record of the services and decisions personally performed and made by Dr. VANDA Ortiz MD. It was created on his behalf by Ravindra Rahman, a trained medical scribe. The creation of this record is based on the provider's personal observations and the statements of the patient. This document has been checked and approved by the attending provider.   Ravindra Rahman July 31, 2018 2:17 PM    Large Joint Injection/Arthocentesis  Date/Time: 7/31/2018 2:22  PM  Performed by: MAYTE ORTIZ  Authorized by: MAYTE ORTIZ     Indications:  Osteoarthritis and pain  Needle Size:  22 G  Guidance: landmark guided    Approach:  Anterolateral  Location:  Knee  Site:  R knee joint  Medications:  80 mg methylPREDNISolone acetate 80 MG/ML; 4 mL lidocaine (PF) 1 %  Outcome:  Tolerated well, no immediate complications  Consent Given by:  Patient  Prep: patient was prepped and draped in usual sterile fashion            Again, thank you for allowing me to participate in the care of your patient.        Sincerely,        Mayte Ortiz MD

## 2018-07-31 NOTE — MR AVS SNAPSHOT
After Visit Summary   7/31/2018    Arturo Rahman    MRN: [id number]           Patient Information     Date Of Birth          1955        Visit Information        Provider Department      7/31/2018 2:00 PM Bill Ortiz MD [facility]        Today's Diagnoses     Primary osteoarthritis of right knee    -  1      Care Instructions    Non-surgical treatment for knee arthritis includes:    * rest.  For acute flares. (Periodic).    * Activity modification - avoid impact activities or activities that aggravate symptoms.   Keeping joints moving may be the most important aspect of joint health.    * Tylenol as needed for pain, consider Tylenol arthritis or similar.  (no more than 3000mg of acetaminophen in a 24 hour period)    * NSAIDS (non-steroidal anti-inflammatory medications; e.g. Aleve, advil, motrin, ibuprofen, etc) - regular use for inflammation ( twice daily or three times daily), with food, as long as there are no contra-indications to using these medications. (if you have stomach ulcers, reflux, or kidney dysfunction, you should talk to your primary care provider to discuss whether these medications are right for your).  Please discuss other concerns with your primary care doctor if needed.    *Glucosamine-Chondroitin (1500 mg per day. Available over the counter)  has not been proven to help arthritis, yet some patients claim that it does help them with their arthritis pain.    * ice, 15-20 minutes at a time several times a day or as needed if there is swelling, or after an acute injury.  * Strengthening of quadriceps muscles  * Physical Therapy for strengthening, stretching and range of motion exercises of legs    * Weight loss: Weight loss:  Your body mass index is 43.77 kg/(m^2).. A healthy BMI is below 25.  Weight loss benefits, not only the current pain symptoms, but also overall health. Recommend a good diet plan that works for the patient, with the assistance of  "a dietician or primary care doctor as needed. Also, a good, low-impact exercise program for at least 20 minutes per day, 3 times per week, such as exercise bike, elliptical , or pool.    *DIET:  Although there is no set diet that will eliminate/cure arthritis, there are some foods that may help inflammation, which is the cause of the pain in arthritis, such as concentrated cherry juice, Tumeric, and Fish Oil.      Check out the Arthritis Foundation website for further diet suggestions to potentially reduce inflammation and pain:    http://www.arthritis.org/living-with-arthritis/arthritis-diet/best-foods-for-arthritis/    * Exercise: low impact such as stationary bike, elliptical, pool.  Some Knee exercises can be found at the [employer] website:  https://orthoinfo.[employer].org/en/staying-healthy/knee-exercises/     * Injections: cortisone, versus viscosupplementation (hyaluronic acid, \"rooster comb\", \"gel shots\")  * Bracing: bracing the knee may offer some relief of symptoms when worn and provide some stability.  These can be over the counter, or custom-made \"\" braces that take the pressure off of the worn portion of the knee.  * over the counter supplements such as glucosamine-chondroitin sulfate may help with joint pain.  * topical ointments may help as well with pain    *Accupuncture may be helpful to control the pain.    *Surgical options include arthroscopy, osteotomy (correcting the alignment of the bone by cutting it), biologic resurfacing, cadaver partial transplant, partial or total knee replacement.  This should be discussed with Dr. Ortiz.    Cortisone Injections  Cortisone is a type of steroid. It can greatly reduce inflammation (swelling, redness, and irritation). Being injected with cortisone is simple and doesn t take long. But your doctor may ask you questions about your health. Certain medical conditions, such as diabetes, can be affected by cortisone.     Your pain may be relieved by a " "cortisone injection.    Why Have a Cortisone Injection?  Injecting cortisone can relieve pain for anything from a sports injury to arthritis. Your doctor may suggest an injection if rest, splints, or oral medication doesn t relieve your pain. Injecting cortisone is simpler than having surgery. And cortisone may provide the lasting pain relief that can help you get out and enjoy life again.  Getting the Injection  Your injection will  start by cleaning and numbing your skin at the injection site. Next, you ll be injected with local anesthetics (for short-term pain relief) and cortisone. The injection may last a few moments. A small bandage will be applied over the injection site. You ll then be ready to go home.  After Your Injection  After being injected, make sure you don t injure the treated region. But stay active. Enjoy a walk or some other mild activity. Just be careful not to strain the region that gave you trouble.  The Next Day or Two  Some patients feel more pain after being injected. This is normal, and it will go away soon. Applying ice for 20 minutes at a time to your injury may reduce the increased pain. Rest for the first day or two. You don t need to stay in bed. But avoid tasks that may strain the injured region.  If You Have Diabetes  Cortisone injections can cause blood sugar to be increased for several days after the injection. Follow your regular plan for what to do when your blood sugar is elevated.     You may have the area injected, in general, every three to four months.  This is the estimated amount of time that the body takes to metabolize the \"cortisone.\"  Getting injections too frequently may result in a softening of the cartilage and cause the joint to actually wear out more quickly.          Follow-ups after your visit        Your next 10 appointments already scheduled     Sep 10, 2018  3:15 PM CDT   CT CHEST W/O CONTRAST with MGCT1   [facility] ([facility]    [address] [place] [address] [phone]           Please bring any scans or X-rays taken at other hospitals, if similar tests were done. Also bring a list of your medicines, including vitamins, minerals and over-the-counter drugs. It is safest to leave personal items at home.  Be sure to tell your doctor:   If you have any allergies.   If there s any chance you are pregnant.   If you are breastfeeding.  You do not need to do anything special to prepare for this exam.  Please wear loose clothing, such as a sweat suit or jogging clothes. Avoid snaps, zippers and other metal. We may ask you to undress and put on a hospital gown.            Sep 10, 2018  3:30 PM CDT   Return Visit with Trent Alberto MD   [facility]    [address] [place] [address] [phone]              Who to contact     If you have questions or need follow up information about today's clinic visit or your schedule please contact [facility] directly at [phone].  Normal or non-critical lab and imaging results will be communicated to you by MyChart, letter or phone within 4 business days after the clinic has received the results. If you do not hear from us within 7 days, please contact the clinic through [employer] or phone. If you have a critical or abnormal lab result, we will notify you by phone as soon as possible.  Submit refill requests through [employer] or call your pharmacy and they will forward the refill request to us. Please allow 3 business days for your refill to be completed.          Additional Information About Your Visit        [employer] Information     [employer] gives you secure access to your electronic health record. If you see a primary care provider, you can also send messages to your care team and make appointments. If you have questions, please call your primary care clinic.  If you do not have a primary care  provider, please call [phone] and they will assist you.        Care EveryWhere ID     This is your Care EveryWhere ID. This could be used by other organizations to access your [facility] medical records  [id number]        Your Vitals Were     Pulse Temperature Pulse Oximetry BMI (Body Mass Index)          112 98.5  F (36.9  C) 96% 43.77 kg/m2         Blood Pressure from Last 3 Encounters:   07/31/18 (!) 132/93   07/18/18 (!) 155/102   07/12/18 124/89    Weight from Last 3 Encounters:   07/31/18 131.5 kg (290 lb)   07/18/18 131.6 kg (290 lb 3.2 oz)   07/12/18 129.7 kg (286 lb)              We Performed the Following     Large Joint Injection/Arthocentesis        Primary Care Provider Office Phone # Fax #    Martina Kaleigh Leung [phone]       [address]  [facility] [address]        Equal Access to Services     [facility]: Hadii aad ku hadasho Soomaali, waaxda luqadaha, qaybta kaalmada adeegyada, waxay idiin haykaruna atkinson . So [facility] [phone].    ATENCIÓN: Si habla español, tiene a santana disposición servicios gratuitos de asistencia lingüística. [facility] [phone].    We comply with applicable federal civil rights laws and [place] laws. We do not discriminate on the basis of race, color, national origin, age, disability, sex, sexual orientation, or gender identity.            Thank you!     Thank you for choosing [facility]  for your care. Our goal is always to provide you with excellent care. Hearing back from our patients is one way we can continue to improve our services. Please take a few minutes to complete the written survey that you may receive in the mail after your visit with us. Thank you!             Your Updated Medication List - Protect others around you: Learn how to safely use, store and throw away your medicines at www.disposemymeds.org.          This list is accurate as of 7/31/18  2:26 PM.  Always use your most recent med list.                    Brand Name Dispense Instructions for use Diagnosis    desloratadine 5 MG tablet    CLARINEX    180 tablet    Take 1 tablet (5 mg) by mouth 2 times daily as needed for allergies    Chronic seasonal allergic rhinitis due to pollen       esomeprazole 40 MG CR capsule    nexIUM    90 capsule    TAKE ONE CAPSULE BY MOUTH IN THE MORNING BEFORE BREAKFAST    Gastroesophageal reflux disease with esophagitis       fluticasone 50 MCG/ACT spray    FLONASE    1 Bottle    Spray 1 spray into both nostrils 2 times daily    Dysfunction of both eustachian tubes       losartan-hydrochlorothiazide 100-25 MG per tablet    HYZAAR    90 tablet    TAKE ONE TABLET BY MOUTH ONE TIME DAILY    Hypertension, goal below 140/90       meloxicam 15 MG tablet    MOBIC    30 tablet    Take 1 tablet (15 mg) by mouth daily    Right knee pain, unspecified chronicity       montelukast 10 MG tablet    SINGULAIR    90 tablet    TAKE ONE TABLET BY MOUTH ONE TIME DAILY.    Moderate persistent asthma without complication       MULTIVITAL PO      Take 1 tablet by mouth daily        simvastatin 10 MG tablet    ZOCOR    90 tablet    Take 1 tablet (10 mg) by mouth At Bedtime    Hyperlipidemia LDL goal <130       VENTOLIN  (90 Base) MCG/ACT Inhaler   Generic drug:  albuterol     18 g    inhale 2 puffs by mouth every 6 hours as needed for shortness of breath/dyspnea    Moderate persistent asthma without complication

## 2018-07-31 NOTE — PROGRESS NOTES
Large Joint Injection/Arthocentesis  Date/Time: 7/31/2018 2:22 PM  Performed by: MAYTE OCONNOR  Authorized by: MAYTE OCONNOR     Indications:  Osteoarthritis and pain  Needle Size:  22 G  Guidance: landmark guided    Approach:  Anterolateral  Location:  Knee  Site:  R knee joint  Medications:  80 mg methylPREDNISolone acetate 80 MG/ML; 4 mL lidocaine (PF) 1 %  Outcome:  Tolerated well, no immediate complications  Consent Given by:  Patient  Prep: patient was prepped and draped in usual sterile fashion

## 2018-09-10 ENCOUNTER — RADIANT APPOINTMENT (OUTPATIENT)
Dept: CT IMAGING | Facility: CLINIC | Age: 63
End: 2018-09-10
Attending: INTERNAL MEDICINE
Payer: COMMERCIAL

## 2018-09-10 ENCOUNTER — OFFICE VISIT (OUTPATIENT)
Dept: PULMONOLOGY | Facility: CLINIC | Age: 63
End: 2018-09-10
Payer: COMMERCIAL

## 2018-09-10 VITALS
WEIGHT: 287.1 LBS | OXYGEN SATURATION: 97 % | SYSTOLIC BLOOD PRESSURE: 127 MMHG | TEMPERATURE: 99 F | BODY MASS INDEX: 41.1 KG/M2 | RESPIRATION RATE: 18 BRPM | HEIGHT: 70 IN | HEART RATE: 99 BPM | DIASTOLIC BLOOD PRESSURE: 91 MMHG

## 2018-09-10 DIAGNOSIS — Q85.9 HAMARTOMA (H): ICD-10-CM

## 2018-09-10 DIAGNOSIS — R91.8 ENDOBRONCHIAL MASS: Primary | ICD-10-CM

## 2018-09-10 PROCEDURE — 71250 CT THORAX DX C-: CPT | Performed by: RADIOLOGY

## 2018-09-10 PROCEDURE — 99214 OFFICE O/P EST MOD 30 MIN: CPT | Performed by: INTERNAL MEDICINE

## 2018-09-10 ASSESSMENT — PAIN SCALES - GENERAL: PAINLEVEL: NO PAIN (0)

## 2018-09-10 NOTE — MR AVS SNAPSHOT
After Visit Summary   9/10/2018    Artruo Rahman    MRN: [id number]           Patient Information     Date Of Birth          1955        Visit Information        Provider Department      9/10/2018 3:30 PM Trent Alberto MD [facility]        Today's Diagnoses     Endobronchial mass    -  1       Follow-ups after your visit        Follow-up notes from your care team     Return in about 6 months (around 3/10/2019) for Physical Exam.      Future tests that were ordered for you today     Open Future Orders        Priority Expected Expires Ordered    General PFT Lab (Please always keep checked) Routine  9/10/2019 9/10/2018    Pulmonary Function Test Routine  9/10/2019 9/10/2018    CT Chest w/o Contrast Routine  9/10/2019 9/10/2018            Who to contact     If you have questions or need follow up information about today's clinic visit or your schedule please contact [facility] directly at [phone].  Normal or non-critical lab and imaging results will be communicated to you by [employer], letter or phone within 4 business days after the clinic has received the results. If you do not hear from us within 7 days, please contact the clinic through [email or web address] or phone. If you have a critical or abnormal lab result, we will notify you by phone as soon as possible.  Submit refill requests through [email or web address] or call your pharmacy and they will forward the refill request to us. Please allow 3 business days for your refill to be completed.          Additional Information About Your Visit        [employer] Information     [email or web address] gives you secure access to your electronic health record. If you see a primary care provider, you can also send messages to your care team and make appointments. If you have questions, please call your primary care clinic.  If you do not have a primary care provider, please call [phone] and they will assist you.      [email or web address] is an electronic  "gateway that provides easy, online access to your medical records. With [employer], you can request a clinic appointment, read your test results, renew a prescription or communicate with your care team.     To access your existing account, please contact your [facility] or call [phone] for assistance.        Care EveryWhere ID     This is your Care EveryWhere ID. This could be used by other organizations to access your [facility] medical records  [id number]        Your Vitals Were     Pulse Temperature Respirations Height Pulse Oximetry BMI (Body Mass Index)    99 99  F (37.2  C) 18 1.778 m (5' 10\") 97% 41.19 kg/m2       Blood Pressure from Last 3 Encounters:   09/10/18 (!) 127/91   07/31/18 (!) 132/93   07/18/18 (!) 155/102    Weight from Last 3 Encounters:   09/10/18 130.2 kg (287 lb 1.6 oz)   07/31/18 131.5 kg (290 lb)   07/18/18 131.6 kg (290 lb 3.2 oz)               Primary Care Provider Office Phone # Fax #    Martina Kaleigh Leung [phone]       [id number] [facility] [address]        Equal Access to Services     INGA MCCRACKEN AH: Hadii aad ku hadasho Soomaali, waaxda luqadaha, qaybta kaalmada adeegyada, waxay idiin hayaan adeeg kharash la'macn ah. So [facility] [phone].    ATENCIÓN: Si habla español, tiene a santana disposición servicios gratuitos de asistencia lingüística. Llame al [phone].    We comply with applicable federal civil rights laws and [place] laws. We do not discriminate on the basis of race, color, national origin, age, disability, sex, sexual orientation, or gender identity.            Thank you!     Thank you for choosing [facility]  for your care. Our goal is always to provide you with excellent care. Hearing back from our patients is one way we can continue to improve our services. Please take a few minutes to complete the written survey that you may receive in the mail after your visit with us. Thank you!           "   Your Updated Medication List - Protect others around you: Learn how to safely use, store and throw away your medicines at www.disposemymeds.org.          This list is accurate as of 9/10/18  3:50 PM.  Always use your most recent med list.                   Brand Name Dispense Instructions for use Diagnosis    desloratadine 5 MG tablet    CLARINEX    180 tablet    Take 1 tablet (5 mg) by mouth 2 times daily as needed for allergies    Chronic seasonal allergic rhinitis due to pollen       esomeprazole 40 MG CR capsule    nexIUM    90 capsule    TAKE ONE CAPSULE BY MOUTH IN THE MORNING BEFORE BREAKFAST    Gastroesophageal reflux disease with esophagitis       fluticasone 50 MCG/ACT spray    FLONASE    1 Bottle    Spray 1 spray into both nostrils 2 times daily    Dysfunction of both eustachian tubes       losartan-hydrochlorothiazide 100-25 MG per tablet    HYZAAR    90 tablet    TAKE ONE TABLET BY MOUTH ONE TIME DAILY    Hypertension, goal below 140/90       meloxicam 15 MG tablet    MOBIC    30 tablet    Take 1 tablet (15 mg) by mouth daily    Right knee pain, unspecified chronicity       montelukast 10 MG tablet    SINGULAIR    90 tablet    TAKE ONE TABLET BY MOUTH ONE TIME DAILY.    Moderate persistent asthma without complication       MULTIVITAL PO      Take 1 tablet by mouth daily        simvastatin 10 MG tablet    ZOCOR    90 tablet    Take 1 tablet (10 mg) by mouth At Bedtime    Hyperlipidemia LDL goal <130       VENTOLIN  (90 Base) MCG/ACT inhaler   Generic drug:  albuterol     18 g    inhale 2 puffs by mouth every 6 hours as needed for shortness of breath/dyspnea    Moderate persistent asthma without complication

## 2018-09-10 NOTE — PROGRESS NOTES
LUNG NODULE & INTERVENTIONAL PULMONARY CLINIC  [facility]     Arturo Rahman MRN# [id number]   Age: 62 year old YOB: 1955     Reason for Consultation: endobronchial hamartoma    Assessment and Plan:    1. HELIO endobronchial hamartoma s/p cryorecanalization. On CT today, all left bronchi look patent. Will wait for official report. Plan repeat CT in 6mo with PFT.      Billing: I spent more than 30 minutes face to face and greater than 50% of time was for counseling and coordination of care about the issues above.    Trent Alberto MD   of Medicine  Interventional Pulmonology  Department of Pulmonary, Allergy, Critical Care and Sleep Medicine   [facility]  Pager: [phone]          History:     Arturo Rahman is a 62 year old male with sig h/o for HELIO endobronchial hamartoma, HTN, hyperlipidemia who is here for evaluation/followup of endobronchial lesion.    - No new resp sx or complaints. Some tightness in the chest during exertion, worse in last wk. No chest pain. No cough, fever, chills.   - Had endobronchial hamartoma of HELIO take-off s/p cryo-recanalization in 1/2018.   - Personal hx of cancer: no. Up-to-date on c-scope.   - Family hx of cancer: no lung cancer.   - Exposure hx: Denies asbestos or radon exposure   - Tobacco hx: Past Smoker: 0.5ppd for 12years. Quit 27yrs ago.   - My interpretation of the images relevant for this visit includes: left mainstem and HELIO take-off look patent.    - My interpretation of the PFT's relevant for this visit includes: Normal     Other active medical problems include:   - HTN and hyperlipidemia. stable            Past Medical History:      Past Medical History:   Diagnosis Date     Allergies      Asthma, moderate persistent      FH: colon cancer     colonoscopy q 5yr.  last 2008     GERD (gastroesophageal reflux disease)      Granulomatous lung  disease (H)     ? histoplasmosis     HTN (hypertension)      Hyperlipidemia            Past Surgical History:      Past Surgical History:   Procedure Laterality Date     BRONCHOSCOPY FLEXIBLE AND RIGID N/A 7/19/2017    Procedure: BRONCHOSCOPY FLEXIBLE AND RIGID;   Bronchoscopy, Tumor Debulking with Cryoprobe and Argon Plasma Coagulation;  Surgeon: Albino Crump MD;  Location: [facility] OR     BRONCHOSCOPY FLEXIBLE,L CRYOBIOPSY N/A 1/11/2018    Procedure: BRONCHOSCOPY FLEXIBLE, CRYOBIOPSY;  Flexible Bronchoscopy, Rigid Bronchoscopy, Tumor Debulking With Cryoprobe;  Surgeon: Albino Crump MD;  Location: [facility] OR     COLONOSCOPY  11/08    + polyp, +FH, repeat in 5 years     COMBINED ENDOSCOPY UPPER WITH ARGON PLASMA COAGULATOR (APC) N/A 7/19/2017    Procedure: COMBINED ENDOSCOPY UPPER WITH ARGON PLASMA COAGULATOR (APC);;  Surgeon: Albino Crump MD;  Location: [facility] OR     TONSILLECTOMY       VASECTOMY            Social History:     Social History   Substance Use Topics     Smoking status: Former Smoker     Quit date: 10/28/1991     Smokeless tobacco: Never Used     Alcohol use Yes      Comment: rare          Family History:     Family History   Problem Relation Age of Onset     Arthritis Mother      Hypertension Father      Cerebrovascular Disease Father      Asthma Daughter      Allergies Daughter      Hypertension Sister      Cancer - colorectal Sister      dx age 50           Allergies:      Allergies   Allergen Reactions     Lisinopril Cough          Medications:     Current Outpatient Prescriptions   Medication Sig     desloratadine (CLARINEX) 5 MG tablet Take 1 tablet (5 mg) by mouth 2 times daily as needed for allergies     esomeprazole (NEXIUM) 40 MG CR capsule TAKE ONE CAPSULE BY MOUTH IN THE MORNING BEFORE BREAKFAST     fluticasone (FLONASE) 50 MCG/ACT spray Spray 1 spray into both nostrils 2 times daily     losartan-hydrochlorothiazide (HYZAAR) 100-25 MG per tablet TAKE ONE TABLET BY MOUTH ONE  TIME DAILY     montelukast (SINGULAIR) 10 MG tablet TAKE ONE TABLET BY MOUTH ONE TIME DAILY.     Multiple Vitamins-Minerals (MULTIVITAL PO) Take 1 tablet by mouth daily     simvastatin (ZOCOR) 10 MG tablet Take 1 tablet (10 mg) by mouth At Bedtime     VENTOLIN  (90 Base) MCG/ACT Inhaler inhale 2 puffs by mouth every 6 hours as needed for shortness of breath/dyspnea     meloxicam (MOBIC) 15 MG tablet Take 1 tablet (15 mg) by mouth daily (Patient not taking: Reported on 7/31/2018)     Current Facility-Administered Medications   Medication     lidocaine (PF) (XYLOCAINE) 1 % injection 4 mL     methylPREDNISolone acetate (DEPO-MEDROL) injection 80 mg          Review of Systems:     CONSTITUTIONAL: negative for fever, chills, change in weight  INTEGUMENTARY/SKIN: no rash or obvious new lesions  ENT/MOUTH: no sore throat, new sinus pain or nasal drainage  RESP: see interval history  CV: negative for chest pain, palpitations or peripheral edema  GI: no nausea, vomiting, change in stools  : no dysuria  MUSCULOSKELETAL: no myalgias, arthralgias  ENDOCRINE: blood sugars with adequate control  PSYCHIATRIC: mood stable  LYMPHATIC: no new lymphadenopathy  HEME: no bleeding or easy bruisability  NEURO: no numbness, weakness, headaches         Physical Exam:     Temp:  [99  F (37.2  C)] 99  F (37.2  C)  Pulse:  [99] 99  Resp:  [18] 18  BP: (127)/(91) 127/91  SpO2:  [97 %] 97 %  Wt Readings from Last 4 Encounters:   09/10/18 130.2 kg (287 lb 1.6 oz)   07/31/18 131.5 kg (290 lb)   07/18/18 131.6 kg (290 lb 3.2 oz)   07/12/18 129.7 kg (286 lb)     Constitutional:   Awake, alert and in no apparent distress     Eyes:   Nonicteric, AIDAN     ENT:    Trachea is midline. No gross neck abnormalities      Neck:   Supple without supraclavicular or cervical lymphadenopathy     Lungs:   Good air flow.  No crackles. No rhonchi.  No wheezes.     Cardiovascular:   Normal S1 and S2.  RRR.  No murmur, gallop or rub.  Radial, DP and PT  pulses normal and symmetric     Abdomen:   NABS, soft, nontender, nondistended.  No HSM.     Musculoskeletal:   No edema.      Neurologic:   Alert and conversant. Cranial nerves  intact.       Skin:   Warm, dry.  No rash on limited exam.           Current Laboratory Data:   All laboratory and imaging data reviewed.    No results found for this or any previous visit (from the past 24 hour(s)).         Previous Pulmonary Function Testing     FVC-Pred   Date Value Ref Range Status   2017 4.56 L      FVC-Pre   Date Value Ref Range Status   2017 3.67 L      FVC-%Pred-Pre   Date Value Ref Range Status   2017 80 %      FEV1-Pre   Date Value Ref Range Status   2017 3.07 L      FEV1-%Pred-Pre   Date Value Ref Range Status   2017 87 %      FEV1FVC-Pred   Date Value Ref Range Status   2017 78 %      FEV1FVC-Pre   Date Value Ref Range Status   2017 84 %      No results found for:   FEFMax-Pred   Date Value Ref Range Status   2017 9.10 L/sec      FEFMax-Pre   Date Value Ref Range Status   2017 6.81 L/sec      FEFMax-%Pred-Pre   Date Value Ref Range Status   2017 74 %      ExpTime-Pre   Date Value Ref Range Status   2017 7.04 sec      FIFMax-Pre   Date Value Ref Range Status   2017 4.38 L/sec      FEV1FEV6-Pred   Date Value Ref Range Status   2017 79 %      FEV1FEV6-Pre   Date Value Ref Range Status   2017 84 %      No results found for:          Previous Chest Imaging   No images are attached to the encounter.  No images are attached to the encounter or orders placed in the encounter.         Previous Cardiology Imaging     Recent Results (from the past 8760 hour(s))   Echocardiogram Complete    Narrative    [id number]  [id number]  [id number]  [id number]^MELISSA^PANDA^ZEHRA           [facility]  Echocardiography Laboratory  [address] [place] [address]        Name: VALERIE CHO  MRN: [id number]  :  1955  Study Date: 12/20/2017 02:32 PM  Age: 62 yrs  Gender: Male  Patient Location: [facility]  Reason For Study: Dyspnea, unspecified type  Ordering Physician: PANDA TORRES  Referring Physician: PANDA TORRES  Performed By: Evin Mcdowell RDCS     BSA: 2.4 m2  Height: 70 in  Weight: 270 lb  HR: 77  BP: 114/78 mmHg  _____________________________________________________________________________  __        Procedure  Echocardiogram with two-dimensional, color and spectral Doppler performed.  Technically difficult study.Extremely poor acoustic windows.  _____________________________________________________________________________  __        Interpretation Summary     Technically difficult study.Extremely poor acoustic windows.  Global and regional left ventricular function is normal with an EF of 60-65%.  Right ventricular function, chamber size, wall motion, and thickness are  normal.  The inferior vena cava is normal.  No cardiac cause for dyspnea identified.  _____________________________________________________________________________  __        Left Ventricle  Global and regional left ventricular function is normal with an EF of 60-65%.  Left ventricular wall thickness is normal. Left ventricular size is normal.  Normal left ventricular filling for age. No regional wall motion abnormalities  are seen.     Right Ventricle  Right ventricular function, chamber size, wall motion, and thickness are  normal.     Atria  Both atria appear normal. The atrial septum is intact as assessed by color  Doppler .     Mitral Valve  The mitral valve is normal.        Aortic Valve  Aortic valve is normal in structure and function.     Tricuspid Valve  The tricuspid valve is normal. The right ventricular systolic pressure is  approximated at 22.6 mmHg plus the right atrial pressure. Trace tricuspid  insufficiency is present.     Pulmonic Valve  The pulmonic valve is normal.     Vessels  The aorta root is normal. The pulmonary  artery is normal. The inferior vena  cava is normal.     Pericardium  No pericardial effusion is present.        Compared to Previous Study  Previous study not available for comparison.  _____________________________________________________________________________  __  MMode/2D Measurements & Calculations  IVSd: 0.77 cm     LVIDd: 4.7 cm  LVIDs: 2.1 cm  LVPWd: 0.58 cm  FS: 54.2 %  EDV(Teich): 101.3 ml  ESV(Teich): 15.2 ml  LV mass(C)d: 97.2 grams  LV mass(C)dI: 41.0 grams/m2  Ao root diam: 3.3 cm  LA dimension: 4.2 cm  asc Aorta Diam: 3.8 cm  LA/Ao: 1.3  LVOT diam: 2.4 cm  LVOT area: 4.5 cm2  LA Volume (BP): 56.0 ml  LA Volume Index (BP): 23.6 ml/m2  RWT: 0.25           Doppler Measurements & Calculations  MV E max dread: 62.0 cm/sec  MV A max dread: 67.5 cm/sec  MV E/A: 0.92  MV dec time: 0.17 sec  Ao V2 max: 129.0 cm/sec  Ao max P.0 mmHg  JAMISON(V,D): 3.4 cm2  LV V1 max PG: 3.8 mmHg  LV V1 max: 97.2 cm/sec  LV V1 VTI: 19.2 cm  CO(LVOT): 6.5 l/min  CI(LVOT): 2.8 l/min/m2  SV(LVOT): 87.0 ml  SI(LVOT): 36.7 ml/m2  PA V2 max: 82.0 cm/sec  PA max P.7 mmHg  PA acc time: 0.06 sec  TR max dread: 237.5 cm/sec  TR max P.6 mmHg  Pulm Sys Dread: 49.4 cm/sec  Pulm Joaquin Dread: 34.6 cm/sec  Pulm S/D: 1.4  E/E' av.8  Lateral E/e': 6.3  Medial E/e': 9.4              _____________________________________________________________________________  __        Report approved by: rBock Rios 2017 03:31 PM

## 2018-09-10 NOTE — NURSING NOTE
"Arturo Rahman's goals for this visit include: Return  He requests these members of his care team be copied on today's visit information: PCP    PCP: Martina Leung    Referring Provider:  No referring provider defined for this encounter.    BP (!) 127/91  Pulse 99  Temp 99  F (37.2  C)  Resp 18  Ht 1.778 m (5' 10\")  Wt 130.2 kg (287 lb 1.6 oz)  SpO2 97%  BMI 41.19 kg/m2    Do you need any medication refills at today's visit? N    "

## 2018-09-21 ENCOUNTER — OFFICE VISIT (OUTPATIENT)
Dept: FAMILY MEDICINE | Facility: CLINIC | Age: 63
End: 2018-09-21
Payer: COMMERCIAL

## 2018-09-21 VITALS
TEMPERATURE: 98.5 F | HEART RATE: 82 BPM | BODY MASS INDEX: 40.79 KG/M2 | DIASTOLIC BLOOD PRESSURE: 82 MMHG | WEIGHT: 284.9 LBS | OXYGEN SATURATION: 96 % | HEIGHT: 70 IN | SYSTOLIC BLOOD PRESSURE: 136 MMHG | RESPIRATION RATE: 16 BRPM

## 2018-09-21 DIAGNOSIS — I10 HYPERTENSION, GOAL BELOW 140/90: ICD-10-CM

## 2018-09-21 DIAGNOSIS — J45.41 MODERATE PERSISTENT ASTHMA WITH ACUTE EXACERBATION: ICD-10-CM

## 2018-09-21 DIAGNOSIS — H69.93 DYSFUNCTION OF BOTH EUSTACHIAN TUBES: ICD-10-CM

## 2018-09-21 DIAGNOSIS — R05.9 COUGH: ICD-10-CM

## 2018-09-21 DIAGNOSIS — Q85.9 HAMARTOMA OF LEFT LUNG (H): ICD-10-CM

## 2018-09-21 DIAGNOSIS — J84.10 GRANULOMATOUS LUNG DISEASE (H): ICD-10-CM

## 2018-09-21 DIAGNOSIS — Z12.11 SCREEN FOR COLON CANCER: ICD-10-CM

## 2018-09-21 DIAGNOSIS — Z23 NEED FOR PROPHYLACTIC VACCINATION AND INOCULATION AGAINST INFLUENZA: ICD-10-CM

## 2018-09-21 DIAGNOSIS — J20.9 ACUTE BRONCHITIS WITH SYMPTOMS > 10 DAYS: Primary | ICD-10-CM

## 2018-09-21 PROCEDURE — 99214 OFFICE O/P EST MOD 30 MIN: CPT | Performed by: FAMILY MEDICINE

## 2018-09-21 RX ORDER — CODEINE PHOSPHATE AND GUAIFENESIN 10; 100 MG/5ML; MG/5ML
1-2 SOLUTION ORAL EVERY 4 HOURS PRN
Qty: 180 ML | Refills: 0 | Status: SHIPPED | OUTPATIENT
Start: 2018-09-21 | End: 2018-11-02

## 2018-09-21 RX ORDER — FLUTICASONE PROPIONATE 50 MCG
1 SPRAY, SUSPENSION (ML) NASAL
Qty: 1 BOTTLE | Refills: 3 | Status: SHIPPED | OUTPATIENT
Start: 2018-09-21 | End: 2020-02-25

## 2018-09-21 RX ORDER — AZITHROMYCIN 250 MG/1
TABLET, FILM COATED ORAL
Qty: 6 TABLET | Refills: 0 | Status: SHIPPED | OUTPATIENT
Start: 2018-09-21 | End: 2018-11-02

## 2018-09-21 ASSESSMENT — PAIN SCALES - GENERAL: PAINLEVEL: MILD PAIN (2)

## 2018-09-21 NOTE — MR AVS SNAPSHOT
After Visit Summary   9/21/2018    Arturo Rahman    MRN: [id number]           Patient Information     Date Of Birth          1955        Visit Information        Provider Department      9/21/2018 9:40 AM Martina Leung MD [facility]        Today's Diagnoses     Acute bronchitis with symptoms > 10 days    -  1    Moderate persistent asthma with acute exacerbation        Granulomatous lung disease (H)        Screen for colon cancer        Need for prophylactic vaccination and inoculation against influenza        Hypertension, goal below 140/90        Hamartoma of left lung (H)        Dysfunction of both eustachian tubes        Cough           Follow-ups after your visit        Follow-up notes from your care team     Return in about 6 months (around 3/21/2019) for Lab Work, medication follow up, Physical Exam, BP Recheck.      Who to contact     If you have questions or need follow up information about today's clinic visit or your schedule please contact [facility] directly at [phone].  Normal or non-critical lab and imaging results will be communicated to you by Baby World Languagehart, letter or phone within 4 business days after the clinic has received the results. If you do not hear from us within 7 days, please contact the clinic through DoughMaint or phone. If you have a critical or abnormal lab result, we will notify you by phone as soon as possible.  Submit refill requests through Xention or call your pharmacy and they will forward the refill request to us. Please allow 3 business days for your refill to be completed.          Additional Information About Your Visit        MyChart Information     Xention gives you secure access to your electronic health record. If you see a primary care provider, you can also send messages to your care team and make appointments. If you have questions, please call your primary care clinic.  If you do not have a primary  "care provider, please call [phone] and they will assist you.        Care EveryWhere ID     This is your Care EveryWhere ID. This could be used by other organizations to access your [place] medical records  [id number]        Your Vitals Were     Pulse Temperature Respirations Height Pulse Oximetry BMI (Body Mass Index)    82 98.5  F (36.9  C) (Oral) 16 5' 10\" (1.778 m) 96% 40.88 kg/m2       Blood Pressure from Last 3 Encounters:   09/21/18 136/82   09/10/18 (!) 127/91   07/31/18 (!) 132/93    Weight from Last 3 Encounters:   09/21/18 284 lb 14.4 oz (129.2 kg)   09/10/18 287 lb 1.6 oz (130.2 kg)   07/31/18 290 lb (131.5 kg)              Today, you had the following     No orders found for display         Today's Medication Changes          These changes are accurate as of 9/21/18 12:45 PM.  If you have any questions, ask your nurse or doctor.               Start taking these medicines.        Dose/Directions    azithromycin 250 MG tablet   Commonly known as:  ZITHROMAX   Used for:  Acute bronchitis with symptoms > 10 days   Started by:  Martina Leung MD        Two tablets first day, then one tablet daily for four days.   Quantity:  6 tablet   Refills:  0       guaiFENesin-codeine 100-10 MG/5ML Soln solution   Commonly known as:  ROBITUSSIN AC   Used for:  Cough   Started by:  Martina Leung MD        Dose:  1-2 tsp.   Take 5-10 mLs by mouth every 4 hours as needed for cough   Quantity:  180 mL   Refills:  0            Where to get your medicines      These medications were sent to [id number] - [place], [place] - [address]  [address] [address]     Phone:  [phone]     azithromycin 250 MG tablet    fluticasone 50 MCG/ACT spray         Some of these will need a paper prescription and others can be bought over the counter.  Ask your nurse if you have questions.     Bring a paper prescription for each of these medications     guaiFENesin-codeine 100-10 " MG/5ML Soln solution                Primary Care Provider Office Phone # Fax #    Martina Kaleigh Leung [phone] [address]  [facility] [address]        Equal Access to Services     INGA MCCRACKEN : Hadii grey ku hadquinno Soomaali, waaxda luqadaha, qaybta kaalmada adeegyada, waxmike jonathanin haymacn adeestrella price lablane barrow. So [facility] [phone].    ATENCIÓN: Si habla español, tiene a santana disposición servicios gratuitos de asistencia lingüística. Llame al [phone].    We comply with applicable federal civil rights laws and [place] laws. We do not discriminate on the basis of race, color, national origin, age, disability, sex, sexual orientation, or gender identity.            Thank you!     Thank you for choosing [facility]  for your care. Our goal is always to provide you with excellent care. Hearing back from our patients is one way we can continue to improve our services. Please take a few minutes to complete the written survey that you may receive in the mail after your visit with us. Thank you!             Your Updated Medication List - Protect others around you: Learn how to safely use, store and throw away your medicines at www.disposemymeds.org.          This list is accurate as of 9/21/18 12:45 PM.  Always use your most recent med list.                   Brand Name Dispense Instructions for use Diagnosis    azithromycin 250 MG tablet    ZITHROMAX    6 tablet    Two tablets first day, then one tablet daily for four days.    Acute bronchitis with symptoms > 10 days       desloratadine 5 MG tablet    CLARINEX    180 tablet    Take 1 tablet (5 mg) by mouth 2 times daily as needed for allergies    Chronic seasonal allergic rhinitis due to pollen       esomeprazole 40 MG CR capsule    nexIUM    90 capsule    TAKE ONE CAPSULE BY MOUTH IN THE MORNING BEFORE BREAKFAST    Gastroesophageal reflux disease with esophagitis       fluticasone 50 MCG/ACT spray    FLONASE    1  Bottle    Spray 1 spray into both nostrils 2 times daily    Dysfunction of both eustachian tubes       guaiFENesin-codeine 100-10 MG/5ML Soln solution    ROBITUSSIN AC    180 mL    Take 5-10 mLs by mouth every 4 hours as needed for cough    Cough       losartan-hydrochlorothiazide 100-25 MG per tablet    HYZAAR    90 tablet    TAKE ONE TABLET BY MOUTH ONE TIME DAILY    Hypertension, goal below 140/90       meloxicam 15 MG tablet    MOBIC    30 tablet    Take 1 tablet (15 mg) by mouth daily    Right knee pain, unspecified chronicity       montelukast 10 MG tablet    SINGULAIR    90 tablet    TAKE ONE TABLET BY MOUTH ONE TIME DAILY.    Moderate persistent asthma without complication       MULTIVITAL PO      Take 1 tablet by mouth daily        simvastatin 10 MG tablet    ZOCOR    90 tablet    Take 1 tablet (10 mg) by mouth At Bedtime    Hyperlipidemia LDL goal <130       VENTOLIN  (90 Base) MCG/ACT inhaler   Generic drug:  albuterol     18 g    inhale 2 puffs by mouth every 6 hours as needed for shortness of breath/dyspnea    Moderate persistent asthma without complication

## 2018-09-21 NOTE — PROGRESS NOTES
SUBJECTIVE:   Arturo Rahman is a 62 year old male who presents to clinic today for the following health issues:      Acute Illness   Acute illness concerns: Cough and Cold  Onset: about 2 weeks ago with sore throat, itchy eye, runny nose and stuffy nose. Has bad allergies.     Fever: no    Chills/Sweats: no    Headache (location?): no    Sinus Pressure:no    Conjunctivitis:  no    Ear Pain: no    Rhinorrhea: no    Congestion: no    Sore Throat: YES     Cough: YES    Wheeze: no    Decreased Appetite: no    Nausea: no    Vomiting: no    Diarrhea:  no    Dysuria/Freq.: no    Fatigue/Achiness: no     Sick/Strep Exposure: no     Therapies Tried and outcome: OTC, relief        Hyperlipidemia Follow-Up      Rate your low fat/cholesterol diet?: good    Taking statin?  Yes, no muscle aches from statin    Other lipid medications/supplements?:  none    Hypertension Follow-up      Outpatient blood pressures are not being checked.    Low Salt Diet: no added salt    Asthma Follow-Up    Was ACT completed today?    Yes    ACT Total Scores 7/12/2018   ACT TOTAL SCORE -   ASTHMA ER VISITS -   ASTHMA HOSPITALIZATIONS -   ACT TOTAL SCORE (Goal Greater than or Equal to 20) 21   In the past 12 months, how many times did you visit the emergency room for your asthma without being admitted to the hospital? 0   In the past 12 months, how many times were you hospitalized overnight because of your asthma? 0       Recent asthma triggers that patient is dealing with: upper respiratory infections and pollens     Asthma improved since surgery for hamartoma. Stopped using Advair discus per recommendation of pulmonology. Inhaler is helping with cough that started with upper respiratory infection symptoms         Problem list and histories reviewed & adjusted, as indicated.  Additional history: as documented    Patient Active Problem List   Diagnosis     Asthma, moderate persistent     Granulomatous lung disease (H)     GERD (gastroesophageal  reflux disease)     FH: colon cancer     Hyperlipidemia LDL goal <130     Allergic rhinitis     Advanced directives, counseling/discussion     Hypertension, goal below 140/90     Morbid obesity (H)     Hamartoma of left lung (H)     Right knee pain, unspecified chronicity     Past Surgical History:   Procedure Laterality Date     BRONCHOSCOPY FLEXIBLE AND RIGID N/A 7/19/2017    Procedure: BRONCHOSCOPY FLEXIBLE AND RIGID;   Bronchoscopy, Tumor Debulking with Cryoprobe and Argon Plasma Coagulation;  Surgeon: Albino Crump MD;  Location: [facility] OR     BRONCHOSCOPY FLEXIBLE,L CRYOBIOPSY N/A 1/11/2018    Procedure: BRONCHOSCOPY FLEXIBLE, CRYOBIOPSY;  Flexible Bronchoscopy, Rigid Bronchoscopy, Tumor Debulking With Cryoprobe;  Surgeon: Albino Crump MD;  Location: [facility] OR     COLONOSCOPY  11/08    + polyp, +FH, repeat in 5 years     COMBINED ENDOSCOPY UPPER WITH ARGON PLASMA COAGULATOR (APC) N/A 7/19/2017    Procedure: COMBINED ENDOSCOPY UPPER WITH ARGON PLASMA COAGULATOR (APC);;  Surgeon: Albino Crump MD;  Location: [facility] OR     TONSILLECTOMY       VASECTOMY         Social History   Substance Use Topics     Smoking status: Former Smoker     Quit date: 10/28/1991     Smokeless tobacco: Never Used     Alcohol use Yes      Comment: rare     Family History   Problem Relation Age of Onset     Arthritis Mother      Hypertension Father      Cerebrovascular Disease Father      Asthma Daughter      Allergies Daughter      Hypertension Sister      Cancer - colorectal Sister      dx age 50         Current Outpatient Prescriptions   Medication Sig Dispense Refill     azithromycin (ZITHROMAX) 250 MG tablet Two tablets first day, then one tablet daily for four days. 6 tablet 0     desloratadine (CLARINEX) 5 MG tablet Take 1 tablet (5 mg) by mouth 2 times daily as needed for allergies 180 tablet 1     esomeprazole (NEXIUM) 40 MG CR capsule TAKE ONE CAPSULE BY MOUTH IN THE MORNING BEFORE BREAKFAST 90 capsule 2      fluticasone (FLONASE) 50 MCG/ACT spray Spray 1 spray into both nostrils 2 times daily 1 Bottle 3     guaiFENesin-codeine (ROBITUSSIN AC) 100-10 MG/5ML SOLN solution Take 5-10 mLs by mouth every 4 hours as needed for cough 180 mL 0     losartan-hydrochlorothiazide (HYZAAR) 100-25 MG per tablet TAKE ONE TABLET BY MOUTH ONE TIME DAILY 90 tablet 1     meloxicam (MOBIC) 15 MG tablet Take 1 tablet (15 mg) by mouth daily 30 tablet 1     montelukast (SINGULAIR) 10 MG tablet TAKE ONE TABLET BY MOUTH ONE TIME DAILY. 90 tablet 2     Multiple Vitamins-Minerals (MULTIVITAL PO) Take 1 tablet by mouth daily       simvastatin (ZOCOR) 10 MG tablet Take 1 tablet (10 mg) by mouth At Bedtime 90 tablet 3     VENTOLIN  (90 Base) MCG/ACT Inhaler inhale 2 puffs by mouth every 6 hours as needed for shortness of breath/dyspnea 18 g 2     Allergies   Allergen Reactions     Lisinopril Cough     Recent Labs   Lab Test  07/12/18   0740  12/22/17   0827  07/11/17   1919   03/21/16   1351  12/01/15   1552   06/19/13   1017  10/23/12   0710   A1C   --    --   6.1*   --   6.4*  6.3*   --    --    --    LDL  81   --   88   --    --    --    --    --   104   HDL  45   --   46   --    --    --    --    --   36*   TRIG  75   --   104   --    --    --    --    --   102   ALT   --    --   37   --    --   40   --    --   49   CR  1.25  1.13  1.21   < >  1.30*  1.45*   < >  1.14  1.12   GFRESTIMATED  59*  66  61   < >  57*  50*   < >  67  68   GFRESTBLACK  71  80  74   < >  69  60*   < >  81  82   POTASSIUM  3.9  3.9  3.8   < >  3.2*  3.8   < >  4.0  3.6   TSH   --    --    --    --    --    --    --   0.71   --     < > = values in this interval not displayed.      BP Readings from Last 3 Encounters:   09/21/18 136/82   09/10/18 (!) 127/91   07/31/18 (!) 132/93    Wt Readings from Last 3 Encounters:   09/21/18 284 lb 14.4 oz (129.2 kg)   09/10/18 287 lb 1.6 oz (130.2 kg)   07/31/18 290 lb (131.5 kg)                  Labs reviewed in EPIC    Reviewed  "and updated as needed this visit by clinical staff  Tobacco  Allergies  Meds  Med Hx  Surg Hx  Fam Hx  Soc Hx      Reviewed and updated as needed this visit by Provider         ROS:  Constitutional, HEENT, cardiovascular, pulmonary, gi and gu systems are negative, except as otherwise noted.    OBJECTIVE:     /82  Pulse 82  Temp 98.5  F (36.9  C) (Oral)  Resp 16  Ht 5' 10\" (1.778 m)  Wt 284 lb 14.4 oz (129.2 kg)  SpO2 96%  BMI 40.88 kg/m2  Body mass index is 40.88 kg/(m^2).  GENERAL: acutely ill, alert, well nourished, well hydrated, no distress, with cough  HENT: ear canals- normal; TMs- normal; Nose- rhinorrhea ; Mouth- no ulcers, no lesions, throat is erythematous without exudate. Sinuses without tenderness to percussion.   NECK: no tenderness, negative anterior cervical adenopathy, no asymmetry, no masses, no stiffness; thyroid- normal to palpation  RESP: lungs clear to auscultation - no rales, no rhonchi, some expiratory wheezes  CV: regular rates and rhythm, normal S1 S2, no S3 or S4 and no murmur, no click or rub -  ABDOMEN: soft, no tenderness, no  hepatosplenomegaly, no masses, normal bowel sounds  MS: extremities- no gross deformities noted, no edema  SKIN: no suspicious lesions, no rashes  PSYCH: Alert and oriented times 3; affect- normal     Diagnostic Test Results:  none     ASSESSMENT/PLAN:         Tobacco Cessation:   reports that he quit smoking about 26 years ago. He has never used smokeless tobacco.      BMI:   Estimated body mass index is 40.88 kg/(m^2) as calculated from the following:    Height as of this encounter: 5' 10\" (1.778 m).    Weight as of this encounter: 284 lb 14.4 oz (129.2 kg).   Weight management plan: Discussed healthy diet and exercise guidelines and patient will follow up in 6 months in clinic to re-evaluate.        ICD-10-CM    1. Acute bronchitis with symptoms > 10 days J20.9 azithromycin (ZITHROMAX) 250 MG tablet for 5 days   2. Moderate persistent asthma " with acute exacerbation J45.41 In general doing much better. May have a cough variant asthma episode with upper respiratory infection or allergies. Does not need prednisone, but will restart Advair if symptoms not improving.    3. Granulomatous lung disease (H) J84.10 Stable    4. Screen for colon cancer Z12.11 Due for colonoscopy   5. Need for prophylactic vaccination and inoculation against influenza Z23 Will get flu shot in October   6. Hypertension, goal below 140/90 I10 Better control today   7. Hamartoma of left lung (H) Q85.9 Removed with improved symptoms. Follow up with pulmonary.    8. Dysfunction of both eustachian tubes H69.83 fluticasone (FLONASE) 50 MCG/ACT spray twice a day    9. Cough R05 guaiFENesin-codeine (ROBITUSSIN AC) 100-10 MG/5ML SOLN solution       CONSULTATION/REFERRAL to pulmonology for follow up as scheduled  FUTURE LABS:       - Schedule fasting labs in 6 months  FUTURE APPOINTMENTS:       - Follow-up visit in 6 months or sooner if any questions or concerns.   Work on weight loss  Regular exercise  See Patient Instructions    Martina Leung MD  [facility]

## 2018-10-08 ENCOUNTER — MYC MEDICAL ADVICE (OUTPATIENT)
Dept: ORTHOPEDICS | Facility: CLINIC | Age: 63
End: 2018-10-08

## 2018-10-10 ENCOUNTER — TELEPHONE (OUTPATIENT)
Dept: ORTHOPEDICS | Facility: CLINIC | Age: 63
End: 2018-10-10

## 2018-10-10 ENCOUNTER — OFFICE VISIT (OUTPATIENT)
Dept: ORTHOPEDICS | Facility: CLINIC | Age: 63
End: 2018-10-10
Payer: COMMERCIAL

## 2018-10-10 VITALS
WEIGHT: 286.6 LBS | DIASTOLIC BLOOD PRESSURE: 144 MMHG | SYSTOLIC BLOOD PRESSURE: 152 MMHG | HEIGHT: 68 IN | BODY MASS INDEX: 43.44 KG/M2

## 2018-10-10 DIAGNOSIS — S83.231D COMPLEX TEAR OF MEDIAL MENISCUS OF RIGHT KNEE, SUBSEQUENT ENCOUNTER: Primary | ICD-10-CM

## 2018-10-10 DIAGNOSIS — M25.561 CHRONIC PAIN OF RIGHT KNEE: ICD-10-CM

## 2018-10-10 DIAGNOSIS — G89.29 CHRONIC PAIN OF RIGHT KNEE: ICD-10-CM

## 2018-10-10 PROCEDURE — 99213 OFFICE O/P EST LOW 20 MIN: CPT | Performed by: ORTHOPAEDIC SURGERY

## 2018-10-10 ASSESSMENT — PAIN SCALES - GENERAL: PAINLEVEL: MILD PAIN (2)

## 2018-10-10 NOTE — MR AVS SNAPSHOT
"              After Visit Summary   10/10/2018    Arturo Rahman    MRN: [id number]           Patient Information     Date Of Birth          1955        Visit Information        Provider Department      10/10/2018 8:00 AM Manoj Augustin MD [facility]        Today's Diagnoses     Complex tear of medial meniscus of right knee, subsequent encounter    -  1    Chronic pain of right knee           Follow-ups after your visit        Follow-up notes from your care team     Return for Postop Visit.      Who to contact     If you have questions or need follow up information about today's clinic visit or your schedule please contact [facility] directly at [phone].  Normal or non-critical lab and imaging results will be communicated to you by MyChart, letter or phone within 4 business days after the clinic has received the results. If you do not hear from us within 7 days, please contact the clinic through DietBetterhart or phone. If you have a critical or abnormal lab result, we will notify you by phone as soon as possible.  Submit refill requests through Optinel Systems or call your pharmacy and they will forward the refill request to us. Please allow 3 business days for your refill to be completed.          Additional Information About Your Visit        MyChart Information     Optinel Systems gives you secure access to your electronic health record. If you see a primary care provider, you can also send messages to your care team and make appointments. If you have questions, please call your primary care clinic.  If you do not have a primary care provider, please call [phone] and they will assist you.        Care EveryWhere ID     This is your Care EveryWhere ID. This could be used by other organizations to access your [place] medical records  [id number]        Your Vitals Were     Height BMI (Body Mass Index)                5' 8.25\" (1.734 m) 43.26 kg/m2           Blood Pressure from Last 3 " Encounters:   10/10/18 (!) 152/144   09/21/18 136/82   09/10/18 (!) 127/91    Weight from Last 3 Encounters:   10/10/18 286 lb 9.6 oz (130 kg)   09/21/18 284 lb 14.4 oz (129.2 kg)   09/10/18 287 lb 1.6 oz (130.2 kg)              We Performed the Following     [facility]-Operative Worksheet        Primary Care Provider Office Phone # Fax #    Martina Kaleigh Leung [phone] [address]  [facility] [address]        Equal Access to Services     [facility]: Hadii grey patricio hadquinno Sochristina, waaxda luqadaha, qaybta kaalmada adeegyada, earl atkinson . So [facility] [phone].    ATENCIÓN: Si habla español, tiene a santana disposición servicios gratuitos de asistencia lingüística. [facility] [phone].    We comply with applicable federal civil rights laws and [place] laws. We do not discriminate on the basis of race, color, national origin, age, disability, sex, sexual orientation, or gender identity.            Thank you!     Thank you for choosing [facility]  for your care. Our goal is always to provide you with excellent care. Hearing back from our patients is one way we can continue to improve our services. Please take a few minutes to complete the written survey that you may receive in the mail after your visit with us. Thank you!             Your Updated Medication List - Protect others around you: Learn how to safely use, store and throw away your medicines at www.disposemymeds.org.          This list is accurate as of 10/10/18 11:12 AM.  Always use your most recent med list.                   Brand Name Dispense Instructions for use Diagnosis    azithromycin 250 MG tablet    ZITHROMAX    6 tablet    Two tablets first day, then one tablet daily for four days.    Acute bronchitis with symptoms > 10 days       desloratadine 5 MG tablet    CLARINEX    180 tablet    Take 1 tablet (5 mg) by mouth 2 times daily as needed for allergies    Chronic seasonal  allergic rhinitis due to pollen       esomeprazole 40 MG CR capsule    nexIUM    90 capsule    TAKE ONE CAPSULE BY MOUTH IN THE MORNING BEFORE BREAKFAST    Gastroesophageal reflux disease with esophagitis       fluticasone 50 MCG/ACT spray    FLONASE    1 Bottle    Spray 1 spray into both nostrils 2 times daily    Dysfunction of both eustachian tubes       guaiFENesin-codeine 100-10 MG/5ML Soln solution    ROBITUSSIN AC    180 mL    Take 5-10 mLs by mouth every 4 hours as needed for cough    Cough       losartan-hydrochlorothiazide 100-25 MG per tablet    HYZAAR    90 tablet    TAKE ONE TABLET BY MOUTH ONE TIME DAILY    Hypertension, goal below 140/90       meloxicam 15 MG tablet    MOBIC    30 tablet    Take 1 tablet (15 mg) by mouth daily    Right knee pain, unspecified chronicity       montelukast 10 MG tablet    SINGULAIR    90 tablet    TAKE ONE TABLET BY MOUTH ONE TIME DAILY.    Moderate persistent asthma without complication       MULTIVITAL PO      Take 1 tablet by mouth daily        simvastatin 10 MG tablet    ZOCOR    90 tablet    Take 1 tablet (10 mg) by mouth At Bedtime    Hyperlipidemia LDL goal <130       VENTOLIN  (90 Base) MCG/ACT inhaler   Generic drug:  albuterol     18 g    inhale 2 puffs by mouth every 6 hours as needed for shortness of breath/dyspnea    Moderate persistent asthma without complication

## 2018-10-10 NOTE — PROGRESS NOTES
"CHIEF COMPLAINT:   Chief Complaint   Patient presents with     Right Knee - Pain     Right medial knee pain. Was seen in this clinic on 7/18/18 and referred for MRI. Had f/u with [facility] on 7/31/18 and received cortisone that helped for about 2.5 months. Would like to discus either having another cortisone injection or arthroscopy.        HISTORY OF PRESENT ILLNESS    Arturo Rahman is a 63 year old male seen for follow up evaluation of ongoing right knee pain with no known injury.   Pain has been present since about 7/2017, however worsened early July 2018. He had a cortisone injection with Dr. Ortiz on 7/31/2018. This injection worked well for about 2.5 months. Since then, his pain has returned. Has mild pain today, rated a 2/10. He continues to have medial knee pain. \"Stabbing\" pain comes on with twisting the knee. Occasional swelling. Denies locking and catching. Would like to discuss his options today.      Present symptoms: mild pain, medial knee.   Pain severity: 2/10  Frequency of symptoms: frequently  Exacerbating Factors: weight bearing, twisting  Relieving Factors: rest, Mobic, cortisone injection   Night Pain: No  Pain while at rest: No   Numbness or tingling: No   Patient has tried:     NSAIDS: Yes , Mobic     Physical Therapy: No      Activity modification: Yes      Bracing: No      Injections: Yes     Ice: No      Assistive device:  No     Other: one    Orthopaedic PMH: none    Other PMH:  has a past medical history of Allergies; Asthma, moderate persistent; FH: colon cancer; GERD (gastroesophageal reflux disease); Granulomatous lung disease (H); HTN (hypertension); and Hyperlipidemia.  Patient Active Problem List   Diagnosis     Asthma, moderate persistent     Granulomatous lung disease (H)     GERD (gastroesophageal reflux disease)     FH: colon cancer     Hyperlipidemia LDL goal <130     Allergic rhinitis     Advanced directives, counseling/discussion     Hypertension, goal below 140/90     Morbid " obesity (H)     Hamartoma of left lung (H)     Right knee pain, unspecified chronicity       Surgical Hx:  has a past surgical history that includes tonsillectomy; colonoscopy (11/08); vasectomy; Bronchoscopy flexible and rigid (N/A, 7/19/2017); Combined Endoscopy Upper With Argon Plasma Coagulator (Apc) (N/A, 7/19/2017); and Bronchoscopy Flexible,L Cryobiopsy (N/A, 1/11/2018).    Medications:   Current Outpatient Prescriptions:      azithromycin (ZITHROMAX) 250 MG tablet, Two tablets first day, then one tablet daily for four days., Disp: 6 tablet, Rfl: 0     desloratadine (CLARINEX) 5 MG tablet, Take 1 tablet (5 mg) by mouth 2 times daily as needed for allergies, Disp: 180 tablet, Rfl: 1     esomeprazole (NEXIUM) 40 MG CR capsule, TAKE ONE CAPSULE BY MOUTH IN THE MORNING BEFORE BREAKFAST, Disp: 90 capsule, Rfl: 2     fluticasone (FLONASE) 50 MCG/ACT spray, Spray 1 spray into both nostrils 2 times daily, Disp: 1 Bottle, Rfl: 3     guaiFENesin-codeine (ROBITUSSIN AC) 100-10 MG/5ML SOLN solution, Take 5-10 mLs by mouth every 4 hours as needed for cough, Disp: 180 mL, Rfl: 0     losartan-hydrochlorothiazide (HYZAAR) 100-25 MG per tablet, TAKE ONE TABLET BY MOUTH ONE TIME DAILY, Disp: 90 tablet, Rfl: 1     meloxicam (MOBIC) 15 MG tablet, Take 1 tablet (15 mg) by mouth daily, Disp: 30 tablet, Rfl: 1     montelukast (SINGULAIR) 10 MG tablet, TAKE ONE TABLET BY MOUTH ONE TIME DAILY., Disp: 90 tablet, Rfl: 2     Multiple Vitamins-Minerals (MULTIVITAL PO), Take 1 tablet by mouth daily, Disp: , Rfl:      simvastatin (ZOCOR) 10 MG tablet, Take 1 tablet (10 mg) by mouth At Bedtime, Disp: 90 tablet, Rfl: 3     VENTOLIN  (90 Base) MCG/ACT Inhaler, inhale 2 puffs by mouth every 6 hours as needed for shortness of breath/dyspnea, Disp: 18 g, Rfl: 2    Current Facility-Administered Medications:      lidocaine (PF) (XYLOCAINE) 1 % injection 4 mL, 4 mL, , , Bill Ortiz MD, 4 mL at 07/31/18 1422     methylPREDNISolone  "acetate (DEPO-MEDROL) injection 80 mg, 80 mg, , , Bill Ortiz MD, 80 mg at 07/31/18 1422    Allergies:   Allergies   Allergen Reactions     Lisinopril Cough       Social Hx: .   reports that he quit smoking about 26 years ago. He has never used smokeless tobacco. He reports that he drinks alcohol. He reports that he does not use illicit drugs.    Family Hx: family history includes Allergies in his daughter; Arthritis in his mother; Asthma in his daughter; Cancer - colorectal in his sister; Cerebrovascular Disease in his father; Hypertension in his father and sister.    REVIEW OF SYSTEMS:   CONSTITUTIONAL:NEGATIVE for fever, chills, change in weight  INTEGUMENTARY/SKIN: NEGATIVE for worrisome rashes, moles or lesions  MUSCULOSKELETAL:See HPI above  NEURO: NEGATIVE for weakness, dizziness or paresthesias    This document serves as a record of the services and decisions personally performed and made by Manoj Augustin MD. It was created on his behalf by Robyn Quarles, a trained medical scribe. The creation of this document is based the provider's statements to the medical scribe.    Scribe Robyn Quarles 8:15 AM 10/10/2018     PHYSICAL EXAM:  BP (!) 152/144  Ht 5' 8.25\" (1.734 m)  Wt 286 lb 9.6 oz (130 kg)  BMI 43.26 kg/m2   GENERAL APPEARANCE: healthy, alert, no distress  SKIN: no suspicious lesions or rashes  NEURO: Normal strength and tone, mentation intact and speech normal  PSYCH:  mentation appears normal and affect normal, not anxious  RESPIRATORY: No increased work of breathing.    BILATERAL LOWER EXTREMITIES:  Gait: normal  Alignment: varus  No gross deformities or masses.  No Quad atrophy, strength normal.  Intact sensation deep peroneal nerve, superficial peroneal nerve, med/lat tibial nerve, sural nerve, saphenous nerve  Intact EHL, EDL, TA, FHL, GS, quadriceps hamstrings and hip flexors  Toes warm and well perfused, brisk capillary refill. Palpable 2+ dp pulses.  Bilateral " "calf soft and nttp or squeeze.  Edema: 2+ pitting, bilateral.       RIGHT KNEE EXAM:    Skin: intact, no ecchymosis or erythema  Squat: 100 %, feels \" tight\" per patient.  ROM: 1 hyperextension to 120 flexion  Tight hamstrings on straight leg raise.  Effusion: none  Tender: medial joint line, lateral joint line.   nontender to palpation anterior or posterior knee  McMurrays: negative    MCL: stable, and non-painful at both 0 and 30 degrees knee flexion  Varus stress: stable, and non-painful at both 0 and 30 degrees knee flexion  Lachmans: neg, firm endpoint  Posterior Drawer stable  Patellofemoral joint:                Apprehension: negative              Crepitations: mild   Grind: positive      LEFT KNEE EXAM:    Skin: intact, no ecchymosis or erythema, callus over the anterior knee  ROM: 1 hyperextension to 120 flexion  Tight hamstrings on straight leg raise.  Effusion: none  Tender: NTTP med/lat joint line, anterior or posterior knee  McMurrays: negative    MCL: stable, and non-painful at both 0 and 30 degrees knee flexion  Varus stress: stable, and non-painful at both 0 and 30 degrees knee flexion  Lachmans: neg, firm endpoint  Posterior Drawer stable  Patellofemoral joint:                Apprehension: negative              Crepitations: minimal   Grind: negative     X-RAY:  no new images today.  3 views right knee from 7/12/2018 were reviewed in clinic today. On my review, no obvious fractures or dislocations. Joint spaces are preserved. No significant marginal osteophyte formation. Minimal chondrocalcinosis. No acute fracture or malalignment. Small joint effusion.      MRI 7/23/2018:  1. Complex medial meniscus tear in the body and posterior horn with  increased signal at the meniscocapsular junction indicating possible  meniscocapsular tear versus stress in this area from the tear of the  body and posterior horn the medial meniscus.  2. Increased signal in the articular cartilage of the medial " femoral  condyle consistent with cartilage fissuring.  3. Irregularity of the lateral patellar facet cartilage. Abnormal  signal over the medial patellar facet with evidence of cartilage  thinning.   4. Moderate joint effusion.  5. 3.5 x 1 x 8 mm likely ganglion cyst tracking along the posterior  collateral ligament.    Impression:   63 year old male with acute on chronic right knee pain, complex medial meniscus tear, mild chondromalacia.      Plan:   * discussed MRI findings with patient, what appears to be a right knee medial meniscal tear on MRI, as well as some mild underlying arthritic changes, which is consistent with symptoms and physical examination findings.     * Discussed treatment options including nonoperative treatment with continued rest, ice, elevation, activity modification, NSAIDS and Physical Therapy, bracing and potential injections versus surgical treatment with arthroscopy and meniscal repair versus debridement. Risks and benefits of each discussed in detail.  * in the setting of underlying chondrosis, predictability of arthroscopy is uncertain, unless mechanical symptoms present due to the meniscus tear.    * surgical risks discussed: bleeding, infection, pain, scar, damage to adjacent structures (nerve, vessels, cartilage), stiffness, post-traumatic arthritis, failure to relieve symptoms, recurrence of symptoms, blood clots (DVT), pulmonary emolism, risks of anesthesia and death. This surgery is not intended nor expected to alleviate arthritic pain symptoms, nor will it treat or correct underlying arthritic changes. Arthritis and symptoms related to arthritis could worsen with arthroscopy and meniscal / chondral debridement. Patient understands.    * understanding the risks of surgery, he would like to discuss this with his wife before continuing with surgery. Likely will continue with surgery.  * plan: right knee arthroscopy with partial meniscal / chondral debridement, outpatient surgery  *  will arrange at a time in future mutual convenience  * will need H+P from PCP prior to surgery  * patient will be on ASA x2 weeks postoperative, as well as use of TEDs, crutches  * plan Physical Therapy to start 1-2 weeks postoperative, to be determined at postoperative visit.  * return to clinic 2 week postop for wound check, sooner as needed.  * all questions addressed and answered prior to discharge from clinic today.  * patient to call if any questions or concerns in the meantime.  * Patient to follow up with Primary Care provider regarding elevated blood pressure       The information in this document, created by a scribe for me, accurately reflects the services I personally performed and the decisions made by me. I have reviewed and approved this document for accuracy.     Manoj Augustin M.D., M.S.  Dept. of Orthopaedic Surgery  [facility]

## 2018-10-10 NOTE — LETTER
"    10/10/2018         RE: Arturo Rahman  [address] [place] [address]        Dear Colleague,    Thank you for referring your patient, Arturo Rahman, to the [facility]. Please see a copy of my visit note below.    CHIEF COMPLAINT:   Chief Complaint   Patient presents with     Right Knee - Pain     Right medial knee pain. Was seen in this clinic on 7/18/18 and referred for MRI. Had f/u with [facility] on 7/31/18 and received cortisone that helped for about 2.5 months. Would like to discus either having another cortisone injection or arthroscopy.        HISTORY OF PRESENT ILLNESS    Artruo Rahman is a 63 year old male seen for follow up evaluation of ongoing right knee pain with no known injury.   Pain has been present since about 7/2017, however worsened early July 2018. He had a cortisone injection with Dr. Ortiz on 7/31/2018. This injection worked well for about 2.5 months. Since then, his pain has returned. Has mild pain today, rated a 2/10. He continues to have medial knee pain. \"Stabbing\" pain comes on with twisting the knee. Occasional swelling. Denies locking and catching. Would like to discuss his options today.      Present symptoms: mild pain, medial knee.   Pain severity: 2/10  Frequency of symptoms: frequently  Exacerbating Factors: weight bearing, twisting  Relieving Factors: rest, Mobic, cortisone injection   Night Pain: No  Pain while at rest: No   Numbness or tingling: No   Patient has tried:     NSAIDS: Yes , Mobic     Physical Therapy: No      Activity modification: Yes      Bracing: No      Injections: Yes     Ice: No      Assistive device:  No     Other: one    Orthopaedic PMH: none    Other PMH:  has a past medical history of Allergies; Asthma, moderate persistent; FH: colon cancer; GERD (gastroesophageal reflux disease); Granulomatous lung disease (H); HTN (hypertension); and Hyperlipidemia.  Patient Active Problem List   Diagnosis     Asthma, moderate " persistent     Granulomatous lung disease (H)     GERD (gastroesophageal reflux disease)     FH: colon cancer     Hyperlipidemia LDL goal <130     Allergic rhinitis     Advanced directives, counseling/discussion     Hypertension, goal below 140/90     Morbid obesity (H)     Hamartoma of left lung (H)     Right knee pain, unspecified chronicity       Surgical Hx:  has a past surgical history that includes tonsillectomy; colonoscopy (11/08); vasectomy; Bronchoscopy flexible and rigid (N/A, 7/19/2017); Combined Endoscopy Upper With Argon Plasma Coagulator (Apc) (N/A, 7/19/2017); and Bronchoscopy Flexible,L Cryobiopsy (N/A, 1/11/2018).    Medications:   Current Outpatient Prescriptions:      azithromycin (ZITHROMAX) 250 MG tablet, Two tablets first day, then one tablet daily for four days., Disp: 6 tablet, Rfl: 0     desloratadine (CLARINEX) 5 MG tablet, Take 1 tablet (5 mg) by mouth 2 times daily as needed for allergies, Disp: 180 tablet, Rfl: 1     esomeprazole (NEXIUM) 40 MG CR capsule, TAKE ONE CAPSULE BY MOUTH IN THE MORNING BEFORE BREAKFAST, Disp: 90 capsule, Rfl: 2     fluticasone (FLONASE) 50 MCG/ACT spray, Spray 1 spray into both nostrils 2 times daily, Disp: 1 Bottle, Rfl: 3     guaiFENesin-codeine (ROBITUSSIN AC) 100-10 MG/5ML SOLN solution, Take 5-10 mLs by mouth every 4 hours as needed for cough, Disp: 180 mL, Rfl: 0     losartan-hydrochlorothiazide (HYZAAR) 100-25 MG per tablet, TAKE ONE TABLET BY MOUTH ONE TIME DAILY, Disp: 90 tablet, Rfl: 1     meloxicam (MOBIC) 15 MG tablet, Take 1 tablet (15 mg) by mouth daily, Disp: 30 tablet, Rfl: 1     montelukast (SINGULAIR) 10 MG tablet, TAKE ONE TABLET BY MOUTH ONE TIME DAILY., Disp: 90 tablet, Rfl: 2     Multiple Vitamins-Minerals (MULTIVITAL PO), Take 1 tablet by mouth daily, Disp: , Rfl:      simvastatin (ZOCOR) 10 MG tablet, Take 1 tablet (10 mg) by mouth At Bedtime, Disp: 90 tablet, Rfl: 3     VENTOLIN  (90 Base) MCG/ACT Inhaler, inhale 2 puffs by  "mouth every 6 hours as needed for shortness of breath/dyspnea, Disp: 18 g, Rfl: 2    Current Facility-Administered Medications:      lidocaine (PF) (XYLOCAINE) 1 % injection 4 mL, 4 mL, , , Bill Ortiz MD, 4 mL at 07/31/18 1422     methylPREDNISolone acetate (DEPO-MEDROL) injection 80 mg, 80 mg, , , Bill Ortiz MD, 80 mg at 07/31/18 1422    Allergies:   Allergies   Allergen Reactions     Lisinopril Cough       Social Hx: .   reports that he quit smoking about 26 years ago. He has never used smokeless tobacco. He reports that he drinks alcohol. He reports that he does not use illicit drugs.    Family Hx: family history includes Allergies in his daughter; Arthritis in his mother; Asthma in his daughter; Cancer - colorectal in his sister; Cerebrovascular Disease in his father; Hypertension in his father and sister.    REVIEW OF SYSTEMS:   CONSTITUTIONAL:NEGATIVE for fever, chills, change in weight  INTEGUMENTARY/SKIN: NEGATIVE for worrisome rashes, moles or lesions  MUSCULOSKELETAL:See HPI above  NEURO: NEGATIVE for weakness, dizziness or paresthesias    This document serves as a record of the services and decisions personally performed and made by Manoj Augustin MD. It was created on his behalf by Robyn Quarles, a trained medical scribe. The creation of this document is based the provider's statements to the medical scribe.    Lamar Quarles 8:15 AM 10/10/2018     PHYSICAL EXAM:  BP (!) 152/144  Ht 5' 8.25\" (1.734 m)  Wt 286 lb 9.6 oz (130 kg)  BMI 43.26 kg/m2   GENERAL APPEARANCE: healthy, alert, no distress  SKIN: no suspicious lesions or rashes  NEURO: Normal strength and tone, mentation intact and speech normal  PSYCH:  mentation appears normal and affect normal, not anxious  RESPIRATORY: No increased work of breathing.    BILATERAL LOWER EXTREMITIES:  Gait: normal  Alignment: varus  No gross deformities or masses.  No Quad atrophy, strength normal.  Intact " "sensation deep peroneal nerve, superficial peroneal nerve, med/lat tibial nerve, sural nerve, saphenous nerve  Intact EHL, EDL, TA, FHL, GS, quadriceps hamstrings and hip flexors  Toes warm and well perfused, brisk capillary refill. Palpable 2+ dp pulses.  Bilateral calf soft and nttp or squeeze.  Edema: 2+ pitting, bilateral.       RIGHT KNEE EXAM:    Skin: intact, no ecchymosis or erythema  Squat: 100 %, feels \" tight\" per patient.  ROM: 1 hyperextension to 120 flexion  Tight hamstrings on straight leg raise.  Effusion: none  Tender: medial joint line, lateral joint line.   nontender to palpation anterior or posterior knee  McMurrays: negative    MCL: stable, and non-painful at both 0 and 30 degrees knee flexion  Varus stress: stable, and non-painful at both 0 and 30 degrees knee flexion  Lachmans: neg, firm endpoint  Posterior Drawer stable  Patellofemoral joint:                Apprehension: negative              Crepitations: mild   Grind: positive      LEFT KNEE EXAM:    Skin: intact, no ecchymosis or erythema, callus over the anterior knee  ROM: 1 hyperextension to 120 flexion  Tight hamstrings on straight leg raise.  Effusion: none  Tender: NTTP med/lat joint line, anterior or posterior knee  McMurrays: negative    MCL: stable, and non-painful at both 0 and 30 degrees knee flexion  Varus stress: stable, and non-painful at both 0 and 30 degrees knee flexion  Lachmans: neg, firm endpoint  Posterior Drawer stable  Patellofemoral joint:                Apprehension: negative              Crepitations: minimal   Grind: negative     X-RAY:  no new images today.  3 views right knee from 7/12/2018 were reviewed in clinic today. On my review, no obvious fractures or dislocations. Joint spaces are preserved. No significant marginal osteophyte formation. Minimal chondrocalcinosis. No acute fracture or malalignment. Small joint effusion.      MRI 7/23/2018:  1. Complex medial meniscus tear in the body and posterior horn " with  increased signal at the meniscocapsular junction indicating possible  meniscocapsular tear versus stress in this area from the tear of the  body and posterior horn the medial meniscus.  2. Increased signal in the articular cartilage of the medial femoral  condyle consistent with cartilage fissuring.  3. Irregularity of the lateral patellar facet cartilage. Abnormal  signal over the medial patellar facet with evidence of cartilage  thinning.   4. Moderate joint effusion.  5. 3.5 x 1 x 8 mm likely ganglion cyst tracking along the posterior  collateral ligament.    Impression:   63 year old male with acute on chronic right knee pain, complex medial meniscus tear, mild chondromalacia.      Plan:   * discussed MRI findings with patient, what appears to be a right knee medial meniscal tear on MRI, as well as some mild underlying arthritic changes, which is consistent with symptoms and physical examination findings.     * Discussed treatment options including nonoperative treatment with continued rest, ice, elevation, activity modification, NSAIDS and Physical Therapy, bracing and potential injections versus surgical treatment with arthroscopy and meniscal repair versus debridement. Risks and benefits of each discussed in detail.  * in the setting of underlying chondrosis, predictability of arthroscopy is uncertain, unless mechanical symptoms present due to the meniscus tear.    * surgical risks discussed: bleeding, infection, pain, scar, damage to adjacent structures (nerve, vessels, cartilage), stiffness, post-traumatic arthritis, failure to relieve symptoms, recurrence of symptoms, blood clots (DVT), pulmonary emolism, risks of anesthesia and death. This surgery is not intended nor expected to alleviate arthritic pain symptoms, nor will it treat or correct underlying arthritic changes. Arthritis and symptoms related to arthritis could worsen with arthroscopy and meniscal / chondral debridement. Patient  understands.    * understanding the risks of surgery, he would like to discuss this with his wife before continuing with surgery. Likely will continue with surgery.  * plan: right knee arthroscopy with partial meniscal / chondral debridement, outpatient surgery  * will arrange at a time in future mutual convenience  * will need H+P from PCP prior to surgery  * patient will be on ASA x2 weeks postoperative, as well as use of TEDs, crutches  * plan Physical Therapy to start 1-2 weeks postoperative, to be determined at postoperative visit.  * return to clinic 2 week postop for wound check, sooner as needed.  * all questions addressed and answered prior to discharge from clinic today.  * patient to call if any questions or concerns in the meantime.  * Patient to follow up with Primary Care provider regarding elevated blood pressure       The information in this document, created by a scribe for me, accurately reflects the services I personally performed and the decisions made by me. I have reviewed and approved this document for accuracy.     Manoj Augustin M.D., M.S.  Dept. of Orthopaedic Surgery  [facility]    Again, thank you for allowing me to participate in the care of your patient.        Sincerely,        Manoj Augustin MD

## 2018-10-12 NOTE — TELEPHONE ENCOUNTER
Type of surgery: right knee arthroscopy,meniscal and chondral debridement  CPT 37943  Complex tear of medial meniscus of right knee, subsequent encounter [O00.444D]  - Primary       Chronic pain of right knee [M25.561, G89.29]         Location of surgery: [facility]  Date and time of surgery: 11-16-18  TBD  Surgeon: Dr Augustin  Pre-Op Appt Date: 11-2-18  Post-Op Appt Date: 11-28-18   Packet sent out: Yes  Pre-cert/Authorization completed: No pre cert needed, per [employer] online list   Date:10/12/2018

## 2018-10-12 NOTE — TELEPHONE ENCOUNTER
Spoke with patient gave him available dates and time he will call back to schedule. [facility]

## 2018-11-02 ENCOUNTER — OFFICE VISIT (OUTPATIENT)
Dept: FAMILY MEDICINE | Facility: CLINIC | Age: 63
End: 2018-11-02
Payer: COMMERCIAL

## 2018-11-02 VITALS
BODY MASS INDEX: 43.29 KG/M2 | HEIGHT: 68 IN | OXYGEN SATURATION: 97 % | TEMPERATURE: 98.6 F | HEART RATE: 75 BPM | DIASTOLIC BLOOD PRESSURE: 90 MMHG | SYSTOLIC BLOOD PRESSURE: 120 MMHG | WEIGHT: 285.6 LBS | RESPIRATION RATE: 18 BRPM

## 2018-11-02 DIAGNOSIS — R60.9 DEPENDENT EDEMA: ICD-10-CM

## 2018-11-02 DIAGNOSIS — I10 HYPERTENSION, GOAL BELOW 140/90: ICD-10-CM

## 2018-11-02 DIAGNOSIS — M25.561 RIGHT KNEE PAIN, UNSPECIFIED CHRONICITY: ICD-10-CM

## 2018-11-02 DIAGNOSIS — Z23 NEED FOR PROPHYLACTIC VACCINATION AND INOCULATION AGAINST INFLUENZA: ICD-10-CM

## 2018-11-02 DIAGNOSIS — M23.306 MENISCUS DEGENERATION, RIGHT: ICD-10-CM

## 2018-11-02 DIAGNOSIS — J45.40 MODERATE PERSISTENT ASTHMA WITHOUT COMPLICATION: ICD-10-CM

## 2018-11-02 DIAGNOSIS — E66.01 MORBID OBESITY (H): ICD-10-CM

## 2018-11-02 DIAGNOSIS — Z01.818 PREOP GENERAL PHYSICAL EXAM: Primary | ICD-10-CM

## 2018-11-02 DIAGNOSIS — J84.10 GRANULOMATOUS LUNG DISEASE (H): ICD-10-CM

## 2018-11-02 DIAGNOSIS — E78.5 HYPERLIPIDEMIA LDL GOAL <130: ICD-10-CM

## 2018-11-02 LAB
ANION GAP SERPL CALCULATED.3IONS-SCNC: 6 MMOL/L (ref 3–14)
BASOPHILS # BLD AUTO: 0 10E9/L (ref 0–0.2)
BASOPHILS NFR BLD AUTO: 0.4 %
BUN SERPL-MCNC: 18 MG/DL (ref 7–30)
CALCIUM SERPL-MCNC: 8.9 MG/DL (ref 8.5–10.1)
CHLORIDE SERPL-SCNC: 103 MMOL/L (ref 94–109)
CO2 SERPL-SCNC: 32 MMOL/L (ref 20–32)
CREAT SERPL-MCNC: 1.12 MG/DL (ref 0.66–1.25)
DIFFERENTIAL METHOD BLD: ABNORMAL
EOSINOPHIL # BLD AUTO: 0.2 10E9/L (ref 0–0.7)
EOSINOPHIL NFR BLD AUTO: 1.9 %
ERYTHROCYTE [DISTWIDTH] IN BLOOD BY AUTOMATED COUNT: 15.1 % (ref 10–15)
GFR SERPL CREATININE-BSD FRML MDRD: 67 ML/MIN/1.7M2
GLUCOSE SERPL-MCNC: 100 MG/DL (ref 70–99)
HCT VFR BLD AUTO: 44.2 % (ref 40–53)
HGB BLD-MCNC: 14.4 G/DL (ref 13.3–17.7)
LYMPHOCYTES # BLD AUTO: 1.5 10E9/L (ref 0.8–5.3)
LYMPHOCYTES NFR BLD AUTO: 18.6 %
MCH RBC QN AUTO: 28.5 PG (ref 26.5–33)
MCHC RBC AUTO-ENTMCNC: 32.6 G/DL (ref 31.5–36.5)
MCV RBC AUTO: 87 FL (ref 78–100)
MONOCYTES # BLD AUTO: 0.8 10E9/L (ref 0–1.3)
MONOCYTES NFR BLD AUTO: 10.6 %
NEUTROPHILS # BLD AUTO: 5.5 10E9/L (ref 1.6–8.3)
NEUTROPHILS NFR BLD AUTO: 68.5 %
PLATELET # BLD AUTO: 230 10E9/L (ref 150–450)
POTASSIUM SERPL-SCNC: 3.8 MMOL/L (ref 3.4–5.3)
RBC # BLD AUTO: 5.06 10E12/L (ref 4.4–5.9)
SODIUM SERPL-SCNC: 141 MMOL/L (ref 133–144)
WBC # BLD AUTO: 8 10E9/L (ref 4–11)

## 2018-11-02 PROCEDURE — 90682 RIV4 VACC RECOMBINANT DNA IM: CPT | Performed by: FAMILY MEDICINE

## 2018-11-02 PROCEDURE — 99215 OFFICE O/P EST HI 40 MIN: CPT | Mod: 25 | Performed by: FAMILY MEDICINE

## 2018-11-02 PROCEDURE — 90471 IMMUNIZATION ADMIN: CPT | Performed by: FAMILY MEDICINE

## 2018-11-02 PROCEDURE — 80048 BASIC METABOLIC PNL TOTAL CA: CPT | Performed by: FAMILY MEDICINE

## 2018-11-02 PROCEDURE — 36415 COLL VENOUS BLD VENIPUNCTURE: CPT | Performed by: FAMILY MEDICINE

## 2018-11-02 PROCEDURE — 93000 ELECTROCARDIOGRAM COMPLETE: CPT | Performed by: FAMILY MEDICINE

## 2018-11-02 PROCEDURE — 85025 COMPLETE CBC W/AUTO DIFF WBC: CPT | Performed by: FAMILY MEDICINE

## 2018-11-02 ASSESSMENT — PAIN SCALES - GENERAL: PAINLEVEL: NO PAIN (0)

## 2018-11-02 NOTE — MR AVS SNAPSHOT
After Visit Summary   11/2/2018    Arturo Rahman    MRN: [id number]           Patient Information     Date Of Birth          1955        Visit Information        Provider Department      11/2/2018 9:40 AM Martina Leung MD [facility]        Today's Diagnoses     Preop general physical exam    -  1    Meniscus degeneration, right        Right knee pain, unspecified chronicity        Dependent edema        Moderate persistent asthma without complication        Granulomatous lung disease (H)        Hyperlipidemia LDL goal <130        Hypertension, goal below 140/90        Morbid obesity (H)          Care Instructions      Before Your Surgery      Call your surgeon if there is any change in your health. This includes signs of a cold or flu (such as a sore throat, runny nose, cough, rash or fever).    Do not smoke, drink alcohol or take over the counter medicine (unless your surgeon or primary care doctor tells you to) for the 24 hours before and after surgery.    If you take prescribed drugs: Follow your doctor s orders about which medicines to take and which to stop until after surgery.    Eating and drinking prior to surgery: follow the instructions from your surgeon    Take a shower or bath the night before surgery. Use the soap your surgeon gave you to gently clean your skin. If you do not have soap from your surgeon, use your regular soap. Do not shave or scrub the surgery site.  Wear clean pajamas and have clean sheets on your bed.           Follow-ups after your visit        Follow-up notes from your care team     Return in about 3 months (around 2/2/2019) for BP Recheck, Weight check.      Your next 10 appointments already scheduled     Nov 16, 2018   Procedure with Manoj Augustin MD   [facility] (--)    [address] [place] [address]   [phone]            Nov 28, 2018  1:30 PM CST   Return Visit with Manoj Augustin MD    [facility] ([facility]    [address] [facility] [address]   [phone]            Mar 18, 2019  2:00 PM CDT   CT CHEST W/O CONTRAST with MGCT1   [facility] ([facility])    [address] [place] [id number] [phone]           How do I prepare for my exam? (Food and drink instructions) No Food and Drink Restrictions.  How do I prepare for my exam? (Other instructions) You do not need to do anything special to prepare for this exam. For a sinus scan: Use your nose spray (nasal decongestant spray) as directed.  What should I wear: Please wear loose clothing, such as a sweat suit or jogging clothes. Avoid snaps, zippers and other metal. We may ask you to undress and put on a hospital gown.  How long does the exam take: Most scans take less than 20 minutes.  What should I bring: Please bring any scans or X-rays taken at other hospitals, if similar tests were done. Also bring a list of your medicines, including vitamins, minerals and over-the-counter drugs. It is safest to leave personal items at home.  Do I need a : No  is needed.  What do I need to tell my doctor? Be sure to tell your doctor: * If you have any allergies. * If there s any chance you are pregnant. * If you are breastfeeding.  What should I do after the exam: No restrictions, You may resume normal activities.  What is this test: A CT (computed tomography) scan is a series of pictures that allows us to look inside your body. The scanner creates images of the body in cross sections, much like slices of bread. This helps us see any problems more clearly.  Who should I call with questions: If you have any questions, please call the Imaging Department where you will have your exam. Directions, parking instructions, and other information is available on our website, [email or web address].            Mar 18, 2019  2:30 PM CDT   Office  Visit with PFT LAB   [facility] ([facility])    [address] [place] [address] [phone]           Bring a current list of meds and any records pertaining to this visit. For Physicals, please bring immunization records and any forms needing to be filled out. Please arrive 10 minutes early to complete paperwork.            Mar 18, 2019  3:30 PM CDT   Return Visit with Trent Alberto MD   [facility] ([facility])    [address] [place] [address] [phone]              Who to contact     If you have questions or need follow up information about today's clinic visit or your schedule please contact [facility] directly at [phone].  Normal or non-critical lab and imaging results will be communicated to you by MyChart, letter or phone within 4 business days after the clinic has received the results. If you do not hear from us within 7 days, please contact the clinic through MyChart or phone. If you have a critical or abnormal lab result, we will notify you by phone as soon as possible.  Submit refill requests through [employer] or call your pharmacy and they will forward the refill request to us. Please allow 3 business days for your refill to be completed.          Additional Information About Your Visit        MyChart Information     [employer] gives you secure access to your electronic health record. If you see a primary care provider, you can also send messages to your care team and make appointments. If you have questions, please call your primary care clinic.  If you do not have a primary care provider, please call [phone] and they will assist you.        Care EveryWhere ID     This is your Care EveryWhere ID. This could be used by other organizations to access your [facility] medical records  [id number]        Your Vitals Were     Pulse Temperature Respirations Height Pulse  "Oximetry BMI (Body Mass Index)    75 98.6  F (37  C) (Oral) 18 5' 8.25\" (1.734 m) 97% 43.11 kg/m2       Blood Pressure from Last 3 Encounters:   11/02/18 120/90   10/10/18 (!) 152/144   09/21/18 136/82    Weight from Last 3 Encounters:   11/02/18 285 lb 9.6 oz (129.5 kg)   10/10/18 286 lb 9.6 oz (130 kg)   09/21/18 284 lb 14.4 oz (129.2 kg)              We Performed the Following     Basic metabolic panel     CBC with platelets differential     EKG 12-lead complete w/read - Clinics        Primary Care Provider Office Phone # Fax #    Martina Kaleigh Leung [phone]       [address] [place]  [facility] [address]        Equal Access to Services     [facility] : Hadii grey patricio hadasho Soomaali, waaxda luqadaha, qaybta kaalmada adeegyada, earl atkinson . So [facility] [phone].    ATENCIÓN: Si habla español, tiene a santana disposición servicios gratuitos de asistencia lingüística. Llame al [phone].    We comply with applicable federal civil rights laws and [place] laws. We do not discriminate on the basis of race, color, national origin, age, disability, sex, sexual orientation, or gender identity.            Thank you!     Thank you for choosing [facility]  for your care. Our goal is always to provide you with excellent care. Hearing back from our patients is one way we can continue to improve our services. Please take a few minutes to complete the written survey that you may receive in the mail after your visit with us. Thank you!             Your Updated Medication List - Protect others around you: Learn how to safely use, store and throw away your medicines at www.disposemymeds.org.          This list is accurate as of 11/2/18 10:33 AM.  Always use your most recent med list.                   Brand Name Dispense Instructions for use Diagnosis    desloratadine 5 MG tablet    CLARINEX    180 tablet    Take 1 tablet (5 mg) by mouth 2 times daily as needed " for allergies    Chronic seasonal allergic rhinitis due to pollen       esomeprazole 40 MG CR capsule    nexIUM    90 capsule    TAKE ONE CAPSULE BY MOUTH IN THE MORNING BEFORE BREAKFAST    Gastroesophageal reflux disease with esophagitis       fluticasone 50 MCG/ACT spray    FLONASE    1 Bottle    Spray 1 spray into both nostrils 2 times daily    Dysfunction of both eustachian tubes       losartan-hydrochlorothiazide 100-25 MG per tablet    HYZAAR    90 tablet    TAKE ONE TABLET BY MOUTH ONE TIME DAILY    Hypertension, goal below 140/90       montelukast 10 MG tablet    SINGULAIR    90 tablet    TAKE ONE TABLET BY MOUTH ONE TIME DAILY.    Moderate persistent asthma without complication       MULTIVITAL PO      Take 1 tablet by mouth daily        simvastatin 10 MG tablet    ZOCOR    90 tablet    Take 1 tablet (10 mg) by mouth At Bedtime    Hyperlipidemia LDL goal <130       VENTOLIN  (90 Base) MCG/ACT inhaler   Generic drug:  albuterol     18 g    inhale 2 puffs by mouth every 6 hours as needed for shortness of breath/dyspnea    Moderate persistent asthma without complication

## 2018-11-02 NOTE — PROGRESS NOTES

## 2018-11-02 NOTE — PROGRESS NOTES
[address] [address]  [phone]  Dept: [phone]    PRE-OP EVALUATION:  Today's date: 2018    Arturo Rahman (: 1955) presents for pre-operative evaluation assessment as requested by Dr. Augustin.  He requires evaluation and anesthesia risk assessment prior to undergoing surgery/procedure for treatment of Right Knee meniscus tear .    Proposed Surgery/ Procedure: Right knee arthroscopy and meniscus repair.   Date of Surgery/ Procedure: 18  Time of Surgery/ Procedure: NA  Hospital/Surgical Facility: [facility]  Fax number for surgical facility:   Primary Physician: Martina Leung  Type of Anesthesia Anticipated: to be determined    Patient has a Health Care Directive or Living Will:  NO    1. NO - Do you have a history of heart attack, stroke, stent, bypass or surgery on an artery in the head, neck, heart or legs?  2. NO - Do you ever have any pain or discomfort in your chest?  3. NO - Do you have a history of  Heart Failure?  4. NO - Are you troubled by shortness of breath when: walking on the level, up a slight hill or at night? Sometimes with hills gets mild dyspnea.   5. NO - DO YOU CURRENTLY HAVE A COLD, BRONCHITIS OR OTHER RESPIRATORY INFECTION?   5. NO - Do you currently have a cold, bronchitis or other respiratory infection?  6. NO - Do you have a cough, shortness of breath or wheezing? Improved after surgery for hamartoma in bronchi. Decontioning  7. NO - Do you sometimes get pains in the calves of your legs when you walk?  8. NO - Do you or anyone in your family have previous history of blood clots?  9. NO - Do you or does anyone in your family have a serious bleeding problem such as prolonged bleeding following surgeries or cuts?  10. NO - Have you ever had problems with anemia or been told to take iron pills?  11. NO - Have you had any abnormal blood loss such as black, tarry or bloody stools,  or abnormal vaginal bleeding?  12. NO - Have you ever had a blood transfusion?  13. NO - Have you or any of your relatives ever had problems with anesthesia?  14. NO - Do you have sleep apnea, excessive snoring or daytime drowsiness?  15. NO - Do you have any prosthetic heart valves?  16. NO - Do you have prosthetic joints?  17. NO - Is there any chance that you may be pregnant?      HPI:     HPI related to upcoming procedure: Intermittent right knee pain that is sometimes severe then gets better. No distinct start to the pain or injury. Worsening over past year.       ASTHMA - Patient has a longstanding history of moderate-severe Asthma . Patient has been doing well overall noting WHEEZING and continues on medication regimen consisting of inhaler without adverse reactions or side effects.                                                                                                                                               .  HYPERLIPIDEMIA - Patient has a long history of significant Hyperlipidemia requiring medication for treatment with recent good control. Patient reports no problems or side effects with the medication.                                                                                                                                                       .  HYPERTENSION - Patient has longstanding history of HTN , currently denies any symptoms referable to elevated blood pressure. Specifically denies chest pain, palpitations, dyspnea, orthopnea, PND or peripheral edema. Blood pressure readings have been in normal range. Current medication regimen is as listed below. Patient denies any side effects of medication.                                                                                                                                                                                          .    MEDICAL HISTORY:     Patient Active Problem List    Diagnosis Date Noted     Hamartoma of left lung  (H) 08/08/2017     Priority: High     Morbid obesity (H) 07/11/2017     Priority: High     Hypertension, goal below 140/90 03/11/2014     Priority: High     Hyperlipidemia LDL goal <130 10/31/2010     Priority: High     Asthma, moderate persistent      Priority: High     Granulomatous lung disease (H)      Priority: High     Right knee pain, unspecified chronicity 07/12/2018     Priority: Medium     Allergic rhinitis 09/19/2012     Priority: Medium     Problem list name updated by automated process. Provider to review       GERD (gastroesophageal reflux disease)      Priority: Medium     Advanced directives, counseling/discussion 12/21/2012     Priority: Low     Advance Care Planning:   ACP Review and Resources Provided:  Reviewed chart for advance care plan.  Arturo STERLING Paulquirino has no plan or code status on file. Discussed available resources on 03/11/2014. Confirmed code status reflects current choices pending further ACP discussions.  Confirmed/documented designated decision maker(s). See permanent comments section of demographics in clinical tab.   Added by Torrie Larry on 3/11/2014  Discussed advance care planning with patient; information given to patient to review.  Catherine Russell MA   12/21/2012   Discussed advance care planning with patient; however, patient declined at this time. March 11, 2014  Diann Ortiz MA         FH: colon cancer      Priority: Low     colonoscopy q 5yr.  last 2008        Past Medical History:   Diagnosis Date     Allergies      Asthma, moderate persistent      FH: colon cancer     colonoscopy q 5yr.  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     HTN (hypertension)      Hyperlipidemia      Past Surgical History:   Procedure Laterality Date     BRONCHOSCOPY FLEXIBLE AND RIGID N/A 7/19/2017    Procedure: BRONCHOSCOPY FLEXIBLE AND RIGID;   Bronchoscopy, Tumor Debulking with Cryoprobe and Argon Plasma Coagulation;   Surgeon: Albino Crump MD;  Location: [facility] OR     BRONCHOSCOPY FLEXIBLE,L CRYOBIOPSY N/A 1/11/2018    Procedure: BRONCHOSCOPY FLEXIBLE, CRYOBIOPSY;  Flexible Bronchoscopy, Rigid Bronchoscopy, Tumor Debulking With Cryoprobe;  Surgeon: Albino Crump MD;  Location: [facility] OR     COLONOSCOPY  11/08    + polyp, +FH, repeat in 5 years     COMBINED ENDOSCOPY UPPER WITH ARGON PLASMA COAGULATOR (APC) N/A 7/19/2017    Procedure: COMBINED ENDOSCOPY UPPER WITH ARGON PLASMA COAGULATOR (APC);;  Surgeon: Albino Crump MD;  Location: [facility] OR     TONSILLECTOMY       VASECTOMY       Current Outpatient Prescriptions   Medication Sig Dispense Refill     desloratadine (CLARINEX) 5 MG tablet Take 1 tablet (5 mg) by mouth 2 times daily as needed for allergies 180 tablet 1     esomeprazole (NEXIUM) 40 MG CR capsule TAKE ONE CAPSULE BY MOUTH IN THE MORNING BEFORE BREAKFAST 90 capsule 2     fluticasone (FLONASE) 50 MCG/ACT spray Spray 1 spray into both nostrils 2 times daily 1 Bottle 3     losartan-hydrochlorothiazide (HYZAAR) 100-25 MG per tablet TAKE ONE TABLET BY MOUTH ONE TIME DAILY 90 tablet 1     montelukast (SINGULAIR) 10 MG tablet TAKE ONE TABLET BY MOUTH ONE TIME DAILY. 90 tablet 2     Multiple Vitamins-Minerals (MULTIVITAL PO) Take 1 tablet by mouth daily       simvastatin (ZOCOR) 10 MG tablet Take 1 tablet (10 mg) by mouth At Bedtime 90 tablet 3     VENTOLIN  (90 Base) MCG/ACT Inhaler inhale 2 puffs by mouth every 6 hours as needed for shortness of breath/dyspnea 18 g 2     OTC products: None, except as noted above    Allergies   Allergen Reactions     Lisinopril Cough      Latex Allergy: NO    Social History   Substance Use Topics     Smoking status: Former Smoker     Quit date: 10/28/1991     Smokeless tobacco: Never Used     Alcohol use Yes      Comment: rare     History   Drug Use No       REVIEW OF SYSTEMS:   Constitutional, neuro, ENT, endocrine, pulmonary, cardiac, gastrointestinal, genitourinary,  "musculoskeletal, integument and psychiatric systems are negative, except as otherwise noted.    EXAM:   /90  Pulse 75  Temp 98.6  F (37  C) (Oral)  Resp 18  Ht 5' 8.25\" (1.734 m)  Wt 285 lb 9.6 oz (129.5 kg)  SpO2 97%  BMI 43.11 kg/m2  GENERAL: healthy, alert, well nourished, well hydrated, no distress, obese  HENT: ear canals- normal; TMs- normal; Nose- normal; Mouth- no ulcers, no lesions, throat is clear with no erythema or exudate.   NECK: no tenderness, no adenopathy, no asymmetry, no masses, no stiffness; thyroid- normal to palpation  RESP: lungs clear to auscultation - no rales, no rhonchi, no wheezes  CV: regular rates and rhythm, normal S1 S2, no S3 or S4 and no murmur, no click or rub -  ABDOMEN: soft, no tenderness, no  hepatosplenomegaly, no masses, normal bowel sounds  MS: extremities- no gross deformities noted, 1+ edema  SKIN: no suspicious lesions, no rashes  NEURO: strength and tone- normal, sensory exam- grossly normal, mentation- intact, speech- normal, reflexes- symmetric  BACK: no CVA tenderness, no paralumbar tenderness  PSYCH: Alert and oriented times 3; speech- coherent , normal rate and volume; able to articulate logical thoughts, able to abstract reason, no tangential thoughts, no hallucinations or delusions, affect- normal     DIAGNOSTICS:   EKG: appears normal, NSR, normal axis, normal intervals, no acute ST/T changes c/w ischemia, no LVH by voltage criteria, unchanged from previous tracings    Recent Labs   Lab Test  07/12/18   0740  12/22/17   0827  07/11/17   1919 07/22/16 03/21/16   1351   HGB  13.9  14.1  14.2   --   14.3   PLT  246  219  260   --   203   INR   --    --    --   1.0   --    NA  142  142  141   --   140   POTASSIUM  3.9  3.9  3.8  4.2  3.2*   CR  1.25  1.13  1.21  1.13  1.30*   A1C   --    --   6.1*   --   6.4*        IMPRESSION:   Reason for surgery/procedure: Right Knee meniscus tear .    Proposed Surgery/ Procedure: Right knee arthroscopy and meniscus " repair.   Diagnosis/reason for consult: cardiac and anesthesia risk assessment     The proposed surgical procedure is considered INTERMEDIATE risk.    REVISED CARDIAC RISK INDEX  The patient has the following serious cardiovascular risks for perioperative complications such as (MI, PE, VFib and 3  AV Block):  No serious cardiac risks  INTERPRETATION: 0 risks: Class I (very low risk - 0.4% complication rate)    The patient has the following additional risks for perioperative complications:  No identified additional risks  Morbid obesity      ICD-10-CM    1. Preop general physical exam Z01.818 EKG 12-lead complete w/read - Clinics   2. Meniscus degeneration, right M23.306 Approved for surgery and anesthesia   3. Right knee pain, unspecified chronicity M25.561 arthroscopy   4. Dependent edema R60.9 Stable and on diuretic. Worse when on feet long time. Not due to heart problems   5. Moderate persistent asthma without complication J45.40 Well controlled on medications    6. Granulomatous lung disease (H) J84.10 stable   7. Hyperlipidemia LDL goal <130 E78.5 Well controlled on medications    8. Hypertension, goal below 140/90- a little high today. Recheck in 3 months.  I10 CBC with platelets differential     Basic metabolic panel   9. Morbid obesity (H) E66.01 Weight loss counseling done        RECOMMENDATIONS:         --Patient is to take all scheduled medications on the day of surgery EXCEPT for modifications listed below.    ACE Inhibitor or Angiotensin Receptor Blocker (ARB) Use  Ace inhibitor or Angiotensin Receptor Blocker (ARB) and should HOLD this medication for the 24 hours prior to surgery.      APPROVAL GIVEN to proceed with proposed procedure, without further diagnostic evaluation       Signed Electronically by: Martina Leung MD    Copy of this evaluation report is provided to requesting physician.    [place] Preop Guidelines    Revised Cardiac Risk Index

## 2018-11-03 NOTE — PROGRESS NOTES
Dear Arturo    Your test results are attached. I am happy to let you know that they are stable.    The blood sugar is normal and you do not have diabetes. The kidneys are healthy. The hemoglobin is normal and you do not have anemia. We can recheck labs in 6 months.     Please contact me by [facility] if you have any questions about your labs or management.    Martina Leung MD

## 2018-11-12 ENCOUNTER — RADIANT APPOINTMENT (OUTPATIENT)
Dept: GENERAL RADIOLOGY | Facility: CLINIC | Age: 63
End: 2018-11-12
Attending: FAMILY MEDICINE
Payer: COMMERCIAL

## 2018-11-12 ENCOUNTER — OFFICE VISIT (OUTPATIENT)
Dept: FAMILY MEDICINE | Facility: CLINIC | Age: 63
End: 2018-11-12
Payer: COMMERCIAL

## 2018-11-12 VITALS
HEIGHT: 68 IN | WEIGHT: 285 LBS | TEMPERATURE: 98.6 F | SYSTOLIC BLOOD PRESSURE: 134 MMHG | HEART RATE: 81 BPM | OXYGEN SATURATION: 98 % | DIASTOLIC BLOOD PRESSURE: 80 MMHG | BODY MASS INDEX: 43.19 KG/M2

## 2018-11-12 DIAGNOSIS — R05.9 COUGH: ICD-10-CM

## 2018-11-12 DIAGNOSIS — R05.9 COUGH: Primary | ICD-10-CM

## 2018-11-12 PROCEDURE — 70210 X-RAY EXAM OF SINUSES: CPT | Mod: FY

## 2018-11-12 PROCEDURE — 71046 X-RAY EXAM CHEST 2 VIEWS: CPT | Mod: FY

## 2018-11-12 PROCEDURE — 99214 OFFICE O/P EST MOD 30 MIN: CPT | Performed by: FAMILY MEDICINE

## 2018-11-12 ASSESSMENT — ANXIETY QUESTIONNAIRES
2. NOT BEING ABLE TO STOP OR CONTROL WORRYING: NOT AT ALL
1. FEELING NERVOUS, ANXIOUS, OR ON EDGE: NOT AT ALL
IF YOU CHECKED OFF ANY PROBLEMS ON THIS QUESTIONNAIRE, HOW DIFFICULT HAVE THESE PROBLEMS MADE IT FOR YOU TO DO YOUR WORK, TAKE CARE OF THINGS AT HOME, OR GET ALONG WITH OTHER PEOPLE: NOT DIFFICULT AT ALL
5. BEING SO RESTLESS THAT IT IS HARD TO SIT STILL: NOT AT ALL
6. BECOMING EASILY ANNOYED OR IRRITABLE: NOT AT ALL
GAD7 TOTAL SCORE: 0
3. WORRYING TOO MUCH ABOUT DIFFERENT THINGS: NOT AT ALL
7. FEELING AFRAID AS IF SOMETHING AWFUL MIGHT HAPPEN: NOT AT ALL

## 2018-11-12 ASSESSMENT — PAIN SCALES - GENERAL: PAINLEVEL: NO PAIN (0)

## 2018-11-12 ASSESSMENT — PATIENT HEALTH QUESTIONNAIRE - PHQ9
5. POOR APPETITE OR OVEREATING: NOT AT ALL
SUM OF ALL RESPONSES TO PHQ QUESTIONS 1-9: 0

## 2018-11-12 NOTE — PATIENT INSTRUCTIONS
At [facility], we strive to deliver an exceptional experience to you, every time we see you.  If you receive a survey in the mail, please send us back your thoughts. We really do value your feedback.    Your care team:                            Family Medicine Internal Medicine   MD Jae An MD Shantel Branch-Fleming, MD Katya Georgiev PA-C Megan Hill, APRN PEREZ Fonseca MD Pediatrics   Jackson Yung, JIM Cam, MD China Vick APRN CNP   MD Savannah Mcfarland MD Deborah Mielke, MD Kaleigh Nava, APRN [facility]      Clinic hours: Monday - Thursday 7 am-7 pm; Fridays 7 am-5 pm.   Urgent care: Monday - Friday 11 am-9 pm; Saturday and Sunday 9 am-5 pm.  Pharmacy : Monday -Thursday 8 am-8 pm; Friday 8 am-6 pm; Saturday and Sunday 9 am-5 pm.     Clinic: [phone]   Pharmacy: [phone]

## 2018-11-12 NOTE — MR AVS SNAPSHOT
After Visit Summary   11/12/2018    Arturo Rahman    MRN: [id number]           Patient Information     Date Of Birth          1955        Visit Information        Provider Department      11/12/2018 8:00 AM Matheus Mistry MD [facility]        Today's Diagnoses     Cough    -  1      Care Instructions    At [facility], we strive to deliver an exceptional experience to you, every time we see you.  If you receive a survey in the mail, please send us back your thoughts. We really do value your feedback.    Your care team:                            Family Medicine Internal Medicine   MD Jae An MD Shantel Branch-Fleming, MD Katya Georgiev PAGigiC  Carola Mckeon, APRN PEREZ Fonseca, MD Pediatrics   Jackson Yung, JIM Cam, MD China Vick APRN CNP   MD Savannah Mcfarland MD Deborah Mielke, MD Kim Thein, APRN CNP      Clinic hours: Monday - Thursday 7 am-7 pm; Fridays 7 am-5 pm.   Urgent care: Monday - Friday 11 am-9 pm; Saturday and Sunday 9 am-5 pm.  Pharmacy : Monday -Thursday 8 am-8 pm; Friday 8 am-6 pm; Saturday and Sunday 9 am-5 pm.     Clinic: [phone]   Pharmacy: [phone]                Follow-ups after your visit        Follow-up notes from your care team     Return in about 10 days (around 11/22/2018) for recheck if symptoms fail to resolve by then.      Your next 10 appointments already scheduled     Nov 16, 2018   Procedure with Manoj Augustin MD   [facility] (--)    [address] [place] [id number] [phone]            Nov 28, 2018  1:30 PM CST   Return Visit with Manoj Augustin MD   [facility] ([facility])    [address] [facility] [id number] [phone]            Mar 18, 2019  2:00 PM CDT   CT CHEST W/O CONTRAST with MGCT1   [facility] (  [facility])    [address] [place] [id number] [phone]           How do I prepare for my exam? (Food and drink instructions) No Food and Drink Restrictions.  How do I prepare for my exam? (Other instructions) You do not need to do anything special to prepare for this exam. For a sinus scan: Use your nose spray (nasal decongestant spray) as directed.  What should I wear: Please wear loose clothing, such as a sweat suit or jogging clothes. Avoid snaps, zippers and other metal. We may ask you to undress and put on a hospital gown.  How long does the exam take: Most scans take less than 20 minutes.  What should I bring: Please bring any scans or X-rays taken at other hospitals, if similar tests were done. Also bring a list of your medicines, including vitamins, minerals and over-the-counter drugs. It is safest to leave personal items at home.  Do I need a : No  is needed.  What do I need to tell my doctor? Be sure to tell your doctor: * If you have any allergies. * If there s any chance you are pregnant. * If you are breastfeeding.  What should I do after the exam: No restrictions, You may resume normal activities.  What is this test: A CT (computed tomography) scan is a series of pictures that allows us to look inside your body. The scanner creates images of the body in cross sections, much like slices of bread. This helps us see any problems more clearly.  Who should I call with questions: If you have any questions, please call the Imaging Department where you will have your exam. Directions, parking instructions, and other information is available on our website, [email or web address].            Mar 18, 2019  2:30 PM CDT   Office Visit with PFT LAB   [facility] ([facility])    [address] [place] [id number] [phone]           Bring a current list of meds and any records pertaining to this visit. For Physicals,  "please bring immunization records and any forms needing to be filled out. Please arrive 10 minutes early to complete paperwork.            Mar 18, 2019  3:30 PM CDT   Return Visit with Trent Alebrto MD   [facility] ([facility])    [address] [place] [id number] [phone]              Who to contact     If you have questions or need follow up information about today's clinic visit or your schedule please contact [facility] directly at [phone].  Normal or non-critical lab and imaging results will be communicated to you by Sepatonhart, letter or phone within 4 business days after the clinic has received the results. If you do not hear from us within 7 days, please contact the clinic through EverTruet or phone. If you have a critical or abnormal lab result, we will notify you by phone as soon as possible.  Submit refill requests through [email or web address] or call your pharmacy and they will forward the refill request to us. Please allow 3 business days for your refill to be completed.          Additional Information About Your Visit        MyChart Information     [email or web address] gives you secure access to your electronic health record. If you see a primary care provider, you can also send messages to your care team and make appointments. If you have questions, please call your primary care clinic.  If you do not have a primary care provider, please call [phone] and they will assist you.        Care EveryWhere ID     This is your Care EveryWhere ID. This could be used by other organizations to access your [place] medical records  [id number]        Your Vitals Were     Pulse Temperature Height Pulse Oximetry BMI (Body Mass Index)       81 98.6  F (37  C) (Oral) 5' 8.25\" (1.734 m) 98% 43.02 kg/m2        Blood Pressure from Last 3 Encounters:   11/12/18 134/80   11/02/18 120/90   10/10/18 (!) 152/144    Weight from Last 3 Encounters:   11/12/18 285 lb (129.3 " kg)   11/02/18 285 lb 9.6 oz (129.5 kg)   10/10/18 286 lb 9.6 oz (130 kg)                 Today's Medication Changes          These changes are accurate as of 11/12/18  9:25 AM.  If you have any questions, ask your nurse or doctor.               Start taking these medicines.        Dose/Directions    amoxicillin-clavulanate 875-125 MG per tablet   Commonly known as:  AUGMENTIN   Used for:  Cough   Started by:  aMtheus Mistry MD        Dose:  1 tablet   Take 1 tablet by mouth 2 times daily for possible sinus infection.   Quantity:  20 tablet   Refills:  0            Where to get your medicines      [place] [facility] [place] [place] [address]  [address], [facility] [address]     Phone:  [phone]     amoxicillin-clavulanate 875-125 MG per tablet                Primary Care Provider Office Phone # Fax #    Martina Kaleigh Leung [phone]       [address]  [facility] [address]        Equal Access to Services     [facility]: Hadii aad ku hadasho Soomaali, waaxda luqadaha, qaybta kaalmada adeegyada, ealr barrow. So [facility] [phone].    ATENCIÓN: Si habla español, tiene a santana disposición servicios gratuitos de asistencia lingüística. [facility] [phone].    We comply with applicable federal civil rights laws and [place] laws. We do not discriminate on the basis of race, color, national origin, age, disability, sex, sexual orientation, or gender identity.            Thank you!     Thank you for choosing [facility]  for your care. Our goal is always to provide you with excellent care. Hearing back from our patients is one way we can continue to improve our services. Please take a few minutes to complete the written survey that you may receive in the mail after your visit with us. Thank you!             Your Updated Medication List - Protect others around you: Learn how to  safely use, store and throw away your medicines at www.disposemymeds.org.          This list is accurate as of 11/12/18  9:25 AM.  Always use your most recent med list.                   Brand Name Dispense Instructions for use Diagnosis    amoxicillin-clavulanate 875-125 MG per tablet    AUGMENTIN    20 tablet    Take 1 tablet by mouth 2 times daily for possible sinus infection.    Cough       desloratadine 5 MG tablet    CLARINEX    180 tablet    Take 1 tablet (5 mg) by mouth 2 times daily as needed for allergies    Chronic seasonal allergic rhinitis due to pollen       esomeprazole 40 MG CR capsule    nexIUM    90 capsule    TAKE ONE CAPSULE BY MOUTH IN THE MORNING BEFORE BREAKFAST    Gastroesophageal reflux disease with esophagitis       fluticasone 50 MCG/ACT spray    FLONASE    1 Bottle    Spray 1 spray into both nostrils 2 times daily    Dysfunction of both eustachian tubes       losartan-hydrochlorothiazide 100-25 MG per tablet    HYZAAR    90 tablet    TAKE ONE TABLET BY MOUTH ONE TIME DAILY    Hypertension, goal below 140/90       montelukast 10 MG tablet    SINGULAIR    90 tablet    TAKE ONE TABLET BY MOUTH ONE TIME DAILY.    Moderate persistent asthma without complication       MULTIVITAL PO      Take 1 tablet by mouth daily        simvastatin 10 MG tablet    ZOCOR    90 tablet    Take 1 tablet (10 mg) by mouth At Bedtime    Hyperlipidemia LDL goal <130       VENTOLIN  (90 Base) MCG/ACT inhaler   Generic drug:  albuterol     18 g    inhale 2 puffs by mouth every 6 hours as needed for shortness of breath/dyspnea    Moderate persistent asthma without complication

## 2018-11-12 NOTE — PROGRESS NOTES
"  SUBJECTIVE:   Arturo Rahman is a 63 year old male who presents to clinic today for the following health issues:      ENT Symptoms             Symptoms: cc Present Absent Comment   Fever/Chills   x    Fatigue   x    Muscle Aches   x    Eye Irritation   x    Sneezing   x    Nasal Mario Alberto/Drg   x    Sinus Pressure/Pain  x  maybe? - starting slightly this morning patient feels like his sinuses are starting to drain   Loss of smell   x    Dental pain   x    Sore Throat  x  feels dry from coughing but not actually sore, scratchy or painful   Swollen Glands   x    Ear Pain/Fullness   x    Cough  x  dry cough worse in the evening, especially when resting and sitting in his recliner  does not seem to be affected by his position  last night the coughing was so severe he started to feel like it was upsetting his stomach   Wheeze   x    Chest Pain   x    Shortness of breath   x asthma has been well controlled  last night his chest felt mildly tight, but otherwise has had no chest or breathing concerns   Rash   x    Other         Symptom duration:  onset about 11/09/18   Symptom severity:  mild   Treatments tried:  robitussin with codeine, albuterol   Contacts:       Medications updated and reviewed.  Past, family and surgical history is updated and reviewed in the record.    ROS:  Other than noted above, general, HEENT, respiratory, cardiac and gastrointestinal systems are negative.    This document serves as a record of the services and decisions personally performed and made by Dr. Mistry. It was created on his behalf by Ginny Goff, a trained medical scribe. The creation of this document is based the provider's statements to the medical scribe.  Ginny Goff November 12, 2018 8:42 AM     OBJECTIVE:                                                    /80  Pulse 81  Temp 98.6  F (37  C) (Oral)  Ht 1.734 m (5' 8.25\")  Wt 129.3 kg (285 lb)  SpO2 98%  BMI 43.02 kg/m2   Body mass index is 43.02 kg/(m^2).   GENERAL " APPEARANCE ADULT: Alert, no acute distress  EYES: PERRL, EOM normal, conjunctiva and lids normal  HENT: right TM normal, left TM normal, nose no nasal discharge or congestion, frontal sinus tenderness no, maxillary sinus tenderness no, throat/mouth:normal, mucous membranes moist  RESP: lungs clear to auscultation   CV: normal rate, regular rhythm, no murmur or gallop  LYMPHATICS: no anterior cervical node tenderness  MS: extremities normal, no peripheral edema  SKIN: no suspicious lesions or rashes  NEURO: Alert, oriented, speech and mentation normal  PSYCH: mentation appears normal., affect and mood normal    Diagnostic Test Results:  A Chest X-Ray was ordered. My reading of this film is no new infiltrate, opacities from scarring in left lower lung. (Comparison films available 5/31/17 (also recent CT 9/10/18): pending review by Radiologist.)  A sinus X-Ray was ordered. My reading of this film is mucosal thickening in the right maxillary sinus. (No comparison films available: pending review by Radiologist.)     ASSESSMENT/PLAN:                                                      (R05) Cough  (primary encounter diagnosis)  Comment: may be developing right maxillary sinusitis  Plan: XR Sinus 1/2 Views, XR Chest 2 Views,         amoxicillin-clavulanate (AUGMENTIN) 875-125 MG         per tablet        Return in about 10 days (around 11/22/2018) for recheck if symptoms fail to resolve by then.       The information in this document, created by the medical scribe for me, accurately reflects the services I personally performed and the decisions made by me. I have reviewed and approved this document for accuracy prior to leaving the patient care area. November 12, 2018 8:42 AM     Matheus Mistry MD

## 2018-11-13 ASSESSMENT — ANXIETY QUESTIONNAIRES: GAD7 TOTAL SCORE: 0

## 2018-11-14 ENCOUNTER — TELEPHONE (OUTPATIENT)
Dept: ORTHOPEDICS | Facility: CLINIC | Age: 63
End: 2018-11-14

## 2018-11-14 NOTE — TELEPHONE ENCOUNTER
Reason for Call:  Other     Detailed comments: Gavi needing a call back for date of disability, diagnosis or ICD, return to work, office visit dates and treatment plans    Phone Number Patient can be reached at: Other phone number:   GAVI JOHN [phone]          Best Time: any    Can we leave a detailed message on this number? YES    Call taken on 11/14/2018 at 3:30 PM by Paz Cordova

## 2018-11-15 ENCOUNTER — ANESTHESIA EVENT (OUTPATIENT)
Dept: SURGERY | Facility: AMBULATORY SURGERY CENTER | Age: 63
End: 2018-11-15

## 2018-11-15 NOTE — ANESTHESIA PREPROCEDURE EVALUATION
Anesthesia Pre-Procedure Evaluation    Patient: Arturo Rahman   MRN:     [id number] Gender:   male   Age:    63 year old :      1955        Preoperative Diagnosis: medial meniscus tear   Procedure(s):  Right knee arthroscopy, meniscal and chondral debridement     Past Medical History:   Diagnosis Date     Allergies      Asthma, moderate persistent      FH: colon cancer     colonoscopy q 5yr.  last      GERD (gastroesophageal reflux disease)      Granulomatous lung disease (H)     ? histoplasmosis     HTN (hypertension)      Hyperlipidemia       Past Surgical History:   Procedure Laterality Date     BRONCHOSCOPY FLEXIBLE AND RIGID N/A 2017    Procedure: BRONCHOSCOPY FLEXIBLE AND RIGID;   Bronchoscopy, Tumor Debulking with Cryoprobe and Argon Plasma Coagulation;  Surgeon: Albino Crump MD;  Location: [facility] OR     BRONCHOSCOPY FLEXIBLE,L CRYOBIOPSY N/A 2018    Procedure: BRONCHOSCOPY FLEXIBLE, CRYOBIOPSY;  Flexible Bronchoscopy, Rigid Bronchoscopy, Tumor Debulking With Cryoprobe;  Surgeon: Albino Crump MD;  Location: [facility] OR     COLONOSCOPY      + polyp, +FH, repeat in 5 years     COMBINED ENDOSCOPY UPPER WITH ARGON PLASMA COAGULATOR (APC) N/A 2017    Procedure: COMBINED ENDOSCOPY UPPER WITH ARGON PLASMA COAGULATOR (APC);;  Surgeon: Albino Crump MD;  Location: [facility] OR     TONSILLECTOMY       VASECTOMY                     PHYSICAL EXAM:   Mental Status/Neuro: A/A/O   Airway: Facies: Feasible  Mallampati: III  Mouth/Opening: Full  TM distance: > 6 cm  Neck ROM: Full   Respiratory: Auscultation: CTAB     Resp. Rate: Normal     Resp. Effort: Normal      CV: Rhythm: Regular  Rate: Age appropriate  Heart: Normal Sounds   Comments:      Dental: Normal                  Lab Results   Component Value Date    WBC 8.0 2018    HGB 14.4 2018    HCT 44.2 2018     2018    CRP 29.8 (H) 2016    SED 17 2016     2018    POTASSIUM  "3.8 11/02/2018    CHLORIDE 103 11/02/2018    CO2 32 11/02/2018    BUN 18 11/02/2018    CR 1.12 11/02/2018     (H) 11/02/2018    TATIANNA 8.9 11/02/2018    PHOS 3.5 07/12/2018    ALBUMIN 3.2 (L) 07/12/2018    PROTTOTAL 7.2 07/11/2017    ALT 37 07/11/2017    AST 17 07/11/2017    ALKPHOS 92 07/11/2017    BILITOTAL 0.4 07/11/2017    LIPASE 147 12/01/2015    AMYLASE 55 12/01/2015    INR 1.0 07/22/2016    TSH 0.71 06/19/2013       Preop Vitals  BP Readings from Last 3 Encounters:   11/12/18 134/80   11/02/18 120/90   10/10/18 (!) 152/144    Pulse Readings from Last 3 Encounters:   11/12/18 81   11/02/18 75   09/21/18 82      Resp Readings from Last 3 Encounters:   11/02/18 18   09/21/18 16   09/10/18 18    SpO2 Readings from Last 3 Encounters:   11/12/18 98%   11/02/18 97%   09/21/18 96%      Temp Readings from Last 1 Encounters:   11/12/18 98.6  F (37  C) (Oral)    Ht Readings from Last 1 Encounters:   11/12/18 1.734 m (5' 8.25\")      Wt Readings from Last 1 Encounters:   11/12/18 129.3 kg (285 lb)    Estimated body mass index is 43.02 kg/(m^2) as calculated from the following:    Height as of 11/12/18: 1.734 m (5' 8.25\").    Weight as of 11/12/18: 129.3 kg (285 lb).     LDA:  Peripheral IV 07/19/17 Right Hand (Active)   Number of days:484       Peripheral IV 07/19/17 Left Hand (Active)   Number of days:484            Assessment:   ASA SCORE: 3    NPO Status: > 2 hours since completed Clear Liquids; > 6 hours since completed Solid Foods   Documentation: H&P complete; Preop Testing complete; Consents complete   Proceeding: Proceed without further delay  Tobacco Use:  NO Active use of Tobacco/UNKNOWN Tobacco use status     Plan:   Anes. Type:  General   Pre-Induction: Midazolam IV; Acetaminophen PO   Induction:  IV (Standard)   Airway: Oral ETT; CMAC/VL   Access/Monitoring: PIV   Maintenance: Balanced   Emergence: Procedure Site   Logistics: Same Day Surgery     Postop Pain/Sedation Strategy:  Standard-Options: Opioids " PRN; IV Ketorolac; LA by Surgeon     PONV Management:  Adult Risk Factors:, Non-Smoker, Postop Opioids  Prevention: Ondansetron; Dexamethasone     CONSENT: Direct conversation   Plan and risks discussed with: Patient        Comments for Plan/Consent:  Glidescope 4, ETT.                     ANESTHESIA PREOP EVALUATION    PROCEDURE: Procedure(s):  Right knee arthroscopy, meniscal and chondral debridement    HPI: Arturo Rahman is a 63 year old male who presents for above procedure.    PAST MEDICAL HISTORY:    Past Medical History:   Diagnosis Date     Allergies      Asthma, moderate persistent      FH: colon cancer     colonoscopy q 5yr.  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     HTN (hypertension)      Hyperlipidemia        PAST SURGICAL HISTORY:    Past Surgical History:   Procedure Laterality Date     BRONCHOSCOPY FLEXIBLE AND RIGID N/A 7/19/2017    Procedure: BRONCHOSCOPY FLEXIBLE AND RIGID;   Bronchoscopy, Tumor Debulking with Cryoprobe and Argon Plasma Coagulation;  Surgeon: Albino Crump MD;  Location: [facility] OR     BRONCHOSCOPY FLEXIBLE,L CRYOBIOPSY N/A 1/11/2018    Procedure: BRONCHOSCOPY FLEXIBLE, CRYOBIOPSY;  Flexible Bronchoscopy, Rigid Bronchoscopy, Tumor Debulking With Cryoprobe;  Surgeon: Albino Crump MD;  Location: [facility] OR     COLONOSCOPY  11/08    + polyp, +FH, repeat in 5 years     COMBINED ENDOSCOPY UPPER WITH ARGON PLASMA COAGULATOR (APC) N/A 7/19/2017    Procedure: COMBINED ENDOSCOPY UPPER WITH ARGON PLASMA COAGULATOR (APC);;  Surgeon: Albino Crump MD;  Location: [facility] OR     TONSILLECTOMY       VASECTOMY         PAST ANESTHESIA HISTORY:     No personal or family h/o anesthesia problems    SOCIAL HISTORY:       Social History   Substance Use Topics     Smoking status: Former Smoker     Quit date: 10/28/1991     Smokeless tobacco: Never Used     Alcohol use Yes      Comment: rare       ALLERGIES:      Allergies   Allergen Reactions     Lisinopril Cough       MEDICATIONS:       (Not in a hospital admission)    Current Outpatient Prescriptions   Medication Sig Dispense Refill     amoxicillin-clavulanate (AUGMENTIN) 875-125 MG per tablet Take 1 tablet by mouth 2 times daily for possible sinus infection. 20 tablet 0     desloratadine (CLARINEX) 5 MG tablet Take 1 tablet (5 mg) by mouth 2 times daily as needed for allergies 180 tablet 1     esomeprazole (NEXIUM) 40 MG CR capsule TAKE ONE CAPSULE BY MOUTH IN THE MORNING BEFORE BREAKFAST 90 capsule 2     fluticasone (FLONASE) 50 MCG/ACT spray Spray 1 spray into both nostrils 2 times daily 1 Bottle 3     losartan-hydrochlorothiazide (HYZAAR) 100-25 MG per tablet TAKE ONE TABLET BY MOUTH ONE TIME DAILY 90 tablet 1     montelukast (SINGULAIR) 10 MG tablet TAKE ONE TABLET BY MOUTH ONE TIME DAILY. 90 tablet 2     Multiple Vitamins-Minerals (MULTIVITAL PO) Take 1 tablet by mouth daily       simvastatin (ZOCOR) 10 MG tablet Take 1 tablet (10 mg) by mouth At Bedtime 90 tablet 3     VENTOLIN  (90 Base) MCG/ACT Inhaler inhale 2 puffs by mouth every 6 hours as needed for shortness of breath/dyspnea 18 g 2       Current Outpatient Prescriptions Ordered in [facility]   Medication Sig Dispense Refill     amoxicillin-clavulanate (AUGMENTIN) 875-125 MG per tablet Take 1 tablet by mouth 2 times daily for possible sinus infection. 20 tablet 0     desloratadine (CLARINEX) 5 MG tablet Take 1 tablet (5 mg) by mouth 2 times daily as needed for allergies 180 tablet 1     esomeprazole (NEXIUM) 40 MG CR capsule TAKE ONE CAPSULE BY MOUTH IN THE MORNING BEFORE BREAKFAST 90 capsule 2     fluticasone (FLONASE) 50 MCG/ACT spray Spray 1 spray into both nostrils 2 times daily 1 Bottle 3     losartan-hydrochlorothiazide (HYZAAR) 100-25 MG per tablet TAKE ONE TABLET BY MOUTH ONE TIME DAILY 90 tablet 1     montelukast (SINGULAIR) 10 MG tablet TAKE ONE TABLET BY MOUTH ONE TIME DAILY. 90 tablet 2      Multiple Vitamins-Minerals (MULTIVITAL PO) Take 1 tablet by mouth daily       simvastatin (ZOCOR) 10 MG tablet Take 1 tablet (10 mg) by mouth At Bedtime 90 tablet 3     VENTOLIN  (90 Base) MCG/ACT Inhaler inhale 2 puffs by mouth every 6 hours as needed for shortness of breath/dyspnea 18 g 2     Current Facility-Administered Medications Ordered in Epic   Medication Dose Route Frequency Provider Last Rate Last Dose     lidocaine (PF) (XYLOCAINE) 1 % injection 4 mL  4 mL   Bill Ortiz MD   4 mL at 07/31/18 1422     methylPREDNISolone acetate (DEPO-MEDROL) injection 80 mg  80 mg   Bill Ortiz MD   80 mg at 07/31/18 1422       PHYSICAL EXAM:    Vitals: T Data Unavailable, P Data Unavailable, BP Data Unavailable, R Data Unavailable, SpO2  , Weight Wt Readings from Last 2 Encounters:   11/12/18 129.3 kg (285 lb)   11/02/18 129.5 kg (285 lb 9.6 oz)     See doc flowsheet    NPO STATUS: see doc flowsheet    LABS:    BMP:  Recent Labs   Lab Test  11/02/18   1049   NA  141   POTASSIUM  3.8   CHLORIDE  103   CO2  32   BUN  18   CR  1.12   GLC  100*   TATIANNA  8.9       LFTs:   Recent Labs   Lab Test  07/12/18   0740   07/11/17   1919   PROTTOTAL   --    --   7.2   ALBUMIN  3.2*   < >  3.4   BILITOTAL   --    --   0.4   ALKPHOS   --    --   92   AST   --    --   17   ALT   --    --   37    < > = values in this interval not displayed.       CBC:   Recent Labs   Lab Test  11/02/18   1049   WBC  8.0   RBC  5.06   HGB  14.4   HCT  44.2   MCV  87   MCH  28.5   MCHC  32.6   RDW  15.1*   PLT  230       Coags:  Recent Labs   Lab Test 07/22/16   INR  1.0       Imaging:  No orders to display       Thang Velazquez MD  Anesthesiology Staff  Pager [phone]    11/15/2018  8:22 AM        Thang Velazquez MD

## 2018-11-15 NOTE — TELEPHONE ENCOUNTER
I have Arturo Rahman's form and have completed it. Will give to my staff to fax today.  Martina Leung MD

## 2018-11-15 NOTE — TELEPHONE ENCOUNTER
I left a Vm for Dinah letting her know when Arturo's first f/u with us is and left our fax number.    Handy Castelan PA-C, CAQ (Ortho)  Supervising Physician: Manoj Augustin M.D., M.S.  Dept. of Orthopaedic Surgery  [facility]

## 2018-11-16 ENCOUNTER — ANESTHESIA (OUTPATIENT)
Dept: SURGERY | Facility: AMBULATORY SURGERY CENTER | Age: 63
End: 2018-11-16
Payer: COMMERCIAL

## 2018-11-16 ENCOUNTER — SURGERY (OUTPATIENT)
Age: 63
End: 2018-11-16

## 2018-11-16 ENCOUNTER — HOSPITAL ENCOUNTER (OUTPATIENT)
Facility: AMBULATORY SURGERY CENTER | Age: 63
Discharge: HOME | End: 2018-11-16
Attending: ORTHOPAEDIC SURGERY | Admitting: ORTHOPAEDIC SURGERY
Payer: COMMERCIAL

## 2018-11-16 VITALS
RESPIRATION RATE: 10 BRPM | TEMPERATURE: 97.8 F | SYSTOLIC BLOOD PRESSURE: 100 MMHG | OXYGEN SATURATION: 97 % | DIASTOLIC BLOOD PRESSURE: 73 MMHG

## 2018-11-16 DIAGNOSIS — S83.241D TEAR OF MEDIAL MENISCUS OF RIGHT KNEE, CURRENT, SUBSEQUENT ENCOUNTER: Primary | ICD-10-CM

## 2018-11-16 PROCEDURE — G8907 PT DOC NO EVENTS ON DISCHARG: HCPCS

## 2018-11-16 PROCEDURE — 29881 ARTHRS KNE SRG MNISECTMY M/L: CPT | Mod: RT | Performed by: ORTHOPAEDIC SURGERY

## 2018-11-16 PROCEDURE — 29881 ARTHRS KNE SRG MNISECTMY M/L: CPT | Mod: RT

## 2018-11-16 PROCEDURE — G8916 PT W IV AB GIVEN ON TIME: HCPCS

## 2018-11-16 RX ORDER — FENTANYL CITRATE 50 UG/ML
INJECTION, SOLUTION INTRAMUSCULAR; INTRAVENOUS PRN
Status: DISCONTINUED | OUTPATIENT
Start: 2018-11-16 | End: 2018-11-16

## 2018-11-16 RX ORDER — CEFAZOLIN SODIUM IN 0.9 % NACL 3 G/100 ML
3 INTRAVENOUS SOLUTION, PIGGYBACK (ML) INTRAVENOUS
Status: COMPLETED | OUTPATIENT
Start: 2018-11-16 | End: 2018-11-16

## 2018-11-16 RX ORDER — ONDANSETRON 2 MG/ML
4 INJECTION INTRAMUSCULAR; INTRAVENOUS EVERY 30 MIN PRN
Status: DISCONTINUED | OUTPATIENT
Start: 2018-11-16 | End: 2018-11-17 | Stop reason: HOSPADM

## 2018-11-16 RX ORDER — HYDROMORPHONE HYDROCHLORIDE 1 MG/ML
.3-.5 INJECTION, SOLUTION INTRAMUSCULAR; INTRAVENOUS; SUBCUTANEOUS EVERY 10 MIN PRN
Status: DISCONTINUED | OUTPATIENT
Start: 2018-11-16 | End: 2018-11-17 | Stop reason: HOSPADM

## 2018-11-16 RX ORDER — CEFAZOLIN SODIUM 1 G/3ML
1 INJECTION, POWDER, FOR SOLUTION INTRAMUSCULAR; INTRAVENOUS SEE ADMIN INSTRUCTIONS
Status: DISCONTINUED | OUTPATIENT
Start: 2018-11-16 | End: 2018-11-17 | Stop reason: HOSPADM

## 2018-11-16 RX ORDER — ONDANSETRON 4 MG/1
4 TABLET, ORALLY DISINTEGRATING ORAL
Status: DISCONTINUED | OUTPATIENT
Start: 2018-11-16 | End: 2018-11-17 | Stop reason: HOSPADM

## 2018-11-16 RX ORDER — ONDANSETRON 4 MG/1
4 TABLET, ORALLY DISINTEGRATING ORAL EVERY 30 MIN PRN
Status: DISCONTINUED | OUTPATIENT
Start: 2018-11-16 | End: 2018-11-17 | Stop reason: HOSPADM

## 2018-11-16 RX ORDER — AMOXICILLIN 250 MG
1-2 CAPSULE ORAL 2 TIMES DAILY
Qty: 30 TABLET | Refills: 0 | Status: SHIPPED | OUTPATIENT
Start: 2018-11-16 | End: 2022-07-11

## 2018-11-16 RX ORDER — ACETAMINOPHEN 325 MG/1
975 TABLET ORAL ONCE
Status: COMPLETED | OUTPATIENT
Start: 2018-11-16 | End: 2018-11-16

## 2018-11-16 RX ORDER — ACETAMINOPHEN 325 MG/1
975 TABLET ORAL ONCE
Status: DISCONTINUED | OUTPATIENT
Start: 2018-11-16 | End: 2018-11-17 | Stop reason: HOSPADM

## 2018-11-16 RX ORDER — HYDROCODONE BITARTRATE AND ACETAMINOPHEN 5; 325 MG/1; MG/1
1 TABLET ORAL
Status: DISCONTINUED | OUTPATIENT
Start: 2018-11-16 | End: 2018-11-17 | Stop reason: HOSPADM

## 2018-11-16 RX ORDER — HYDROXYZINE HYDROCHLORIDE 25 MG/1
25 TABLET, FILM COATED ORAL
Status: DISCONTINUED | OUTPATIENT
Start: 2018-11-16 | End: 2018-11-17 | Stop reason: HOSPADM

## 2018-11-16 RX ORDER — GABAPENTIN 300 MG/1
300 CAPSULE ORAL ONCE
Status: COMPLETED | OUTPATIENT
Start: 2018-11-16 | End: 2018-11-16

## 2018-11-16 RX ORDER — FENTANYL CITRATE 50 UG/ML
25-50 INJECTION, SOLUTION INTRAMUSCULAR; INTRAVENOUS
Status: DISCONTINUED | OUTPATIENT
Start: 2018-11-16 | End: 2018-11-17 | Stop reason: HOSPADM

## 2018-11-16 RX ORDER — METHOCARBAMOL 750 MG/1
750 TABLET, FILM COATED ORAL
Status: DISCONTINUED | OUTPATIENT
Start: 2018-11-16 | End: 2018-11-17 | Stop reason: HOSPADM

## 2018-11-16 RX ORDER — NALOXONE HYDROCHLORIDE 0.4 MG/ML
.1-.4 INJECTION, SOLUTION INTRAMUSCULAR; INTRAVENOUS; SUBCUTANEOUS
Status: DISCONTINUED | OUTPATIENT
Start: 2018-11-16 | End: 2018-11-17 | Stop reason: HOSPADM

## 2018-11-16 RX ORDER — ONDANSETRON 2 MG/ML
INJECTION INTRAMUSCULAR; INTRAVENOUS PRN
Status: DISCONTINUED | OUTPATIENT
Start: 2018-11-16 | End: 2018-11-16

## 2018-11-16 RX ORDER — LIDOCAINE HYDROCHLORIDE 20 MG/ML
INJECTION, SOLUTION INFILTRATION; PERINEURAL PRN
Status: DISCONTINUED | OUTPATIENT
Start: 2018-11-16 | End: 2018-11-16

## 2018-11-16 RX ORDER — SODIUM CHLORIDE, SODIUM LACTATE, POTASSIUM CHLORIDE, CALCIUM CHLORIDE 600; 310; 30; 20 MG/100ML; MG/100ML; MG/100ML; MG/100ML
INJECTION, SOLUTION INTRAVENOUS CONTINUOUS
Status: DISCONTINUED | OUTPATIENT
Start: 2018-11-16 | End: 2018-11-17 | Stop reason: HOSPADM

## 2018-11-16 RX ORDER — OXYCODONE HYDROCHLORIDE 5 MG/1
5 TABLET ORAL EVERY 4 HOURS PRN
Status: DISCONTINUED | OUTPATIENT
Start: 2018-11-16 | End: 2018-11-17 | Stop reason: HOSPADM

## 2018-11-16 RX ORDER — HYDROCODONE BITARTRATE AND ACETAMINOPHEN 5; 325 MG/1; MG/1
1-2 TABLET ORAL EVERY 6 HOURS PRN
Qty: 20 TABLET | Refills: 0 | Status: SHIPPED | OUTPATIENT
Start: 2018-11-16 | End: 2020-05-14

## 2018-11-16 RX ORDER — PROPOFOL 10 MG/ML
INJECTION, EMULSION INTRAVENOUS PRN
Status: DISCONTINUED | OUTPATIENT
Start: 2018-11-16 | End: 2018-11-16

## 2018-11-16 RX ORDER — LIDOCAINE 40 MG/G
CREAM TOPICAL
Status: DISCONTINUED | OUTPATIENT
Start: 2018-11-16 | End: 2018-11-17 | Stop reason: HOSPADM

## 2018-11-16 RX ORDER — IBUPROFEN 600 MG/1
600 TABLET, FILM COATED ORAL
Status: DISCONTINUED | OUTPATIENT
Start: 2018-11-16 | End: 2018-11-17 | Stop reason: HOSPADM

## 2018-11-16 RX ORDER — MEPERIDINE HYDROCHLORIDE 25 MG/ML
12.5 INJECTION INTRAMUSCULAR; INTRAVENOUS; SUBCUTANEOUS
Status: DISCONTINUED | OUTPATIENT
Start: 2018-11-16 | End: 2018-11-17 | Stop reason: HOSPADM

## 2018-11-16 RX ORDER — BUPIVACAINE HYDROCHLORIDE 2.5 MG/ML
INJECTION, SOLUTION INFILTRATION; PERINEURAL PRN
Status: DISCONTINUED | OUTPATIENT
Start: 2018-11-16 | End: 2018-11-16 | Stop reason: HOSPADM

## 2018-11-16 RX ORDER — GABAPENTIN 300 MG/1
300 CAPSULE ORAL ONCE
Status: DISCONTINUED | OUTPATIENT
Start: 2018-11-16 | End: 2018-11-17 | Stop reason: HOSPADM

## 2018-11-16 RX ORDER — DEXAMETHASONE SODIUM PHOSPHATE 4 MG/ML
INJECTION, SOLUTION INTRA-ARTICULAR; INTRALESIONAL; INTRAMUSCULAR; INTRAVENOUS; SOFT TISSUE PRN
Status: DISCONTINUED | OUTPATIENT
Start: 2018-11-16 | End: 2018-11-16

## 2018-11-16 RX ORDER — PROPOFOL 10 MG/ML
INJECTION, EMULSION INTRAVENOUS CONTINUOUS PRN
Status: DISCONTINUED | OUTPATIENT
Start: 2018-11-16 | End: 2018-11-16

## 2018-11-16 RX ORDER — OXYCODONE HYDROCHLORIDE 5 MG/1
5-10 TABLET ORAL EVERY 4 HOURS PRN
Status: DISCONTINUED | OUTPATIENT
Start: 2018-11-16 | End: 2018-11-17 | Stop reason: HOSPADM

## 2018-11-16 RX ADMIN — Medication 100 MCG: at 08:57

## 2018-11-16 RX ADMIN — PROPOFOL 50 MCG/KG/MIN: 10 INJECTION, EMULSION INTRAVENOUS at 08:14

## 2018-11-16 RX ADMIN — Medication 3 G: at 08:15

## 2018-11-16 RX ADMIN — Medication 100 MCG: at 08:55

## 2018-11-16 RX ADMIN — ONDANSETRON 4 MG: 2 INJECTION INTRAMUSCULAR; INTRAVENOUS at 08:39

## 2018-11-16 RX ADMIN — SODIUM CHLORIDE, SODIUM LACTATE, POTASSIUM CHLORIDE, CALCIUM CHLORIDE: 600; 310; 30; 20 INJECTION, SOLUTION INTRAVENOUS at 07:30

## 2018-11-16 RX ADMIN — FENTANYL CITRATE 50 MCG: 50 INJECTION, SOLUTION INTRAMUSCULAR; INTRAVENOUS at 08:03

## 2018-11-16 RX ADMIN — FENTANYL CITRATE 50 MCG: 50 INJECTION, SOLUTION INTRAMUSCULAR; INTRAVENOUS at 08:24

## 2018-11-16 RX ADMIN — Medication 100 MCG: at 08:54

## 2018-11-16 RX ADMIN — LIDOCAINE HYDROCHLORIDE 100 MG: 20 INJECTION, SOLUTION INFILTRATION; PERINEURAL at 08:03

## 2018-11-16 RX ADMIN — PROPOFOL 250 MG: 10 INJECTION, EMULSION INTRAVENOUS at 08:10

## 2018-11-16 RX ADMIN — PROPOFOL 50 MG: 10 INJECTION, EMULSION INTRAVENOUS at 08:15

## 2018-11-16 RX ADMIN — FENTANYL CITRATE 100 MCG: 50 INJECTION, SOLUTION INTRAMUSCULAR; INTRAVENOUS at 08:38

## 2018-11-16 RX ADMIN — PROPOFOL 50 MG: 10 INJECTION, EMULSION INTRAVENOUS at 08:34

## 2018-11-16 RX ADMIN — GABAPENTIN 300 MG: 300 CAPSULE ORAL at 07:30

## 2018-11-16 RX ADMIN — ACETAMINOPHEN 975 MG: 325 TABLET ORAL at 07:29

## 2018-11-16 RX ADMIN — OXYCODONE HYDROCHLORIDE 5 MG: 5 TABLET ORAL at 09:28

## 2018-11-16 RX ADMIN — DEXAMETHASONE SODIUM PHOSPHATE 4 MG: 4 INJECTION, SOLUTION INTRA-ARTICULAR; INTRALESIONAL; INTRAMUSCULAR; INTRAVENOUS; SOFT TISSUE at 08:38

## 2018-11-16 RX ADMIN — BUPIVACAINE HYDROCHLORIDE 30 ML: 2.5 INJECTION, SOLUTION INFILTRATION; PERINEURAL at 08:43

## 2018-11-16 NOTE — ANESTHESIA CARE TRANSFER NOTE
Patient: Arturo Rahman    Procedure(s):  Right knee arthroscopy, medial meniscal and chondral debridement    Diagnosis: medial meniscus tear  Diagnosis Additional Information: No value filed.    Anesthesia Type:   No value filed.     Note:  Airway :Nasal Cannula  Patient transferred to:PACU  Handoff Report: Identifed the Patient, Identified the Reponsible Provider, Reviewed the pertinent medical history, Discussed the surgical course, Reviewed Intra-OP anesthesia mangement and issues during anesthesia, Set expectations for post-procedure period and Allowed opportunity for questions and acknowledgement of understanding      Vitals: (Last set prior to Anesthesia Care Transfer)    CR[facility] VITALS  11/16/2018 0829 - 11/16/2018 0905      11/16/2018             Pulse: 72    SpO2: 99 %    Resp Rate (observed): 13                Electronically Signed By: JOSH Alonso CRNA  November 16, 2018  9:05 AM

## 2018-11-16 NOTE — DISCHARGE INSTRUCTIONS
1. Name: Arutro Rahman MRN #: [id number]  2. Date: 11/16/2018  Procedure: right knee arthroscopy, meniscal and chondral debridement  3. Discharge to home when stable, tolerating clear liquids, and patient has urinated  4. Call for follow-up appointment, [phone], with Dr. Augustin in:  2 weeks.   WOUND CARE    The bandage may be slightly bloody. This is normal.  5. Ice:  Keep an ice bag on your knee for 20 minutes at a time.  6. Keep incisions clean and dry following surgery for:  72 hours   7. Change all bandages in:  72 hours       8. If bandages are changed before follow-up, cover all incisions with fresh bandages or bandaids.  9. O.K. to shower (may get incision wet) in:  72 hours  10. No tub baths, swimming pools, hot tubs, etc. for a minimum of 2 weeks following surgery  ACTIVITY  11. Keep leg elevated on a pillow placed under ankle. Do not keep pillow under your knee.  12. Weight-bearing ([place]):  Weight-bear as tolerated       May discontinue crutches in 2-3 days if able to walk without a limp.  13. Bracing: no brace needed.  14. Range of motion limits: no limit, work on regaining full knee range of motion.  15. Exercises:  Perform exercises 3 times a day for a minimum of 25 reps each time (start today or tomorrow):             Quadriceps sets  Calf Pumps Straight leg raises  Heels Slides   16. Start Physical Therapy: upon return to clinic.  17. OK to drive:  Not until off all pain medications and not until seen at your postoperative visit at 2 weeks.    When going back to driving, be sure to test braking/acceleration maneuvers in an empty parking lot before entry into any traffic areas.      ABSOLUTELY NO DRIVING WHILE TAKING NARCOTICS!    DISCHARGE MEDICATIONS:   Aspirin 325 mg, 1 tablet, take once a day for 14 days then stop (to prevent blood clots) (over the counter)  Norco (5/325), 1 to 2 tablets, take every 6 hours as needed for pain, do not exceed 8 tabs/day  Other: stool softeners while  taking pain medications  Ok to take over the counter medications such as ibuprofen as needed and ok to substitute acetaminophen for the hydrocodone as needed.    Strong pain medication has been prescribed. Use as directed. Do not combine with alcohol. Be careful as you walk or climb stairs.   DIET:  If no nausea, clear liquids should be taken initially.  Then progress to solid foods when clear liquids are tolerated.   RESPONSE TO SURGERY: It is normal to have pain and swelling in your knee after surgery. It may take 4 weeks or longer for the swelling to go away. It is also common to notice some bruising around the knee, thigh, and calf as the swelling resolves.  EMERGENCY: Call or return for any fevers (temperature greater than 101.5   or sustained fevers greater than 100.5   that haven t resolved within 3 to 4 days following surgery) or chills, increasing pain, swelling, redness, calf pain, drainage (especially if yellow, green, or foul smelling), excessive bleeding), chest pain, shortness of breath:  Phone #: [phone]; If emergency, go to local ER or dial 911.    Manoj Augustni M.D., M.S.  Dept. of Orthopaedic Surgery  [facility]    11/16/2018        KNEE SURGERY - HOME EXERCISE PROGRAM    All exercises to be performed at least 3 times per day.     Quad Sets    Sit with leg extended    Tighten quad muscles in front of leg, trying to  push back of knee downward    Hold exercise for 10 seconds    Rest 10 seconds between reps    Perform 1 set of 20 reps, 3 times a day     Heel Slides     Lie on back with legs straight    Slide heel to buttocks     Return to start position    Repeat with other leg    Perform 1 rep every 4 seconds    Perform 3 sets of 20 reps, 3 times a day    Rest 1 minute between sets     Ankle Pumps     Lie on back with foot elevated on pillow    Move foot up and down, pumping ankle    Perform 3 sets of 20 reps, 3 times a day    Perform 1 rep every 4 seconds    Rest 1 minute  between sets     Straight Leg Raise    Lie on back with uninvolved knee bent    Raise straight leg to thigh level of bend leg    Return to starting position    Perform 3 sets of 20 reps, 3 times a day    Perform 1 rep every 4 seconds    Rest 1 minute between sets            [facility]  Same-Day Surgery   Adult Discharge Orders & Instructions   For 24 hours after surgery  1. Get plenty of rest.  A responsible adult must stay with you for at least 24 hours after you leave the hospital.   2. Do not drive or use heavy equipment.  If you have weakness or tingling, don't drive or use heavy equipment until this feeling goes away.  3. Do not drink alcohol.  4. Avoid strenuous or risky activities.  Ask for help when climbing stairs.   5. You may feel lightheaded.  IF so, sit for a few minutes before standing.  Have someone help you get up.   6. If you have nausea (feel sick to your stomach): Drink only clear liquids such as apple juice, ginger ale, broth or 7-Up.  Rest may also help.  Be sure to drink enough fluids.  Move to a regular diet as you feel able.  7. You may have a slight fever. Call the doctor if your fever is over 100 F (37.7 C) (taken under the tongue) or lasts longer than 24 hours.  8. You may have a dry mouth, a sore throat, muscle aches or trouble sleeping.  These should go away after 24 hours.  9. Do not make important or legal decisions.   Call your doctor for any of the followin.  Signs of infection (fever, growing tenderness at the surgery site, a large amount of drainage or bleeding, severe pain, foul-smelling drainage, redness, swelling).    2. It has been over 8 to 10 hours since surgery and you are still not able to urinate (pass water).    3.  Headache for over 24 hours.    4.  Numbness, tingling or weakness the day after surgery (if you had spinal anesthesia).  To contact Dr Augustin call:  [phone]

## 2018-11-16 NOTE — IP AVS SNAPSHOT
MRN:[id number]                      After Visit Summary   11/16/2018    Arturo Rahman    MRN: [id number]           Thank you!     Thank you for choosing [facility] for your care. Our goal is always to provide you with excellent care. Hearing back from our patients is one way we can continue to improve our services. Please take a few minutes to complete the written survey that you may receive in the mail after you visit with us. Thank you!        Patient Information     Date Of Birth          1955        About your hospital stay     You were admitted on:  November 16, 2018 You last received care in the:  [facility]    You were discharged on:  November 16, 2018       Who to Call     For medical emergencies, please call 911.  For non-urgent questions about your medical care, please call your primary care provider or clinic, [phone]  For questions related to your surgery, please call your surgery clinic        Attending Provider     Provider Specialty    Manoj Augustin MD Orthopedics       Primary Care Provider Office Phone # Fax #    Martina Kaleigh Leung [phone]      After Care Instructions      Diet as Tolerated       Return to diet before surgery, unless instructed otherwise.            Discharge Instructions       Review outpatient procedure discharge instructions with patient as directed by Provider            Dressing Change       Change dressing on third day after surgery.            Ice to affected area       Ice pack to surgical site every 15 minutes per hour for 24 hours            No Alcohol       For 24 hours post procedure or until off all pain medications.            No driving or operating machinery        Until seen at followup visit and off all pain medications.            Notify Provider       For signs and symptoms of infection: Fever greater than 101.5, redness, swelling, heat at site, drainage, pus.            Return to clinic        2 weeks after date of surgery in Dr. Augustin's clinic for suture removal and wound check. Please call for appointment, [phone]. [facility] [place] [facility].            Shower        Ok to remove dressing and shower on postoperative day #3. Leave steri strips in place. Dab dry. Cover with gauze. No soaking baths, hot tubs, whirlpools, swimming pools.            Weight bearing - As tolerated           Wound care       Do not submerge wound in water until sutures removed                  Your next 10 appointments already scheduled     Nov 28, 2018  1:30 PM CST   Return Visit with Manoj Augustin MD   [facility] ([facility])    [address] [facility] [address] [phone]            Mar 18, 2019  2:00 PM CDT   CT CHEST W/O CONTRAST with MGCT1   [facility] ([facility])    [id number] [place] [address] [phone]           How do I prepare for my exam? (Food and drink instructions) No Food and Drink Restrictions.  How do I prepare for my exam? (Other instructions) You do not need to do anything special to prepare for this exam. For a sinus scan: Use your nose spray (nasal decongestant spray) as directed.  What should I wear: Please wear loose clothing, such as a sweat suit or jogging clothes. Avoid snaps, zippers and other metal. We may ask you to undress and put on a hospital gown.  How long does the exam take: Most scans take less than 20 minutes.  What should I bring: Please bring any scans or X-rays taken at other hospitals, if similar tests were done. Also bring a list of your medicines, including vitamins, minerals and over-the-counter drugs. It is safest to leave personal items at home.  Do I need a : No  is needed.  What do I need to tell my doctor? Be sure to tell your doctor: * If you have any allergies. *  If there s any chance you are pregnant. * If you are breastfeeding.  What should I do after the exam: No restrictions, You may resume normal activities.  What is this test: A CT (computed tomography) scan is a series of pictures that allows us to look inside your body. The scanner creates images of the body in cross sections, much like slices of bread. This helps us see any problems more clearly.  Who should I call with questions: If you have any questions, please call the Imaging Department where you will have your exam. Directions, parking instructions, and other information is available on our website, [email or web address].            Mar 18, 2019  2:30 PM CDT   Office Visit with PFT LAB   [facility])    [address] [place] [address] [phone]           Bring a current list of meds and any records pertaining to this visit. For Physicals, please bring immunization records and any forms needing to be filled out. Please arrive 10 minutes early to complete paperwork.            Mar 18, 2019  3:30 PM CDT   Return Visit with Trent Alberto MD   [facility])    [address] [place] [address] [phone]              Further instructions from your care team       1. Name: Arturo Rahman MRN #: [id number]  2. Date: 11/16/2018  Procedure: right knee arthroscopy, meniscal and chondral debridement  3. Discharge to home when stable, tolerating clear liquids, and patient has urinated  4. Call for follow-up appointment, [phone], with Dr. Augustin in:  2 weeks.   WOUND CARE    The bandage may be slightly bloody. This is normal.  5. Ice:  Keep an ice bag on your knee for 20 minutes at a time.  6. Keep incisions clean and dry following surgery for:  72 hours   7. Change all bandages in:  72 hours       8. If bandages are changed before follow-up, cover all incisions with fresh bandages or  bandaids.  9. O.K. to shower (may get incision wet) in:  72 hours  10. No tub baths, swimming pools, hot tubs, etc. for a minimum of 2 weeks following surgery  ACTIVITY  11. Keep leg elevated on a pillow placed under ankle. Do not keep pillow under your knee.  12. Weight-bearing ([place]):  Weight-bear as tolerated       May discontinue crutches in 2-3 days if able to walk without a limp.  13. Bracing: no brace needed.  14. Range of motion limits: no limit, work on regaining full knee range of motion.  15. Exercises:  Perform exercises 3 times a day for a minimum of 25 reps each time (start today or tomorrow):             Quadriceps sets  Calf Pumps Straight leg raises  Heels Slides   16. Start Physical Therapy: upon return to clinic.  17. OK to drive:  Not until off all pain medications and not until seen at your postoperative visit at 2 weeks.    When going back to driving, be sure to test braking/acceleration maneuvers in an empty parking lot before entry into any traffic areas.      ABSOLUTELY NO DRIVING WHILE TAKING NARCOTICS!    DISCHARGE MEDICATIONS:   Aspirin 325 mg, 1 tablet, take once a day for 14 days then stop (to prevent blood clots) (over the counter)  Norco (5/325), 1 to 2 tablets, take every 6 hours as needed for pain, do not exceed 8 tabs/day  Other: stool softeners while taking pain medications  Ok to take over the counter medications such as ibuprofen as needed and ok to substitute acetaminophen for the hydrocodone as needed.    Strong pain medication has been prescribed. Use as directed. Do not combine with alcohol. Be careful as you walk or climb stairs.   DIET:  If no nausea, clear liquids should be taken initially.  Then progress to solid foods when clear liquids are tolerated.   RESPONSE TO SURGERY: It is normal to have pain and swelling in your knee after surgery. It may take 4 weeks or longer for the swelling to go away. It is also common to notice some bruising around the knee, thigh, and  calf as the swelling resolves.  EMERGENCY: Call or return for any fevers (temperature greater than 101.5   or sustained fevers greater than 100.5   that haven t resolved within 3 to 4 days following surgery) or chills, increasing pain, swelling, redness, calf pain, drainage (especially if yellow, green, or foul smelling), excessive bleeding), chest pain, shortness of breath:  Phone #: [phone]; If emergency, go to local ER or dial 911.    Manoj Augustin M.D., M.S.  Dept. of Orthopaedic Surgery  [facility]    11/16/2018        KNEE SURGERY - HOME EXERCISE PROGRAM    All exercises to be performed at least 3 times per day.     Quad Sets    Sit with leg extended    Tighten quad muscles in front of leg, trying to  push back of knee downward    Hold exercise for 10 seconds    Rest 10 seconds between reps    Perform 1 set of 20 reps, 3 times a day     Heel Slides     Lie on back with legs straight    Slide heel to buttocks     Return to start position    Repeat with other leg    Perform 1 rep every 4 seconds    Perform 3 sets of 20 reps, 3 times a day    Rest 1 minute between sets     Ankle Pumps     Lie on back with foot elevated on pillow    Move foot up and down, pumping ankle    Perform 3 sets of 20 reps, 3 times a day    Perform 1 rep every 4 seconds    Rest 1 minute between sets     Straight Leg Raise    Lie on back with uninvolved knee bent    Raise straight leg to thigh level of bend leg    Return to starting position    Perform 3 sets of 20 reps, 3 times a day    Perform 1 rep every 4 seconds    Rest 1 minute between sets            [facility]  Same-Day Surgery   Adult Discharge Orders & Instructions   For 24 hours after surgery  1. Get plenty of rest.  A responsible adult must stay with you for at least 24 hours after you leave the hospital.   2. Do not drive or use heavy equipment.  If you have weakness or tingling, don't drive or use heavy equipment until this  feeling goes away.  3. Do not drink alcohol.  4. Avoid strenuous or risky activities.  Ask for help when climbing stairs.   5. You may feel lightheaded.  IF so, sit for a few minutes before standing.  Have someone help you get up.   6. If you have nausea (feel sick to your stomach): Drink only clear liquids such as apple juice, ginger ale, broth or 7-Up.  Rest may also help.  Be sure to drink enough fluids.  Move to a regular diet as you feel able.  7. You may have a slight fever. Call the doctor if your fever is over 100 F (37.7 C) (taken under the tongue) or lasts longer than 24 hours.  8. You may have a dry mouth, a sore throat, muscle aches or trouble sleeping.  These should go away after 24 hours.  9. Do not make important or legal decisions.   Call your doctor for any of the followin.  Signs of infection (fever, growing tenderness at the surgery site, a large amount of drainage or bleeding, severe pain, foul-smelling drainage, redness, swelling).    2. It has been over 8 to 10 hours since surgery and you are still not able to urinate (pass water).    3.  Headache for over 24 hours.    4.  Numbness, tingling or weakness the day after surgery (if you had spinal anesthesia).  To contact Dr Augustin call:  [phone]      Pending Results     No orders found from 2018 to 2018.            Admission Information     Date & Time Provider Department Dept. Phone    2018 Manoj Augustin MD [facility] [phone]      Your Vitals Were     Blood Pressure Temperature Respirations Pulse Oximetry          132/88 97.7  F (36.5  C) (Temporal) 20 97%        MyChart Information     [facility] gives you secure access to your electronic health record. If you see a primary care provider, you can also send messages to your care team and make appointments. If you have questions, please call your primary care clinic.  If you do not have a primary care provider, please call [phone] and they  will assist you.        Care EveryWhere ID     This is your Care EveryWhere ID. This could be used by other organizations to access your [place] medical records  [id number]        Equal Access to Services     INGA MCCRACKEN : Sheri Medrano, watoyada aydenadaha, qagalota kabjda sobai, waxmike julian bolivarlisy loyaestrella price demetrio barrow. So [facility] [phone].    ATENCIÓN: Si habla español, tiene a santana disposición servicios gratuitos de asistencia lingüística. Llame al [phone].    We comply with applicable federal civil rights laws and [place] laws. We do not discriminate on the basis of race, color, national origin, age, disability, sex, sexual orientation, or gender identity.               Review of your medicines      START taking        Dose / Directions    aspirin 325 MG EC tablet   Used for:  Tear of medial meniscus of right knee, current, subsequent encounter        Dose:  325 mg   Take 1 tablet (325 mg) by mouth daily for 14 days   Quantity:  14 tablet   Refills:  0       HYDROcodone-acetaminophen 5-325 MG per tablet   Commonly known as:  NORCO   Used for:  Tear of medial meniscus of right knee, current, subsequent encounter        Dose:  1-2 tablet   Take 1-2 tablets by mouth every 6 hours as needed for moderate to severe pain   Quantity:  20 tablet   Refills:  0       senna-docusate 8.6-50 MG per tablet   Commonly known as:  SENOKOT-S;PERICOLACE   Used for:  Tear of medial meniscus of right knee, current, subsequent encounter        Dose:  1-2 tablet   Take 1-2 tablets by mouth 2 times daily   Quantity:  30 tablet   Refills:  0         CONTINUE these medicines which have NOT CHANGED        Dose / Directions    amoxicillin-clavulanate 875-125 MG per tablet   Commonly known as:  AUGMENTIN   Used for:  Cough        Dose:  1 tablet   Take 1 tablet by mouth 2 times daily for possible sinus infection.   Quantity:  20 tablet   Refills:  0       desloratadine 5 MG tablet   Commonly known as:  CLARINEX    Used for:  Chronic seasonal allergic rhinitis due to pollen        Dose:  5 mg   Take 1 tablet (5 mg) by mouth 2 times daily as needed for allergies   Quantity:  180 tablet   Refills:  1       esomeprazole 40 MG CR capsule   Commonly known as:  nexIUM   Used for:  Gastroesophageal reflux disease with esophagitis        TAKE ONE CAPSULE BY MOUTH IN THE MORNING BEFORE BREAKFAST   Quantity:  90 capsule   Refills:  2       fluticasone 50 MCG/ACT spray   Commonly known as:  FLONASE   Used for:  Dysfunction of both eustachian tubes        Dose:  1 spray   Spray 1 spray into both nostrils 2 times daily   Quantity:  1 Bottle   Refills:  3       losartan-hydrochlorothiazide 100-25 MG per tablet   Commonly known as:  HYZAAR   Used for:  Hypertension, goal below 140/90        TAKE ONE TABLET BY MOUTH ONE TIME DAILY   Quantity:  90 tablet   Refills:  1       montelukast 10 MG tablet   Commonly known as:  SINGULAIR   Used for:  Moderate persistent asthma without complication        TAKE ONE TABLET BY MOUTH ONE TIME DAILY.   Quantity:  90 tablet   Refills:  2       MULTIVITAL PO        Dose:  1 tablet   Take 1 tablet by mouth daily   Refills:  0       simvastatin 10 MG tablet   Commonly known as:  ZOCOR   Used for:  Hyperlipidemia LDL goal <130        Dose:  10 mg   Take 1 tablet (10 mg) by mouth At Bedtime   Quantity:  90 tablet   Refills:  3       VENTOLIN  (90 Base) MCG/ACT inhaler   Used for:  Moderate persistent asthma without complication   Generic drug:  albuterol        inhale 2 puffs by mouth every 6 hours as needed for shortness of breath/dyspnea   Quantity:  18 g   Refills:  2            Where to get your medicines      These medications were sent to [facility] - [place], [place] - [address], [address]  [address], [address], [facility] [address]     Phone:  [phone]     aspirin 325 MG EC tablet    senna-docusate 8.6-50 MG per tablet         Some of these will need a  paper prescription and others can be bought over the counter. Ask your nurse if you have questions.     Bring a paper prescription for each of these medications     HYDROcodone-acetaminophen 5-325 MG per tablet                Protect others around you: Learn how to safely use, store and throw away your medicines at www.disposemymeds.org.        Information about OPIOIDS     PRESCRIPTION OPIOIDS: WHAT YOU NEED TO KNOW   We gave you an opioid (narcotic) pain medicine. It is important to manage your pain, but opioids are not always the best choice. You should first try all the other options your care team gave you. Take this medicine for as short a time (and as few doses) as possible.    Some activities can increase your pain, such as bandage changes or therapy sessions. It may help to take your pain medicine 30 to 60 minutes before these activities. Reduce your stress by getting enough sleep, working on hobbies you enjoy and practicing relaxation or meditation. Talk to your care team about ways to manage your pain beyond prescription opioids.    These medicines have risks:    DO NOT drive when on new or higher doses of pain medicine. These medicines can affect your alertness and reaction times, and you could be arrested for driving under the influence (DUI). If you need to use opioids long-term, talk to your care team about driving.    DO NOT operate heavy machinery    DO NOT do any other dangerous activities while taking these medicines.    DO NOT drink any alcohol while taking these medicines.     If the opioid prescribed includes acetaminophen, DO NOT take with any other medicines that contain acetaminophen. Read all labels carefully. Look for the word  acetaminophen  or  Tylenol.  Ask your pharmacist if you have questions or are unsure.    You can get addicted to pain medicines, especially if you have a history of addiction (chemical, alcohol or substance dependence). Talk to your care team about ways to reduce  this risk.    All opioids tend to cause constipation. Drink plenty of water and eat foods that have a lot of fiber, such as fruits, vegetables, prune juice, apple juice and high-fiber cereal. Take a laxative (Miralax, milk of magnesia, Colace, Senna) if you don t move your bowels at least every other day. Other side effects include upset stomach, sleepiness, dizziness, throwing up, tolerance (needing more of the medicine to have the same effect), physical dependence and slowed breathing.    Store your pills in a secure place, locked if possible. We will not replace any lost or stolen medicine. If you don t finish your medicine, please throw away (dispose) as directed by your pharmacist. The [place] Pollution [employer] Agency has more information about safe disposal: https://www.pca.[facility].[place].us/living-green/managing-unwanted-medications             Medication List: This is a list of all your medications and when to take them. Check marks below indicate your daily home schedule. Keep this list as a reference.      Medications           Morning Afternoon Evening Bedtime As Needed    amoxicillin-clavulanate 875-125 MG per tablet   Commonly known as:  AUGMENTIN   Take 1 tablet by mouth 2 times daily for possible sinus infection.                                aspirin 325 MG EC tablet   Take 1 tablet (325 mg) by mouth daily for 14 days                                desloratadine 5 MG tablet   Commonly known as:  CLARINEX   Take 1 tablet (5 mg) by mouth 2 times daily as needed for allergies                                esomeprazole 40 MG CR capsule   Commonly known as:  nexIUM   TAKE ONE CAPSULE BY MOUTH IN THE MORNING BEFORE BREAKFAST                                fluticasone 50 MCG/ACT spray   Commonly known as:  FLONASE   Spray 1 spray into both nostrils 2 times daily                                HYDROcodone-acetaminophen 5-325 MG per tablet   Commonly known as:  NORCO   Take 1-2 tablets by mouth every 6 hours as  needed for moderate to severe pain                                losartan-hydrochlorothiazide 100-25 MG per tablet   Commonly known as:  HYZAAR   TAKE ONE TABLET BY MOUTH ONE TIME DAILY                                montelukast 10 MG tablet   Commonly known as:  SINGULAIR   TAKE ONE TABLET BY MOUTH ONE TIME DAILY.                                MULTIVITAL PO   Take 1 tablet by mouth daily                                senna-docusate 8.6-50 MG per tablet   Commonly known as:  SENOKOT-S;PERICOLACE   Take 1-2 tablets by mouth 2 times daily                                simvastatin 10 MG tablet   Commonly known as:  ZOCOR   Take 1 tablet (10 mg) by mouth At Bedtime                                VENTOLIN  (90 Base) MCG/ACT inhaler   inhale 2 puffs by mouth every 6 hours as needed for shortness of breath/dyspnea   Generic drug:  albuterol

## 2018-11-16 NOTE — H&P (VIEW-ONLY)
[address] [address]  [phone]  Dept: [phone]    PRE-OP EVALUATION:  Today's date: 2018    Arturo Rahman (: 1955) presents for pre-operative evaluation assessment as requested by Dr. Augustin.  He requires evaluation and anesthesia risk assessment prior to undergoing surgery/procedure for treatment of Right Knee meniscus tear .    Proposed Surgery/ Procedure: Right knee arthroscopy and meniscus repair.   Date of Surgery/ Procedure: 18  Time of Surgery/ Procedure: NA  Hospital/Surgical Facility: [facility]  Fax number for surgical facility:   Primary Physician: Martina Leung  Type of Anesthesia Anticipated: to be determined    Patient has a Health Care Directive or Living Will:  NO    1. NO - Do you have a history of heart attack, stroke, stent, bypass or surgery on an artery in the head, neck, heart or legs?  2. NO - Do you ever have any pain or discomfort in your chest?  3. NO - Do you have a history of  Heart Failure?  4. NO - Are you troubled by shortness of breath when: walking on the level, up a slight hill or at night? Sometimes with hills gets mild dyspnea.   5. NO - DO YOU CURRENTLY HAVE A COLD, BRONCHITIS OR OTHER RESPIRATORY INFECTION?   5. NO - Do you currently have a cold, bronchitis or other respiratory infection?  6. NO - Do you have a cough, shortness of breath or wheezing? Improved after surgery for hamartoma in bronchi. Decontioning  7. NO - Do you sometimes get pains in the calves of your legs when you walk?  8. NO - Do you or anyone in your family have previous history of blood clots?  9. NO - Do you or does anyone in your family have a serious bleeding problem such as prolonged bleeding following surgeries or cuts?  10. NO - Have you ever had problems with anemia or been told to take iron pills?  11. NO - Have you had any abnormal blood loss such as black, tarry or bloody stools,  or abnormal vaginal bleeding?  12. NO - Have you ever had a blood transfusion?  13. NO - Have you or any of your relatives ever had problems with anesthesia?  14. NO - Do you have sleep apnea, excessive snoring or daytime drowsiness?  15. NO - Do you have any prosthetic heart valves?  16. NO - Do you have prosthetic joints?  17. NO - Is there any chance that you may be pregnant?      HPI:     HPI related to upcoming procedure: Intermittent right knee pain that is sometimes severe then gets better. No distinct start to the pain or injury. Worsening over past year.       ASTHMA - Patient has a longstanding history of moderate-severe Asthma . Patient has been doing well overall noting WHEEZING and continues on medication regimen consisting of inhaler without adverse reactions or side effects.                                                                                                                                               .  HYPERLIPIDEMIA - Patient has a long history of significant Hyperlipidemia requiring medication for treatment with recent good control. Patient reports no problems or side effects with the medication.                                                                                                                                                       .  HYPERTENSION - Patient has longstanding history of HTN , currently denies any symptoms referable to elevated blood pressure. Specifically denies chest pain, palpitations, dyspnea, orthopnea, PND or peripheral edema. Blood pressure readings have been in normal range. Current medication regimen is as listed below. Patient denies any side effects of medication.                                                                                                                                                                                          .    MEDICAL HISTORY:     Patient Active Problem List    Diagnosis Date Noted     Hamartoma of left lung  (H) 08/08/2017     Priority: High     Morbid obesity (H) 07/11/2017     Priority: High     Hypertension, goal below 140/90 03/11/2014     Priority: High     Hyperlipidemia LDL goal <130 10/31/2010     Priority: High     Asthma, moderate persistent      Priority: High     Granulomatous lung disease (H)      Priority: High     Right knee pain, unspecified chronicity 07/12/2018     Priority: Medium     Allergic rhinitis 09/19/2012     Priority: Medium     Problem list name updated by automated process. Provider to review       GERD (gastroesophageal reflux disease)      Priority: Medium     Advanced directives, counseling/discussion 12/21/2012     Priority: Low     Advance Care Planning:   ACP Review and Resources Provided:  Reviewed chart for advance care plan.  Arturo STERLING Paulquirino has no plan or code status on file. Discussed available resources on 03/11/2014. Confirmed code status reflects current choices pending further ACP discussions.  Confirmed/documented designated decision maker(s). See permanent comments section of demographics in clinical tab.   Added by Torrie Larry on 3/11/2014  Discussed advance care planning with patient; information given to patient to review.  Catherine Russell MA   12/21/2012   Discussed advance care planning with patient; however, patient declined at this time. March 11, 2014  Diann Ortiz MA         FH: colon cancer      Priority: Low     colonoscopy q 5yr.  last 2008        Past Medical History:   Diagnosis Date     Allergies      Asthma, moderate persistent      FH: colon cancer     colonoscopy q 5yr.  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     HTN (hypertension)      Hyperlipidemia      Past Surgical History:   Procedure Laterality Date     BRONCHOSCOPY FLEXIBLE AND RIGID N/A 7/19/2017    Procedure: BRONCHOSCOPY FLEXIBLE AND RIGID;   Bronchoscopy, Tumor Debulking with Cryoprobe and Argon Plasma Coagulation;   Surgeon: Albino Crump MD;  Location: [facility] OR     BRONCHOSCOPY FLEXIBLE,L CRYOBIOPSY N/A 1/11/2018    Procedure: BRONCHOSCOPY FLEXIBLE, CRYOBIOPSY;  Flexible Bronchoscopy, Rigid Bronchoscopy, Tumor Debulking With Cryoprobe;  Surgeon: Albino Crump MD;  Location: [facility] OR     COLONOSCOPY  11/08    + polyp, +FH, repeat in 5 years     COMBINED ENDOSCOPY UPPER WITH ARGON PLASMA COAGULATOR (APC) N/A 7/19/2017    Procedure: COMBINED ENDOSCOPY UPPER WITH ARGON PLASMA COAGULATOR (APC);;  Surgeon: Albino Crump MD;  Location: [facility] OR     TONSILLECTOMY       VASECTOMY       Current Outpatient Prescriptions   Medication Sig Dispense Refill     desloratadine (CLARINEX) 5 MG tablet Take 1 tablet (5 mg) by mouth 2 times daily as needed for allergies 180 tablet 1     esomeprazole (NEXIUM) 40 MG CR capsule TAKE ONE CAPSULE BY MOUTH IN THE MORNING BEFORE BREAKFAST 90 capsule 2     fluticasone (FLONASE) 50 MCG/ACT spray Spray 1 spray into both nostrils 2 times daily 1 Bottle 3     losartan-hydrochlorothiazide (HYZAAR) 100-25 MG per tablet TAKE ONE TABLET BY MOUTH ONE TIME DAILY 90 tablet 1     montelukast (SINGULAIR) 10 MG tablet TAKE ONE TABLET BY MOUTH ONE TIME DAILY. 90 tablet 2     Multiple Vitamins-Minerals (MULTIVITAL PO) Take 1 tablet by mouth daily       simvastatin (ZOCOR) 10 MG tablet Take 1 tablet (10 mg) by mouth At Bedtime 90 tablet 3     VENTOLIN  (90 Base) MCG/ACT Inhaler inhale 2 puffs by mouth every 6 hours as needed for shortness of breath/dyspnea 18 g 2     OTC products: None, except as noted above    Allergies   Allergen Reactions     Lisinopril Cough      Latex Allergy: NO    Social History   Substance Use Topics     Smoking status: Former Smoker     Quit date: 10/28/1991     Smokeless tobacco: Never Used     Alcohol use Yes      Comment: rare     History   Drug Use No       REVIEW OF SYSTEMS:   Constitutional, neuro, ENT, endocrine, pulmonary, cardiac, gastrointestinal, genitourinary,  "musculoskeletal, integument and psychiatric systems are negative, except as otherwise noted.    EXAM:   /90  Pulse 75  Temp 98.6  F (37  C) (Oral)  Resp 18  Ht 5' 8.25\" (1.734 m)  Wt 285 lb 9.6 oz (129.5 kg)  SpO2 97%  BMI 43.11 kg/m2  GENERAL: healthy, alert, well nourished, well hydrated, no distress, obese  HENT: ear canals- normal; TMs- normal; Nose- normal; Mouth- no ulcers, no lesions, throat is clear with no erythema or exudate.   NECK: no tenderness, no adenopathy, no asymmetry, no masses, no stiffness; thyroid- normal to palpation  RESP: lungs clear to auscultation - no rales, no rhonchi, no wheezes  CV: regular rates and rhythm, normal S1 S2, no S3 or S4 and no murmur, no click or rub -  ABDOMEN: soft, no tenderness, no  hepatosplenomegaly, no masses, normal bowel sounds  MS: extremities- no gross deformities noted, 1+ edema  SKIN: no suspicious lesions, no rashes  NEURO: strength and tone- normal, sensory exam- grossly normal, mentation- intact, speech- normal, reflexes- symmetric  BACK: no CVA tenderness, no paralumbar tenderness  PSYCH: Alert and oriented times 3; speech- coherent , normal rate and volume; able to articulate logical thoughts, able to abstract reason, no tangential thoughts, no hallucinations or delusions, affect- normal     DIAGNOSTICS:   EKG: appears normal, NSR, normal axis, normal intervals, no acute ST/T changes c/w ischemia, no LVH by voltage criteria, unchanged from previous tracings    Recent Labs   Lab Test  07/12/18   0740  12/22/17   0827  07/11/17   1919 07/22/16 03/21/16   1351   HGB  13.9  14.1  14.2   --   14.3   PLT  246  219  260   --   203   INR   --    --    --   1.0   --    NA  142  142  141   --   140   POTASSIUM  3.9  3.9  3.8  4.2  3.2*   CR  1.25  1.13  1.21  1.13  1.30*   A1C   --    --   6.1*   --   6.4*        IMPRESSION:   Reason for surgery/procedure: Right Knee meniscus tear .    Proposed Surgery/ Procedure: Right knee arthroscopy and meniscus " repair.   Diagnosis/reason for consult: cardiac and anesthesia risk assessment     The proposed surgical procedure is considered INTERMEDIATE risk.    REVISED CARDIAC RISK INDEX  The patient has the following serious cardiovascular risks for perioperative complications such as (MI, PE, VFib and 3  AV Block):  No serious cardiac risks  INTERPRETATION: 0 risks: Class I (very low risk - 0.4% complication rate)    The patient has the following additional risks for perioperative complications:  No identified additional risks  Morbid obesity      ICD-10-CM    1. Preop general physical exam Z01.818 EKG 12-lead complete w/read - Clinics   2. Meniscus degeneration, right M23.306 Approved for surgery and anesthesia   3. Right knee pain, unspecified chronicity M25.561 arthroscopy   4. Dependent edema R60.9 Stable and on diuretic. Worse when on feet long time. Not due to heart problems   5. Moderate persistent asthma without complication J45.40 Well controlled on medications    6. Granulomatous lung disease (H) J84.10 stable   7. Hyperlipidemia LDL goal <130 E78.5 Well controlled on medications    8. Hypertension, goal below 140/90- a little high today. Recheck in 3 months.  I10 CBC with platelets differential     Basic metabolic panel   9. Morbid obesity (H) E66.01 Weight loss counseling done        RECOMMENDATIONS:         --Patient is to take all scheduled medications on the day of surgery EXCEPT for modifications listed below.    ACE Inhibitor or Angiotensin Receptor Blocker (ARB) Use  Ace inhibitor or Angiotensin Receptor Blocker (ARB) and should HOLD this medication for the 24 hours prior to surgery.      APPROVAL GIVEN to proceed with proposed procedure, without further diagnostic evaluation       Signed Electronically by: Martina Leung MD    Copy of this evaluation report is provided to requesting physician.    [facility] Preop Guidelines    Revised Cardiac Risk Index

## 2018-11-16 NOTE — IP AVS SNAPSHOT
[facility]    [address] [place] [address]    Phone:  [phone]                                       After Visit Summary   11/16/2018    Arturo Rahman    MRN: [id number]           After Visit Summary Signature Page     I have received my discharge instructions, and my questions have been answered. I have discussed any challenges I see with this plan with the nurse or doctor.    ..........................................................................................................................................  Patient/Patient Representative Signature      ..........................................................................................................................................  Patient Representative Print Name and Relationship to Patient    ..................................................               ................................................  Date                                   Time    ..........................................................................................................................................  Reviewed by Signature/Title    ...................................................              ..............................................  Date                                               Time          22EPIC Rev 08/18

## 2018-11-16 NOTE — INTERVAL H&P NOTE
"The History and Physical on patient's chart was personally reviewed today with the patient. there have been no interval changes in patient's history since H+P performed.    History:  Arturo Rahman is a 63 year old male with  ongoing right knee pain with no known injury.   Pain has been present since about 7/2017, however worsened early July 2018. He had a cortisone injection with Dr. Ortiz on 7/31/2018. This injection worked well for about 2.5 months. Since then, his pain has returned. Has mild pain today, rated a 2/10. He continues to have medial knee pain. \"Stabbing\" pain comes on with twisting the knee. Occasional swelling. Denies locking and catching. Would like to discuss his options today.        Present symptoms: mild pain, medial knee.     X-RAY:  no new images today.  3 views right knee from 7/12/2018 : no obvious fractures or dislocations. Joint spaces are preserved. No significant marginal osteophyte formation. Minimal chondrocalcinosis. No acute fracture or malalignment. Small joint effusion.      MRI 7/23/2018:  1. Complex medial meniscus tear in the body and posterior horn with  increased signal at the meniscocapsular junction indicating possible  meniscocapsular tear versus stress in this area from the tear of the  body and posterior horn the medial meniscus.  2. Increased signal in the articular cartilage of the medial femoral  condyle consistent with cartilage fissuring.  3. Irregularity of the lateral patellar facet cartilage. Abnormal  signal over the medial patellar facet with evidence of cartilage  thinning.   4. Moderate joint effusion.  5. 3.5 x 1 x 8 mm likely ganglion cyst tracking along the posterior  collateral ligament.     Impression:   63 year old male with acute on chronic right knee pain, complex medial meniscus tear, mild chondromalacia.        Plan:   * discussed MRI findings with patient, what appears to be a right knee medial meniscal tear on MRI, as well as some mild " underlying arthritic changes, which is consistent with symptoms and physical examination findings.      * Discussed treatment options including nonoperative treatment with continued rest, ice, elevation, activity modification, NSAIDS and Physical Therapy, bracing and potential injections versus surgical treatment with arthroscopy and meniscal repair versus debridement. Risks and benefits of each discussed in detail.  * in the setting of underlying chondrosis, predictability of arthroscopy is uncertain, unless mechanical symptoms present due to the meniscus tear.     * surgical risks discussed: bleeding, infection, pain, scar, damage to adjacent structures (nerve, vessels, cartilage), stiffness, post-traumatic arthritis, failure to relieve symptoms, recurrence of symptoms, blood clots (DVT), pulmonary emolism, risks of anesthesia and death. This surgery is not intended nor expected to alleviate arthritic pain symptoms, nor will it treat or correct underlying arthritic changes. Arthritis and symptoms related to arthritis could worsen with arthroscopy and meniscal / chondral debridement. Patient understands.     * understanding the risks of surgery, he would like to discuss this with his wife before continuing with surgery. Likely will continue with surgery.  * plan: right knee arthroscopy with partial meniscal / chondral debridement, outpatient surgery      Risks and perceived benefits of surgery again discussed with patient. Patient's questions addressed and answered. Written informed consent obtained and reviewed. Surgical site marked with indelible marker with patient's participation after confirming site with patient.      Manoj Augustin M.D., M.S.  Dept. of Orthopaedic Surgery  [facility]

## 2018-11-16 NOTE — OP NOTE
Procedure Date: 11/16/2018      DATE OF SURGERY:  11/16/2018.      PREOPERATIVE DIAGNOSIS:  Right knee complex medial meniscus tear, chondromalacia, chondrocalcinosis.      POSTOPERATIVE DIAGNOSIS:  Right knee complex medial meniscus tear, chondromalacia, chondrocalcinosis.      PROCEDURES:   1.  Right knee arthroscopic partial medial meniscectomy.   2.  Right knee shaving chondroplasty of the medial femoral condyle.      ATTENDING SURGEON:  Manoj Augustin MD.      ASSISTANT:  Handy Castelan PA-C.      ANESTHESIA:  General in the supine position.      INTRAVENOUS FLUID:  900 mL lactated Ringer's.      URINE  OUTPUT:  0, no Aguero.      ESTIMATED BLOOD LOSS:  1 mL.      ANTIBIOTICS:  Cefazolin 3 grams IV prior to incision and tourniquet inflation.      TOURNIQUET TIME:  15 minutes at 300 mmHg of the right upper thigh.      DRAINS:  None.      SPECIMENS:  None.      IMPLANTS:  None.      COMPLICATIONS:  None apparent.      FINDINGS:  No effusion.  Mild synovitis.  No significant medial plica.  Some grade 3 chondrosis of the superior pole of the patella.  Grade 2 chondrosis elsewhere.  Grade 1 chondrosis of the femoral trochlea.  No loose bodies.  Intact ACL within the notch.  Prominent fat pad.  Diffuse calcifications throughout the knee.  Lateral compartment with intact lateral meniscus.  Grade 1 and 2 chondrosis.  Medial compartment with complex tear of the posterior horn/body junction with a displaced undersurface flap in a radial component.  Diffuse grade 3 chondrosis of the medial femoral condyle.      INDICATIONS FOR PROCEDURE:  Arturo Rahman is a pleasant 63-year-old gentleman with ongoing right knee pain since 07/2017 without injury.  Progressively worse over the following year and significantly worsened in 07/2018.  He was seen by Dr. Ortiz and had a cortisone injection at the end of 07/2018.  This worked for about 2-1/2 months, but the pain had returned.  Pain is mostly medial with stabbing pain with  twisting the knee.  Occasional swelling.  No locking or catching.  He has used anti-inflammatories and activity modification without significant relief.  He had an MRI showing complex tear of the posterior horn and body of the meniscus with diffuse chondral, medial and patellofemoral chondromalacia, effusion and likely popliteal cyst.  He was then seen in our office.  We reviewed his imaging studies, discussed the treatment options, continued nonsurgical versus surgical management with arthroscopy.  After an in-depth discussion, he elected to proceed with surgery.  We had a detailed discussion about the risks and perceived benefits in our consultation on 10/10/2018.      DETAILS OF PROCEDURE:  The patient was identified in the preoperative holding area.  After confirming with the patient the correct procedure and the procedure site, the right lower extremity was marked with an indelible marker by myself, the attending surgeon.  Again, reviewing risks and perceived benefits of surgery, questions were addressed.  He elected to proceed.  Written informed consent was obtained and reviewed.      The patient was then taken to the operating room, placed supine on operating table.  All bony prominences well padded.  He was adequately secured.  After adequate general anesthesia, a nonsterile tourniquet was placed on the right upper thigh.  Right lower extremity was prepped and draped in the usual sterile fashion.  A timeout was then performed confirming correct patient, procedure, procedure site, availability of instruments and implants, review of the patient's allergies as well as the administration of prophylactic antibiotics by operating staff.      At this time, the right lower extremity was elevated, exsanguinated with an Esmarch and tourniquet inflated.  Standard anterolateral arthroscopy portal was made.  Upon entering the suprapatellar pouch, no obvious effusion.  Mild distal patellar synovitis.  Diffuse  calcifications throughout the knee.  Mild to moderate patellofemoral chondrosis of the superior pole and medial patellar facet.  Trochlea was grossly intact.  No significant medial plica.  No loose bodies in the gutters.  Upon entering the notch, the ACL was grossly intact, with some longitudinal fissuring.  Anterior medial arthroscopy portal was made, guided with a spinal needle.  Probe was introduced.  Knee was placed in a figure-of-4 position.  Evaluating superior and inferior surfaces of the lateral meniscus using the probe revealed no obvious meniscal tear.  Mild chondrosis.      Knee was then placed in figure-of-4 position, evaluating the medial compartment.  There was a radial tear of the posterior horn/body junction, and when probing this, there appeared to be a displaced undersurface flap tear as well as a flap posteriorly.  Alternating between the oscillating debrider and meniscal biter, this was debrided back until this was stable, confirmed with probing.  There were diffuse calcifications within the meniscus as well.      Noted chondrosis of the majority of the medial femoral condyle with delamination and fissuring.  Calcifications within this as well.  Using the oscillating debrider a gentle shaving chondroplasty of the medial femoral condyle was performed.  A final diagnostic examination of the knee was performed.  No loose bodies.  Remaining fluid was drained from the knee.  Instruments were removed.  Wounds were closed with 3-0 Prolene, injected with 0.25% Marcaine.  Steri-Strips, well-padded soft dressing, Ace wrap and tourniquet deflated.  He was then awakened and taken to recovery in stable condition.      Postoperatively rest, ice, elevate.  Weightbear as tolerated.  Home exercise program.  Oral pain medications will include hydrocodone.  Stool softeners.  Daily aspirin 2 weeks for blood thinning.  He may choose over-the-counter pain medications such as ibuprofen or substitute acetaminophen for  the hydrocodone as needed.      No apparent complications.         BERTHA HALL MD             D: 2018   T: 2018   MT: MARLIN      Name:     VALERIE CHO   MRN:      [id number]        Account:        [id number]   :      1955           Procedure Date: 2018      Document: [id number]

## 2018-11-16 NOTE — ANESTHESIA POSTPROCEDURE EVALUATION
Anesthesia POST Procedure Evaluation    Patient: Arturo Rahman   MRN:     [id number] Gender:   male   Age:    63 year old :      1955        Preoperative Diagnosis: medial meniscus tear   Procedure(s):  Right knee arthroscopy, medial meniscal and chondral debridement   Postop Comments: No value filed.       Anesthesia Type:  General    Reportable Event: NO     PAIN: Uncomplicated   Sign Out status: Comfortable, Well controlled pain     PONV: No PONV   Sign Out status:  No Nausea or Vomiting     Neuro/Psych: Uneventful perioperative course   Sign Out Status: Preoperative baseline; Age appropriate mentation     Airway/Resp.: Uneventful perioperative course   Sign Out Status: Non labored breathing, age appropriate RR; Resp. Status within EXPECTED Parameters     CV: Uneventful perioperative course   Sign Out status: Appropriate BP and perfusion indices; Appropriate HR/Rhythm     Disposition:   Sign Out in:  PACU  Disposition:  Phase II; Home  Recovery Course: Uneventful  Follow-Up: Not required           Last Anesthesia Record Vitals:  CRNA VITALS  2018 0829 - 2018 0929      2018             Pulse: 72    SpO2: 99 %    Resp Rate (observed): 13          Last PACU/Preop Vitals:  Vitals:    18 0915 18 0930 18 0945   BP: 99/62 101/67 100/73   Resp: 16 19 10   Temp:      SpO2: 98% 94% 97%         Electronically Signed By: Thang Velazquez MD, 2018

## 2018-11-28 ENCOUNTER — OFFICE VISIT (OUTPATIENT)
Dept: ORTHOPEDICS | Facility: CLINIC | Age: 63
End: 2018-11-28
Payer: COMMERCIAL

## 2018-11-28 VITALS
TEMPERATURE: 98.2 F | WEIGHT: 285 LBS | HEART RATE: 87 BPM | SYSTOLIC BLOOD PRESSURE: 159 MMHG | BODY MASS INDEX: 43.02 KG/M2 | DIASTOLIC BLOOD PRESSURE: 112 MMHG | OXYGEN SATURATION: 97 %

## 2018-11-28 DIAGNOSIS — Z98.890 S/P ARTHROSCOPIC SURGERY OF RIGHT KNEE: Primary | ICD-10-CM

## 2018-11-28 PROCEDURE — 99024 POSTOP FOLLOW-UP VISIT: CPT | Performed by: ORTHOPAEDIC SURGERY

## 2018-11-28 ASSESSMENT — PAIN SCALES - GENERAL: PAINLEVEL: NO PAIN (0)

## 2018-11-28 NOTE — PROGRESS NOTES
"Chief Complaint   Patient presents with     Right Knee - Surgical Followup     Pt states he feels fine, slight stiffness.  Has not needed medication.    Chhaya Cormier CMA  11/28/2018  1:30 PM           SURGERY: right knee arthroscopy. ( [facility] )  1. Right knee arthroscopic partial medial meniscectomy  2. Right knee shaving chondroplasty of the medial femoral condyle.  DATE OF SURGERY: 11/16/2018.      HISTORY OF PRESENT ILLNESS:  Arturo \"Jayson\" ASHER Rahman is a 63 year old male seen for postoperative evaluation of a right knee arthroscopy and partial medial meniscectomy and shaving chondroplasty for right knee complex medial meniscus tear, chondromalacia and chondrocalcinosis. Surgery occurred 2 weeks ago. Returns today stating he is doing well. Pain has been none, 0/10. No problems with the surgical wounds. Denies fevers chills or night sweats. No associated numbness or tingling. Has been taking it easy since surgery as recommended. Has not needed medication. Taking aspirin daily. Presents with his wife. Some stiffness after sitting, but able to move it just fine.        OR FINDINGS:  No effusion.  Mild synovitis.  No significant medial plica.  Some grade 3 chondrosis of the superior pole of the patella.  Grade 2 chondrosis elsewhere.  Grade 1 chondrosis of the femoral trochlea.  No loose bodies.  Intact ACL within the notch.  Prominent fat pad.  Diffuse calcifications throughout the knee.  Lateral compartment with intact lateral meniscus.  Grade 1 and 2 chondrosis.  Medial compartment with complex tear of the posterior horn/body junction with a displaced undersurface flap in a radial component.  Diffuse grade 3 chondrosis of the medial femoral condyle.     Past Medical History:   Diagnosis Date     Allergies      Asthma, moderate persistent      FH: colon cancer     colonoscopy q 5yr.  last 2008     GERD (gastroesophageal reflux disease)      Granulomatous lung disease (H)     ? " histoplasmosis     HTN (hypertension)      Hyperlipidemia        Past Surgical History:   Procedure Laterality Date     ARTHROSCOPY KNEE Right 11/16/2018    Procedure: Right knee arthroscopy, medial meniscal and chondral debridement;  Surgeon: Manoj Augustin MD;  Location: [facility] OR     BRONCHOSCOPY FLEXIBLE AND RIGID N/A 7/19/2017    Procedure: BRONCHOSCOPY FLEXIBLE AND RIGID;   Bronchoscopy, Tumor Debulking with Cryoprobe and Argon Plasma Coagulation;  Surgeon: Albino Crump MD;  Location: [facility] OR     BRONCHOSCOPY FLEXIBLE,L CRYOBIOPSY N/A 1/11/2018    Procedure: BRONCHOSCOPY FLEXIBLE, CRYOBIOPSY;  Flexible Bronchoscopy, Rigid Bronchoscopy, Tumor Debulking With Cryoprobe;  Surgeon: Albino Crump MD;  Location: [facility] OR     COLONOSCOPY  11/08    + polyp, +FH, repeat in 5 years     COMBINED ENDOSCOPY UPPER WITH ARGON PLASMA COAGULATOR (APC) N/A 7/19/2017    Procedure: COMBINED ENDOSCOPY UPPER WITH ARGON PLASMA COAGULATOR (APC);;  Surgeon: Albino Crump MD;  Location: [facility] OR     TONSILLECTOMY       VASECTOMY         Medications:   Current Outpatient Prescriptions:      aspirin 325 MG EC tablet, Take 1 tablet (325 mg) by mouth daily for 14 days, Disp: 14 tablet, Rfl: 0     desloratadine (CLARINEX) 5 MG tablet, Take 1 tablet (5 mg) by mouth 2 times daily as needed for allergies, Disp: 180 tablet, Rfl: 1     esomeprazole (NEXIUM) 40 MG CR capsule, TAKE ONE CAPSULE BY MOUTH IN THE MORNING BEFORE BREAKFAST, Disp: 90 capsule, Rfl: 2     fluticasone (FLONASE) 50 MCG/ACT spray, Spray 1 spray into both nostrils 2 times daily, Disp: 1 Bottle, Rfl: 3     losartan-hydrochlorothiazide (HYZAAR) 100-25 MG per tablet, TAKE ONE TABLET BY MOUTH ONE TIME DAILY, Disp: 90 tablet, Rfl: 1     montelukast (SINGULAIR) 10 MG tablet, TAKE ONE TABLET BY MOUTH ONE TIME DAILY., Disp: 90 tablet, Rfl: 2     Multiple Vitamins-Minerals (MULTIVITAL PO), Take 1 tablet by mouth daily, Disp: , Rfl:      simvastatin (ZOCOR) 10 MG tablet,  Take 1 tablet (10 mg) by mouth At Bedtime, Disp: 90 tablet, Rfl: 3     VENTOLIN  (90 Base) MCG/ACT Inhaler, inhale 2 puffs by mouth every 6 hours as needed for shortness of breath/dyspnea, Disp: 18 g, Rfl: 2     amoxicillin-clavulanate (AUGMENTIN) 875-125 MG per tablet, Take 1 tablet by mouth 2 times daily for possible sinus infection. (Patient not taking: Reported on 11/28/2018), Disp: 20 tablet, Rfl: 0     HYDROcodone-acetaminophen (NORCO) 5-325 MG per tablet, Take 1-2 tablets by mouth every 6 hours as needed for moderate to severe pain (Patient not taking: Reported on 11/28/2018), Disp: 20 tablet, Rfl: 0     senna-docusate (SENOKOT-S;PERICOLACE) 8.6-50 MG per tablet, Take 1-2 tablets by mouth 2 times daily (Patient not taking: Reported on 11/28/2018), Disp: 30 tablet, Rfl: 0    Current Facility-Administered Medications:      lidocaine (PF) (XYLOCAINE) 1 % injection 4 mL, 4 mL, , , Bill Ortiz MD, 4 mL at 07/31/18 1422     methylPREDNISolone acetate (DEPO-MEDROL) injection 80 mg, 80 mg, , , Bill Ortiz MD, 80 mg at 07/31/18 1422    Allergies:   Allergies   Allergen Reactions     Lisinopril Cough       REVIEW OF SYSTEMS:   CONSTITUTIONAL:NEGATIVE for fever, chills, night sweats  INTEGUMENTARY/SKIN: NEGATIVE for worrisome wound problems or redness.  MUSCULOSKELETAL:See HPI above  NEURO: NEGATIVE for weakness, dizziness or paresthesias    This document serves as a record of the services and decisions personally performed and made by Manoj Augustin MD. It was created on his behalf by Robyn Quarles, a trained medical scribe. The creation of this document is based the provider's statements to the medical scribe.    Lamar Quarles 1:47 PM 11/28/2018       PHYSICAL EXAM:  BP (!) 159/112 (BP Location: Left arm, Patient Position: Sitting, Cuff Size: Adult Large)  Pulse 87  Temp 98.2  F (36.8  C) (Oral)  Wt 285 lb (129.3 kg)  SpO2 97%  BMI 43.02 kg/m2   GENERAL APPEARANCE: healthy, alert, no  distress  SKIN: no suspicious lesions or rashes  NEURO: Normal strength and tone, mentation intact and speech normal  PSYCH:  mentation appears normal and affect normal, not anxious  RESPIRATORY: No increased work of breathing.    RIGHT LOWER EXTREMITY:  Gait: mildy favoring right  No Quad atrophy, strength normal.  Intact sensation deep peroneal nerve, superficial peroneal nerve, med/lat tibial nerve, sural nerve, saphenous nerve  Intact EHL, EDL, TA, FHL, GS, quadriceps hamstrings and hip flexors  Toes warm and well perfused, brisk capillary refill. Palpable 2+ dp pulses.  calf soft and nttp or squeeze.  Edema: none    RIGHT KNEE EXAM:    Incision: healing well, sutures intact, skin well approximated.  Skin: intact, no ecchymosis or erythema  ROM:  0 extension to 115 flexion  Effusion: moderate  Tender: NTTP med/lat joint line, anterior or posterior knee      X-RAY: none indicated.      Impression: Arturo Rahman is a 63 year old male 2 weeks status post right knee arthroscopy and partial medial meniscectomy and shaving chondroplasty, doing well.       Plan: routine postoperative knee arthroscopy  * suture removal  * reviewed surgical photos with patient.    * WB status: Cautioned not to overdo it too quickly. Increased activities too soon will lead to more swelling of the knee and leg, more pain, slower recovery. Expect 6-8 weeks.    * Rest  * Activity modification - avoid activities that aggravate symptoms.  * NSAIDS - regular use for inflammation, with food, as long as no contra-indications. Please discuss with pcp if needed.  * OK to drive if patient feels comfortable.  * Ice twice daily to three times daily as needed.  * Compression wrap as needed.  * Elevation of extremity to reduce swelling as needed.  * PT for strengthening, stretching and range of motion exercises, effusion control.  * Tylenol as needed for pain  * Workability update: No return to work at this time. Reassess in 4 weeks.   * return to  clinic in 4 weeks, sooner if needed.  * Patient to follow up with Primary Care provider regarding elevated blood pressure       * We did also discuss that based on the amount of arthritic changes seen at the time of surgery, may have continued knee pain due to the arthritis. Once recovered from the knee arthroscopy, and arthritic symptoms persist, consider full treatment of arthritis starting with Physical Therapy and injections, NSAIDS, activity modification, bracing.    The information in this document, created by a scribe for me, accurately reflects the services I personally performed and the decisions made by me. I have reviewed and approved this document for accuracy.        Manoj Augustin M.D., M.S.  Dept. of Orthopaedic Surgery  [facility]

## 2018-11-28 NOTE — MR AVS SNAPSHOT
After Visit Summary   11/28/2018    Arturo Rahman    MRN: [id number]           Patient Information     Date Of Birth          1955        Visit Information        Provider Department      11/28/2018 1:30 PM Manoj Augustin MD [facility]        Today's Diagnoses     S/P arthroscopic surgery of right knee    -  1       Follow-ups after your visit        Additional Services     [facility] PT, HAND, AND CHIROPRACTIC REFERRAL       Physical Therapy, Hand Therapy and Chiropractic Care are available through:  *[facility]  *Hand Therapy (Occupational Therapy or Physical Therapy)  *[facility]    Call one number to schedule at any of the above locations: [phone].    Physical therapy, Hand therapy and/or Chiropractic care has been recommended by your physician as an excellent treatment option to reduce pain and help people return to normal activities, including sports.  Therapy and/or chiropractic care services are a great complement or alternative to expensive and invasive surgery, injections, or long-term use of prescription medications. The primary goal is to identify the underlying problem and provide you the tools to manage your condition on your own.     Please be aware that coverage of these services is subject to the terms and limitations of your health insurance plan.  Call member services at your health plan with any benefit or coverage questions.      Please bring the following to your appointment:  *Your personal calendar for scheduling future appointments  *Comfortable clothing                  Follow-up notes from your care team     Return in about 4 weeks (around 12/26/2018) for clinical recheck.      Your next 10 appointments already scheduled     Mar 18, 2019  2:00 PM CDT   CT CHEST W/O CONTRAST with MGCT1   [facility]    [address] [place] [id number]    [phone]           How do I prepare for my exam? (Food and drink instructions) No Food and Drink Restrictions.  How do I prepare for my exam? (Other instructions) You do not need to do anything special to prepare for this exam. For a sinus scan: Use your nose spray (nasal decongestant spray) as directed.  What should I wear: Please wear loose clothing, such as a sweat suit or jogging clothes. Avoid snaps, zippers and other metal. We may ask you to undress and put on a hospital gown.  How long does the exam take: Most scans take less than 20 minutes.  What should I bring: Please bring any scans or X-rays taken at other hospitals, if similar tests were done. Also bring a list of your medicines, including vitamins, minerals and over-the-counter drugs. It is safest to leave personal items at home.  Do I need a : No  is needed.  What do I need to tell my doctor? Be sure to tell your doctor: * If you have any allergies. * If there s any chance you are pregnant. * If you are breastfeeding.  What should I do after the exam: No restrictions, You may resume normal activities.  What is this test: A CT (computed tomography) scan is a series of pictures that allows us to look inside your body. The scanner creates images of the body in cross sections, much like slices of bread. This helps us see any problems more clearly.  Who should I call with questions: If you have any questions, please call the Imaging Department where you will have your exam. Directions, parking instructions, and other information is available on our website, [email or web address].            Mar 18, 2019  2:30 PM CDT   Office Visit with PFT LAB   [facility] ([facility]    [address] [place] [id number] [phone]           Bring a current list of meds and any records pertaining to this visit. For Physicals, please bring immunization records and any forms needing to be filled out.  Please arrive 10 minutes early to complete paperwork.            Mar 18, 2019  3:30 PM CDT   Return Visit with Trent Alberto MD   [facility]    [address] [place] [id number] [phone]              Future tests that were ordered for you today     Open Future Orders        Priority Expected Expires Ordered    [facility] PT, HAND, AND CHIROPRACTIC REFERRAL Routine  11/28/2019 11/28/2018            Who to contact     If you have questions or need follow up information about today's clinic visit or your schedule please contact [facility] directly at [phone].  Normal or non-critical lab and imaging results will be communicated to you by [employer], letter or phone within 4 business days after the clinic has received the results. If you do not hear from us within 7 days, please contact the clinic through [employer] or phone. If you have a critical or abnormal lab result, we will notify you by phone as soon as possible.  Submit refill requests through [employer] or call your pharmacy and they will forward the refill request to us. Please allow 3 business days for your refill to be completed.          Additional Information About Your Visit        [employer] Information     [employer] gives you secure access to your electronic health record. If you see a primary care provider, you can also send messages to your care team and make appointments. If you have questions, please call your primary care clinic.  If you do not have a primary care provider, please call [phone] and they will assist you.        Care EveryWhere ID     This is your Care EveryWhere ID. This could be used by other organizations to access your [facility] medical records  [id number]        Your Vitals Were     Pulse Temperature Pulse Oximetry BMI (Body Mass Index)          87 98.2  F (36.8  C) (Oral) 97% 43.02 kg/m2         Blood Pressure from Last 3 Encounters:   11/28/18 (!)  159/112   11/16/18 100/73   11/12/18 134/80    Weight from Last 3 Encounters:   11/28/18 285 lb (129.3 kg)   11/12/18 285 lb (129.3 kg)   11/02/18 285 lb 9.6 oz (129.5 kg)               Primary Care Provider Office Phone # Fax #    Martina Kaleigh Leung [phone]       [address]  [facility] [address]        Equal Access to Services     INGA MCCRACKEN : Hadii aad ku hadasho Soomaali, waaxda luqadaha, qaybta kaalmada adeegyada, waxay idiin hayaan adeeg kharash lablane . So [facility] [phone].    ATENCIÓN: Si habla español, tiene a santana disposición servicios gratuitos de asistencia lingüística. Llame al [phone].    We comply with applicable federal civil rights laws and [place] laws. We do not discriminate on the basis of race, color, national origin, age, disability, sex, sexual orientation, or gender identity.            Thank you!     Thank you for choosing [facility]  for your care. Our goal is always to provide you with excellent care. Hearing back from our patients is one way we can continue to improve our services. Please take a few minutes to complete the written survey that you may receive in the mail after your visit with us. Thank you!             Your Updated Medication List - Protect others around you: Learn how to safely use, store and throw away your medicines at www.disposemymeds.org.          This list is accurate as of 11/28/18  5:19 PM.  Always use your most recent med list.                   Brand Name Dispense Instructions for use Diagnosis    amoxicillin-clavulanate 875-125 MG tablet    AUGMENTIN    20 tablet    Take 1 tablet by mouth 2 times daily for possible sinus infection.    Cough       aspirin 325 MG EC tablet    ASA    14 tablet    Take 1 tablet (325 mg) by mouth daily for 14 days    Tear of medial meniscus of right knee, current, subsequent encounter       desloratadine 5 MG tablet    CLARINEX    180 tablet    Take 1 tablet (5 mg) by mouth 2  times daily as needed for allergies    Chronic seasonal allergic rhinitis due to pollen       esomeprazole 40 MG DR capsule    nexIUM    90 capsule    TAKE ONE CAPSULE BY MOUTH IN THE MORNING BEFORE BREAKFAST    Gastroesophageal reflux disease with esophagitis       fluticasone 50 MCG/ACT nasal spray    FLONASE    1 Bottle    Spray 1 spray into both nostrils 2 times daily    Dysfunction of both eustachian tubes       HYDROcodone-acetaminophen 5-325 MG tablet    NORCO    20 tablet    Take 1-2 tablets by mouth every 6 hours as needed for moderate to severe pain    Tear of medial meniscus of right knee, current, subsequent encounter       losartan-hydrochlorothiazide 100-25 MG per tablet    HYZAAR    90 tablet    TAKE ONE TABLET BY MOUTH ONE TIME DAILY    Hypertension, goal below 140/90       montelukast 10 MG tablet    SINGULAIR    90 tablet    TAKE ONE TABLET BY MOUTH ONE TIME DAILY.    Moderate persistent asthma without complication       MULTIVITAL PO      Take 1 tablet by mouth daily        senna-docusate 8.6-50 MG tablet    SENOKOT-S/PERICOLACE    30 tablet    Take 1-2 tablets by mouth 2 times daily    Tear of medial meniscus of right knee, current, subsequent encounter       simvastatin 10 MG tablet    ZOCOR    90 tablet    Take 1 tablet (10 mg) by mouth At Bedtime    Hyperlipidemia LDL goal <130       VENTOLIN  (90 Base) MCG/ACT inhaler   Generic drug:  albuterol     18 g    inhale 2 puffs by mouth every 6 hours as needed for shortness of breath/dyspnea    Moderate persistent asthma without complication

## 2018-11-28 NOTE — LETTER
"    11/28/2018         RE: Arturo Rahman  [address]  [facility] [address]        Dear Colleague,    Thank you for referring your patient, Arturo Rahman, to the [facility]. Please see a copy of my visit note below.    Patient to follow up with Primary Care provider regarding elevated blood pressure.    Chhaya Cormier, [facility]  11/28/2018  1:32 PM        Chief Complaint   Patient presents with     Right Knee - Surgical Followup     Pt states he feels fine, slight stiffness.  Has not needed medication.    Chhaya Cormier, [facility]  11/28/2018  1:30 PM           SURGERY: right knee arthroscopy. ( [facility] )  1. Right knee arthroscopic partial medial meniscectomy  2. Right knee shaving chondroplasty of the medial femoral condyle.  DATE OF SURGERY: 11/16/2018.      HISTORY OF PRESENT ILLNESS:  Arturo \"Jayson\" ASHER Rahman is a 63 year old male seen for postoperative evaluation of a right knee arthroscopy and partial medial meniscectomy and shaving chondroplasty for right knee complex medial meniscus tear, chondromalacia and chondrocalcinosis. Surgery occurred 2 weeks ago. Returns today stating he is doing well. Pain has been none, 0/10. No problems with the surgical wounds. Denies fevers chills or night sweats. No associated numbness or tingling. Has been taking it easy since surgery as recommended. Has not needed medication. Taking aspirin daily. Presents with his wife. Some stiffness after sitting, but able to move it just fine.        OR FINDINGS:  No effusion.  Mild synovitis.  No significant medial plica.  Some grade 3 chondrosis of the superior pole of the patella.  Grade 2 chondrosis elsewhere.  Grade 1 chondrosis of the femoral trochlea.  No loose bodies.  Intact ACL within the notch.  Prominent fat pad.  Diffuse calcifications throughout the knee.  Lateral compartment with intact lateral meniscus.  Grade 1 and 2 chondrosis.  Medial compartment with complex " tear of the posterior horn/body junction with a displaced undersurface flap in a radial component.  Diffuse grade 3 chondrosis of the medial femoral condyle.     Past Medical History:   Diagnosis Date     Allergies      Asthma, moderate persistent      FH: colon cancer     colonoscopy q 5yr.  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     HTN (hypertension)      Hyperlipidemia        Past Surgical History:   Procedure Laterality Date     ARTHROSCOPY KNEE Right 11/16/2018    Procedure: Right knee arthroscopy, medial meniscal and chondral debridement;  Surgeon: Manoj Augustin MD;  Location: [facility] OR     BRONCHOSCOPY FLEXIBLE AND RIGID N/A 7/19/2017    Procedure: BRONCHOSCOPY FLEXIBLE AND RIGID;   Bronchoscopy, Tumor Debulking with Cryoprobe and Argon Plasma Coagulation;  Surgeon: Albino Crump MD;  Location: [facility] OR     BRONCHOSCOPY FLEXIBLE,L CRYOBIOPSY N/A 1/11/2018    Procedure: BRONCHOSCOPY FLEXIBLE, CRYOBIOPSY;  Flexible Bronchoscopy, Rigid Bronchoscopy, Tumor Debulking With Cryoprobe;  Surgeon: Albino Crump MD;  Location: [facility] OR     COLONOSCOPY  11/08    + polyp, +FH, repeat in 5 years     COMBINED ENDOSCOPY UPPER WITH ARGON PLASMA COAGULATOR (APC) N/A 7/19/2017    Procedure: COMBINED ENDOSCOPY UPPER WITH ARGON PLASMA COAGULATOR (APC);;  Surgeon: Albino Crump MD;  Location: [facility] OR     TONSILLECTOMY       VASECTOMY         Medications:   Current Outpatient Prescriptions:      aspirin 325 MG EC tablet, Take 1 tablet (325 mg) by mouth daily for 14 days, Disp: 14 tablet, Rfl: 0     desloratadine (CLARINEX) 5 MG tablet, Take 1 tablet (5 mg) by mouth 2 times daily as needed for allergies, Disp: 180 tablet, Rfl: 1     esomeprazole (NEXIUM) 40 MG CR capsule, TAKE ONE CAPSULE BY MOUTH IN THE MORNING BEFORE BREAKFAST, Disp: 90 capsule, Rfl: 2     fluticasone (FLONASE) 50 MCG/ACT spray, Spray 1 spray into both nostrils 2 times daily,  Disp: 1 Bottle, Rfl: 3     losartan-hydrochlorothiazide (HYZAAR) 100-25 MG per tablet, TAKE ONE TABLET BY MOUTH ONE TIME DAILY, Disp: 90 tablet, Rfl: 1     montelukast (SINGULAIR) 10 MG tablet, TAKE ONE TABLET BY MOUTH ONE TIME DAILY., Disp: 90 tablet, Rfl: 2     Multiple Vitamins-Minerals (MULTIVITAL PO), Take 1 tablet by mouth daily, Disp: , Rfl:      simvastatin (ZOCOR) 10 MG tablet, Take 1 tablet (10 mg) by mouth At Bedtime, Disp: 90 tablet, Rfl: 3     VENTOLIN  (90 Base) MCG/ACT Inhaler, inhale 2 puffs by mouth every 6 hours as needed for shortness of breath/dyspnea, Disp: 18 g, Rfl: 2     amoxicillin-clavulanate (AUGMENTIN) 875-125 MG per tablet, Take 1 tablet by mouth 2 times daily for possible sinus infection. (Patient not taking: Reported on 11/28/2018), Disp: 20 tablet, Rfl: 0     HYDROcodone-acetaminophen (NORCO) 5-325 MG per tablet, Take 1-2 tablets by mouth every 6 hours as needed for moderate to severe pain (Patient not taking: Reported on 11/28/2018), Disp: 20 tablet, Rfl: 0     senna-docusate (SENOKOT-S;PERICOLACE) 8.6-50 MG per tablet, Take 1-2 tablets by mouth 2 times daily (Patient not taking: Reported on 11/28/2018), Disp: 30 tablet, Rfl: 0    Current Facility-Administered Medications:      lidocaine (PF) (XYLOCAINE) 1 % injection 4 mL, 4 mL, , , Bill Ortiz MD, 4 mL at 07/31/18 1422     methylPREDNISolone acetate (DEPO-MEDROL) injection 80 mg, 80 mg, , , Bill Ortiz MD, 80 mg at 07/31/18 1422    Allergies:   Allergies   Allergen Reactions     Lisinopril Cough       REVIEW OF SYSTEMS:   CONSTITUTIONAL:NEGATIVE for fever, chills, night sweats  INTEGUMENTARY/SKIN: NEGATIVE for worrisome wound problems or redness.  MUSCULOSKELETAL:See HPI above  NEURO: NEGATIVE for weakness, dizziness or paresthesias    This document serves as a record of the services and decisions personally performed and made by Manoj Augustin MD. It was created on his behalf by shahana Larsen  medical scribe. The creation of this document is based the provider's statements to the medical scribe.    Lamar Robyn Quarles 1:47 PM 11/28/2018       PHYSICAL EXAM:  BP (!) 159/112 (BP Location: Left arm, Patient Position: Sitting, Cuff Size: Adult Large)  Pulse 87  Temp 98.2  F (36.8  C) (Oral)  Wt 285 lb (129.3 kg)  SpO2 97%  BMI 43.02 kg/m2   GENERAL APPEARANCE: healthy, alert, no distress  SKIN: no suspicious lesions or rashes  NEURO: Normal strength and tone, mentation intact and speech normal  PSYCH:  mentation appears normal and affect normal, not anxious  RESPIRATORY: No increased work of breathing.    RIGHT LOWER EXTREMITY:  Gait: mildy favoring right  No Quad atrophy, strength normal.  Intact sensation deep peroneal nerve, superficial peroneal nerve, med/lat tibial nerve, sural nerve, saphenous nerve  Intact EHL, EDL, TA, FHL, GS, quadriceps hamstrings and hip flexors  Toes warm and well perfused, brisk capillary refill. Palpable 2+ dp pulses.  calf soft and nttp or squeeze.  Edema: none    RIGHT KNEE EXAM:    Incision: healing well, sutures intact, skin well approximated.  Skin: intact, no ecchymosis or erythema  ROM:  0 extension to 115 flexion  Effusion: moderate  Tender: NTTP med/lat joint line, anterior or posterior knee      X-RAY: none indicated.      Impression: Arturo Rahman is a 63 year old male 2 weeks status post right knee arthroscopy and partial medial meniscectomy and shaving chondroplasty, doing well.       Plan: routine postoperative knee arthroscopy  * suture removal  * reviewed surgical photos with patient.    * WB status: Cautioned not to overdo it too quickly. Increased activities too soon will lead to more swelling of the knee and leg, more pain, slower recovery. Expect 6-8 weeks.    * Rest  * Activity modification - avoid activities that aggravate symptoms.  * NSAIDS - regular use for inflammation, with food, as long as no contra-indications. Please discuss with pcp if  needed.  * OK to drive if patient feels comfortable.  * Ice twice daily to three times daily as needed.  * Compression wrap as needed.  * Elevation of extremity to reduce swelling as needed.  * PT for strengthening, stretching and range of motion exercises, effusion control.  * Tylenol as needed for pain  * Workability update: No return to work at this time. Reassess in 4 weeks.   * return to clinic in 4 weeks, sooner if needed.  * Patient to follow up with Primary Care provider regarding elevated blood pressure       * We did also discuss that based on the amount of arthritic changes seen at the time of surgery, may have continued knee pain due to the arthritis. Once recovered from the knee arthroscopy, and arthritic symptoms persist, consider full treatment of arthritis starting with Physical Therapy and injections, NSAIDS, activity modification, bracing.    The information in this document, created by a scribe for me, accurately reflects the services I personally performed and the decisions made by me. I have reviewed and approved this document for accuracy.        Manoj Augustin M.D., M.S.  Dept. of Orthopaedic Surgery  [facility] [place]      Again, thank you for allowing me to participate in the care of your patient.        Sincerely,        Manoj Augustin MD

## 2018-11-28 NOTE — PROGRESS NOTES
Patient to follow up with Primary Care provider regarding elevated blood pressure.    Chhaya Cormier CMA  11/28/2018  1:32 PM

## 2018-11-30 ENCOUNTER — THERAPY VISIT (OUTPATIENT)
Dept: PHYSICAL THERAPY | Facility: CLINIC | Age: 63
End: 2018-11-30
Attending: PHYSICIAN ASSISTANT
Payer: COMMERCIAL

## 2018-11-30 DIAGNOSIS — Z98.890 S/P ARTHROSCOPIC SURGERY OF RIGHT KNEE: ICD-10-CM

## 2018-11-30 DIAGNOSIS — M25.561 RIGHT KNEE PAIN, UNSPECIFIED CHRONICITY: Primary | ICD-10-CM

## 2018-11-30 PROCEDURE — 97112 NEUROMUSCULAR REEDUCATION: CPT | Mod: GP | Performed by: PHYSICAL THERAPIST

## 2018-11-30 PROCEDURE — 97110 THERAPEUTIC EXERCISES: CPT | Mod: GP | Performed by: PHYSICAL THERAPIST

## 2018-11-30 PROCEDURE — 97161 PT EVAL LOW COMPLEX 20 MIN: CPT | Mod: GP | Performed by: PHYSICAL THERAPIST

## 2018-11-30 ASSESSMENT — ACTIVITIES OF DAILY LIVING (ADL)
KNEE_ACTIVITY_OF_DAILY_LIVING_SCORE: 68.57
RAW_SCORE: 48
PAIN: I HAVE THE SYMPTOM BUT IT DOES NOT AFFECT MY ACTIVITY
SWELLING: I DO NOT HAVE THE SYMPTOM
LIMPING: I HAVE THE SYMPTOM BUT IT DOES NOT AFFECT MY ACTIVITY
HOW_WOULD_YOU_RATE_THE_CURRENT_FUNCTION_OF_YOUR_KNEE_DURING_YOUR_USUAL_DAILY_ACTIVITIES_ON_A_SCALE_FROM_0_TO_100_WITH_100_BEING_YOUR_LEVEL_OF_KNEE_FUNCTION_PRIOR_TO_YOUR_INJURY_AND_0_BEING_THE_INABILITY_TO_PERFORM_ANY_OF_YOUR_USUAL_DAILY_ACTIVITIES?: 65
STIFFNESS: THE SYMPTOM AFFECTS MY ACTIVITY SLIGHTLY
KNEE_ACTIVITY_OF_DAILY_LIVING_SUM: 48
GIVING WAY, BUCKLING OR SHIFTING OF KNEE: THE SYMPTOM AFFECTS MY ACTIVITY MODERATELY
SIT WITH YOUR KNEE BENT: ACTIVITY IS NOT DIFFICULT
RISE FROM A CHAIR: ACTIVITY IS MINIMALLY DIFFICULT
SQUAT: ACTIVITY IS FAIRLY DIFFICULT
KNEEL ON THE FRONT OF YOUR KNEE: I AM UNABLE TO DO THE ACTIVITY
GO UP STAIRS: ACTIVITY IS MINIMALLY DIFFICULT
HOW_WOULD_YOU_RATE_THE_OVERALL_FUNCTION_OF_YOUR_KNEE_DURING_YOUR_USUAL_DAILY_ACTIVITIES?: ABNORMAL
AS_A_RESULT_OF_YOUR_KNEE_INJURY,_HOW_WOULD_YOU_RATE_YOUR_CURRENT_LEVEL_OF_DAILY_ACTIVITY?: NEARLY NORMAL
STAND: ACTIVITY IS MINIMALLY DIFFICULT
WEAKNESS: THE SYMPTOM AFFECTS MY ACTIVITY SLIGHTLY
GO DOWN STAIRS: ACTIVITY IS MINIMALLY DIFFICULT
WALK: ACTIVITY IS MINIMALLY DIFFICULT

## 2018-11-30 NOTE — MR AVS SNAPSHOT
After Visit Summary   11/30/2018    Arturo Rahman    MRN: [id number]           Patient Information     Date Of Birth          1955        Visit Information        Provider Department      11/30/2018 11:30 AM Arianna Pichardo, PT [facility]        Today's Diagnoses     Right knee pain, unspecified chronicity    -  1    S/P arthroscopic surgery of right knee           Follow-ups after your visit        Your next 10 appointments already scheduled     Dec 07, 2018 11:30 AM CST   RAIMUNDO Extremity with Arianna Pichardo, PT   [facility] ([facility]  )    [address] [address]   [phone]            Dec 10, 2018 11:00 AM CST   RAIMUNDO Extremity with Arianna Pichardo, PT   [facility] ([facility]  )    [address] [address]   [phone]            Dec 14, 2018 12:40 PM CST   RAIMUNDO Extremity with Olivia Vega [facility] ([facility]  )    [address] [address]   [phone]            Mar 18, 2019  2:00 PM CDT   CT CHEST W/O CONTRAST with MGCT[facility]   [facility]    [address] [place] [address] [phone]           How do I prepare for my exam? (Food and drink instructions) No Food and Drink Restrictions.  How do I prepare for my exam? (Other instructions) You do not need to do anything special to prepare for this exam. For a sinus scan: Use your nose spray (nasal decongestant spray) as directed.  What should I wear: Please wear loose clothing, such as a sweat suit or jogging clothes. Avoid snaps, zippers and other metal. We may ask you to undress and put on a hospital gown.  How long does the exam take: Most scans take less than 20 minutes.  What should I bring: Please bring any scans or X-rays taken at  other hospitals, if similar tests were done. Also bring a list of your medicines, including vitamins, minerals and over-the-counter drugs. It is safest to leave personal items at home.  Do I need a : No  is needed.  What do I need to tell my doctor? Be sure to tell your doctor: * If you have any allergies. * If there s any chance you are pregnant. * If you are breastfeeding.  What should I do after the exam: No restrictions, You may resume normal activities.  What is this test: A CT (computed tomography) scan is a series of pictures that allows us to look inside your body. The scanner creates images of the body in cross sections, much like slices of bread. This helps us see any problems more clearly.  Who should I call with questions: If you have any questions, please call the Imaging Department where you will have your exam. Directions, parking instructions, and other information is available on our website, [email or web address].            Mar 18, 2019  2:30 PM CDT   Office Visit with PFT LAB   [facility]    [address] [place] [address] [phone]           Bring a current list of meds and any records pertaining to this visit. For Physicals, please bring immunization records and any forms needing to be filled out. Please arrive 10 minutes early to complete paperwork.            Mar 18, 2019  3:30 PM CDT   Return Visit with Trent Alberto MD   [facility]    [address] [place] [address] [phone]              Who to contact     If you have questions or need follow up information about today's clinic visit or your schedule please contact [facility] directly at [phone].  Normal or non-critical lab and imaging results will be communicated to you by MyChart, letter or phone within 4 business days after the clinic has received  the results. If you do not hear from us within 7 days, please contact the clinic through [employer] or phone. If you have a critical or abnormal lab result, we will notify you by phone as soon as possible.  Submit refill requests through [employer] or call your pharmacy and they will forward the refill request to us. Please allow 3 business days for your refill to be completed.          Additional Information About Your Visit        "[employer] Information     [employer] gives you secure access to your electronic health record. If you see a primary care provider, you can also send messages to your care team and make appointments. If you have questions, please call your primary care clinic.  If you do not have a primary care provider, please call [phone] and they will assist you.        Care EveryWhere ID     This is your Care EveryWhere ID. This could be used by other organizations to access your [facility] medical records  [id number]         Blood Pressure from Last 3 Encounters:   11/28/18 (!) 159/112   11/16/18 100/73   11/12/18 134/80    Weight from Last 3 Encounters:   11/28/18 129.3 kg (285 lb)   11/12/18 129.3 kg (285 lb)   11/02/18 129.5 kg (285 lb 9.6 oz)              We Performed the Following     HC PT EVAL, LOW COMPLEXITY     RAIMUNDO INITIAL EVAL REPORT     RAIMUNDO PT, HAND, AND CHIROPRACTIC REFERRAL     NEUROMUSCULAR RE-EDUCATION     THERAPEUTIC EXERCISES        Primary Care Provider Office Phone # Fax #    Martina Kaleigh Leung [phone]       [address]  [facility] [address]        Equal Access to Services     LORETTA MCCRACKEN : Hadii aad ku hadasho Soomaali, waaxda luqadaha, qaybta kaalmada adeegyada, earl barrow. So [facility] [phone].    ATENCIÓN: Si habla español, tiene a santana disposición servicios gratuitos de asistencia lingüística. Llame al [phone].    We comply with applicable federal civil rights laws and [place] laws. We do not discriminate on the basis  of race, color, national origin, age, disability, sex, sexual orientation, or gender identity.            Thank you!     Thank you for choosing [facility]  for your care. Our goal is always to provide you with excellent care. Hearing back from our patients is one way we can continue to improve our services. Please take a few minutes to complete the written survey that you may receive in the mail after your visit with us. Thank you!             Your Updated Medication List - Protect others around you: Learn how to safely use, store and throw away your medicines at www.disposemymeds.org.          This list is accurate as of 11/30/18 12:49 PM.  Always use your most recent med list.                   Brand Name Dispense Instructions for use Diagnosis    amoxicillin-clavulanate 875-125 MG tablet    AUGMENTIN    20 tablet    Take 1 tablet by mouth 2 times daily for possible sinus infection.    Cough       aspirin 325 MG EC tablet    ASA    14 tablet    Take 1 tablet (325 mg) by mouth daily for 14 days    Tear of medial meniscus of right knee, current, subsequent encounter       desloratadine 5 MG tablet    CLARINEX    180 tablet    Take 1 tablet (5 mg) by mouth 2 times daily as needed for allergies    Chronic seasonal allergic rhinitis due to pollen       esomeprazole 40 MG DR capsule    nexIUM    90 capsule    TAKE ONE CAPSULE BY MOUTH IN THE MORNING BEFORE BREAKFAST    Gastroesophageal reflux disease with esophagitis       fluticasone 50 MCG/ACT nasal spray    FLONASE    1 Bottle    Spray 1 spray into both nostrils 2 times daily    Dysfunction of both eustachian tubes       HYDROcodone-acetaminophen 5-325 MG tablet    NORCO    20 tablet    Take 1-2 tablets by mouth every 6 hours as needed for moderate to severe pain    Tear of medial meniscus of right knee, current, subsequent encounter       losartan-hydrochlorothiazide 100-25 MG tablet    HYZAAR    90 tablet    TAKE ONE TABLET BY  MOUTH ONE TIME DAILY    Hypertension, goal below 140/90       montelukast 10 MG tablet    SINGULAIR    90 tablet    TAKE ONE TABLET BY MOUTH ONE TIME DAILY.    Moderate persistent asthma without complication       MULTIVITAL PO      Take 1 tablet by mouth daily        senna-docusate 8.6-50 MG tablet    SENOKOT-S/PERICOLACE    30 tablet    Take 1-2 tablets by mouth 2 times daily    Tear of medial meniscus of right knee, current, subsequent encounter       simvastatin 10 MG tablet    ZOCOR    90 tablet    Take 1 tablet (10 mg) by mouth At Bedtime    Hyperlipidemia LDL goal <130       VENTOLIN  (90 Base) MCG/ACT inhaler   Generic drug:  albuterol     18 g    inhale 2 puffs by mouth every 6 hours as needed for shortness of breath/dyspnea    Moderate persistent asthma without complication

## 2018-12-07 ENCOUNTER — THERAPY VISIT (OUTPATIENT)
Dept: PHYSICAL THERAPY | Facility: CLINIC | Age: 63
End: 2018-12-07
Payer: COMMERCIAL

## 2018-12-07 DIAGNOSIS — Z98.890 S/P ARTHROSCOPIC SURGERY OF RIGHT KNEE: ICD-10-CM

## 2018-12-07 DIAGNOSIS — M25.561 RIGHT KNEE PAIN, UNSPECIFIED CHRONICITY: ICD-10-CM

## 2018-12-07 PROCEDURE — 97112 NEUROMUSCULAR REEDUCATION: CPT | Mod: GP | Performed by: PHYSICAL THERAPIST

## 2018-12-07 PROCEDURE — 97110 THERAPEUTIC EXERCISES: CPT | Mod: GP | Performed by: PHYSICAL THERAPIST

## 2018-12-08 DIAGNOSIS — I10 HYPERTENSION, GOAL BELOW 140/90: ICD-10-CM

## 2018-12-08 DIAGNOSIS — J30.1 CHRONIC SEASONAL ALLERGIC RHINITIS DUE TO POLLEN: ICD-10-CM

## 2018-12-08 DIAGNOSIS — K21.00 GASTROESOPHAGEAL REFLUX DISEASE WITH ESOPHAGITIS: ICD-10-CM

## 2018-12-08 DIAGNOSIS — J45.40 MODERATE PERSISTENT ASTHMA WITHOUT COMPLICATION: ICD-10-CM

## 2018-12-08 NOTE — TELEPHONE ENCOUNTER
"Requested Prescriptions   Pending Prescriptions Disp Refills     montelukast (SINGULAIR) 10 MG tablet [Pharmacy Med Name: Montelukast Sodium Oral Tablet 10 MG]    Last Written Prescription Date:  3/13/18  Last Fill Quantity: 90,  # refills: 2  Last Office Visit with [facility] or [facility] prescribing provider:  11/12/18   Future Office Visit:      90 tablet 1     Sig: TAKE ONE TABLET BY MOUTH ONE TIME DAILY.    Leukotriene Inhibitors Protocol Passed    12/8/2018  2:31 PM       Passed - Patient is age 12 or older    If patient is under 16, ok to refill using age based dosing.          Passed - Asthma control assessment score within normal limits in last 6 months    Please review ACT score.          Passed - Recent (6 mo) or future (30 days) visit within the authorizing provider's specialty    Patient had office visit in the last 6 months or has a visit in the next 30 days with authorizing provider or within the authorizing provider's specialty.  See \"Patient Info\" tab in [employer], or \"Choose Columns\" in Meds & Orders section of the refill encounter.            esomeprazole (NEXIUM) 40 MG DR capsule [Pharmacy Med Name: Esomeprazole Magnesium Oral Capsule Delayed Release 40 MG]    Last Written Prescription Date:  3/13/18  Last Fill Quantity: 90,  # refills: 2   Last Office Visit with [facility] or [facility] prescribing provider:  11/12/18   Future Office Visit:      90 capsule 1     Sig: TAKE ONE CAPSULE BY MOUTH IN THE MORNING BEFORE BREAKFAST    PPI Protocol Passed    12/8/2018  2:31 PM       Passed - Not on Clopidogrel (unless Pantoprazole ordered)       Passed - No diagnosis of osteoporosis on record       Passed - Recent (12 mo) or future (30 days) visit within the authorizing provider's specialty    Patient had office visit in the last 12 months or has a visit in the next 30 days with authorizing provider or within the authorizing provider's specialty.  See \"Patient Info\" tab in inbasket, or \"Choose Columns\" in Meds & " "Orders section of the refill encounter.             Passed - Patient is age 18 or older        desloratadine (CLARINEX) 5 MG tablet [Pharmacy Med Name: Desloratadine Oral Tablet 5 MG]    Last Written Prescription Date:  7/12/18  Last Fill Quantity: 180,  # refills: 1   Last Office Visit with [facility] or [facility] prescribing provider:  11/12/18   Future Office Visit:      180 tablet 0     Sig: Take 1 tablet (5 mg) by mouth 2 times daily as needed for allergies    Antihistamines Protocol Passed    12/8/2018  2:31 PM       Passed - Patient is 3-64 years of age    Apply weight-based dosing for peds patients age 3 - 12 years of age.    Forward request to provider for patients under the age of 3 or over the age of 64.         Passed - Recent (12 mo) or future (30 days) visit within the authorizing provider's specialty    Patient had office visit in the last 12 months or has a visit in the next 30 days with authorizing provider or within the authorizing provider's specialty.  See \"Patient Info\" tab in inbasket, or \"Choose Columns\" in Meds & Orders section of the refill encounter.              losartan-hydrochlorothiazide (HYZAAR) 100-25 MG tablet [Pharmacy Med Name: Losartan Potassium-HCTZ Oral Tablet 100-25 MG]    Last Written Prescription Date:  6/11/18  Last Fill Quantity: 90,  # refills: 1   Last Office Visit with [facility], [facility] or [facility] prescribing provider:  11/12/18   Future Office Visit:      90 tablet 0     Sig: TAKE ONE TABLET BY MOUTH ONE TIME DAILY    Angiotensin-II Receptors Failed    12/8/2018  2:31 PM       Failed - Blood pressure under 140/90 in past 12 months    BP Readings from Last 3 Encounters:   11/28/18 (!) 159/112   11/16/18 100/73   11/12/18 134/80                Passed - Recent (12 mo) or future (30 days) visit within the authorizing provider's specialty    Patient had office visit in the last 12 months or has a visit in the next 30 days with authorizing provider or within the authorizing provider's " "specialty.  See \"Patient Info\" tab in inbasket, or \"Choose Columns\" in Meds & Orders section of the refill encounter.             Passed - Patient is age 18 or older       Passed - Normal serum creatinine on file in past 12 months    Recent Labs   Lab Test  11/02/18   1049   CR  1.12            Passed - Normal serum potassium on file in past 12 months    Recent Labs   Lab Test  11/02/18   1049   POTASSIUM  3.8                          Tone Faarax  [facility]  "

## 2018-12-10 ENCOUNTER — THERAPY VISIT (OUTPATIENT)
Dept: PHYSICAL THERAPY | Facility: CLINIC | Age: 63
End: 2018-12-10
Payer: COMMERCIAL

## 2018-12-10 DIAGNOSIS — Z98.890 S/P ARTHROSCOPIC SURGERY OF RIGHT KNEE: ICD-10-CM

## 2018-12-10 DIAGNOSIS — M25.561 RIGHT KNEE PAIN, UNSPECIFIED CHRONICITY: ICD-10-CM

## 2018-12-10 PROCEDURE — 97110 THERAPEUTIC EXERCISES: CPT | Mod: GP

## 2018-12-10 PROCEDURE — 97112 NEUROMUSCULAR REEDUCATION: CPT | Mod: GP

## 2018-12-11 RX ORDER — ESOMEPRAZOLE MAGNESIUM 40 MG/1
CAPSULE, DELAYED RELEASE ORAL
Qty: 90 CAPSULE | Refills: 1 | Status: SHIPPED | OUTPATIENT
Start: 2018-12-11 | End: 2019-06-14

## 2018-12-11 RX ORDER — DESLORATADINE 5 MG/1
TABLET ORAL
Qty: 180 TABLET | Refills: 0 | Status: SHIPPED | OUTPATIENT
Start: 2018-12-11 | End: 2019-02-04

## 2018-12-11 RX ORDER — LOSARTAN POTASSIUM AND HYDROCHLOROTHIAZIDE 25; 100 MG/1; MG/1
TABLET ORAL
Qty: 90 TABLET | Refills: 0 | Status: SHIPPED | OUTPATIENT
Start: 2018-12-11 | End: 2019-02-04

## 2018-12-11 RX ORDER — MONTELUKAST SODIUM 10 MG/1
TABLET ORAL
Qty: 90 TABLET | Refills: 1 | Status: SHIPPED | OUTPATIENT
Start: 2018-12-11 | End: 2019-06-14

## 2018-12-12 NOTE — TELEPHONE ENCOUNTER
Prescription approved per [facility] Protocol.      Tito Mirza RN, BSN

## 2018-12-14 ENCOUNTER — THERAPY VISIT (OUTPATIENT)
Dept: PHYSICAL THERAPY | Facility: CLINIC | Age: 63
End: 2018-12-14
Payer: COMMERCIAL

## 2018-12-14 DIAGNOSIS — Z98.890 S/P ARTHROSCOPIC SURGERY OF RIGHT KNEE: ICD-10-CM

## 2018-12-14 DIAGNOSIS — M25.561 RIGHT KNEE PAIN, UNSPECIFIED CHRONICITY: ICD-10-CM

## 2018-12-14 PROCEDURE — 97110 THERAPEUTIC EXERCISES: CPT | Mod: GP

## 2018-12-14 PROCEDURE — 97112 NEUROMUSCULAR REEDUCATION: CPT | Mod: GP

## 2018-12-17 ENCOUNTER — THERAPY VISIT (OUTPATIENT)
Dept: PHYSICAL THERAPY | Facility: CLINIC | Age: 63
End: 2018-12-17
Payer: COMMERCIAL

## 2018-12-17 DIAGNOSIS — Z98.890 S/P ARTHROSCOPIC SURGERY OF RIGHT KNEE: ICD-10-CM

## 2018-12-17 DIAGNOSIS — M25.561 RIGHT KNEE PAIN, UNSPECIFIED CHRONICITY: ICD-10-CM

## 2018-12-17 PROCEDURE — 97530 THERAPEUTIC ACTIVITIES: CPT | Mod: GP

## 2018-12-17 PROCEDURE — 97110 THERAPEUTIC EXERCISES: CPT | Mod: GP

## 2018-12-19 ENCOUNTER — OFFICE VISIT (OUTPATIENT)
Dept: ORTHOPEDICS | Facility: CLINIC | Age: 63
End: 2018-12-19
Payer: COMMERCIAL

## 2018-12-19 VITALS
WEIGHT: 289 LBS | SYSTOLIC BLOOD PRESSURE: 134 MMHG | DIASTOLIC BLOOD PRESSURE: 88 MMHG | BODY MASS INDEX: 43.62 KG/M2 | RESPIRATION RATE: 16 BRPM

## 2018-12-19 DIAGNOSIS — Z98.890 S/P ARTHROSCOPY OF RIGHT KNEE: Primary | ICD-10-CM

## 2018-12-19 PROCEDURE — 99024 POSTOP FOLLOW-UP VISIT: CPT | Performed by: ORTHOPAEDIC SURGERY

## 2018-12-19 ASSESSMENT — PAIN SCALES - GENERAL: PAINLEVEL: NO PAIN (1)

## 2018-12-19 NOTE — LETTER
"    12/19/2018         RE: Arturo Rahman  [address]  [facility] [address]        Dear Colleague,    Thank you for referring your patient, Arturo Rahman, to the [facility]. Please see a copy of my visit note below.    Chief Complaint   Patient presents with     Right Knee - Surgical Followup     RIght knee arthroscopy, medial meniscus debridement. DOS: 11/16/18. Doing pretty well, feeling better a little pain just below the knee cap.        SURGERY: right knee arthroscopy. ( [facility] )  1. Right knee arthroscopic partial medial meniscectomy  2. Right knee shaving chondroplasty of the medial femoral condyle.  DATE OF SURGERY: 11/16/2018.      HISTORY OF PRESENT ILLNESS:  Arturo \"Jayson\"ASHER Rahman is a 63 year old male seen for postoperative evaluation of a right knee arthroscopy and partial medial meniscectomy and shaving chondroplasty for right knee complex medial meniscus tear, chondromalacia and chondrocalcinosis. Surgery occurred 5 weeks ago. Returns today stating he is doing well. Minimal pain today, rated a 1/10. Pain is located over the anterior knee, under the kneecap. He thinks physical therapy may have contributed to the pain as well as putting the legs up while at rest. Otherwise is doing well. Returns today for workability update.        OR FINDINGS:  No effusion.  Mild synovitis.  No significant medial plica.  Some grade 3 chondrosis of the superior pole of the patella.  Grade 2 chondrosis elsewhere.  Grade 1 chondrosis of the femoral trochlea.  No loose bodies.  Intact ACL within the notch.  Prominent fat pad.  Diffuse calcifications throughout the knee.  Lateral compartment with intact lateral meniscus.  Grade 1 and 2 chondrosis.  Medial compartment with complex tear of the posterior horn/body junction with a displaced undersurface flap in a radial component.  Diffuse grade 3 chondrosis of the medial femoral condyle.     Past Medical History: "   Diagnosis Date     Allergies      Asthma, moderate persistent      FH: colon cancer     colonoscopy q 5yr.  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     HTN (hypertension)      Hyperlipidemia        Past Surgical History:   Procedure Laterality Date     ARTHROSCOPY KNEE Right 11/16/2018    Procedure: Right knee arthroscopy, medial meniscal and chondral debridement;  Surgeon: Manoj Augustin MD;  Location: [facility] OR     BRONCHOSCOPY FLEXIBLE AND RIGID N/A 7/19/2017    Procedure: BRONCHOSCOPY FLEXIBLE AND RIGID;   Bronchoscopy, Tumor Debulking with Cryoprobe and Argon Plasma Coagulation;  Surgeon: Albino Crump MD;  Location: [facility] OR     BRONCHOSCOPY FLEXIBLE,L CRYOBIOPSY N/A 1/11/2018    Procedure: BRONCHOSCOPY FLEXIBLE, CRYOBIOPSY;  Flexible Bronchoscopy, Rigid Bronchoscopy, Tumor Debulking With Cryoprobe;  Surgeon: Albino Crump MD;  Location: [facility] OR     COLONOSCOPY  11/08    + polyp, +FH, repeat in 5 years     COMBINED ENDOSCOPY UPPER WITH ARGON PLASMA COAGULATOR (APC) N/A 7/19/2017    Procedure: COMBINED ENDOSCOPY UPPER WITH ARGON PLASMA COAGULATOR (APC);;  Surgeon: Albino Crump MD;  Location: [facility] OR     TONSILLECTOMY       VASECTOMY         Medications:   Current Outpatient Medications:      amoxicillin-clavulanate (AUGMENTIN) 875-125 MG per tablet, Take 1 tablet by mouth 2 times daily for possible sinus infection., Disp: 20 tablet, Rfl: 0     desloratadine (CLARINEX) 5 MG tablet, Take 1 tablet (5 mg) by mouth 2 times daily as needed for allergies, Disp: 180 tablet, Rfl: 0     esomeprazole (NEXIUM) 40 MG DR capsule, TAKE ONE CAPSULE BY MOUTH IN THE MORNING BEFORE BREAKFAST, Disp: 90 capsule, Rfl: 1     fluticasone (FLONASE) 50 MCG/ACT spray, Spray 1 spray into both nostrils 2 times daily, Disp: 1 Bottle, Rfl: 3     HYDROcodone-acetaminophen (NORCO) 5-325 MG per tablet, Take 1-2 tablets by mouth every 6 hours as needed for  moderate to severe pain, Disp: 20 tablet, Rfl: 0     losartan-hydrochlorothiazide (HYZAAR) 100-25 MG tablet, TAKE ONE TABLET BY MOUTH ONE TIME DAILY, Disp: 90 tablet, Rfl: 0     montelukast (SINGULAIR) 10 MG tablet, TAKE ONE TABLET BY MOUTH ONE TIME DAILY., Disp: 90 tablet, Rfl: 1     Multiple Vitamins-Minerals (MULTIVITAL PO), Take 1 tablet by mouth daily, Disp: , Rfl:      senna-docusate (SENOKOT-S;PERICOLACE) 8.6-50 MG per tablet, Take 1-2 tablets by mouth 2 times daily, Disp: 30 tablet, Rfl: 0     simvastatin (ZOCOR) 10 MG tablet, Take 1 tablet (10 mg) by mouth At Bedtime, Disp: 90 tablet, Rfl: 3     VENTOLIN  (90 Base) MCG/ACT Inhaler, inhale 2 puffs by mouth every 6 hours as needed for shortness of breath/dyspnea, Disp: 18 g, Rfl: 2    Current Facility-Administered Medications:      lidocaine (PF) (XYLOCAINE) 1 % injection 4 mL, 4 mL, , , Bill Ortiz MD, 4 mL at 07/31/18 1422     methylPREDNISolone acetate (DEPO-MEDROL) injection 80 mg, 80 mg, , , Bill Ortiz MD, 80 mg at 07/31/18 1422    Allergies:   Allergies   Allergen Reactions     Lisinopril Cough       REVIEW OF SYSTEMS:   CONSTITUTIONAL:NEGATIVE for fever, chills, night sweats  INTEGUMENTARY/SKIN: NEGATIVE for worrisome wound problems or redness.  MUSCULOSKELETAL:See HPI above  NEURO: NEGATIVE for weakness, dizziness or paresthesias    This document serves as a record of the services and decisions personally performed and made by Manoj Augustin MD. It was created on his behalf by Robyn Quarles, a trained medical scribe. The creation of this document is based the provider's statements to the medical scribe.    Charleneibmarcio Quarles 2:42 PM 12/19/2018     PHYSICAL EXAM:  /88   Resp 16   Wt 131.1 kg (289 lb)   BMI 43.62 kg/m      GENERAL APPEARANCE: healthy, alert, no distress  SKIN: no suspicious lesions or rashes  NEURO: Normal strength and tone, mentation intact and speech normal  PSYCH:  mentation appears normal and affect  normal, not anxious  RESPIRATORY: No increased work of breathing.    RIGHT LOWER EXTREMITY:  Gait: mildy favoring right  No Quad atrophy, strength normal.  Intact sensation deep peroneal nerve, superficial peroneal nerve, med/lat tibial nerve, sural nerve, saphenous nerve  Intact EHL, EDL, TA, FHL, GS, quadriceps hamstrings and hip flexors  Toes warm and well perfused, brisk capillary refill. Palpable 2+ dp pulses.  calf soft and nttp or squeeze.  Edema: none    RIGHT KNEE EXAM:    Incision: healed. Skin: intact, no ecchymosis or erythema  ROM:  0 extension to 125 flexion  Effusion: minimal  Tender: NTTP med/lat joint line, anterior or posterior knee  Mild patello-femoral grind.      X-RAY: none indicated.      Impression: Arturo Rahman is a 63 year old male 5 weeks status post right knee arthroscopy and partial medial meniscectomy and shaving chondroplasty, doing well.       Plan: routine postoperative knee arthroscopy  * WB status: weightbearing as tolerated. No restrictions.    * Rest  * Activity modification - avoid activities that aggravate symptoms.  * NSAIDS - regular use for inflammation, with food, as long as no contra-indications. Please discuss with pcp if needed.  * OK to drive if patient feels comfortable.  * Ice twice daily to three times daily as needed.  * Compression wrap as needed.  * Elevation of extremity to reduce swelling as needed.  * PT for strengthening, stretching and range of motion exercises, effusion control.  * Tylenol as needed for pain  * Workability update: Ok to return to work with no restrictions 12/23/2018.   * return to clinic as needed  * Patient to follow up with Primary Care provider regarding elevated blood pressure       * We did also discuss that based on the amount of arthritic changes seen at the time of surgery, may have continued knee pain due to the arthritis. Once recovered from the knee arthroscopy, and arthritic symptoms persist, consider full treatment of  arthritis starting with Physical Therapy and injections, NSAIDS, activity modification, bracing.    The information in this document, created by a scribe for me, accurately reflects the services I personally performed and the decisions made by me. I have reviewed and approved this document for accuracy.        Manoj Augustin M.D., M.S.  Dept. of Orthopaedic Surgery  [facility]      Again, thank you for allowing me to participate in the care of your patient.        Sincerely,        Maonj Augustin MD

## 2018-12-21 ENCOUNTER — THERAPY VISIT (OUTPATIENT)
Dept: PHYSICAL THERAPY | Facility: CLINIC | Age: 63
End: 2018-12-21
Payer: COMMERCIAL

## 2018-12-21 DIAGNOSIS — Z98.890 S/P ARTHROSCOPIC SURGERY OF RIGHT KNEE: ICD-10-CM

## 2018-12-21 DIAGNOSIS — M25.561 RIGHT KNEE PAIN, UNSPECIFIED CHRONICITY: ICD-10-CM

## 2018-12-21 PROCEDURE — 97110 THERAPEUTIC EXERCISES: CPT | Mod: GP | Performed by: PHYSICAL THERAPIST

## 2018-12-21 PROCEDURE — 97112 NEUROMUSCULAR REEDUCATION: CPT | Mod: GP | Performed by: PHYSICAL THERAPIST

## 2018-12-28 ENCOUNTER — THERAPY VISIT (OUTPATIENT)
Dept: PHYSICAL THERAPY | Facility: CLINIC | Age: 63
End: 2018-12-28
Payer: COMMERCIAL

## 2018-12-28 DIAGNOSIS — M25.561 RIGHT KNEE PAIN, UNSPECIFIED CHRONICITY: ICD-10-CM

## 2018-12-28 DIAGNOSIS — Z98.890 S/P ARTHROSCOPIC SURGERY OF RIGHT KNEE: ICD-10-CM

## 2018-12-28 PROCEDURE — 97112 NEUROMUSCULAR REEDUCATION: CPT | Mod: GP | Performed by: PHYSICAL THERAPIST

## 2018-12-28 PROCEDURE — 97110 THERAPEUTIC EXERCISES: CPT | Mod: GP | Performed by: PHYSICAL THERAPIST

## 2019-01-11 NOTE — PROGRESS NOTES
Subjective:  HPI                    Objective:  System    Physical Exam    General     ROS    Assessment/Plan:    DISCHARGE REPORT    Progress reporting period as of 12/28/18.       SUBJECTIVE  Subjective changes noted by patient:  Patient reports he is doing well; ready to return to work next week.  Patient feels he can be done with PT and continue on his own with currently HEP.    Current pain level is : 0/10.     Previous pain level was: 2/10.   Changes in function:  Yes (See Goal flowsheet attached for changes in current functional level)  Adverse reaction to treatment or activity: None    OBJECTIVE  Changes noted in objective findings:  Yes, Patient given home exercise program to follow  Objective: 0-122 R knee ROM;  Gait is normal; Strength is R leg is good     ASSESSMENT/PLAN  Updated problem list and treatment plan: Diagnosis 1:  S/p R Arthroscopy Knee surgery  Pain -  hot/cold therapy  Impaired muscle performance - neuro re-education and home program  Decreased function - therapeutic activities and home program  STG/LTGs have been met or progress has been made towards goals:  Yes (See Goal flow sheet completed today.)  Assessment of Progress: The patient's condition is improving.  The patient has met all of their long term goals.  Self Management Plans:  Patient is independent in a home treatment program.    Arturo continues to require the following intervention to meet STG and LTG's:  PT intervention is no longer required to meet STG/LTG.    Recommendations:  This patient is ready to be discharged from therapy and continue their home treatment program.    Please refer to the daily flowsheet for treatment today, total treatment time and time spent performing 1:1 timed codes.

## 2019-01-31 ENCOUNTER — TELEPHONE (OUTPATIENT)
Dept: FAMILY MEDICINE | Facility: CLINIC | Age: 64
End: 2019-01-31

## 2019-01-31 NOTE — TELEPHONE ENCOUNTER
Panel Management Review      BP Readings from Last 1 Encounters:   12/19/18 134/88      Last Office Visit with this department: 11/12/2018    Fail List measure: colonoscopy      Patient is due/failing the following:   COLONOSCOPY    Action needed:   Call / letter patient need to schedule colonoscopy    Type of outreach:    Sent [employer] message.    Questions for provider review:    None                                                                                                                                    Diann Ortiz MA      Chart routed to  .

## 2019-02-04 DIAGNOSIS — I10 HYPERTENSION, GOAL BELOW 140/90: ICD-10-CM

## 2019-02-04 DIAGNOSIS — J30.1 CHRONIC SEASONAL ALLERGIC RHINITIS DUE TO POLLEN: ICD-10-CM

## 2019-02-05 NOTE — TELEPHONE ENCOUNTER
"Requested Prescriptions   Pending Prescriptions Disp Refills     desloratadine (CLARINEX) 5 MG tablet [Pharmacy Med Name: Desloratadine Oral Tablet 5 MG] 180 tablet 0    Last Written Prescription Date:  12/11/18  Last Fill Quantity: 180,  # refills: 0   Last Office Visit with [facility] or [facility] prescribing provider:  11/12/18   Future Office Visit:    Next 5 appointments (look out 90 days)    Mar 18, 2019  2:30 PM CDT  Office Visit with PFT LAB  [facility]) [address] [place] [id number]  [phone]   Mar 18, 2019  3:30 PM CDT  Return Visit with Trent Alberto MD  [facility]) [address] [place] [id number]  [phone]          Sig: Take 1 tablet (5 mg) by mouth 2 times daily as needed for allergies    Antihistamines Protocol Passed - 2/4/2019  7:20 PM       Passed - Patient is 3-64 years of age    Apply weight-based dosing for peds patients age 3 - 12 years of age.    Forward request to provider for patients under the age of 3 or over the age of 64.         Passed - Recent (12 mo) or future (30 days) visit within the authorizing provider's specialty    Patient had office visit in the last 12 months or has a visit in the next 30 days with authorizing provider or within the authorizing provider's specialty.  See \"Patient Info\" tab in inbasket, or \"Choose Columns\" in Meds & Orders section of the refill encounter.             Passed - Medication is active on med list        losartan-hydrochlorothiazide (HYZAAR) 100-25 MG tablet [Pharmacy Med Name: Losartan Potassium-HCTZ Oral Tablet 100-25 MG] 90 tablet 0    Last Written Prescription Date:  12/11/18  Last Fill Quantity: 90,  # refills: 0   Last Office Visit with [facility] or [facility] prescribing provider:  11/12/18   Future Office Visit:    Next 5 appointments (look out 90 days)    Mar 18, 2019  2:30 PM CDT  Office Visit with PFT LAB  M " "[facility] ([facility]) [address] [address] [place] [id number]  [phone]   Mar 18, 2019  3:30 PM CDT  Return Visit with Trent Alberto MD  [facility] [address] [address] [place] [id number]  [phone]          Sig: TAKE ONE TABLET BY MOUTH ONE TIME DAILY    Angiotensin-II Receptors Passed - 2/4/2019  7:20 PM       Passed - Blood pressure under 140/90 in past 12 months    BP Readings from Last 3 Encounters:   12/19/18 134/88   11/28/18 (!) 159/112   11/16/18 100/73                Passed - Recent (12 mo) or future (30 days) visit within the authorizing provider's specialty    Patient had office visit in the last 12 months or has a visit in the next 30 days with authorizing provider or within the authorizing provider's specialty.  See \"Patient Info\" tab in inbasket, or \"Choose Columns\" in Meds & Orders section of the refill encounter.             Passed - Medication is active on med list       Passed - Patient is age 18 or older       Passed - Normal serum creatinine on file in past 12 months    Recent Labs   Lab Test 11/02/18  1049   CR 1.12            Passed - Normal serum potassium on file in past 12 months    Recent Labs   Lab Test 11/02/18  1049   POTASSIUM 3.8                      "

## 2019-02-07 RX ORDER — LOSARTAN POTASSIUM AND HYDROCHLOROTHIAZIDE 25; 100 MG/1; MG/1
TABLET ORAL
Qty: 90 TABLET | Refills: 2 | Status: SHIPPED | OUTPATIENT
Start: 2019-02-07 | End: 2020-05-14

## 2019-02-07 RX ORDER — DESLORATADINE 5 MG/1
TABLET ORAL
Qty: 180 TABLET | Refills: 0 | Status: SHIPPED | OUTPATIENT
Start: 2019-02-07 | End: 2019-08-19

## 2019-02-07 NOTE — TELEPHONE ENCOUNTER
Prescription approved per [facility] Refill Protocol.      Tito Mirza RN, BSN

## 2019-03-18 ENCOUNTER — OFFICE VISIT (OUTPATIENT)
Dept: NURSING | Facility: CLINIC | Age: 64
End: 2019-03-18
Payer: COMMERCIAL

## 2019-03-18 ENCOUNTER — OFFICE VISIT (OUTPATIENT)
Dept: PULMONOLOGY | Facility: CLINIC | Age: 64
End: 2019-03-18
Payer: COMMERCIAL

## 2019-03-18 ENCOUNTER — ANCILLARY PROCEDURE (OUTPATIENT)
Dept: CT IMAGING | Facility: CLINIC | Age: 64
End: 2019-03-18
Attending: INTERNAL MEDICINE
Payer: COMMERCIAL

## 2019-03-18 VITALS
HEIGHT: 69 IN | TEMPERATURE: 98.6 F | RESPIRATION RATE: 17 BRPM | OXYGEN SATURATION: 99 % | DIASTOLIC BLOOD PRESSURE: 89 MMHG | SYSTOLIC BLOOD PRESSURE: 120 MMHG | BODY MASS INDEX: 43.26 KG/M2 | WEIGHT: 292.1 LBS | HEART RATE: 95 BPM

## 2019-03-18 DIAGNOSIS — R91.8 PULMONARY NODULES: Primary | ICD-10-CM

## 2019-03-18 DIAGNOSIS — R91.8 ENDOBRONCHIAL MASS: ICD-10-CM

## 2019-03-18 PROCEDURE — 94375 RESPIRATORY FLOW VOLUME LOOP: CPT | Performed by: INTERNAL MEDICINE

## 2019-03-18 PROCEDURE — 71250 CT THORAX DX C-: CPT | Performed by: RADIOLOGY

## 2019-03-18 PROCEDURE — 94726 PLETHYSMOGRAPHY LUNG VOLUMES: CPT | Performed by: INTERNAL MEDICINE

## 2019-03-18 PROCEDURE — 99214 OFFICE O/P EST MOD 30 MIN: CPT | Mod: 25 | Performed by: INTERNAL MEDICINE

## 2019-03-18 PROCEDURE — 94729 DIFFUSING CAPACITY: CPT | Performed by: INTERNAL MEDICINE

## 2019-03-18 ASSESSMENT — MIFFLIN-ST. JEOR: SCORE: 2120.34

## 2019-03-18 ASSESSMENT — PAIN SCALES - GENERAL: PAINLEVEL: MILD PAIN (2)

## 2019-03-18 NOTE — NURSING NOTE
"Arturo Rahman's goals for this visit include: Return  He requests these members of his care team be copied on today's visit information: PCP    PCP: Martina Leung    Referring Provider:  No referring provider defined for this encounter.    /89   Pulse 95   Temp 98.6  F (37  C)   Resp 17   Ht 1.753 m (5' 9\")   Wt 132.5 kg (292 lb 1.6 oz)   SpO2 99%   BMI 43.14 kg/m      Do you need any medication refills at today's visit? N    "

## 2019-03-18 NOTE — PATIENT INSTRUCTIONS
Preventive Care:    Colorectal Cancer Screening: During our visit today, we discussed that it is recommended you receive colorectal cancer screening. Please call or make an appointment with your primary care provider to discuss this. You may also call the [facility] scheduling line ([phone]) to set up a colonoscopy appointment.

## 2019-03-18 NOTE — PROGRESS NOTES
LUNG NODULE & INTERVENTIONAL PULMONARY CLINIC  [facility], [facility], [facility]     Arturo Rahman MRN# [id number]   Age: 63 year old YOB: 1955     Reason for Consultation: lung abnormality      Assessment and Plan:    1. 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 Looks more narrow on today's CT than previous. Given sx, would recommend repeat bronch and debulking. He is considering delaying the procedure for now. He will call the nurse here when he is ready, most likely in next 4wks. If he were to defer bronchoscopy, I discussed that there is no role for surveillance CT in this situation. He could be referred back to me when new sx arise.      Billing: I spent more than 30 minutes face to face and greater than 50% of time was for counseling and coordination of care about the issues above.     Trent Alberto MD   of Medicine  Interventional Pulmonology  Department of Pulmonary, Allergy, Critical Care and Sleep Medicine   Formerly [facility]  Pager: [phone]                   History:     Arturo Rahman is a 62 year old male with sig h/o for HELIO endobronchial hamartoma, HTN, hyperlipidemia who is here for evaluation/followup of endobronchial lesion.    - No new resp sx or complaints. Has COOK but attributes to deconditioning and underlying cardiac disease.   - Had endobronchial hamartoma of HELIO take-off s/p cryo-recanalization in 1/2018.   - Personal hx of cancer: no. Up-to-date on c-scope.   - Family hx of cancer: no lung cancer.   - Exposure hx: Denies asbestos or radon exposure   - Tobacco hx: Past Smoker: 0.5ppd for 12years. Quit 27yrs ago.   - My interpretation of the images relevant for this visit includes: left mainstem and HELIO take-off look patent.    - My interpretation of the PFT's relevant for this visit includes: Normal      Other active medical problems include:   - HTN and  hyperlipidemia. stable          Past Medical History:      Past Medical History:   Diagnosis Date     Allergies      Asthma, moderate persistent      FH: colon cancer     colonoscopy q 5yr.  last      GERD (gastroesophageal reflux disease)      Granulomatous lung disease (H)     ? histoplasmosis     HTN (hypertension)      Hyperlipidemia            Past Surgical History:      Past Surgical History:   Procedure Laterality Date     ARTHROSCOPY KNEE Right 2018    Procedure: Right knee arthroscopy, medial meniscal and chondral debridement;  Surgeon: Manoj Augustin MD;  Location: [facility] OR     BRONCHOSCOPY FLEXIBLE AND RIGID N/A 2017    Procedure: BRONCHOSCOPY FLEXIBLE AND RIGID;   Bronchoscopy, Tumor Debulking with Cryoprobe and Argon Plasma Coagulation;  Surgeon: Albino Crump MD;  Location: [facility] OR     BRONCHOSCOPY FLEXIBLE,L CRYOBIOPSY N/A 2018    Procedure: BRONCHOSCOPY FLEXIBLE, CRYOBIOPSY;  Flexible Bronchoscopy, Rigid Bronchoscopy, Tumor Debulking With Cryoprobe;  Surgeon: Albino Crump MD;  Location: [facility] OR     COLONOSCOPY      + polyp, +FH, repeat in 5 years     COMBINED ENDOSCOPY UPPER WITH ARGON PLASMA COAGULATOR (APC) N/A 2017    Procedure: COMBINED ENDOSCOPY UPPER WITH ARGON PLASMA COAGULATOR (APC);;  Surgeon: Albino Crump MD;  Location: [facility] OR     TONSILLECTOMY       VASECTOMY            Social History:     Social History     Tobacco Use     Smoking status: Former Smoker     Last attempt to quit: 10/28/1991     Years since quittin.4     Smokeless tobacco: Never Used   Substance Use Topics     Alcohol use: Yes     Comment: rare          Family History:     Family History   Problem Relation Age of Onset     Arthritis Mother      Hypertension Father      Cerebrovascular Disease Father      Asthma Daughter      Allergies Daughter      Hypertension Sister      Cancer - colorectal Sister         dx age 50           Allergies:      Allergies   Allergen  Reactions     Lisinopril Cough          Medications:     Current Outpatient Medications   Medication Sig     desloratadine (CLARINEX) 5 MG tablet Take 1 tablet (5 mg) by mouth 2 times daily as needed for allergies     esomeprazole (NEXIUM) 40 MG DR capsule TAKE ONE CAPSULE BY MOUTH IN THE MORNING BEFORE BREAKFAST     fluticasone (FLONASE) 50 MCG/ACT spray Spray 1 spray into both nostrils 2 times daily     losartan-hydrochlorothiazide (HYZAAR) 100-25 MG tablet TAKE ONE TABLET BY MOUTH ONE TIME DAILY     montelukast (SINGULAIR) 10 MG tablet TAKE ONE TABLET BY MOUTH ONE TIME DAILY.     Multiple Vitamins-Minerals (MULTIVITAL PO) Take 1 tablet by mouth daily     simvastatin (ZOCOR) 10 MG tablet Take 1 tablet (10 mg) by mouth At Bedtime     VENTOLIN  (90 Base) MCG/ACT Inhaler inhale 2 puffs by mouth every 6 hours as needed for shortness of breath/dyspnea     HYDROcodone-acetaminophen (NORCO) 5-325 MG per tablet Take 1-2 tablets by mouth every 6 hours as needed for moderate to severe pain (Patient not taking: Reported on 3/18/2019)     senna-docusate (SENOKOT-S;PERICOLACE) 8.6-50 MG per tablet Take 1-2 tablets by mouth 2 times daily (Patient not taking: Reported on 3/18/2019)     Current Facility-Administered Medications   Medication     lidocaine (PF) (XYLOCAINE) 1 % injection 4 mL     methylPREDNISolone acetate (DEPO-MEDROL) injection 80 mg          Review of Systems:     CONSTITUTIONAL: negative for fever, chills, change in weight  INTEGUMENTARY/SKIN: no rash or obvious new lesions  ENT/MOUTH: no sore throat, new sinus pain or nasal drainage  RESP: see interval history  CV: negative for chest pain, palpitations or peripheral edema  GI: no nausea, vomiting, change in stools  : no dysuria  MUSCULOSKELETAL: no myalgias, arthralgias  ENDOCRINE: blood sugars with adequate control  PSYCHIATRIC: mood stable  LYMPHATIC: no new lymphadenopathy  HEME: no bleeding or easy bruisability  NEURO: no numbness, weakness,  headaches         Physical Exam:     Temp:  [98.6  F (37  C)] 98.6  F (37  C)  Pulse:  [95] 95  Resp:  [17] 17  BP: (120)/(89) 120/89  SpO2:  [99 %] 99 %  Wt Readings from Last 4 Encounters:   03/18/19 132.5 kg (292 lb 1.6 oz)   12/19/18 131.1 kg (289 lb)   11/28/18 129.3 kg (285 lb)   11/12/18 129.3 kg (285 lb)     Constitutional:   Awake, alert and in no apparent distress     Eyes:   Nonicteric, AIDAN     ENT:    Trachea is midline. No gross neck abnormalities      Neck:   Supple without supraclavicular or cervical lymphadenopathy     Lungs:   Good air flow.  No crackles. No rhonchi.  No wheezes.     Cardiovascular:   Normal S1 and S2.  RRR.  No murmur, gallop or rub.  Radial, DP and PT pulses normal and symmetric     Abdomen:   NABS, soft, nontender, nondistended.  No HSM.     Musculoskeletal:   No edema.      Neurologic:   Alert and conversant. Cranial nerves  intact.       Skin:   Warm, dry.  No rash on limited exam.           Current Laboratory Data:   All laboratory and imaging data reviewed.    Results for orders placed or performed in visit on 03/18/19 (from the past 24 hour(s))   General PFT Lab (Please always keep checked)   Result Value Ref Range    FVC-Pred 4.52 L    FVC-Pre 3.74 L    FVC-%Pred-Pre 82 %    FEV1-Pre 3.08 L    FEV1-%Pred-Pre 87 %    FEV1FVC-Pred 77 %    FEV1FVC-Pre 82 %    FEFMax-Pred 9.04 L/sec    FEFMax-Pre 6.85 L/sec    FEFMax-%Pred-Pre 75 %    FEF2575-Pred 2.90 L/sec    FEF2575-Pre 3.14 L/sec    PSL8996-%Pred-Pre 108 %    ExpTime-Pre 6.91 sec    FIFMax-Pre 2.81 L/sec    VC-Pred 4.88 L    VC-Pre 3.93 L    VC-%Pred-Pre 80 %    IC-Pred 4.42 L    IC-Pre 3.62 L    IC-%Pred-Pre 81 %    ERV-Pred 0.46 L    ERV-Pre 0.31 L    ERV-%Pred-Pre 66 %    FEV1FEV6-Pred 79 %    FEV1FEV6-Pre 83 %    FRCPleth-Pred 3.59 L    FRCPleth-Pre 2.32 L    FRCPleth-%Pred-Pre 64 %    RVPleth-Pred 2.42 L    RVPleth-Pre 2.01 L    RVPleth-%Pred-Pre 82 %    TLCPleth-Pred 7.02 L    TLCPleth-Pre 5.94 L     TLCPleth-%Pred-Pre 84 %    DLCOunc-Pred 27.04 ml/min/mmHg    DLCOunc-Pre 22.62 ml/min/mmHg    DLCOunc-%Pred-Pre 83 %    VA-Pre 5.44 L    VA-%Pred-Pre 84 %    FEV1SVC-Pred 72 %    FEV1SVC-Pre 78 %            Previous Pulmonary Function Testing     FVC-Pred   Date Value Ref Range Status   03/18/2019 4.52 L    12/20/2017 4.56 L      FVC-Pre   Date Value Ref Range Status   03/18/2019 3.74 L    12/20/2017 3.67 L      FVC-%Pred-Pre   Date Value Ref Range Status   03/18/2019 82 %    12/20/2017 80 %      FEV1-Pre   Date Value Ref Range Status   03/18/2019 3.08 L    12/20/2017 3.07 L      FEV1-%Pred-Pre   Date Value Ref Range Status   03/18/2019 87 %    12/20/2017 87 %      FEV1FVC-Pred   Date Value Ref Range Status   03/18/2019 77 %    12/20/2017 78 %      FEV1FVC-Pre   Date Value Ref Range Status   03/18/2019 82 %    12/20/2017 84 %      No results found for: 68987  FEFMax-Pred   Date Value Ref Range Status   03/18/2019 9.04 L/sec    12/20/2017 9.10 L/sec      FEFMax-Pre   Date Value Ref Range Status   03/18/2019 6.85 L/sec    12/20/2017 6.81 L/sec      FEFMax-%Pred-Pre   Date Value Ref Range Status   03/18/2019 75 %    12/20/2017 74 %      ExpTime-Pre   Date Value Ref Range Status   03/18/2019 6.91 sec    12/20/2017 7.04 sec      FIFMax-Pre   Date Value Ref Range Status   03/18/2019 2.81 L/sec    12/20/2017 4.38 L/sec      FEV1FEV6-Pred   Date Value Ref Range Status   03/18/2019 79 %    12/20/2017 79 %      FEV1FEV6-Pre   Date Value Ref Range Status   03/18/2019 83 %    12/20/2017 84 %             Previous Cardiology Imaging   No results found for this or any previous visit (from the past 8760 hour(s)).

## 2019-03-19 LAB
DLCOUNC-%PRED-PRE: 83 %
DLCOUNC-PRE: 22.62 ML/MIN/MMHG
DLCOUNC-PRED: 27.04 ML/MIN/MMHG
ERV-%PRED-PRE: 66 %
ERV-PRE: 0.31 L
ERV-PRED: 0.46 L
EXPTIME-PRE: 6.91 SEC
FEF2575-%PRED-PRE: 108 %
FEF2575-PRE: 3.14 L/SEC
FEF2575-PRED: 2.9 L/SEC
FEFMAX-%PRED-PRE: 75 %
FEFMAX-PRE: 6.85 L/SEC
FEFMAX-PRED: 9.04 L/SEC
FEV1-%PRED-PRE: 87 %
FEV1-PRE: 3.08 L
FEV1FEV6-PRE: 83 %
FEV1FEV6-PRED: 79 %
FEV1FVC-PRE: 82 %
FEV1FVC-PRED: 77 %
FEV1SVC-PRE: 78 %
FEV1SVC-PRED: 72 %
FIFMAX-PRE: 2.81 L/SEC
FRCPLETH-%PRED-PRE: 64 %
FRCPLETH-PRE: 2.32 L
FRCPLETH-PRED: 3.59 L
FVC-%PRED-PRE: 82 %
FVC-PRE: 3.74 L
FVC-PRED: 4.52 L
IC-%PRED-PRE: 81 %
IC-PRE: 3.62 L
IC-PRED: 4.42 L
RVPLETH-%PRED-PRE: 82 %
RVPLETH-PRE: 2.01 L
RVPLETH-PRED: 2.42 L
TLCPLETH-%PRED-PRE: 84 %
TLCPLETH-PRE: 5.94 L
TLCPLETH-PRED: 7.02 L
VA-%PRED-PRE: 84 %
VA-PRE: 5.44 L
VC-%PRED-PRE: 80 %
VC-PRE: 3.93 L
VC-PRED: 4.88 L

## 2019-04-17 ENCOUNTER — OFFICE VISIT (OUTPATIENT)
Dept: FAMILY MEDICINE | Facility: CLINIC | Age: 64
End: 2019-04-17
Payer: COMMERCIAL

## 2019-04-17 VITALS
TEMPERATURE: 97.9 F | SYSTOLIC BLOOD PRESSURE: 118 MMHG | HEART RATE: 88 BPM | BODY MASS INDEX: 44.17 KG/M2 | DIASTOLIC BLOOD PRESSURE: 86 MMHG | OXYGEN SATURATION: 98 % | WEIGHT: 298.2 LBS | HEIGHT: 69 IN

## 2019-04-17 DIAGNOSIS — J30.2 SEASONAL ALLERGIC RHINITIS, UNSPECIFIED TRIGGER: Primary | ICD-10-CM

## 2019-04-17 DIAGNOSIS — Z23 NEED FOR SHINGLES VACCINE: ICD-10-CM

## 2019-04-17 DIAGNOSIS — H92.02 LEFT EAR PAIN: ICD-10-CM

## 2019-04-17 DIAGNOSIS — Z12.11 SCREEN FOR COLON CANCER: ICD-10-CM

## 2019-04-17 PROCEDURE — 90750 HZV VACC RECOMBINANT IM: CPT | Performed by: FAMILY MEDICINE

## 2019-04-17 PROCEDURE — 99213 OFFICE O/P EST LOW 20 MIN: CPT | Mod: 25 | Performed by: FAMILY MEDICINE

## 2019-04-17 PROCEDURE — 90471 IMMUNIZATION ADMIN: CPT | Performed by: FAMILY MEDICINE

## 2019-04-17 ASSESSMENT — PAIN SCALES - GENERAL: PAINLEVEL: NO PAIN (0)

## 2019-04-17 ASSESSMENT — MIFFLIN-ST. JEOR: SCORE: 2148.01

## 2019-04-17 NOTE — PROGRESS NOTES
SUBJECTIVE:   Arturo Rahman is a 63 year old male who presents to clinic today for the following   health issues:        Acute Illness   Acute illness concerns: possible ear infection  Onset: 5-6 days ago    Fever: no     Chills/Sweats: no     Headache (location?): no     Sinus Pressure:no    Conjunctivitis:  no    Ear Pain: YES: No pain but achy, minimal discomfot    Rhinorrhea: no     Congestion: no     Sore Throat: no      Cough: no    Wheeze: no     Decreased Appetite: no     Nausea: no     Vomiting: no     Diarrhea:  no     Dysuria/Freq.: no     Fatigue/Achiness: no     Sick/Strep Exposure: no      Therapies Tried and outcome: nothing      Additional history: as documented    Reviewed  and updated as needed this visit by clinical staff  Tobacco  Allergies  Meds  Problems  Med Hx  Surg Hx  Fam Hx  Soc Hx          Reviewed and updated as needed this visit by Provider  Tobacco  Allergies  Meds  Problems  Med Hx  Surg Hx  Fam Hx         Patient Active Problem List   Diagnosis     Asthma, moderate persistent     Granulomatous lung disease (H)     GERD (gastroesophageal reflux disease)     FH: colon cancer     Hyperlipidemia LDL goal <130     Allergic rhinitis     Advanced directives, counseling/discussion     Hypertension, goal below 140/90     Morbid obesity with BMI of 40.0-44.9, adult (H)     Hamartoma of left lung (H)     Dependent edema     Past Surgical History:   Procedure Laterality Date     ARTHROSCOPY KNEE Right 11/16/2018    Procedure: Right knee arthroscopy, medial meniscal and chondral debridement;  Surgeon: Manoj Augustin MD;  Location:  OR     BRONCHOSCOPY FLEXIBLE AND RIGID N/A 7/19/2017    Procedure: BRONCHOSCOPY FLEXIBLE AND RIGID;   Bronchoscopy, Tumor Debulking with Cryoprobe and Argon Plasma Coagulation;  Surgeon: Albino Crump MD;  Location: [facility] OR     BRONCHOSCOPY FLEXIBLE,L CRYOBIOPSY N/A 1/11/2018    Procedure: BRONCHOSCOPY FLEXIBLE, CRYOBIOPSY;  Flexible  "Bronchoscopy, Rigid Bronchoscopy, Tumor Debulking With Cryoprobe;  Surgeon: Albino Crump MD;  Location: [facility] OR     COLONOSCOPY      + polyp, +FH, repeat in 5 years     COMBINED ENDOSCOPY UPPER WITH ARGON PLASMA COAGULATOR (APC) N/A 2017    Procedure: COMBINED ENDOSCOPY UPPER WITH ARGON PLASMA COAGULATOR (APC);;  Surgeon: Albino Crump MD;  Location: [facility] OR     TONSILLECTOMY       VASECTOMY         Social History     Tobacco Use     Smoking status: Former Smoker     Last attempt to quit: 10/28/1991     Years since quittin.4     Smokeless tobacco: Never Used   Substance Use Topics     Alcohol use: Yes     Comment: rare     Family History   Problem Relation Age of Onset     Arthritis Mother      Hypertension Father      Cerebrovascular Disease Father      Asthma Daughter      Allergies Daughter      Hypertension Sister      Cancer - colorectal Sister         dx age 50           ROS:  Constitutional, HEENT, cardiovascular, pulmonary, gi and gu systems are negative, except as otherwise noted.    OBJECTIVE:     /86   Pulse 88   Temp 97.9  F (36.6  C) (Oral)   Ht 1.753 m (5' 9\")   Wt 135.3 kg (298 lb 3.2 oz)   SpO2 98%   BMI 44.04 kg/m    Body mass index is 44.04 kg/m .  GENERAL: healthy, alert, no distress and obese  EYES: Eyes grossly normal to inspection, PERRL and conjunctivae and sclerae normal  HENT: normal cephalic/atraumatic, ear canals and TM's normal, nasal mucosa edematous/pale L > R, oropharynx clear and oral mucous membranes moist  NECK: no adenopathy, no asymmetry, masses, or scars and thyroid normal to palpation  RESP: lungs clear to auscultation - no rales, rhonchi or wheezes  CV: regular rate and rhythm, normal S1 S2, no S3 or S4, no murmur, click or rub, no peripheral edema and peripheral pulses strong  ABDOMEN: soft, nontender, no hepatosplenomegaly, no masses and bowel sounds normal  MS: no gross musculoskeletal defects noted, no edema    Diagnostic Test " Results:  none     ASSESSMENT/PLAN:     1. Seasonal allergic rhinitis, unspecified trigger  Increase flonase to two spray to left nostril    2. Left ear pain  Suspect this will improve with decreased nasal congestion    3. Need for shingles vaccine    - ZOSTER VACCINE RECOMBINANT ADJUVANTED IM NJX  - ADMIN 1st VACCINE    4. Screen for colon cancer    - GASTROENTEROLOGY ADULT REF PROCEDURE ONLY [facility] [phone]; No Provider Preference    FUTURE APPOINTMENTS:       - Follow-up visit in 3 days if not improved.    Mary Husain MD  [facility]

## 2019-04-17 NOTE — NURSING NOTE
Screening Questionnaire for Adult Immunization    Are you sick today?   No   Do you have allergies to medications, food, a vaccine component or latex?   No   Have you ever had a serious reaction after receiving a vaccination?   No   Do you have a long-term health problem with heart disease, lung disease, asthma, kidney disease, metabolic disease (e.g. diabetes), anemia, or other blood disorder?   Yes   Do you have cancer, leukemia, HIV/AIDS, or any other immune system problem?   No   In the past 3 months, have you taken medications that affect  your immune system, such as prednisone, other steroids, or anticancer drugs; drugs for the treatment of rheumatoid arthritis, Crohn s disease, or psoriasis; or have you had radiation treatments?   No   Have you had a seizure, or a brain or other nervous system problem?   No   During the past year, have you received a transfusion of blood or blood     products, or been given immune (gamma) globulin or antiviral drug?   No   For women: Are you pregnant or is there a chance you could become        pregnant during the next month?   No   Have you received any vaccinations in the past 4 weeks?   No     Immunization questionnaire was positive for at least one answer.  Notified Dr. Vandana Husain.        Patient instructed to remain in clinic for 15 minutes afterwards, and to report any adverse reaction to me immediately.       Screening performed by Audra Ortiz on 4/17/2019 at 3:50 PM.

## 2019-04-17 NOTE — PATIENT INSTRUCTIONS
At [facility], we strive to deliver an exceptional experience to you, every time we see you.  If you receive a survey in the mail, please send us back your thoughts. We really do value your feedback.    Your care team:                            Family Medicine Internal Medicine   MD Jae An MD Shantel Branch-Fleming, MD Katya Georgiev PA-C Megan Hill, APRN PEREZ Fonseca MD Pediatrics   Jackson Yung, JIM Cam, MD China Vick APRN CNP   MD Savannah Mcfarland MD Deborah Mielke, MD Kaleigh Nava, APRN [facility]      Clinic hours: Monday - Thursday 7 am-7 pm; Fridays 7 am-5 pm.   Urgent care: Monday - Friday 11 am-9 pm; Saturday and Sunday 9 am-5 pm.  Pharmacy : Monday -Thursday 8 am-8 pm; Friday 8 am-6 pm; Saturday and Sunday 9 am-5 pm.     Clinic: [phone]   Pharmacy: [phone]

## 2019-04-18 ASSESSMENT — ASTHMA QUESTIONNAIRES: ACT_TOTALSCORE: 22

## 2019-06-14 DIAGNOSIS — K21.00 GASTROESOPHAGEAL REFLUX DISEASE WITH ESOPHAGITIS: ICD-10-CM

## 2019-06-14 DIAGNOSIS — J45.40 MODERATE PERSISTENT ASTHMA WITHOUT COMPLICATION: ICD-10-CM

## 2019-06-14 DIAGNOSIS — E78.5 HYPERLIPIDEMIA LDL GOAL <130: ICD-10-CM

## 2019-06-14 NOTE — TELEPHONE ENCOUNTER
"Requested Prescriptions   Pending Prescriptions Disp Refills     simvastatin (ZOCOR) 10 MG tablet [Pharmacy Med Name: Simvastatin Oral Tablet 10 MG]      Last Written Prescription Date:  7/12/18  Last Fill Quantity: 90,  # refills: 3   Last Office Visit with [facility] or [facility] prescribing provider:  4/17/19   Future Office Visit:      60 tablet 2     Sig: Take 1 tablet (10 mg) by mouth At Bedtime       Statins Protocol Passed - 6/14/2019  4:23 PM        Passed - LDL on file in past 12 months     Recent Labs   Lab Test 07/12/18  0740   LDL 81             Passed - No abnormal creatine kinase in past 12 months     Recent Labs   Lab Test 10/23/12  0710                   Passed - Recent (12 mo) or future (30 days) visit within the authorizing provider's specialty     Patient had office visit in the last 12 months or has a visit in the next 30 days with authorizing provider or within the authorizing provider's specialty.  See \"Patient Info\" tab in inbasket, or \"Choose Columns\" in Meds & Orders section of the refill encounter.              Passed - Medication is active on med list        Passed - Patient is age 18 or older        esomeprazole (NEXIUM) 40 MG DR capsule [Pharmacy Med Name: Esomeprazole Magnesium Oral Capsule Delayed Release 40 MG]      Last Written Prescription Date:  3/13/18  Last Fill Quantity: 90,  # refills: 2   Last Office [facility] or [facility] prescribing provider:  4/17/19   Future Office Visit:      90 capsule 0     Sig: TAKE ONE CAPSULE BY MOUTH IN THE MORNING BEFORE BREAKFAST       PPI Protocol Passed - 6/14/2019  4:23 PM        Passed - Not on Clopidogrel (unless Pantoprazole ordered)        Passed - No diagnosis of osteoporosis on record        Passed - Recent (12 mo) or future (30 days) visit within the authorizing provider's specialty     Patient had office visit in the last 12 months or has a visit in the next 30 days with authorizing provider or within the authorizing " "provider's specialty.  See \"Patient Info\" tab in inbasket, or \"Choose Columns\" in Meds & Orders section of the refill encounter.              Passed - Medication is active on med list        Passed - Patient is age 18 or older        montelukast (SINGULAIR) 10 MG tablet [Pharmacy Med Name: Montelukast Sodium Oral Tablet 10 MG]      Last Written Prescription Date:  12/11/18  Last Fill Quantity: 90,  # refills: 1   Last Office Visit with [facility] or [facility] prescribing provider:  4/17/19   Future Office Visit:      90 tablet 0     Sig: TAKE ONE TABLET BY MOUTH ONE TIME DAILY.       Leukotriene Inhibitors Protocol Passed - 6/14/2019  4:23 PM        Passed - Patient is age 12 or older     If patient is under 16, ok to refill using age based dosing.           Passed - Asthma control assessment score within normal limits in last 6 months     Please review ACT score.           Passed - Medication is active on med list        Passed - Recent (6 mo) or future (30 days) visit within the authorizing provider's specialty     Patient had office visit in the last 6 months or has a visit in the next 30 days with authorizing provider or within the authorizing provider's specialty.  See \"Patient Info\" tab in inbasket, or \"Choose Columns\" in Meds & Orders section of the refill encounter.                  [facility]  "

## 2019-06-14 NOTE — TELEPHONE ENCOUNTER
"Requested Prescriptions   Pending Prescriptions Disp Refills     VENTOLIN  (90 Base) MCG/ACT inhaler [Pharmacy Med Name: Ventolin HFA Inhalation Aerosol Solution 108 (90 Base) MCG/ACT]      Last Written Prescription Date:  6/11/18  Last Fill Quantity: 18 G,  # refills: 2   Last Office Visit with [facility], [facility] or [facility] prescribing provider:  4/17/19   Future Office Visit:      18 g 1     Sig: inhale 2 puffs by mouth every 6 hours as needed for shortness of breath/dyspnea       Asthma Maintenance Inhalers - Anticholinergics Passed - 6/14/2019  4:23 PM        Passed - Patient is age 12 years or older        Passed - Asthma control assessment score within normal limits in last 6 months     Please review ACT score.           Passed - Medication is active on med list        Passed - Recent (6 mo) or future (30 days) visit within the authorizing provider's specialty     Patient had office visit in the last 6 months or has a visit in the next 30 days with authorizing provider or within the authorizing provider's specialty.  See \"Patient Info\" tab in inbasket, or \"Choose Columns\" in Meds & Orders section of the refill encounter.                  Tone Faarax  [facility]  "

## 2019-06-17 RX ORDER — SIMVASTATIN 10 MG
10 TABLET ORAL AT BEDTIME
Qty: 60 TABLET | Refills: 2 | Status: SHIPPED | OUTPATIENT
Start: 2019-06-17 | End: 2019-12-14

## 2019-06-17 RX ORDER — MONTELUKAST SODIUM 10 MG/1
TABLET ORAL
Qty: 90 TABLET | Refills: 0 | Status: SHIPPED | OUTPATIENT
Start: 2019-06-17 | End: 2019-08-19

## 2019-06-17 RX ORDER — ESOMEPRAZOLE MAGNESIUM 40 MG/1
CAPSULE, DELAYED RELEASE ORAL
Qty: 90 CAPSULE | Refills: 0 | Status: SHIPPED | OUTPATIENT
Start: 2019-06-17 | End: 2019-08-19

## 2019-06-17 RX ORDER — ALBUTEROL SULFATE 90 UG/1
AEROSOL, METERED RESPIRATORY (INHALATION)
Qty: 18 G | Refills: 1 | OUTPATIENT
Start: 2019-06-17

## 2019-06-17 NOTE — TELEPHONE ENCOUNTER
Prescription approved per [facility] Refill Protocol- Simvastatin, esomeprazole, montelukast

## 2019-08-19 DIAGNOSIS — J30.1 CHRONIC SEASONAL ALLERGIC RHINITIS DUE TO POLLEN: ICD-10-CM

## 2019-08-19 DIAGNOSIS — K21.00 GASTROESOPHAGEAL REFLUX DISEASE WITH ESOPHAGITIS: ICD-10-CM

## 2019-08-19 DIAGNOSIS — J45.40 MODERATE PERSISTENT ASTHMA WITHOUT COMPLICATION: ICD-10-CM

## 2019-08-20 NOTE — TELEPHONE ENCOUNTER
"Requested Prescriptions   Pending Prescriptions Disp Refills     esomeprazole (NEXIUM) 40 MG DR capsule [Pharmacy Med Name: Esomeprazole Magnesium Oral Capsule Delayed Release 40 MG]  Last Written Prescription Date:  6/17/19  Last Fill Quantity: 90,  # refills: 0   Last Office Visit with [facility], [facility] or [facility] prescribing provider:  4/17/19   Future Office Visit:      90 capsule 0     Sig: TAKE ONE CAPSULE BY MOUTH ONCE DAILY IN THE MORNING BEFORE BREAKFAST       PPI Protocol Passed - 8/19/2019  7:21 PM        Passed - Not on Clopidogrel (unless Pantoprazole ordered)        Passed - No diagnosis of osteoporosis on record        Passed - Recent (12 mo) or future (30 days) visit within the authorizing provider's specialty     Patient had office visit in the last 12 months or has a visit in the next 30 days with authorizing provider or within the authorizing provider's specialty.  See \"Patient Info\" tab in inbasket, or \"Choose Columns\" in Meds & Orders section of the refill encounter.              Passed - Medication is active on med list        Passed - Patient is age 18 or older        desloratadine (CLARINEX) 5 MG tablet [Pharmacy Med Name: Desloratadine Oral Tablet 5 MG]  Last Written Prescription Date:  2/7/19  Last Fill Quantity: 180,  # refills: 0   Last Office Visit with [facility] or [facility] prescribing provider:  4/17/19   Future Office Visit:      180 tablet 0     Sig: Take 1 tablet (5 mg) by mouth 2 times daily as needed for allergies       Antihistamines Protocol Passed - 8/19/2019  7:21 PM        Passed - Patient is 3-64 years of age     Apply weight-based dosing for peds patients age 3 - 12 years of age.    Forward request to provider for patients under the age of 3 or over the age of 64.          Passed - Recent (12 mo) or future (30 days) visit within the authorizing provider's specialty     Patient had office visit in the last 12 months or has a visit in the next 30 days with authorizing provider or " "within the authorizing provider's specialty.  See \"Patient Info\" tab in inbasket, or \"Choose Columns\" in Meds & Orders section of the refill encounter.              Passed - Medication is active on med list        montelukast (SINGULAIR) 10 MG tablet [Pharmacy Med Name: Montelukast Sodium Oral Tablet 10 MG]  Last Written Prescription Date:  6/17/19  Last Fill Quantity: 90,  # refills: 0   Last Office Visit with [facility], [facility] or [facility] prescribing provider:  4/17/19   Future Office Visit:      90 tablet 0     Sig: TAKE ONE TABLET BY MOUTH ONE TIME DAILY.       Leukotriene Inhibitors Protocol Passed - 8/19/2019  7:21 PM        Passed - Patient is age 12 or older     If patient is under 16, ok to refill using age based dosing.           Passed - Asthma control assessment score within normal limits in last 6 months     Please review ACT score.           Passed - Medication is active on med list        Passed - Recent (6 mo) or future (30 days) visit within the authorizing provider's specialty     Patient had office visit in the last 6 months or has a visit in the next 30 days with authorizing provider or within the authorizing provider's specialty.  See \"Patient Info\" tab in inbasket, or \"Choose Columns\" in Meds & Orders section of the refill encounter.            VENTOLIN  (90 Base) MCG/ACT inhaler [Pharmacy Med Name: Ventolin HFA Inhalation Aerosol Solution 108 (90 Base) MCG/ACT]  Last Written Prescription Date:  6/11/18  Last Fill Quantity: 18,  # refills: 2   Last Office Visit with [facility], [facility] or [facility] prescribing provider:  4/17/19   Future Office Visit:      18 g 1     Sig: inhale 2 puffs by mouth every 6 hours as needed for shortness of breath/dyspnea       Asthma Maintenance Inhalers - Anticholinergics Passed - 8/19/2019  7:21 PM        Passed - Patient is age 12 years or older        Passed - Asthma control assessment score within normal limits in last 6 months     Please review ACT score.           Passed " "- Medication is active on med list        Passed - Recent (6 mo) or future (30 days) visit within the authorizing provider's specialty     Patient had office visit in the last 6 months or has a visit in the next 30 days with authorizing provider or within the authorizing provider's specialty.  See \"Patient Info\" tab in inbasket, or \"Choose Columns\" in Meds & Orders section of the refill encounter.              "

## 2019-08-21 RX ORDER — DESLORATADINE 5 MG/1
TABLET ORAL
Qty: 180 TABLET | Refills: 0 | Status: SHIPPED | OUTPATIENT
Start: 2019-08-21 | End: 2019-10-15

## 2019-08-21 RX ORDER — MONTELUKAST SODIUM 10 MG/1
TABLET ORAL
Qty: 90 TABLET | Refills: 1 | Status: SHIPPED | OUTPATIENT
Start: 2019-08-21 | End: 2020-02-25

## 2019-08-21 RX ORDER — ESOMEPRAZOLE MAGNESIUM 40 MG/1
CAPSULE, DELAYED RELEASE ORAL
Qty: 90 CAPSULE | Refills: 1 | Status: SHIPPED | OUTPATIENT
Start: 2019-08-21 | End: 2020-02-25

## 2019-08-21 RX ORDER — ALBUTEROL SULFATE 90 UG/1
AEROSOL, METERED RESPIRATORY (INHALATION)
Qty: 18 G | Refills: 1 | Status: SHIPPED | OUTPATIENT
Start: 2019-08-21 | End: 2020-02-25

## 2019-09-22 ENCOUNTER — OFFICE VISIT (OUTPATIENT)
Dept: URGENT CARE | Facility: URGENT CARE | Age: 64
End: 2019-09-22
Payer: COMMERCIAL

## 2019-09-22 ENCOUNTER — ANCILLARY PROCEDURE (OUTPATIENT)
Dept: GENERAL RADIOLOGY | Facility: CLINIC | Age: 64
End: 2019-09-22
Attending: PHYSICIAN ASSISTANT
Payer: COMMERCIAL

## 2019-09-22 VITALS
TEMPERATURE: 99.5 F | WEIGHT: 289 LBS | BODY MASS INDEX: 42.68 KG/M2 | OXYGEN SATURATION: 94 % | DIASTOLIC BLOOD PRESSURE: 90 MMHG | HEART RATE: 95 BPM | RESPIRATION RATE: 18 BRPM | SYSTOLIC BLOOD PRESSURE: 126 MMHG

## 2019-09-22 DIAGNOSIS — R07.0 THROAT PAIN: ICD-10-CM

## 2019-09-22 DIAGNOSIS — R50.9 FEVER, UNSPECIFIED FEVER CAUSE: ICD-10-CM

## 2019-09-22 DIAGNOSIS — J18.9 PNEUMONIA OF LEFT LOWER LOBE DUE TO INFECTIOUS ORGANISM: Primary | ICD-10-CM

## 2019-09-22 DIAGNOSIS — Q85.9 HAMARTOMA OF LEFT LUNG (H): ICD-10-CM

## 2019-09-22 DIAGNOSIS — R05.9 COUGH: ICD-10-CM

## 2019-09-22 DIAGNOSIS — J84.10 GRANULOMATOUS LUNG DISEASE (H): ICD-10-CM

## 2019-09-22 DIAGNOSIS — R06.2 WHEEZING: ICD-10-CM

## 2019-09-22 LAB
DEPRECATED S PYO AG THROAT QL EIA: NORMAL
SPECIMEN SOURCE: NORMAL

## 2019-09-22 PROCEDURE — 71046 X-RAY EXAM CHEST 2 VIEWS: CPT

## 2019-09-22 PROCEDURE — 94640 AIRWAY INHALATION TREATMENT: CPT | Performed by: PHYSICIAN ASSISTANT

## 2019-09-22 PROCEDURE — 87880 STREP A ASSAY W/OPTIC: CPT | Performed by: PHYSICIAN ASSISTANT

## 2019-09-22 PROCEDURE — 96372 THER/PROPH/DIAG INJ SC/IM: CPT | Performed by: PHYSICIAN ASSISTANT

## 2019-09-22 PROCEDURE — 87081 CULTURE SCREEN ONLY: CPT | Performed by: PHYSICIAN ASSISTANT

## 2019-09-22 PROCEDURE — 99214 OFFICE O/P EST MOD 30 MIN: CPT | Mod: 25 | Performed by: PHYSICIAN ASSISTANT

## 2019-09-22 RX ORDER — PREDNISONE 20 MG/1
20 TABLET ORAL 2 TIMES DAILY
Qty: 10 TABLET | Refills: 0 | Status: SHIPPED | OUTPATIENT
Start: 2019-09-22 | End: 2019-09-27

## 2019-09-22 RX ORDER — AZITHROMYCIN 250 MG/1
TABLET, FILM COATED ORAL
Qty: 6 TABLET | Refills: 0 | Status: SHIPPED | OUTPATIENT
Start: 2019-09-22 | End: 2020-05-14

## 2019-09-22 RX ORDER — ALBUTEROL SULFATE 90 UG/1
2 AEROSOL, METERED RESPIRATORY (INHALATION) EVERY 6 HOURS
Status: CANCELLED | OUTPATIENT
Start: 2019-09-22

## 2019-09-22 RX ORDER — ALBUTEROL SULFATE 0.83 MG/ML
2.5 SOLUTION RESPIRATORY (INHALATION) ONCE
Status: COMPLETED | OUTPATIENT
Start: 2019-09-22 | End: 2019-09-22

## 2019-09-22 RX ORDER — BENZONATATE 100 MG/1
CAPSULE ORAL
Qty: 20 CAPSULE | Refills: 0 | Status: SHIPPED | OUTPATIENT
Start: 2019-09-22 | End: 2020-05-14

## 2019-09-22 RX ORDER — CEFTRIAXONE SODIUM 1 G
1 VIAL (EA) INJECTION ONCE
Status: COMPLETED | OUTPATIENT
Start: 2019-09-22 | End: 2019-09-22

## 2019-09-22 RX ADMIN — ALBUTEROL SULFATE 2.5 MG: 0.83 SOLUTION RESPIRATORY (INHALATION) at 15:38

## 2019-09-22 RX ADMIN — Medication 1 G: at 16:02

## 2019-09-22 ASSESSMENT — PAIN SCALES - GENERAL: PAINLEVEL: MILD PAIN (2)

## 2019-09-22 NOTE — NURSING NOTE
Clinic Administered Medication Documentation    MEDICATION LIST: Inhalable/Nebs Medication Documentation    Patient was given Albuterol Sulfate Neb. Prior to medication administration, verified patients identity using patient s name and date of birth. Please see MAR and medication order for additional information.     Jevon Whiting CMA

## 2019-09-22 NOTE — PROGRESS NOTES
S: 63-year-old male presents for evaluation of dry cough for 5 days.  His chest feels tight with some wheezing.  Has had a headache and fever up to 101.5.  Significant ST. He has had prior left lung surgeries.  He had routine chest CT in March of 2019 that showed no substantial change in lingular endobronchial nodular thickening.  Stable associated scarring and bronchiectasis in the lingula.  Unchanged scattered calcified pulmonary nodules.      Allergies   Allergen Reactions     Lisinopril Cough       Past Medical History:   Diagnosis Date     Allergies      Asthma, moderate persistent      FH: colon cancer     colonoscopy q 5yr.  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     HTN (hypertension)      Hyperlipidemia        desloratadine (CLARINEX) 5 MG tablet, Take 1 tablet (5 mg) by mouth 2 times daily as needed for allergies  esomeprazole (NEXIUM) 40 MG DR capsule, TAKE ONE CAPSULE BY MOUTH ONCE DAILY IN THE MORNING BEFORE BREAKFAST  fluticasone (FLONASE) 50 MCG/ACT spray, Spray 1 spray into both nostrils 2 times daily  losartan-hydrochlorothiazide (HYZAAR) 100-25 MG tablet, TAKE ONE TABLET BY MOUTH ONE TIME DAILY  montelukast (SINGULAIR) 10 MG tablet, TAKE ONE TABLET BY MOUTH ONE TIME DAILY.  Multiple Vitamins-Minerals (MULTIVITAL PO), Take 1 tablet by mouth daily  simvastatin (ZOCOR) 10 MG tablet, Take 1 tablet (10 mg) by mouth At Bedtime  VENTOLIN  (90 Base) MCG/ACT inhaler, inhale 2 puffs by mouth every 6 hours as needed for shortness of breath/dyspnea  HYDROcodone-acetaminophen (NORCO) 5-325 MG per tablet, Take 1-2 tablets by mouth every 6 hours as needed for moderate to severe pain (Patient not taking: Reported on 9/22/2019)  senna-docusate (SENOKOT-S;PERICOLACE) 8.6-50 MG per tablet, Take 1-2 tablets by mouth 2 times daily (Patient not taking: Reported on 9/22/2019)    lidocaine (PF) (XYLOCAINE) 1 % injection 4 mL  methylPREDNISolone  acetate (DEPO-MEDROL) injection 80 mg        Social History     Tobacco Use     Smoking status: Former Smoker     Last attempt to quit: 10/28/1991     Years since quittin.9     Smokeless tobacco: Never Used   Substance Use Topics     Alcohol use: Yes     Comment: rare       ROS:  CONSTITUTIONAL: Negative for fatigue or fever.  EYES: Negative for eye problems.  ENT: As above.  RESP: As above.  CV: Negative for chest pains.  GI: Negative for vomiting.  MUSCULOSKELETAL:  Negative for significant muscle or joint pains.  NEUROLOGIC: Negative for headaches.  SKIN: Negative for rash.  PSYCH: Normal mentation for age.    OBJECTIVE:  BP (!) 126/90 (BP Location: Right arm, Patient Position: Chair, Cuff Size: Adult Large)   Pulse 95   Temp 99.5  F (37.5  C) (Oral)   Resp 18   Wt 131.1 kg (289 lb)   SpO2 94%   BMI 42.68 kg/m     GENERAL APPEARANCE: Healthy, alert and no distress.  EYES:Conjunctiva/sclera clear.  EARS: No cerumen.   Ear canals w/o erythema.  TM's intact w/o erythema.    NOSE/MOUTH: Nose without ulcers, erythema or lesions.  SINUSES: No maxillary sinus tenderness.  THROAT: Moderate erythema w/o tonsillar enlargement . No exudates.  NECK: Supple, nontender, no lymphadenopathy.  RESP: Lungs  Are with wheezing throughout.  CV: Regular rate and rhythm, normal S1 S2, no murmur noted.  NEURO: Awake, alert    SKIN: No rashes  Results for orders placed or performed in visit on 19   Rapid strep screen   Result Value Ref Range    Specimen Description Throat     Rapid Strep A Screen       NEGATIVE: No Group A streptococcal antigen detected by immunoassay, await culture report.       Study Result     CHEST TWO VIEW 2019 3:28 PM      HISTORY: Fever, unspecified fever cause. Cough.      COMPARISON: 2018     FINDINGS: Again there is old granulomatous disease. Spine  hyperostosis.                                                                      IMPRESSION:  Streaky left basilar infiltrate versus  atelectasis.     LUCILLE WU MD         ASSESSMENT:     ICD-10-CM    1. Pneumonia of left lower lobe due to infectious organism (H) J18.1 azithromycin (ZITHROMAX Z-JERAD) 250 MG tablet     predniSONE (DELTASONE) 20 MG tablet     cefTRIAXone (ROCEPHIN) injection 1 g     benzonatate (TESSALON PERLES) 100 MG capsule     INJECTION INTRAMUSCULAR OR SUB-Q   2. Fever, unspecified fever cause R50.9 XR Chest 2 Views     INHALATION/NEBULIZER TREATMENT, INITIAL     albuterol (PROVENTIL) neb solution 2.5 mg   3. Throat pain R07.0 Rapid strep screen     Beta strep group A culture   4. Wheezing R06.2 XR Chest 2 Views     INHALATION/NEBULIZER TREATMENT, INITIAL     albuterol (PROVENTIL) neb solution 2.5 mg     azithromycin (ZITHROMAX Z-JERAD) 250 MG tablet     predniSONE (DELTASONE) 20 MG tablet     cefTRIAXone (ROCEPHIN) injection 1 g     benzonatate (TESSALON PERLES) 100 MG capsule     INJECTION INTRAMUSCULAR OR SUB-Q   5. Granulomatous lung disease (H) J84.10    6. Hamartoma of left lung (H) Q85.9            PLAN:Use albuterol inhaler more frequently. Recheck 3 days.  I have discussed clinical findings with patient.  Side effects of medications discussed.  Symptomatic care is discussed.  I have discussed the possibility of  worsening symptoms and to RTC or ER if they occur.  All questions are answered and patient is in agreement with plan.   Patient care instructions are given to at the end of visit.   Lots of rest and fluids.    Dayanara Todd PA-C

## 2019-09-22 NOTE — NURSING NOTE
Clinic Administered Medication Documentation    MEDICATION LIST:   Injectable Medication Documentation    Patient was given Ceftriaxone Sodium (Rocephin). Prior to medication administration, verified patients identity using patient s name and date of birth. Please see MAR and medication order for additional information. Patient instructed to remain in clinic for 15 minutes and report any adverse reaction to staff immediately .    Injection time 4:02pm  Route/Site IM Left gluteus mani  Lot: [id number]  NDC 46721-877-41    Critical Care  Was entire vial of medication used? Yes  Vial/Syringe: Single dose vial  Expiration Date:  09/2020    Dilutent: Lidocaine 1% without epi  Dosage: 2.1ml  Lot [id number]  Exp 11/21  NDC 11870-724-84   [employer]    Was this medication supplied by the patient? No     Jevon Whiting CMA

## 2019-09-23 LAB
BACTERIA SPEC CULT: NORMAL
SPECIMEN SOURCE: NORMAL

## 2019-09-26 ENCOUNTER — OFFICE VISIT (OUTPATIENT)
Dept: FAMILY MEDICINE | Facility: CLINIC | Age: 64
End: 2019-09-26
Payer: COMMERCIAL

## 2019-09-26 VITALS
WEIGHT: 290.6 LBS | OXYGEN SATURATION: 95 % | HEART RATE: 105 BPM | RESPIRATION RATE: 16 BRPM | DIASTOLIC BLOOD PRESSURE: 91 MMHG | BODY MASS INDEX: 42.91 KG/M2 | TEMPERATURE: 98.9 F | SYSTOLIC BLOOD PRESSURE: 142 MMHG

## 2019-09-26 DIAGNOSIS — J18.9 PNEUMONIA OF LEFT LOWER LOBE DUE TO INFECTIOUS ORGANISM: ICD-10-CM

## 2019-09-26 DIAGNOSIS — R06.2 WHEEZING: ICD-10-CM

## 2019-09-26 PROCEDURE — 99213 OFFICE O/P EST LOW 20 MIN: CPT | Performed by: FAMILY MEDICINE

## 2019-09-26 ASSESSMENT — PATIENT HEALTH QUESTIONNAIRE - PHQ9: SUM OF ALL RESPONSES TO PHQ QUESTIONS 1-9: 0

## 2019-09-26 NOTE — PROGRESS NOTES
Subjective     Arturo Rahman is a 63 year old male who presents to clinic today for the following health issues:    HPI   Acute Illness   Acute illness concerns: follow up--pneumonia from [facility] on Sun. 22nd 2019 and patient states he feels better now   Onset: started on Tues. 17th, 2019    Fever: no     Chills/Sweats: no     Headache (location?): no     Sinus Pressure:no    Conjunctivitis:  no    Ear Pain: no    Rhinorrhea: no     Congestion: no     Sore Throat: no      Cough: no    Wheeze: YES--little     Decreased Appetite: no     Nausea: no     Vomiting: no     Diarrhea:  no     Dysuria/Freq.: no     Fatigue/Achiness: no     Sick/Strep Exposure: no      Therapies Tried and outcome: Zithromax and prednisone--has one more day left for the pills       Patient Active Problem List   Diagnosis     Asthma, moderate persistent     Granulomatous lung disease (H)     GERD (gastroesophageal reflux disease)     FH: colon cancer     Hyperlipidemia LDL goal <130     Allergic rhinitis     Advanced directives, counseling/discussion     Hypertension, goal below 140/90     Morbid obesity with BMI of 40.0-44.9, adult (H)     Hamartoma of left lung (H)     Dependent edema     Past Surgical History:   Procedure Laterality Date     ARTHROSCOPY KNEE Right 11/16/2018    Procedure: Right knee arthroscopy, medial meniscal and chondral debridement;  Surgeon: Manoj Augustin MD;  Location:  OR     BRONCHOSCOPY FLEXIBLE AND RIGID N/A 7/19/2017    Procedure: BRONCHOSCOPY FLEXIBLE AND RIGID;   Bronchoscopy, Tumor Debulking with Cryoprobe and Argon Plasma Coagulation;  Surgeon: Albino Crump MD;  Location: [facility] OR     BRONCHOSCOPY FLEXIBLE,L CRYOBIOPSY N/A 1/11/2018    Procedure: BRONCHOSCOPY FLEXIBLE, CRYOBIOPSY;  Flexible Bronchoscopy, Rigid Bronchoscopy, Tumor Debulking With Cryoprobe;  Surgeon: Albino Crump MD;  Location: [facility] OR     COLONOSCOPY  11/08    + polyp, +FH, repeat in 5 years     COMBINED ENDOSCOPY  UPPER WITH ARGON PLASMA COAGULATOR (APC) N/A 2017    Procedure: COMBINED ENDOSCOPY UPPER WITH ARGON PLASMA COAGULATOR (APC);;  Surgeon: Albino Crump MD;  Location: [facility] OR     TONSILLECTOMY       VASECTOMY         Social History     Tobacco Use     Smoking status: Former Smoker     Last attempt to quit: 10/28/1991     Years since quittin.9     Smokeless tobacco: Never Used   Substance Use Topics     Alcohol use: Yes     Comment: rare     Family History   Problem Relation Age of Onset     Arthritis Mother      Hypertension Father      Cerebrovascular Disease Father      Asthma Daughter      Allergies Daughter      Hypertension Sister      Cancer - colorectal Sister         dx age 50           Reviewed and updated as needed this visit by Provider         Review of Systems   ROS COMP: Constitutional, HEENT, cardiovascular, pulmonary, GI, , musculoskeletal, neuro, skin, endocrine and psych systems are negative, except as otherwise noted.      Objective    BP (!) 142/91 (BP Location: Left arm, Patient Position: Sitting, Cuff Size: Adult Large)   Pulse 105   Temp 98.9  F (37.2  C) (Oral)   Resp 16   Wt 131.8 kg (290 lb 9.6 oz)   SpO2 95%   BMI 42.91 kg/m    Body mass index is 42.91 kg/m .  Physical Exam   GENERAL: healthy, alert and no distress  NECK: no adenopathy, no asymmetry, masses, or scars and thyroid normal to palpation  RESP: lungs clear to auscultation - no rales, rhonchi or wheezes  CV: regular rate and rhythm, normal S1 S2, no S3 or S4, no murmur, click or rub, no peripheral edema and peripheral pulses strong  ABDOMEN: soft, nontender, no hepatosplenomegaly, no masses and bowel sounds normal  MS: no gross musculoskeletal defects noted, no edema    Diagnostic Test Results:  Labs reviewed in Epic        Assessment & Plan     1. Pneumonia of left lower lobe due to infectious organism (H)  Clinically improving. Patient feels better. VSS. Lung exam normal. Continue with full course of the  "azithromycin and prednisone. RTC if worsening.       BMI:   Estimated body mass index is 42.91 kg/m  as calculated from the following:    Height as of 4/17/19: 1.753 m (5' 9\").    Weight as of this encounter: 131.8 kg (290 lb 9.6 oz).           See Patient Instructions    Return in about 2 weeks (around 10/10/2019) for as needed.    Jaime Fonseca MD, MD  [facility]        "

## 2019-09-26 NOTE — PATIENT INSTRUCTIONS
At [facility], we strive to deliver an exceptional experience to you, every time we see you.  If you receive a survey in the mail, please send us back your thoughts. We really do value your feedback.    Based on your medical history, these are the current health maintenance/preventive care services that you are due for (some may have been done at this visit.)  Health Maintenance Due   Topic Date Due     PREVENTIVE CARE VISIT  1955     COLONOSCOPY  10/13/2013     ADVANCE CARE PLANNING  03/11/2019     LIPID  07/12/2019     ASTHMA ACTION PLAN  07/12/2019     INFLUENZA VACCINE (1) 09/01/2019     ZOSTER IMMUNIZATION (2 of 2) 06/12/2019     ASTHMA CONTROL TEST  10/17/2019         Suggested websites for health information:  Www.[facility].org : Up to date and easily searchable information on multiple topics.  Www.medlineplus.gov : medication info, interactive tutorials, watch real surgeries online  Www.familydoctor.org : good info from the Academy of Family Physicians  Www.cdc.gov : public health info, travel advisories, epidemics (H1N1)  Www.aap.org : children's health info, normal development, vaccinations  Www.health.[facility].[place].us : [place] dept of health, public health issues in [place], N1N1    Your care team:                            Family Medicine Internal Medicine   MD Jae An MD Shantel Branch-Fleming, MD Katya Georgiev PA-C Nam Ho, MD Pediatrics   JIM Brady, MD Savannah Webb CNP, MD Deborah Mielke, MD Kim Thein, APRN CNP      Clinic hours: Monday - Thursday 7 am-7 pm; Fridays 7 am-5 pm.   Urgent care: Monday - Friday 11 am-9 pm; Saturday and Sunday 9 am-5 pm.  Pharmacy : Monday -Thursday 8 am-8 pm; Friday 8 am-6 pm; Saturday and Sunday 9 am-5 pm.     Clinic: [phone]   Pharmacy: [phone]

## 2019-10-15 DIAGNOSIS — J30.1 CHRONIC SEASONAL ALLERGIC RHINITIS DUE TO POLLEN: ICD-10-CM

## 2019-10-15 NOTE — TELEPHONE ENCOUNTER
"Requested Prescriptions   Pending Prescriptions Disp Refills     desloratadine (CLARINEX) 5 MG tablet  Last Written Prescription Date:  08/21/19  Last Fill Quantity: 180,  # refills: 0   Last Office Visit with [facility], P or [facility] prescribing provider:  09/26/19-Ho   Future Office Visit:    180 tablet 0       Antihistamines Protocol Passed - 10/15/2019 10:35 AM        Passed - Patient is 3-64 years of age     Apply weight-based dosing for peds patients age 3 - 12 years of age.    Forward request to provider for patients under the age of 3 or over the age of 64.          Passed - Recent (12 mo) or future (30 days) visit within the authorizing provider's specialty     Patient has had an office visit with the authorizing provider or a provider within the authorizing providers department within the previous 12 mos or has a future within next 30 days. See \"Patient Info\" tab in inbasket, or \"Choose Columns\" in Meds & Orders section of the refill encounter.              Passed - Medication is active on med list          "

## 2019-10-16 DIAGNOSIS — J30.1 CHRONIC SEASONAL ALLERGIC RHINITIS DUE TO POLLEN: ICD-10-CM

## 2019-10-16 RX ORDER — DESLORATADINE 5 MG/1
TABLET ORAL
Qty: 180 TABLET | Refills: 0 | Status: SHIPPED | OUTPATIENT
Start: 2019-10-16 | End: 2020-01-28

## 2019-10-16 NOTE — TELEPHONE ENCOUNTER
"Requested Prescriptions   Pending Prescriptions Disp Refills     desloratadine (CLARINEX) 5 MG tablet [Pharmacy Med Name: Desloratadine Oral Tablet 5 MG] 180 tablet 0     Sig: TAKE ONE TABLET BY MOUTH TWICE DAILY AS NEEDED for allergies         Last Written Prescription Date:  8/21/19  Last Fill Quantity: 180,  # refills: 0   [place] [facility] Visit with [facility] or [facility] prescribing provider:  9/26/19   Future Office Visit:         Antihistamines Protocol Passed - 10/16/2019  9:49 AM        Passed - Patient is 3-64 years of age     Apply weight-based dosing for peds patients age 3 - 12 years of age.    Forward request to provider for patients under the age of 3 or over the age of 64.          Passed - Recent (12 mo) or future (30 days) visit within the authorizing provider's specialty     Patient has had an office visit with the authorizing provider or a provider within the authorizing providers department within the previous 12 mos or has a future within next 30 days. See \"Patient Info\" tab in inbasket, or \"Choose Columns\" in Meds & Orders section of the refill encounter.              Passed - Medication is active on med list              Tone Faarax  [facility]  "

## 2019-10-16 NOTE — TELEPHONE ENCOUNTER
Prescription approved per [facility] Refill Protocol.  Violette Painter RN

## 2019-10-18 RX ORDER — DESLORATADINE 5 MG/1
TABLET ORAL
Qty: 180 TABLET | Refills: 0 | OUTPATIENT
Start: 2019-10-18

## 2019-10-18 NOTE — TELEPHONE ENCOUNTER
Duplicate.   Please see refill encounter dated 10/15/19.    Asuncion Alcocer RN  [facility]

## 2019-11-06 ENCOUNTER — HEALTH MAINTENANCE LETTER (OUTPATIENT)
Age: 64
End: 2019-11-06

## 2019-11-17 ENCOUNTER — TELEPHONE (OUTPATIENT)
Dept: FAMILY MEDICINE | Facility: CLINIC | Age: 64
End: 2019-11-17

## 2019-11-17 DIAGNOSIS — I10 HYPERTENSION, GOAL BELOW 140/90: Primary | ICD-10-CM

## 2019-11-17 NOTE — TELEPHONE ENCOUNTER
losartan-hydrochlorothiazide (HYZAAR) 100-25 MG tablet is discontinued. Please send separate rxs for Losartan and hydrochlorothiazide.

## 2019-11-18 RX ORDER — LOSARTAN POTASSIUM 100 MG/1
100 TABLET ORAL DAILY
Qty: 90 TABLET | Refills: 3 | Status: SHIPPED | OUTPATIENT
Start: 2019-11-18 | End: 2020-12-02

## 2019-11-18 RX ORDER — HYDROCHLOROTHIAZIDE 25 MG/1
25 TABLET ORAL DAILY
Qty: 90 TABLET | Refills: 3 | Status: SHIPPED | OUTPATIENT
Start: 2019-11-18 | End: 2020-12-02

## 2019-11-18 NOTE — TELEPHONE ENCOUNTER
Routing to provider to review and advise, see message below from pharmacy.    Maddy Lynn RN  [facility]

## 2019-11-18 NOTE — TELEPHONE ENCOUNTER
Medication has not been discontinued. It is currently on back order. I will refill medications separately until his combined medication becomes available again.  Martina Leung MD

## 2019-12-14 DIAGNOSIS — E78.5 HYPERLIPIDEMIA LDL GOAL <130: ICD-10-CM

## 2019-12-14 NOTE — TELEPHONE ENCOUNTER
"Requested Prescriptions   Pending Prescriptions Disp Refills     simvastatin (ZOCOR) 10 MG tablet [Pharmacy Med Name: Simvastatin Oral Tablet 10 MG]  Last Written Prescription Date:  6/17/19  Last Fill Quantity: 60,  # refills: 2   Last Office Visit with [facility], [facility] or [facility] prescribing provider:  9/26/19   Future Office Visit:      60 tablet 1     Sig: Take 1 tablet by mouth once daily at bedtime       Statins Protocol Failed - 12/14/2019 11:17 AM        Failed - LDL on file in past 12 months     Recent Labs   Lab Test 07/12/18  0740   LDL 81             Passed - No abnormal creatine kinase in past 12 months     Recent Labs   Lab Test 10/23/12  0710                   Passed - Recent (12 mo) or future (30 days) visit within the authorizing provider's specialty     Patient has had an office visit with the authorizing provider or a provider within the authorizing providers department within the previous 12 mos or has a future within next 30 days. See \"Patient Info\" tab in inbasket, or \"Choose Columns\" in Meds & Orders section of the refill encounter.              Passed - Medication is active on med list        Passed - Patient is age 18 or older          "

## 2019-12-16 RX ORDER — SIMVASTATIN 10 MG
TABLET ORAL
Qty: 60 TABLET | Refills: 1 | Status: SHIPPED | OUTPATIENT
Start: 2019-12-16 | End: 2020-05-12

## 2019-12-16 NOTE — TELEPHONE ENCOUNTER
Routing refill request to provider for review/approval because:  Labs not current:  ldl last drawn on 07/12/2018    Elsy Blount RN

## 2020-01-04 ENCOUNTER — OFFICE VISIT (OUTPATIENT)
Dept: URGENT CARE | Facility: URGENT CARE | Age: 65
End: 2020-01-04
Payer: COMMERCIAL

## 2020-01-04 VITALS
RESPIRATION RATE: 20 BRPM | SYSTOLIC BLOOD PRESSURE: 132 MMHG | DIASTOLIC BLOOD PRESSURE: 88 MMHG | TEMPERATURE: 98.4 F | BODY MASS INDEX: 43.12 KG/M2 | OXYGEN SATURATION: 98 % | HEART RATE: 106 BPM | WEIGHT: 292 LBS

## 2020-01-04 DIAGNOSIS — R09.82 POST-NASAL DRIP: ICD-10-CM

## 2020-01-04 DIAGNOSIS — J02.9 SORE THROAT: ICD-10-CM

## 2020-01-04 DIAGNOSIS — J06.9 VIRAL UPPER RESPIRATORY TRACT INFECTION WITH COUGH: Primary | ICD-10-CM

## 2020-01-04 LAB
DEPRECATED S PYO AG THROAT QL EIA: NORMAL
SPECIMEN SOURCE: NORMAL

## 2020-01-04 PROCEDURE — 87880 STREP A ASSAY W/OPTIC: CPT | Performed by: PHYSICIAN ASSISTANT

## 2020-01-04 PROCEDURE — 99214 OFFICE O/P EST MOD 30 MIN: CPT | Performed by: PHYSICIAN ASSISTANT

## 2020-01-04 PROCEDURE — 87081 CULTURE SCREEN ONLY: CPT | Performed by: PHYSICIAN ASSISTANT

## 2020-01-04 ASSESSMENT — ENCOUNTER SYMPTOMS
EYE ITCHING: 0
NEUROLOGICAL NEGATIVE: 1
SORE THROAT: 1
MUSCULOSKELETAL NEGATIVE: 1
EYE DISCHARGE: 0
VOMITING: 0
ADENOPATHY: 0
ENDOCRINE NEGATIVE: 1
CARDIOVASCULAR NEGATIVE: 1
POLYDIPSIA: 0
DYSURIA: 0
CHILLS: 0
SHORTNESS OF BREATH: 0
EYES NEGATIVE: 1
NAUSEA: 0
DIAPHORESIS: 0
GASTROINTESTINAL NEGATIVE: 1
FEVER: 0
CHEST TIGHTNESS: 0
WEAKNESS: 0
DIZZINESS: 0
FREQUENCY: 0
EYE REDNESS: 0
PALPITATIONS: 0
RHINORRHEA: 0
MYALGIAS: 0
LIGHT-HEADEDNESS: 0
CONSTITUTIONAL NEGATIVE: 1
HEADACHES: 0
WHEEZING: 0
COUGH: 1
ABDOMINAL PAIN: 0
DIARRHEA: 0
HEMATURIA: 0

## 2020-01-04 NOTE — PATIENT INSTRUCTIONS
Patient Education     Viral Upper Respiratory Illness (Adult)    You have a viral upper respiratory illness (URI), which is another term for the common cold. This illness is contagious during the first few days. It is spread through the air by coughing and sneezing. It may also be spread by direct contact (touching the sick person and then touching your own eyes, nose, or mouth). Frequent handwashing will decrease risk of spread. Most viral illnesses go away within 7 to 10 days with rest and simple home remedies. Sometimes the illness may last for several weeks. Antibiotics will not kill a virus, and they are generally not prescribed for this condition.  Home care    If symptoms are severe, rest at home for the first 2 to 3 days. When you resume activity, don't let yourself get too tired.    Don't smoke. If you need help stopping, talk with your healthcare provider.    Avoid being exposed to cigarette smoke (yours or others ).    You may use acetaminophen or ibuprofen to control pain and fever, unless another medicine was prescribed. If you have chronic liver or kidney disease, have ever had a stomach ulcer or gastrointestinal bleeding, or are taking blood-thinning medicines, talk with your healthcare provider before using these medicines. Aspirin should never be given to anyone under 18 years of age who is ill with a viral infection or fever. It may cause severe liver or brain damage.    Your appetite may be poor, so a light diet is fine. Stay well hydrated by drinking 6 to 8 glasses of fluids per day (water, soft drinks, juices, tea, or soup). Extra fluids will help loosen secretions in the nose and lungs.    Over-the-counter cold medicines will not shorten the length of time you re sick, but they may be helpful for the following symptoms: cough, sore throat, and nasal and sinus congestion. If you take prescription medicines, ask your healthcare provider or pharmacist which over-the-counter medicines are safe to  use. (Note: Don't use decongestants if you have high blood pressure.)  Follow-up care  Follow up with your healthcare provider, or as advised.  When to seek medical advice  Call your healthcare provider right away if any of these occur:    Cough with lots of colored sputum (mucus)    Severe headache; face, neck, or ear pain    Difficulty swallowing due to throat pain    Fever of 100.4 F (38 C) or higher, or as directed by your healthcare provider  Call 911  Call 911 if any of these occur:    Chest pain, shortness of breath, wheezing, or difficulty breathing    Coughing up blood    Very severe pain with swallowing, especially if it goes along with a muffled voice   Date Last Reviewed: 6/1/2018 2000-2019 [employer]. [address] [place] [address], [place] [address]. All rights reserved. This information is not intended as a substitute for professional medical care. Always follow your healthcare professional's instructions.           Patient Education     Self-Care for Sore Throats    Sore throats happen for many reasons, such as colds, allergies, and infections caused by viruses or bacteria. In any case, your throat becomes red and sore. Your goal for self-care is to reduce your discomfort while giving your throat a chance to heal.  Moisten and soothe your throat  Tips include the following:    Try a sip of water first thing after waking up.    Keep your throat moist by drinking 6 or more glasses of clear liquids every day.    Run a cool-air humidifier in your room overnight.    Avoid cigarette smoke.     Suck on throat lozenges, cough drops, hard candy, ice chips, or frozen fruit-juice bars. Use the sugar-free versions if your diet or medical condition requires them.  Gargle to ease irritation  Gargling every hour or 2 can ease irritation. Try gargling with 1 of these solutions:    1/4 teaspoon of salt in 1/2 cup of warm water    An over-the-counter anesthetic gargle  Use medicine for more  relief  Over-the-counter medicine can reduce sore throat symptoms. Ask your pharmacist if you have questions about which medicine to use:    Ease pain with anesthetic sprays. Aspirin or an aspirin substitute also helps. Remember, never give aspirin to anyone 18 or younger, or if you are already taking blood thinners.     For sore throats caused by allergies, try antihistamines to block the allergic reaction.    Remember: unless a sore throat is caused by a bacterial infection, antibiotics won t help you.  Prevent future sore throats  Prevention tips include the following:    Stop smoking or reduce contact with secondhand smoke. Smoke irritates the tender throat lining.    Limit contact with pets and with allergy-causing substances, such as pollen and mold.    When you re around someone with a sore throat or cold, wash your hands often to keep viruses or bacteria from spreading.    Don t strain your vocal cords.  Contact your healthcare provider if you have:    A temperature over 101 F (38.3 C)    White spots on the throat    Great difficulty swallowing    Trouble breathing    A skin rash    Recent exposure to someone else with strep bacteria    Severe hoarseness and swollen glands in the neck or jaw  Date Last Reviewed: 8/1/2016 2000-2019 [employer]. [address] [place] [address], [place] [address]. All rights reserved. This information is not intended as a substitute for professional medical care. Always follow your healthcare professional's instructions.

## 2020-01-04 NOTE — PROGRESS NOTES
"Chief Complaint:     Chief Complaint   Patient presents with     Urgent Care     URI     For 8 plus days, sore throat, cough, slight runny/stuffy nose, no fever/chills       HPI: Arturo Rahman is an 64 year old male who presents with chest congestion, cough nonproductive, occasional, nasal congestion and sore throat. Symptoms began 8  days ago and has unchanged.  There is no shortness of breath, wheezing and chest pain.      Recent travel?  no.    Patient has a complicated medical Hx \"see problem list below.    ROS:     Review of Systems   Constitutional: Negative.  Negative for chills, diaphoresis and fever.   HENT: Positive for congestion and sore throat. Negative for ear pain and rhinorrhea.    Eyes: Negative.  Negative for discharge, redness and itching.   Respiratory: Positive for cough. Negative for chest tightness, shortness of breath and wheezing.    Cardiovascular: Negative.  Negative for chest pain and palpitations.   Gastrointestinal: Negative.  Negative for abdominal pain, diarrhea, nausea and vomiting.   Endocrine: Negative.  Negative for polydipsia and polyuria.   Genitourinary: Negative for dysuria, frequency, hematuria and urgency.   Musculoskeletal: Negative.  Negative for myalgias.   Skin: Negative for rash.   Allergic/Immunologic: Negative for immunocompromised state.   Neurological: Negative.  Negative for dizziness, weakness, light-headedness and headaches.   Hematological: Negative for adenopathy.        Respiratory History  occasional episodes of bronchitis, asthma, granulomatous lung disease, Hamartoma of L lung       Family History   Family History   Problem Relation Age of Onset     Arthritis Mother      Hypertension Father      Cerebrovascular Disease Father      Asthma Daughter      Allergies Daughter      Hypertension Sister      Cancer - colorectal Sister         dx age 50        Problem history  Patient Active Problem List   Diagnosis     Asthma, moderate persistent     " Granulomatous lung disease (H)     GERD (gastroesophageal reflux disease)     FH: colon cancer     Hyperlipidemia LDL goal <130     Allergic rhinitis     Advanced directives, counseling/discussion     Hypertension, goal below 140/90     Morbid obesity with BMI of 40.0-44.9, adult (H)     Hamartoma of left lung (H)     Dependent edema        Allergies  Allergies   Allergen Reactions     Lisinopril Cough        Social History  Social History     Socioeconomic History     Marital status:      Spouse name: Not on file     Number of children: 2     Years of education: Not on file     Highest education level: Not on file   Occupational History     Employer: [employer]   Social Needs     Financial resource strain: Not on file     Food insecurity:     Worry: Not on file     Inability: Not on file     Transportation needs:     Medical: Not on file     Non-medical: Not on file   Tobacco Use     Smoking status: Former Smoker     Last attempt to quit: 10/28/1991     Years since quittin.2     Smokeless tobacco: Never Used   Substance and Sexual Activity     Alcohol use: Yes     Comment: rare     Drug use: No     Sexual activity: Yes     Partners: Female     Birth control/protection: None   Lifestyle     Physical activity:     Days per week: Not on file     Minutes per session: Not on file     Stress: Not on file   Relationships     Social connections:     Talks on phone: Not on file     Gets together: Not on file     Attends Latter day service: Not on file     Active member of club or organization: Not on file     Attends meetings of clubs or organizations: Not on file     Relationship status: Not on file     Intimate partner violence:     Fear of current or ex partner: Not on file     Emotionally abused: Not on file     Physically abused: Not on file     Forced sexual activity: Not on file   Other Topics Concern     Parent/sibling w/ CABG, MI or angioplasty before 65F 55M? No   Social History Narrative     Not  on file        Current Meds    Current Outpatient Medications:      desloratadine (CLARINEX) 5 MG tablet, Take 1 tablet (5 mg) by mouth 2 times daily as needed for allergies, Disp: 180 tablet, Rfl: 0     esomeprazole (NEXIUM) 40 MG DR capsule, TAKE ONE CAPSULE BY MOUTH ONCE DAILY IN THE MORNING BEFORE BREAKFAST, Disp: 90 capsule, Rfl: 1     fluticasone (FLONASE) 50 MCG/ACT spray, Spray 1 spray into both nostrils 2 times daily, Disp: 1 Bottle, Rfl: 3     hydrochlorothiazide (HYDRODIURIL) 25 MG tablet, Take 1 tablet (25 mg) by mouth daily, Disp: 90 tablet, Rfl: 3     losartan (COZAAR) 100 MG tablet, Take 1 tablet (100 mg) by mouth daily, Disp: 90 tablet, Rfl: 3     montelukast (SINGULAIR) 10 MG tablet, TAKE ONE TABLET BY MOUTH ONE TIME DAILY., Disp: 90 tablet, Rfl: 1     Multiple Vitamins-Minerals (MULTIVITAL PO), Take 1 tablet by mouth daily, Disp: , Rfl:      simvastatin (ZOCOR) 10 MG tablet, Take 1 tablet by mouth once daily at bedtime, Disp: 60 tablet, Rfl: 1     VENTOLIN  (90 Base) MCG/ACT inhaler, inhale 2 puffs by mouth every 6 hours as needed for shortness of breath/dyspnea, Disp: 18 g, Rfl: 1     azithromycin (ZITHROMAX Z-JERAD) 250 MG tablet, 2 tabs day one then 1 tab qd (Patient not taking: Reported on 1/4/2020), Disp: 6 tablet, Rfl: 0     benzonatate (TESSALON PERLES) 100 MG capsule, 1-2 caps TID as needed cough (Patient not taking: Reported on 1/4/2020), Disp: 20 capsule, Rfl: 0     HYDROcodone-acetaminophen (NORCO) 5-325 MG per tablet, Take 1-2 tablets by mouth every 6 hours as needed for moderate to severe pain (Patient not taking: Reported on 9/22/2019), Disp: 20 tablet, Rfl: 0     losartan-hydrochlorothiazide (HYZAAR) 100-25 MG tablet, TAKE ONE TABLET BY MOUTH ONE TIME DAILY (Patient not taking: Reported on 1/4/2020), Disp: 90 tablet, Rfl: 2     senna-docusate (SENOKOT-S;PERICOLACE) 8.6-50 MG per tablet, Take 1-2 tablets by mouth 2 times daily (Patient not taking: Reported on 9/22/2019), Disp: 30  tablet, Rfl: 0    Current Facility-Administered Medications:      lidocaine (PF) (XYLOCAINE) 1 % injection 4 mL, 4 mL, , , Bill Ortiz MD, 4 mL at 18 1422     methylPREDNISolone acetate (DEPO-MEDROL) injection 80 mg, 80 mg, , , Bill Ortiz MD, 80 mg at 18 1422        OBJECTIVE     Vital signs reviewed by Emmett Palm PA-C  /88 (BP Location: Right arm, Patient Position: Chair, Cuff Size: Adult Large)   Pulse 106   Temp 98.4  F (36.9  C) (Oral)   Resp 20   Wt 132.5 kg (292 lb)   SpO2 98%   BMI 43.12 kg/m       Physical Exam  Vitals signs reviewed.   Constitutional:       General: He is not in acute distress.     Appearance: He is well-developed. He is not ill-appearing, toxic-appearing or diaphoretic.   HENT:      Head: Normocephalic and atraumatic.      Right Ear: Hearing, tympanic membrane, ear canal and external ear normal. No drainage, swelling or tenderness. Tympanic membrane is not perforated, erythematous, retracted or bulging.      Left Ear: Hearing, tympanic membrane, ear canal and external ear normal. No drainage, swelling or tenderness. Tympanic membrane is not perforated, erythematous, retracted or bulging.      Nose: Mucosal edema, congestion and rhinorrhea present. No nasal tenderness.      Right Turbinates: Not enlarged or swollen.      Left Turbinates: Not enlarged or swollen.      Right Sinus: No maxillary sinus tenderness or frontal sinus tenderness.      Left Sinus: No maxillary sinus tenderness or frontal sinus tenderness.      Mouth/Throat:      Pharynx: Posterior oropharyngeal erythema present. No pharyngeal swelling, oropharyngeal exudate or uvula swelling.      Tonsils: No tonsillar exudate. Swellin on the right. 0 on the left.   Eyes:      General: Lids are normal.         Right eye: No discharge.         Left eye: No discharge.      Conjunctiva/sclera: Conjunctivae normal.      Right eye: Right conjunctiva is not injected. No exudate.      Left eye: Left conjunctiva is not injected. No exudate.     Pupils: Pupils are equal, round, and reactive to light.   Neck:      Musculoskeletal: Normal range of motion and neck supple.   Cardiovascular:      Rate and Rhythm: Normal rate and regular rhythm.      Heart sounds: Normal heart sounds. No murmur. No friction rub. No gallop.    Pulmonary:      Effort: Pulmonary effort is normal. No accessory muscle usage, respiratory distress or retractions.      Breath sounds: Normal breath sounds and air entry. No stridor, decreased air movement or transmitted upper airway sounds. No decreased breath sounds, wheezing, rhonchi or rales.   Chest:      Chest wall: No tenderness.   Abdominal:      General: Bowel sounds are normal. There is no distension.      Palpations: Abdomen is soft. Abdomen is not rigid. There is no mass.      Tenderness: There is no abdominal tenderness. There is no guarding or rebound.   Musculoskeletal: Normal range of motion.   Lymphadenopathy:      Head:      Right side of head: No submental, submandibular, tonsillar, preauricular or posterior auricular adenopathy.      Left side of head: No submental, submandibular, tonsillar, preauricular or posterior auricular adenopathy.      Cervical:      Right cervical: No superficial or posterior cervical adenopathy.     Left cervical: No superficial or posterior cervical adenopathy.   Skin:     General: Skin is warm.      Capillary Refill: Capillary refill takes less than 2 seconds.   Neurological:      Mental Status: He is alert and oriented to person, place, and time.      Cranial Nerves: No cranial nerve deficit.      Sensory: No sensory deficit.      Motor: No abnormal muscle tone.      Coordination: Coordination normal.      Deep Tendon Reflexes: Reflexes normal.   Psychiatric:         Behavior: Behavior normal. Behavior is cooperative.         Thought Content: Thought content normal.         Judgment: Judgment normal.           Labs:     Results for  orders placed or performed in visit on 01/04/20   Strep, Rapid Screen     Status: None   Result Value Ref Range    Specimen Description Throat     Rapid Strep A Screen       NEGATIVE: No Group A streptococcal antigen detected by immunoassay, await culture report.       Medical Decision Making:    Differential Diagnosis:  URI Adult/Peds:  Bronchitis-viral, Influenza, Pneumonia, Strep pharyngitis, Tonsilitis, Viral pharyngitis, Viral syndrome and Viral upper respiratory illness        ASSESSMENT    1. Viral upper respiratory tract infection with cough    2. Sore throat    3. Post-nasal drip        PLAN    Patient presents with 8 day(s) chest congestion, cough nonproductive, occasional, nasal congestion and sore throat.    Patient is in no acute distress.    Temp is 98.4 in clinic today, lung sounds were clear, and O2 sats at 98% on RA.  Imaging to rule out pneumonia is not indicated at this time.  RST was negative.  We will call with culture results only if positive.  Flonase 1 hour before bedtime for post nasal drip.  Patient declined Rx for this.  Rest, Push fluids, vaporizer, elevation of head of bed.  Ibuprofen and or Tylenol for any fever or body aches.  Over the counter cough suppressant- PRN- as discussed.   If symptoms worsen, recheck immediately otherwise follow up with your PCP in 1 week if symptoms are not improving.  Worrisome symptoms discussed with instructions to go to the ED.  Patient verbalized understanding and agreed with this plan.         Emmett Palm PA-C  1/4/2020, 12:10 PM

## 2020-01-05 LAB
BACTERIA SPEC CULT: NORMAL
SPECIMEN SOURCE: NORMAL

## 2020-01-06 ENCOUNTER — MYC MEDICAL ADVICE (OUTPATIENT)
Dept: FAMILY MEDICINE | Facility: CLINIC | Age: 65
End: 2020-01-06

## 2020-01-06 DIAGNOSIS — R05.9 COUGH: Primary | ICD-10-CM

## 2020-01-06 RX ORDER — BENZONATATE 200 MG/1
200 CAPSULE ORAL 3 TIMES DAILY PRN
Qty: 30 CAPSULE | Refills: 0 | Status: SHIPPED | OUTPATIENT
Start: 2020-01-06 | End: 2020-05-14

## 2020-01-24 DIAGNOSIS — J30.1 CHRONIC SEASONAL ALLERGIC RHINITIS DUE TO POLLEN: ICD-10-CM

## 2020-01-24 NOTE — TELEPHONE ENCOUNTER
"Requested Prescriptions   Pending Prescriptions Disp Refills     desloratadine (CLARINEX) 5 MG tablet [Pharmacy Med Name: Desloratadine Oral Tablet 5 MG] 180 tablet 0     Sig: TAKE ONE TABLET BY MOUTH TWICE DAILY AS NEEDED FOR ALLERGY         Last Written Prescription Date:  10/16/19  Last Fill Quantity: 180,  # refills: 0   Last Office Visit with [facility], [facility] or [facility] prescribing provider:  9/26/19   Future Office Visit:         Antihistamines Protocol Passed - 1/24/2020  3:36 PM        Passed - Patient is 3-64 years of age     Apply weight-based dosing for peds patients age 3 - 12 years of age.    Forward request to provider for patients under the age of 3 or over the age of 64.          Passed - Recent (12 mo) or future (30 days) visit within the authorizing provider's specialty     Patient has had an office visit with the authorizing provider or a provider within the authorizing providers department within the previous 12 mos or has a future within next 30 days. See \"Patient Info\" tab in inbasket, or \"Choose Columns\" in Meds & Orders section of the refill encounter.              Passed - Medication is active on med list              Tone Faarax  [facility]  "

## 2020-01-28 RX ORDER — DESLORATADINE 5 MG/1
TABLET ORAL
Qty: 180 TABLET | Refills: 0 | Status: SHIPPED | OUTPATIENT
Start: 2020-01-28 | End: 2020-05-12

## 2020-01-28 NOTE — TELEPHONE ENCOUNTER
Prescription approved per [facility] Refill Protocol.      Tito Mirza RN, BSN, PHN

## 2020-02-22 DIAGNOSIS — J45.40 MODERATE PERSISTENT ASTHMA WITHOUT COMPLICATION: ICD-10-CM

## 2020-02-22 DIAGNOSIS — K21.00 GASTROESOPHAGEAL REFLUX DISEASE WITH ESOPHAGITIS: ICD-10-CM

## 2020-02-22 DIAGNOSIS — H69.93 DYSFUNCTION OF BOTH EUSTACHIAN TUBES: ICD-10-CM

## 2020-02-24 NOTE — TELEPHONE ENCOUNTER
"Requested Prescriptions   Pending Prescriptions Disp Refills     esomeprazole (NEXIUM) 40 MG DR capsule [Pharmacy Med Name: Esomeprazole Magnesium Oral Capsule Delayed Release 40 MG]  Last Written Prescription Date:  08/21/19  Last Fill Quantity: 90,  # refills: 1   Last Office Visit with [facility], [facility] or [facility] prescribing provider:  09/26/19-  Future Office Visit:    90 capsule 0     Sig: TAKE ONE CAPSULE BY MOUTH ONCE DAILY IN THE MORNING BEFORE BREAKFAST       PPI Protocol Passed - 2/22/2020 11:35 AM        Passed - Not on Clopidogrel (unless Pantoprazole ordered)        Passed - No diagnosis of osteoporosis on record        Passed - Recent (12 mo) or future (30 days) visit within the authorizing provider's specialty     Patient has had an office visit with the authorizing provider or a provider within the authorizing providers department within the previous 12 mos or has a future within next 30 days. See \"Patient Info\" tab in inbasket, or \"Choose Columns\" in Meds & Orders section of the refill encounter.              Passed - Medication is active on med list        Passed - Patient is age 18 or older        montelukast (SINGULAIR) 10 MG tablet [Pharmacy Med Name: Montelukast Sodium Oral Tablet 10 MG]  Last Written Prescription Date: 08/21/19  Last Fill Quantity:90,  # refills: 1   Last Office Visit with [facility], [facility] or [facility] prescribing provider:  09/26/19-   Future Office Visit:    90 tablet 0     Sig: TAKE ONE TABLET BY MOUTH ONE TIME DAILY       Leukotriene Inhibitors Protocol Failed - 2/22/2020 11:35 AM        Failed - Asthma control assessment score within normal limits in last 6 months     Please review ACT score.           Passed - Patient is age 12 or older     If patient is under 16, ok to refill using age based dosing.           Passed - Medication is active on med list        Passed - Recent (6 mo) or future (30 days) visit within the authorizing provider's specialty     Patient had office visit in " "the last 6 months or has a visit in the next 30 days with authorizing provider or within the authorizing provider's specialty.  See \"Patient Info\" tab in inbasket, or \"Choose Columns\" in Meds & Orders section of the refill encounter.            albuterol (PROAIR HFA/PROVENTIL HFA/VENTOLIN HFA) 108 (90 Base) MCG/ACT inhaler [Pharmacy Med Name: Albuterol Sulfate HFA Inhalation Aerosol Solution 108 (90 Base) MCG/ACT]  Last Written Prescription Date:  08/21/19  Last Fill Quantity: 18g,  # refills: 1   Last Office Visit with [facility], [facility] or [facility] prescribing provider:  09/26/19-Ho   Future Office Visit:    18 g 0     Sig: inhale 2 puffs by mouth every 6 hours as needed for shortness of breath/dyspnea       Asthma Maintenance Inhalers - Anticholinergics Failed - 2/22/2020 11:35 AM        Failed - Asthma control assessment score within normal limits in last 6 months     Please review ACT score.           Passed - Patient is age 12 years or older        Passed - Medication is active on med list        Passed - Recent (6 mo) or future (30 days) visit within the authorizing provider's specialty     Patient had office visit in the last 6 months or has a visit in the next 30 days with authorizing provider or within the authorizing provider's specialty.  See \"Patient Info\" tab in inbasket, or \"Choose Columns\" in Meds & Orders section of the refill encounter.            fluticasone (FLONASE) 50 MCG/ACT nasal spray [Pharmacy Med Name: Fluticasone Propionate Nasal Suspension 50 MCG/ACT]  Last Written Prescription Date:  09/21/18  Last Fill Quantity: 1,  # refills: 3   Last Office Visit with [facility], [facility] or [facility] prescribing provider:  09/26/19-Ho   Future Office Visit:    16 g 2     Sig: Spray 1 spray into both nostrils 2 times daily       Inhaled Steroids Protocol Failed - 2/22/2020 11:35 AM        Failed - Asthma control assessment score within normal limits in last 6 months     Please review ACT score.           Passed - Patient " "is age 12 or older        Passed - Medication is active on med list        Passed - Recent (6 mo) or future (30 days) visit within the authorizing provider's specialty     Patient had office visit in the last 6 months or has a visit in the next 30 days with authorizing provider or within the authorizing provider's specialty.  See \"Patient Info\" tab in inbasket, or \"Choose Columns\" in Meds & Orders section of the refill encounter.              "

## 2020-02-25 RX ORDER — ALBUTEROL SULFATE 90 UG/1
AEROSOL, METERED RESPIRATORY (INHALATION)
Qty: 18 G | Refills: 0 | Status: SHIPPED | OUTPATIENT
Start: 2020-02-25 | End: 2020-05-12

## 2020-02-25 RX ORDER — MONTELUKAST SODIUM 10 MG/1
TABLET ORAL
Qty: 90 TABLET | Refills: 0 | Status: SHIPPED | OUTPATIENT
Start: 2020-02-25 | End: 2020-05-12

## 2020-02-25 RX ORDER — FLUTICASONE PROPIONATE 50 MCG
1 SPRAY, SUSPENSION (ML) NASAL DAILY
Qty: 16 G | Refills: 2 | Status: SHIPPED | OUTPATIENT
Start: 2020-02-25 | End: 2021-03-24

## 2020-02-25 RX ORDER — ESOMEPRAZOLE MAGNESIUM 40 MG/1
CAPSULE, DELAYED RELEASE ORAL
Qty: 90 CAPSULE | Refills: 1 | Status: SHIPPED | OUTPATIENT
Start: 2020-02-25 | End: 2020-05-14

## 2020-02-25 NOTE — TELEPHONE ENCOUNTER
Routing refill request to provider for review/approval because:  There is a new warning on the flonase. Malathi refills sent for all other medications.    MA to call and update the ACT.    Abi Wray RN, [facility] Triage

## 2020-02-26 ASSESSMENT — ASTHMA QUESTIONNAIRES: ACT_TOTALSCORE: 22

## 2020-05-05 DIAGNOSIS — E78.5 HYPERLIPIDEMIA LDL GOAL <130: ICD-10-CM

## 2020-05-05 DIAGNOSIS — J30.1 CHRONIC SEASONAL ALLERGIC RHINITIS DUE TO POLLEN: ICD-10-CM

## 2020-05-05 DIAGNOSIS — J45.40 MODERATE PERSISTENT ASTHMA WITHOUT COMPLICATION: ICD-10-CM

## 2020-05-07 NOTE — TELEPHONE ENCOUNTER
Team call and schedule the patient for a visit. Please ask if there is enough medication/supplies to last till scheduled appointment. Then route back to RN's.    Abi Wray RN, [facility] Triage

## 2020-05-12 RX ORDER — MONTELUKAST SODIUM 10 MG/1
TABLET ORAL
Qty: 90 TABLET | Refills: 0 | Status: SHIPPED | OUTPATIENT
Start: 2020-05-12 | End: 2020-05-14

## 2020-05-12 RX ORDER — ALBUTEROL SULFATE 90 UG/1
AEROSOL, METERED RESPIRATORY (INHALATION)
Qty: 18 G | Refills: 0 | Status: SHIPPED | OUTPATIENT
Start: 2020-05-12 | End: 2020-05-14

## 2020-05-12 RX ORDER — SIMVASTATIN 10 MG
TABLET ORAL
Qty: 30 TABLET | Refills: 0 | Status: SHIPPED | OUTPATIENT
Start: 2020-05-12 | End: 2020-06-10

## 2020-05-12 RX ORDER — DESLORATADINE 5 MG/1
TABLET ORAL
Qty: 180 TABLET | Refills: 0 | Status: SHIPPED | OUTPATIENT
Start: 2020-05-12 | End: 2020-08-20

## 2020-05-12 NOTE — TELEPHONE ENCOUNTER
Prescriptions approved per [facility] Refill Protocol.  30 day heaven refill given on Simvastatin, per [facility] refill protocol  Patient has virtual visit scheduled for 5/14/20. Will need fasting labs for additional refills.    Maddy Lynn RN  [facility]/ [facility]

## 2020-05-14 ENCOUNTER — VIRTUAL VISIT (OUTPATIENT)
Dept: FAMILY MEDICINE | Facility: CLINIC | Age: 65
End: 2020-05-14
Payer: COMMERCIAL

## 2020-05-14 DIAGNOSIS — I10 HYPERTENSION, GOAL BELOW 140/90: Primary | ICD-10-CM

## 2020-05-14 DIAGNOSIS — K21.00 GASTROESOPHAGEAL REFLUX DISEASE WITH ESOPHAGITIS: ICD-10-CM

## 2020-05-14 DIAGNOSIS — Q85.9 HAMARTOMA OF LEFT LUNG (H): ICD-10-CM

## 2020-05-14 DIAGNOSIS — E66.01 MORBID OBESITY WITH BMI OF 40.0-44.9, ADULT (H): ICD-10-CM

## 2020-05-14 DIAGNOSIS — J45.40 MODERATE PERSISTENT ASTHMA WITHOUT COMPLICATION: ICD-10-CM

## 2020-05-14 DIAGNOSIS — E78.5 HYPERLIPIDEMIA LDL GOAL <130: ICD-10-CM

## 2020-05-14 PROCEDURE — 99214 OFFICE O/P EST MOD 30 MIN: CPT | Mod: TEL | Performed by: FAMILY MEDICINE

## 2020-05-14 RX ORDER — MONTELUKAST SODIUM 10 MG/1
1 TABLET ORAL DAILY
Qty: 90 TABLET | Refills: 3 | Status: SHIPPED | OUTPATIENT
Start: 2020-05-14 | End: 2021-06-10

## 2020-05-14 RX ORDER — ALBUTEROL SULFATE 90 UG/1
2 AEROSOL, METERED RESPIRATORY (INHALATION) EVERY 6 HOURS PRN
Qty: 18 G | Refills: 3 | Status: SHIPPED | OUTPATIENT
Start: 2020-05-14 | End: 2021-06-10

## 2020-05-14 RX ORDER — ESOMEPRAZOLE MAGNESIUM 40 MG/1
CAPSULE, DELAYED RELEASE ORAL
Qty: 90 CAPSULE | Refills: 1 | Status: SHIPPED | OUTPATIENT
Start: 2020-05-14 | End: 2021-03-24

## 2020-05-14 ASSESSMENT — PAIN SCALES - GENERAL: PAINLEVEL: NO PAIN (0)

## 2020-05-14 NOTE — PATIENT INSTRUCTIONS
"At [facility], we strive to deliver an exceptional experience to you, every time we see you. If you receive a survey, please complete it as we do value your feedback.  If you have MyChart, you can expect to receive results automatically within 24 hours of their completion.  Your provider will send a note interpreting your results as well.   If you do not have MyChart, you should receive your results in about a week by mail.    Your care team:                            Family Medicine Internal Medicine   MD Jae An MD Shantel Branch-Fleming, MD Katya Georgiev PA-C Megan Hill, APRN CNP    Jaime Fonseca, MD Pediatrics   Jackson Yung, PAEMILY Cam, CNP MD China Bryant APRN CNP   MD Savannah Mcfarland MD Deborah Mielke, MD Kim Thein, APRN [facility]      Clinic hours: Monday - Thursday 7 am-7 pm; Fridays 7 am-5 pm.   Urgent care: Monday - Friday 11 am-9 pm; Saturday and Sunday 9 am-5 pm.    Clinic: [phone]       [facility]: Monday - Thursday 8 am - 7 pm; Friday 8 am - 6 pm  [facility]: [phone]     Use www.[email or web address].org for 24/7 diagnosis and treatment of dozens of conditions.    Patient Education     Coronavirus Disease 2019 (COVID-19): Prevention  The best way to prevent COVID-19 is to avoid contact with the virus. There is no vaccine yet.  Cancel travel and other outings  Stay informed about COVID-19 in your area. School, sporting events and other activities may be cancelled. Follow local instructions. For example, you may need to:     Stay at least 6 feet away from others. This is called \"social distancing.\"    Stay at home and isolate yourself. You may hear terms like \"self-isolate, \"quarantine,\"  stay at home,   shelter in place,  and  lockdown.   Don t travel to areas with a COVID-19 outbreak. Don t go on a cruise. It s best to cancel any non-essential travel right now.  For the " "most up-to-date travel advisories, visit the CDC website at www.cdc.gov/coronavirus/2019-ncov/travelers.  When you re at home    Wash your hands often. Use soap and clean, running water for at least 20 seconds. Or, use hand  that has at least 60% alcohol.    Don't touch your eyes, nose, or mouth unless you have clean hands.    Don t kiss someone who is sick.    If you need to cough or sneeze, do it into a tissue. Then, throw the tissue into the trash. If you don't have tissues, cough or sneeze into the bend of your elbow.    Try to avoid \"high-touch\" surfaces, like doorknobs, handles, light switches, desks, printers, phones, kitchen counters, tables and bathroom surfaces. Clean these often with disinfectant (read the label for instructions). For cleaning tips, go to www.cdc.gov/coronavirus/2019-ncov/prepare/cleaning-disinfection.html.    Check your home supplies. Try to keep a 2-week supply of medicine, food, and household items.    Make a plan for childcare, work, and ways to stay in touch with others. Know who will help you if you get sick.    Don't be around people who are sick.    Don t share eating or drinking utensils with sick people.    Wash your hands after touching animals. Don't touch animals that may be sick.  If you leave home    Stay at least 6 feet away from all people.    Try to avoid \"high-touch\" public surfaces, like doorknobs, handles, and light switches. If you need to touch these, clean them first with a disinfecting wipe. Or, touch them using a tissue or paper towel.    Use hand  often. Make sure it has at least 60% alcohol.    Don't touch your eyes, nose, or mouth unless you have clean hands.    If you need to cough or sneeze, do it into a tissue. Then throw the tissue into the trash. If you don't have tissues, cough or sneeze into the bend of your elbow.    Avoid public gatherings.    Wear a cloth face mask in public. You may need to make your own mask using a bandana, " T-shirt, or other cloth. See the CDC s instructions on how to make a mask.  If you re at a work site    Follow all of the instructions above.    Wash your hands often with soap and clean, running water for at least 20 seconds. Or, use hand  with at least 60% alcohol.    Don't shake hands. Don t have in-person meetings. (Meet over phone or video.)    Use office noe one person at a time. Don t share coffee, tea or food in the office. Don t have meals in groups.    Clean work surfaces often with disinfectant. These include desks, photocopiers, printers, phones, kitchen counters, fridge door handles, bathroom surfaces, and others.    Don t touch other people s phones, keyboards, pens, eating or drinking utensils, or other items.    If you feel sick in any way, go home and stay home.  If you ve been around someone with COVID-19  In the past 14 days, if you've been around someone who has (or may have) COVID-19:    Contact your care team to ask for advice. Follow all instructions. You may need to stay home and limit your activities for up to 2 weeks.    Take your temperature every morning and evening for at least 14 days. This is to check for fever. Keep a record of the readings.    Watch for symptoms of the virus. (See the CDC's symptom .) If you have symptoms, contact your care team.    Stay home if you re sick for any reason. If you need to go to a clinic or hospital, call ahead to let them know you re coming.  When to contact your care team  Call your care team if you think you have COVID-19 symptoms. These can include fever, cough, trouble breathing, body aches, headaches, chills or repeated shaking with chills, sore throat, loss of tasted or smell, or diarrhea (loose, watery poops).  Don t go to a clinic or hospital before speaking to your care team.  Last modified date: 4/27/2020  This information has been modified by your health care provider with permission from the publisher.      [id number]  [employer], [address] [place] [address]. All rights reserved. This information is not intended as a substitute for professional medical care. Always follow your healthcare professional's instructions. This information has been modified by your health care provider with permission from the publisher.

## 2020-05-14 NOTE — PROGRESS NOTES
"Arturo Rahman is a 64 year old male who is being evaluated via a billable telephone visit.      The patient has been notified of following:     \"This telephone visit will be conducted via a call between you and your physician/provider. We have found that certain health care needs can be provided without the need for a physical exam.  This service lets us provide the care you need with a short phone conversation.  If a prescription is necessary we can send it directly to your pharmacy.  If lab work is needed we can place an order for that and you can then stop by our lab to have the test done at a later time.    Telephone visits are billed at different rates depending on your insurance coverage. During this emergency period, for some insurers they may be billed the same as an in-person visit.  Please reach out to your insurance provider with any questions.    If during the course of the call the physician/provider feels a telephone visit is not appropriate, you will not be charged for this service.\"    Patient has given verbal consent for Telephone visit?  Yes    What phone number would you like to be contacted at? [phone]    How would you like to obtain your AVS? Rocio Ballesteros     Arturo Rahman is a 64 year old male who presents to clinic today for the following health issues:    HPI  Hyperlipidemia Follow-Up      Are you regularly taking any medication or supplement to lower your cholesterol?   Yes- Simvastatin    Are you having muscle aches or other side effects that you think could be caused by your cholesterol lowering medication?  No    Hypertension Follow-up      Do you check your blood pressure regularly outside of the clinic? No     Are you following a low salt diet? No    Are your blood pressures ever more than 140 on the top number (systolic) OR more   than 90 on the bottom number (diastolic), for example 140/90? n/a      How many servings of fruits and vegetables do you eat daily?  " 0-1    On average, how many sweetened beverages do you drink each day (Examples: soda, juice, sweet tea, etc.  Do NOT count diet or artificially sweetened beverages)?   1    How many days per week do you exercise enough to make your heart beat faster? 5    How many minutes a day do you exercise enough to make your heart beat faster? 30 - 60    How many days per week do you miss taking your medication? 0         Asthma Follow-Up    Was ACT completed today?  No      Do you have a cough?  YES occasional     Are you experiencing any wheezing in your chest?  YES    Do you have any shortness of breath?  YES     How often are you using a short-acting (rescue) inhaler or nebulizer, such as Albuterol?  A few times a month    How many days per week do you miss taking your asthma controller medication?  I do not have an asthma controller medication    Please describe any recent triggers for your asthma: pollens    Have you had any Emergency Room Visits, Urgent Care Visits, or Hospital Admissions since your last office visit?  No      Patient Active Problem List   Diagnosis     Asthma, moderate persistent     Granulomatous lung disease (H)     GERD (gastroesophageal reflux disease)     FH: colon cancer     Hyperlipidemia LDL goal <130     Allergic rhinitis     Advanced directives, counseling/discussion     Hypertension, goal below 140/90     Morbid obesity with BMI of 40.0-44.9, adult (H)     Hamartoma of left lung (H)     Dependent edema     Past Surgical History:   Procedure Laterality Date     ARTHROSCOPY KNEE Right 11/16/2018    Procedure: Right knee arthroscopy, medial meniscal and chondral debridement;  Surgeon: Manoj Augustin MD;  Location:  OR     BRONCHOSCOPY FLEXIBLE AND RIGID N/A 7/19/2017    Procedure: BRONCHOSCOPY FLEXIBLE AND RIGID;   Bronchoscopy, Tumor Debulking with Cryoprobe and Argon Plasma Coagulation;  Surgeon: Albino Crump MD;  Location:  OR     BRONCHOSCOPY FLEXIBLE,L CRYOBIOPSY N/A  2018    Procedure: BRONCHOSCOPY FLEXIBLE, CRYOBIOPSY;  Flexible Bronchoscopy, Rigid Bronchoscopy, Tumor Debulking With Cryoprobe;  Surgeon: Albino Crump MD;  Location: [facility] OR     COLONOSCOPY      + polyp, +FH, repeat in 5 years     COMBINED ENDOSCOPY UPPER WITH ARGON PLASMA COAGULATOR (APC) N/A 2017    Procedure: COMBINED ENDOSCOPY UPPER WITH ARGON PLASMA COAGULATOR (APC);;  Surgeon: Albino Crump MD;  Location: [facility] OR     TONSILLECTOMY       VASECTOMY         Social History     Tobacco Use     Smoking status: Former Smoker     Last attempt to quit: 10/28/1991     Years since quittin.5     Smokeless tobacco: Never Used   Substance Use Topics     Alcohol use: Yes     Comment: rare     Family History   Problem Relation Age of Onset     Arthritis Mother      Hypertension Father      Cerebrovascular Disease Father      Asthma Daughter      Allergies Daughter      Hypertension Sister      Cancer - colorectal Sister         dx age 50         Current Outpatient Medications   Medication Sig Dispense Refill     albuterol (PROAIR HFA/PROVENTIL HFA/VENTOLIN HFA) 108 (90 Base) MCG/ACT inhaler inhale 2 puffs by mouth every 6 hours as needed for shortness of breath/dyspnea 18 g 0     benzonatate (TESSALON) 200 MG capsule Take 1 capsule (200 mg) by mouth 3 times daily as needed for cough 30 capsule 0     desloratadine (CLARINEX) 5 MG tablet TAKE ONE TABLET BY MOUTH TWICE DAILY AS NEEDED for allergy 180 tablet 0     esomeprazole (NEXIUM) 40 MG DR capsule TAKE ONE CAPSULE BY MOUTH ONCE DAILY IN THE MORNING BEFORE BREAKFAST 90 capsule 1     fluticasone (FLONASE) 50 MCG/ACT nasal spray Spray 1 spray into both nostrils daily 16 g 2     hydrochlorothiazide (HYDRODIURIL) 25 MG tablet Take 1 tablet (25 mg) by mouth daily 90 tablet 3     losartan (COZAAR) 100 MG tablet Take 1 tablet (100 mg) by mouth daily 90 tablet 3     montelukast (SINGULAIR) 10 MG tablet TAKE ONE TABLET BY MOUTH ONE TIME DAILY  90  tablet 0     Multiple Vitamins-Minerals (MULTIVITAL PO) Take 1 tablet by mouth daily       simvastatin (ZOCOR) 10 MG tablet Take 1 tablet by mouth once daily at bedtime 30 tablet 0     benzonatate (TESSALON PERLES) 100 MG capsule 1-2 caps TID as needed cough (Patient not taking: Reported on 1/4/2020) 20 capsule 0     HYDROcodone-acetaminophen (NORCO) 5-325 MG per tablet Take 1-2 tablets by mouth every 6 hours as needed for moderate to severe pain (Patient not taking: Reported on 9/22/2019) 20 tablet 0     losartan-hydrochlorothiazide (HYZAAR) 100-25 MG tablet TAKE ONE TABLET BY MOUTH ONE TIME DAILY (Patient not taking: Reported on 1/4/2020) 90 tablet 2     senna-docusate (SENOKOT-S;PERICOLACE) 8.6-50 MG per tablet Take 1-2 tablets by mouth 2 times daily (Patient not taking: Reported on 9/22/2019) 30 tablet 0     Allergies   Allergen Reactions     Lisinopril Cough     Recent Labs   Lab Test 11/02/18  1049 07/12/18  0740  07/11/17  1919  03/21/16  1351 12/01/15  1552  06/19/13  1017 10/23/12  0710   A1C  --   --   --  6.1*  --  6.4* 6.3*  --   --   --    LDL  --  81  --  88  --   --   --   --   --  104   HDL  --  45  --  46  --   --   --   --   --  36*   TRIG  --  75  --  104  --   --   --   --   --  102   ALT  --   --   --  37  --   --  40  --   --  49   CR 1.12 1.25   < > 1.21   < > 1.30* 1.45*   < > 1.14 1.12   GFRESTIMATED 67 59*   < > 61   < > 57* 50*   < > 67 68   GFRESTBLACK 81 71   < > 74   < > 69 60*   < > 81 82   POTASSIUM 3.8 3.9   < > 3.8   < > 3.2* 3.8   < > 4.0 3.6   TSH  --   --   --   --   --   --   --   --  0.71  --     < > = values in this interval not displayed.      BP Readings from Last 3 Encounters:   01/04/20 132/88   09/26/19 (!) 142/91   09/22/19 (!) 126/90    Wt Readings from Last 3 Encounters:   01/04/20 132.5 kg (292 lb)   09/26/19 131.8 kg (290 lb 9.6 oz)   09/22/19 131.1 kg (289 lb)                    Reviewed and updated as needed this visit by Provider         Review of Systems    Constitutional, HEENT, cardiovascular, pulmonary, gi and gu systems are negative, except as otherwise noted.       Objective   Reported vitals:  There were no vitals taken for this visit.     PSYCH: Alert and oriented times 3; coherent speech, normal   rate and volume, able to articulate logical thoughts, able   to abstract reason, no tangential thoughts, no hallucinations   or delusions  His affect is normal  RESP: No cough, no audible wheezing, able to talk in full sentences  Remainder of exam unable to be completed due to telephone visits    Diagnostic Test Results:  Labs reviewed in Epic  none         Assessment/Plan:  1. Hypertension, goal below 140/90  Well controlled on medications   - **Basic metabolic panel FUTURE anytime; Future  - **CBC with platelets FUTURE anytime; Future    2. Hyperlipidemia LDL goal <130  controlled  - Lipid panel reflex to direct LDL Fasting; Future  - **ALT FUTURE anytime; Future    3. Hamartoma of left lung (H)  Surgically removed and no signs of recurrence.     4. Morbid obesity with BMI of 40.0-44.9, adult (H)  Weight loss counseling done     5. Moderate persistent asthma without complication  Stable on medication and inhaler use  - montelukast (SINGULAIR) 10 MG tablet; Take 1 tablet (10 mg) by mouth daily  Dispense: 90 tablet; Refill: 3  - albuterol (PROAIR HFA/PROVENTIL HFA/VENTOLIN HFA) 108 (90 Base) MCG/ACT inhaler; Inhale 2 puffs into the lungs every 6 hours as needed for shortness of breath / dyspnea or wheezing  Dispense: 18 g; Refill: 3    6. Gastroesophageal reflux disease with esophagitis  Needs to take most days to prevent reflux symptoms   - esomeprazole (NEXIUM) 40 MG DR capsule; TAKE ONE CAPSULE BY MOUTH ONCE DAILY IN THE MORNING BEFORE BREAKFAST  Dispense: 90 capsule; Refill: 1    High risk for COVID 19 complications. Working full time. 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 Uses social separation at work. Discussed risk reduction and when to call.     No  follow-ups on file.      Phone call duration:  12 minutes    Martina Leung MD

## 2020-05-18 ENCOUNTER — ALLIED HEALTH/NURSE VISIT (OUTPATIENT)
Dept: NURSING | Facility: CLINIC | Age: 65
End: 2020-05-18
Payer: COMMERCIAL

## 2020-05-18 VITALS — HEART RATE: 79 BPM | DIASTOLIC BLOOD PRESSURE: 102 MMHG | SYSTOLIC BLOOD PRESSURE: 162 MMHG

## 2020-05-18 DIAGNOSIS — E78.5 HYPERLIPIDEMIA LDL GOAL <130: ICD-10-CM

## 2020-05-18 DIAGNOSIS — I10 HYPERTENSION, GOAL BELOW 140/90: ICD-10-CM

## 2020-05-18 DIAGNOSIS — I10 HTN (HYPERTENSION): Primary | ICD-10-CM

## 2020-05-18 DIAGNOSIS — B02.9 SHINGLES: ICD-10-CM

## 2020-05-18 LAB
ERYTHROCYTE [DISTWIDTH] IN BLOOD BY AUTOMATED COUNT: 14.7 % (ref 10–15)
HCT VFR BLD AUTO: 44.4 % (ref 40–53)
HGB BLD-MCNC: 14.1 G/DL (ref 13.3–17.7)
MCH RBC QN AUTO: 27.1 PG (ref 26.5–33)
MCHC RBC AUTO-ENTMCNC: 31.8 G/DL (ref 31.5–36.5)
MCV RBC AUTO: 85 FL (ref 78–100)
PLATELET # BLD AUTO: 262 10E9/L (ref 150–450)
RBC # BLD AUTO: 5.2 10E12/L (ref 4.4–5.9)
WBC # BLD AUTO: 8.7 10E9/L (ref 4–11)

## 2020-05-18 PROCEDURE — 90471 IMMUNIZATION ADMIN: CPT

## 2020-05-18 PROCEDURE — 85027 COMPLETE CBC AUTOMATED: CPT | Performed by: FAMILY MEDICINE

## 2020-05-18 PROCEDURE — 36415 COLL VENOUS BLD VENIPUNCTURE: CPT | Performed by: FAMILY MEDICINE

## 2020-05-18 PROCEDURE — 99207 ZZC NO CHARGE NURSE ONLY: CPT

## 2020-05-18 PROCEDURE — 80048 BASIC METABOLIC PNL TOTAL CA: CPT | Performed by: FAMILY MEDICINE

## 2020-05-18 PROCEDURE — 90736 HZV VACCINE LIVE SUBQ: CPT

## 2020-05-18 PROCEDURE — 80061 LIPID PANEL: CPT | Performed by: FAMILY MEDICINE

## 2020-05-18 PROCEDURE — 84460 ALANINE AMINO (ALT) (SGPT): CPT | Performed by: FAMILY MEDICINE

## 2020-05-18 NOTE — PROGRESS NOTES
Patient consents to receive outdoor care: Yes    Upon arrival, patient instructed to proceed to designated location, place vehicle in park, turn off, and remove keys     If we are unable to safely and ergonomically able to provide care- is the patient able to safely able to get out of car and transfer to a chair? Yes      Patient would like to receive their AVS N/A.    Ancillary BP check    HTN Guidelines:  Age 18-59 BP range:  Less than 140/90  Age 60-85 with Diabetes:  Less than 140/90  Age 60-85 without Diabetes:  less than 150/90        Arturo Rahman is a 64 year old male who comes in today for a Blood Pressure check.    Patient is taking medication as prescribed  Patient is tolerating medications well.  Patient is monitoring Blood Pressure at home.  Average readings if yes are N/A    BP goal: < 150/90 mmHg (patient 60+ years of age without diabetes)    BP reading today: FAILED     BP Readings from Last 1 Encounters:   01/04/20 132/88     A large cuff was used.  Pulse: 77    Vitals reviewed     Each reading recorded in vital signs section of chart: yes    Symptoms: none    Need for urgent appointment, based on criteria in ancillary BP workflow (elevated blood pressure and any of the following: dizziness, blurry vision, lightheadedness, severe shortness of breath, chest pain or visual disturbance): No       Plan: Not at goal without red flags, message routed to provider for further directions and follow up.      Completed by: Gray Love CMA on 5/18/2020 at 3:41 PM    [facility]    Prior to immunization administration, verified patients identity using patient s name and date of birth. Please see Immunization Activity for additional information.     Screening Questionnaire for Adult Immunization    Are you sick today?   No   Do you have allergies to medications, food, a vaccine component or latex?   No   Have you ever had a serious reaction after receiving a vaccination?   No   Do you have a  long-term health problem with heart, lung, kidney, or metabolic disease (e.g., diabetes), asthma, a blood disorder, no spleen, complement component deficiency, a cochlear implant, or a spinal fluid leak?  Are you on long-term aspirin therapy?   No   Do you have cancer, leukemia, HIV/AIDS, or any other immune system problem?   No   Do you have a parent, brother, or sister with an immune system problem?   No   In the past 3 months, have you taken medications that affect  your immune system, such as prednisone, other steroids, or anticancer drugs; drugs for the treatment of rheumatoid arthritis, Crohn s disease, or psoriasis; or have you had radiation treatments?   No   Have you had a seizure, or a brain or other nervous system problem?   No   During the past year, have you received a transfusion of blood or blood    products, or been given immune (gamma) globulin or antiviral drug?   No   For women: Are you pregnant or is there a chance you could become       pregnant during the next month?   No   Have you received any vaccinations in the past 4 weeks?   No     Immunization questionnaire answers were all negative.        Per orders of Dr. Leung, injection of Shingles given by Gray Love CMA. Patient instructed to remain in clinic for 15 minutes afterwards, and to report any adverse reaction to me immediately.       Screening performed by Gray Love CMA on 5/18/2020 at 3:41 PM.

## 2020-05-18 NOTE — PATIENT INSTRUCTIONS
At [facility], we strive to deliver an exceptional experience to you, every time we see you. If you receive a survey, please complete it as we do value your feedback.  If you have MyChart, you can expect to receive results automatically within 24 hours of their completion.  Your provider will send a note interpreting your results as well.   If you do not have MyChart, you should receive your results in about a week by mail.    Your care team:                            Family Medicine Internal Medicine   MD Jae An MD Shantel Branch-Fleming, MD Katya Georgiev PA-C Megan Hill, APRJERRY Fonseca, MD Pediatrics   Jackson Yung, PAEMILY Cam, MD China Vick APRN CNP   MD Savannah Mcfarland MD Deborah Mielke, MD Kim Thein, APRN [facility]      Clinic hours: Monday - Thursday 7 am-7 pm; Fridays 7 am-5 pm.   Urgent care: Monday - Friday 11 am-9 pm; Saturday and Sunday 9 am-5 pm.    Clinic: [phone]       [facility]: Monday - Thursday 8 am - 7 pm; Friday 8 am - 6 pm  [facility]: [phone]     Use [email or web address] for 24/7 diagnosis and treatment of dozens of conditions.

## 2020-05-19 LAB
ALT SERPL W P-5'-P-CCNC: 37 U/L (ref 0–70)
ANION GAP SERPL CALCULATED.3IONS-SCNC: 4 MMOL/L (ref 3–14)
BUN SERPL-MCNC: 24 MG/DL (ref 7–30)
CALCIUM SERPL-MCNC: 8.9 MG/DL (ref 8.5–10.1)
CHLORIDE SERPL-SCNC: 107 MMOL/L (ref 94–109)
CHOLEST SERPL-MCNC: 152 MG/DL
CO2 SERPL-SCNC: 29 MMOL/L (ref 20–32)
CREAT SERPL-MCNC: 1.49 MG/DL (ref 0.66–1.25)
GFR SERPL CREATININE-BSD FRML MDRD: 49 ML/MIN/{1.73_M2}
GLUCOSE SERPL-MCNC: 102 MG/DL (ref 70–99)
HDLC SERPL-MCNC: 41 MG/DL
LDLC SERPL CALC-MCNC: 81 MG/DL
NONHDLC SERPL-MCNC: 111 MG/DL
POTASSIUM SERPL-SCNC: 3.7 MMOL/L (ref 3.4–5.3)
SODIUM SERPL-SCNC: 140 MMOL/L (ref 133–144)
TRIGL SERPL-MCNC: 152 MG/DL

## 2020-05-20 NOTE — RESULT ENCOUNTER NOTE
Dear Arturo    Your test results are attached. I am happy to let you know that they are stable.    Your kidney test is low and shows chronic kidney disease stage 3. This may be caused by taking pain medications like ibuprofen or naproxen. We should recheck this in 3 months. It also can be caused by high blood pressure and we may need to increase your blood pressure medication.     The blood sugar is normal and you do not have diabetes. Your cholesterol looks good.    Please contact me by [employer] if you have any questions about your labs or management. You may also call my office number [phone] for any questions.     Martina Leung MD

## 2020-06-09 DIAGNOSIS — E78.5 HYPERLIPIDEMIA LDL GOAL <130: ICD-10-CM

## 2020-06-10 RX ORDER — SIMVASTATIN 10 MG
TABLET ORAL
Qty: 90 TABLET | Refills: 2 | Status: SHIPPED | OUTPATIENT
Start: 2020-06-10 | End: 2021-03-24

## 2020-06-10 NOTE — TELEPHONE ENCOUNTER
"Requested Prescriptions   Pending Prescriptions Disp Refills     simvastatin (ZOCOR) 10 MG tablet [Pharmacy Med Name: Simvastatin Oral Tablet 10 MG] 30 tablet 0     Sig: TAKE ONE TABLET BY MOUTH AT BEDTIME       [facility] Protocol Passed - 6/9/2020  9:58 AM        Passed - LDL on file in past 12 months     Recent Labs   Lab Test 05/18/20  1504   LDL 81             Passed - No abnormal creatine kinase in past 12 months     Recent Labs   Lab Test 10/23/12  0710                   Passed - Recent (12 mo) or future (30 days) visit within the authorizing provider's specialty     Patient has had an office visit with the authorizing provider or a provider within the authorizing providers department within the previous 12 mos or has a future within next 30 days. See \"Patient Info\" tab in inbasket, or \"Choose Columns\" in Meds & Orders section of the refill encounter.              Passed - Medication is active on med list        Passed - Patient is age 18 or older           Prescription approved per [facility] Refill Protocol.      Tito Mirza RN, BSN, PHN    "

## 2020-08-18 DIAGNOSIS — J30.1 CHRONIC SEASONAL ALLERGIC RHINITIS DUE TO POLLEN: ICD-10-CM

## 2020-08-20 RX ORDER — DESLORATADINE 5 MG/1
TABLET ORAL
Qty: 180 TABLET | Refills: 0 | Status: SHIPPED | OUTPATIENT
Start: 2020-08-20 | End: 2020-12-01

## 2020-08-20 NOTE — TELEPHONE ENCOUNTER
Prescription approved per [facility] Refill Protocol.    TRAMAINE CainN, RN  [facility]

## 2020-11-29 ENCOUNTER — HEALTH MAINTENANCE LETTER (OUTPATIENT)
Age: 65
End: 2020-11-29

## 2021-01-01 NOTE — IP AVS SNAPSHOT
Post Anesthesia Care Unit [address] [address]    Phone:  [phone]                                       After Visit Summary   7/19/2017    Arturo Rahman    MRN: [id number]           After Visit Summary Signature Page     I have received my discharge instructions, and my questions have been answered. I have discussed any challenges I see with this plan with the nurse or doctor.    ..........................................................................................................................................  Patient/Patient Representative Signature      ..........................................................................................................................................  Patient Representative Print Name and Relationship to Patient    ..................................................               ................................................  Date                                            Time    ..........................................................................................................................................  Reviewed by Signature/Title    ...................................................              ..............................................  Date                                                            Time           I will START or STAY ON the medications listed below when I get home from the hospital:  None

## 2021-03-23 DIAGNOSIS — I10 HYPERTENSION, GOAL BELOW 140/90: ICD-10-CM

## 2021-03-24 NOTE — TELEPHONE ENCOUNTER
"Requested Prescriptions   Pending Prescriptions Disp Refills    losartan (COZAAR) 100 MG tablet [Pharmacy Med Name: Losartan Potassium Oral Tablet 100 MG] 90 tablet 0     Sig: Take 1 tablet (100 mg) by mouth daily       Angiotensin-II Receptors Failed - 3/23/2021  7:29 PM        Failed - Last blood pressure under 140/90 in past 12 months     BP Readings from Last 3 Encounters:   05/18/20 (!) 162/102   01/04/20 132/88   09/26/19 (!) 142/91                 Failed - Normal serum creatinine on file in past 12 months     Recent Labs   Lab Test 05/18/20  1504   CR 1.49*       Ok to refill medication if creatinine is low          Passed - Recent (12 mo) or future (30 days) visit within the authorizing provider's specialty     Patient has had an office visit with the authorizing provider or a provider within the authorizing providers department within the previous 12 mos or has a future within next 30 days. See \"Patient Info\" tab in inbasket, or \"Choose Columns\" in Meds & Orders section of the refill encounter.              Passed - Medication is active on med list        Passed - Patient is age 18 or older        Passed - Normal serum potassium on file in past 12 months     Recent Labs   Lab Test 05/18/20  1504   POTASSIUM 3.7                      hydrochlorothiazide (HYDRODIURIL) 25 MG tablet [Pharmacy Med Name: hydroCHLOROthiazide Oral Tablet 25 MG] 90 tablet 0     Sig: Take 1 tablet (25 mg) by mouth daily       Diuretics (Including Combos) Protocol Failed - 3/23/2021  7:29 PM        Failed - Blood pressure under 140/90 in past 12 months     BP Readings from Last 3 Encounters:   05/18/20 (!) 162/102   01/04/20 132/88   09/26/19 (!) 142/91                 Failed - Normal serum creatinine on file in past 12 months     Recent Labs   Lab Test 05/18/20  1504   CR 1.49*              Passed - Recent (12 mo) or future (30 days) visit within the authorizing provider's specialty     Patient has had an office visit with the " "authorizing provider or a provider within the authorizing providers department within the previous 12 mos or has a future within next 30 days. See \"Patient Info\" tab in inbasket, or \"Choose Columns\" in Meds & Orders section of the refill encounter.              Passed - Medication is active on med list        Passed - Patient is age 18 or older        Passed - Normal serum potassium on file in past 12 months     Recent Labs   Lab Test 05/18/20  1504   POTASSIUM 3.7                    Passed - Normal serum sodium on file in past 12 months     Recent Labs   Lab Test 05/18/20  1504                      "

## 2021-03-25 RX ORDER — LOSARTAN POTASSIUM 100 MG/1
100 TABLET ORAL DAILY
Qty: 90 TABLET | Refills: 0 | Status: SHIPPED | OUTPATIENT
Start: 2021-03-25 | End: 2021-06-10

## 2021-03-25 RX ORDER — HYDROCHLOROTHIAZIDE 25 MG/1
25 TABLET ORAL DAILY
Qty: 90 TABLET | Refills: 0 | Status: SHIPPED | OUTPATIENT
Start: 2021-03-25 | End: 2021-06-10

## 2021-05-05 ENCOUNTER — TELEPHONE (OUTPATIENT)
Dept: FAMILY MEDICINE | Facility: CLINIC | Age: 66
End: 2021-05-05

## 2021-05-05 NOTE — TELEPHONE ENCOUNTER
Patient Quality Outreach      Summary:    Patient has the following on his problem list/HM:   Hypertension   Last three blood pressure readings:  BP Readings from Last 3 Encounters:   05/18/20 (!) 162/102   01/04/20 132/88   09/26/19 (!) 142/91     Blood pressure: Failed    HTN Guidelines:  ? 139/89     Patient is due/failing the following:   Hypertension follow-up visit    Type of outreach:        Questions for provider review:    None                                                                                                                                          Chart routed to Care Team.

## 2021-06-10 ENCOUNTER — OFFICE VISIT (OUTPATIENT)
Dept: FAMILY MEDICINE | Facility: CLINIC | Age: 66
End: 2021-06-10
Payer: COMMERCIAL

## 2021-06-10 VITALS
WEIGHT: 308 LBS | DIASTOLIC BLOOD PRESSURE: 82 MMHG | SYSTOLIC BLOOD PRESSURE: 134 MMHG | HEART RATE: 115 BPM | OXYGEN SATURATION: 95 % | BODY MASS INDEX: 45.48 KG/M2

## 2021-06-10 DIAGNOSIS — Q85.9 HAMARTOMA OF LEFT LUNG (H): ICD-10-CM

## 2021-06-10 DIAGNOSIS — Z12.5 SCREENING FOR PROSTATE CANCER: ICD-10-CM

## 2021-06-10 DIAGNOSIS — J45.40 MODERATE PERSISTENT ASTHMA WITHOUT COMPLICATION: ICD-10-CM

## 2021-06-10 DIAGNOSIS — I10 HYPERTENSION, GOAL BELOW 140/90: Primary | ICD-10-CM

## 2021-06-10 DIAGNOSIS — K21.00 GASTROESOPHAGEAL REFLUX DISEASE WITH ESOPHAGITIS, UNSPECIFIED WHETHER HEMORRHAGE: ICD-10-CM

## 2021-06-10 DIAGNOSIS — Z12.11 SCREEN FOR COLON CANCER: ICD-10-CM

## 2021-06-10 DIAGNOSIS — R73.9 ELEVATED BLOOD SUGAR: ICD-10-CM

## 2021-06-10 DIAGNOSIS — H10.13 ALLERGIC CONJUNCTIVITIS, BILATERAL: ICD-10-CM

## 2021-06-10 DIAGNOSIS — H69.93 DYSFUNCTION OF BOTH EUSTACHIAN TUBES: ICD-10-CM

## 2021-06-10 DIAGNOSIS — E78.5 HYPERLIPIDEMIA LDL GOAL <130: ICD-10-CM

## 2021-06-10 DIAGNOSIS — E66.01 MORBID OBESITY WITH BMI OF 40.0-44.9, ADULT (H): ICD-10-CM

## 2021-06-10 DIAGNOSIS — J84.10 GRANULOMATOUS LUNG DISEASE (H): ICD-10-CM

## 2021-06-10 LAB
ALBUMIN SERPL-MCNC: 3.3 G/DL (ref 3.4–5)
ALBUMIN UR-MCNC: NEGATIVE MG/DL
ALP SERPL-CCNC: 95 U/L (ref 40–150)
ALT SERPL W P-5'-P-CCNC: 38 U/L (ref 0–70)
ANION GAP SERPL CALCULATED.3IONS-SCNC: 6 MMOL/L (ref 3–14)
APPEARANCE UR: CLEAR
AST SERPL W P-5'-P-CCNC: 18 U/L (ref 0–45)
BILIRUB SERPL-MCNC: 0.4 MG/DL (ref 0.2–1.3)
BILIRUB UR QL STRIP: NEGATIVE
BUN SERPL-MCNC: 19 MG/DL (ref 7–30)
CALCIUM SERPL-MCNC: 9.3 MG/DL (ref 8.5–10.1)
CHLORIDE SERPL-SCNC: 104 MMOL/L (ref 94–109)
CHOLEST SERPL-MCNC: 153 MG/DL
CO2 SERPL-SCNC: 29 MMOL/L (ref 20–32)
COLOR UR AUTO: YELLOW
CREAT SERPL-MCNC: 1.25 MG/DL (ref 0.66–1.25)
ERYTHROCYTE [DISTWIDTH] IN BLOOD BY AUTOMATED COUNT: 14.6 % (ref 10–15)
GFR SERPL CREATININE-BSD FRML MDRD: 61 ML/MIN/{1.73_M2}
GLUCOSE SERPL-MCNC: 109 MG/DL (ref 70–99)
GLUCOSE UR STRIP-MCNC: NEGATIVE MG/DL
HBA1C MFR BLD: 6.7 % (ref 0–5.6)
HCT VFR BLD AUTO: 45.1 % (ref 40–53)
HDLC SERPL-MCNC: 44 MG/DL
HGB BLD-MCNC: 14.5 G/DL (ref 13.3–17.7)
HGB UR QL STRIP: NEGATIVE
KETONES UR STRIP-MCNC: NEGATIVE MG/DL
LDLC SERPL CALC-MCNC: 78 MG/DL
LEUKOCYTE ESTERASE UR QL STRIP: NEGATIVE
MCH RBC QN AUTO: 27.4 PG (ref 26.5–33)
MCHC RBC AUTO-ENTMCNC: 32.2 G/DL (ref 31.5–36.5)
MCV RBC AUTO: 85 FL (ref 78–100)
NITRATE UR QL: NEGATIVE
NONHDLC SERPL-MCNC: 109 MG/DL
PH UR STRIP: 6 PH (ref 5–7)
PLATELET # BLD AUTO: 292 10E9/L (ref 150–450)
POTASSIUM SERPL-SCNC: 3.5 MMOL/L (ref 3.4–5.3)
PROT SERPL-MCNC: 7.3 G/DL (ref 6.8–8.8)
RBC # BLD AUTO: 5.3 10E12/L (ref 4.4–5.9)
SODIUM SERPL-SCNC: 139 MMOL/L (ref 133–144)
SOURCE: NORMAL
SP GR UR STRIP: 1.01 (ref 1–1.03)
TRIGL SERPL-MCNC: 156 MG/DL
UROBILINOGEN UR STRIP-ACNC: 0.2 EU/DL (ref 0.2–1)
WBC # BLD AUTO: 11.6 10E9/L (ref 4–11)

## 2021-06-10 PROCEDURE — 83036 HEMOGLOBIN GLYCOSYLATED A1C: CPT | Performed by: FAMILY MEDICINE

## 2021-06-10 PROCEDURE — 81003 URINALYSIS AUTO W/O SCOPE: CPT | Performed by: FAMILY MEDICINE

## 2021-06-10 PROCEDURE — 36415 COLL VENOUS BLD VENIPUNCTURE: CPT | Performed by: FAMILY MEDICINE

## 2021-06-10 PROCEDURE — 80053 COMPREHEN METABOLIC PANEL: CPT | Performed by: FAMILY MEDICINE

## 2021-06-10 PROCEDURE — 85027 COMPLETE CBC AUTOMATED: CPT | Performed by: FAMILY MEDICINE

## 2021-06-10 PROCEDURE — 99214 OFFICE O/P EST MOD 30 MIN: CPT | Performed by: FAMILY MEDICINE

## 2021-06-10 PROCEDURE — G0103 PSA SCREENING: HCPCS | Performed by: FAMILY MEDICINE

## 2021-06-10 PROCEDURE — 80061 LIPID PANEL: CPT | Performed by: FAMILY MEDICINE

## 2021-06-10 PROCEDURE — 82043 UR ALBUMIN QUANTITATIVE: CPT | Performed by: FAMILY MEDICINE

## 2021-06-10 RX ORDER — HYDROCHLOROTHIAZIDE 25 MG/1
25 TABLET ORAL DAILY
Qty: 90 TABLET | Refills: 3 | Status: SHIPPED | OUTPATIENT
Start: 2021-06-10 | End: 2022-07-11

## 2021-06-10 RX ORDER — MONTELUKAST SODIUM 10 MG/1
1 TABLET ORAL DAILY
Qty: 90 TABLET | Refills: 3 | Status: SHIPPED | OUTPATIENT
Start: 2021-06-10 | End: 2022-07-11

## 2021-06-10 RX ORDER — ALBUTEROL SULFATE 90 UG/1
2 AEROSOL, METERED RESPIRATORY (INHALATION) EVERY 6 HOURS PRN
Qty: 18 G | Refills: 3 | Status: SHIPPED | OUTPATIENT
Start: 2021-06-10 | End: 2022-07-11

## 2021-06-10 RX ORDER — ESOMEPRAZOLE MAGNESIUM 40 MG/1
40 CAPSULE, DELAYED RELEASE ORAL
Qty: 90 CAPSULE | Refills: 3 | Status: SHIPPED | OUTPATIENT
Start: 2021-06-10 | End: 2022-07-11

## 2021-06-10 RX ORDER — LOSARTAN POTASSIUM 100 MG/1
100 TABLET ORAL DAILY
Qty: 90 TABLET | Refills: 3 | Status: SHIPPED | OUTPATIENT
Start: 2021-06-10 | End: 2022-07-11

## 2021-06-10 RX ORDER — SIMVASTATIN 10 MG
10 TABLET ORAL AT BEDTIME
Qty: 90 TABLET | Refills: 3 | Status: SHIPPED | OUTPATIENT
Start: 2021-06-10 | End: 2022-07-11

## 2021-06-10 RX ORDER — OLOPATADINE HYDROCHLORIDE 1 MG/ML
1 SOLUTION/ DROPS OPHTHALMIC 2 TIMES DAILY
Qty: 5 ML | Refills: 1 | Status: SHIPPED | OUTPATIENT
Start: 2021-06-10 | End: 2022-07-11

## 2021-06-10 RX ORDER — FLUTICASONE PROPIONATE 50 MCG
1 SPRAY, SUSPENSION (ML) NASAL DAILY
Qty: 16 G | Refills: 0 | Status: SHIPPED | OUTPATIENT
Start: 2021-06-10 | End: 2021-09-10

## 2021-06-10 RX ORDER — ESOMEPRAZOLE MAGNESIUM 40 MG/1
CAPSULE, DELAYED RELEASE ORAL
Qty: 90 CAPSULE | Refills: 0 | Status: SHIPPED | OUTPATIENT
Start: 2021-06-10 | End: 2021-06-10

## 2021-06-10 ASSESSMENT — PAIN SCALES - GENERAL: PAINLEVEL: NO PAIN (0)

## 2021-06-10 NOTE — TELEPHONE ENCOUNTER
Pharmacy calling needing clarification on Nexium directions:  Take 30-60 minutes before eating    Needing to know how many tabs to take      Abi REDDN, RN

## 2021-06-10 NOTE — PATIENT INSTRUCTIONS
At [facility], we strive to deliver an exceptional experience to you, every time we see you. If you receive a survey, please complete it as we do value your feedback.  If you have MyChart, you can expect to receive results automatically within 24 hours of their completion.  Your provider will send a note interpreting your results as well.   If you do not have MyChart, you should receive your results in about a week by mail.    Your care team:                            Family Medicine Internal Medicine   MD Jae An MD Shantel Branch-Fleming, MD Srinivasa Vaka, MD Katya Belousova, PAGigiC  Carola Mckeon, APRN CNP    Jaime Fonseca, MD Pediatrics   Jackson Yung, PAEMILY Cam, CNP MD China Bryant APRN CNP   MD Savannah Mcfarland MD Deborah Mielke, MD Kim Thein, APRN [facility]      Clinic hours: Monday - Thursday 7 am-6 pm; Fridays 7 am-5 pm.   Urgent care: Monday - Friday 10 am- 8 pm; Saturday and Sunday 9 am-5 pm.    Clinic: [phone]       [facility]: Monday - Thursday 8 am - 7 pm; Friday 8 am - 6 pm  [facility]: [phone]     Use [email or web address] for 24/7 diagnosis and treatment of dozens of conditions.    Patient Education     Eating Heart-Healthy Foods  Eating has a big impact on your heart health. In fact, eating healthier can improve several of your heart risks at once. For instance, it helps you manage weight, cholesterol, and blood pressure. Here are ideas to help you make heart-healthy changes without giving up all the foods and flavors you love.   Getting started    Talk with your healthcare provider about eating plans, such as the DASH or Mediterranean diet. You may also be referred to a dietitian.    Change a few things at a time. Give yourself time to get used to a few eating changes before adding more.    Work to create a tasty, healthy eating plan  that you can stick to for the rest of your life.    Goals for healthy eating  Below are some tips to improve your eating habits:     Limit saturated fats and trans fats. Saturated fats raise your levels of cholesterol, so keep these fats to a minimum. They are found in foods such as fatty meats, whole milk, cheese, and palm and coconut oils. Avoid trans fats because they lower good cholesterol as well as raise bad cholesterol. Trans fats are most often found in processed foods, such as pastries, cookies, pies, muffins, fried foods, stick margarines, and shortening.    Reduce how much sodium (salt) you have. Eating too much salt may increase your blood pressure. Limit your sodium intake to 2,300 milligrams (mg) per day (the amount in 1 teaspoon of salt), or less if your healthcare provider recommends it. Dining out less often and eating fewer processed foods are two great ways to decrease the amount of salt you consume. At home, flavor your foods with other spices and herbs instead of salt.    Managing calories. A calorie is a unit of energy. Your body burns calories for fuel, but if you eat more calories than your body burns, the extras are stored as fat. Your healthcare provider can help you create a diet plan to manage your calories. This will likely include eating healthier foods and getting regular exercise. To help you track your progress, keep a diary to record what you eat and how often you exercise.  Choose the right foods  Aim to make these foods staples of your diet. If you have diabetes, you may have different recommendations than what is listed here:     Fruits and vegetables provide plenty of nutrients without a lot of calories. At meals, fill half your plate with these foods. Choose between fresh, frozen, canned, or dried without added sauces, salt, or sugars. Split the other half of your plate between whole grains and lean protein.    Whole grains are high in fiber and rich in vitamins and nutrients.  Good choices include whole wheat bread, pasta, oats, and brown rice. Make at least half of your grains whole grains.    Lean proteins give you nutrition with less fat. Good choices include fish, skinless chicken and turkey, and beans. Draining the fat from cooked ground meat is another way to reduce the amount of fat you eat.    Low-fat and nonfat dairy provide nutrients without a lot of fat. Try low-fat or nonfat milk, cheese, or yogurt.    Healthy fats can be good for you in small amounts. These are unsaturated fats, such as olive oil, nuts, and fish. Try to have at least 2 servings per week of fatty fish, such as salmon, sardines, mackerel, rainbow trout, and albacore tuna. These contain omega-3 fatty acids, which are good for your heart. Flaxseed and walnuts are other sources of heart-healthy fats.  More on heart-healthy eating  Read food labels  Healthy eating starts at the grocery store. Be sure to pay attention to food labels on packaged foods. Look for products that are high in fiber and protein, and low in saturated fat, added sugars, and sodium. Avoid products that contain trans fat. And pay close attention to serving size. For instance, if you plan to eat two servings, double all the numbers on the label.   Prepare food right  A key part of healthy cooking is cutting down on added fat, sugar and salt. Look on the internet for lower-fat, lower-sodium recipes without a lot of added sugars. Also try these tips:     Remove fat from meat and skin from poultry before cooking.    Skim fat from the surface of soups and sauces.    Broil, roast, boil, bake, steam, grill, or microwave food without added fats.    Choose ingredients that spice up your food without adding calories, fat, sugar, or sodium. Try these items: horseradish, hot sauce, lemon, mustard, nonfat salad dressings, and vinegar. Small amounts of olive oil-based vinaigrettes are OK, too. For salt-free herbs and spices, try basil, cilantro, cinnamon,  cumin, paprika, pepper, and rosemary.  [employer] last reviewed this educational content on 7/1/2020 2000-2021 [employer]. All rights reserved. This information is not intended as a substitute for professional medical care. Always follow your healthcare professional's instructions.           Patient Education     Exercise for a Healthier Heart  You may wonder how you can improve the health of your heart. If you re thinking about exercise, you re on the right track. You don t need to become an athlete. But you do need a certain amount of brisk exercise to help strengthen your heart. If you have been diagnosed with a heart condition, your healthcare provider may advise exercise to help stabilize your condition. To help make exercise a habit, choose safe, fun activities.      Exercise with a friend. When activity is fun, you're more likely to stick with it.   Before you start  Check with your healthcare provider before starting an exercise program. This is especially important if you have not been active for a while. It's also important if you have a long-term (chronic) health problem such as heart disease, diabetes, or obesity. Or if you are at high risk for having these problems.   Why exercise?  Exercising regularly offers many healthy rewards. It can help you do all of the following:     Improve your blood cholesterol level to help prevent further heart trouble    Lower your blood pressure to help prevent a stroke or heart attack    Control diabetes, or reduce your risk of getting this disease    Improve your heart and lung function    Reach and stay at a healthy weight    Make your muscles stronger so you can stay active    Prevent falls and fractures by slowing the loss of bone mass (osteoporosis)    Manage stress better    Reduce your blood pressure    Improve your sense of self and your body image  Exercise tips      Ease into your routine. Set small goals. Then build on them. If you are not  sure what your activity level should be, talk with your healthcare provider first before starting an exercise routine.    Exercise on most days. Aim for a total of 150 minutes (2 hours and 30 minutes) or more of moderate-intensity aerobic activity each week. Or 75 minutes (1 hour and 15 minutes) or more of vigorous-intensity aerobic activity each week. Or try for a combination of both. Moderate activity means that you breathe heavier and your heart rate increases but you can still talk. Think about doing 40 minutes of moderate exercise, 3 to 4 times a week. For best results, activity should last for about 40 minutes to lower blood pressure and cholesterol. It's OK to work up to the 40-minute period over time. Examples of moderate-intensity activity are walking 1 mile in 15 minutes. Or doing 30 to 45 minutes of yard work.    Step up your daily activity level.  Along with your exercise program, try being more active the whole day. Walk instead of drive. Or park further away so that you take more steps each day. Do more household tasks or yard work. You may not be able to meet the advised mount of physical activity. But doing some moderate- or vigorous-intensity aerobic activity can help reduce your risk for heart disease. Your healthcare provider can help you figure out what is best for you.    Choose 1 or more activities you enjoy.  Walking is one of the easiest things you can do. You can also try swimming, riding a bike, dancing, or taking an exercise class.    When to call your healthcare provider  Call your healthcare provider if you have any of these:     Chest pain or feel dizzy or lightheaded    Burning, tightness, pressure, or heaviness in your chest, neck, shoulders, back, or arms    Abnormal shortness of breath    More joint or muscle pain    A very fast or irregular heartbeat (palpitations)  [employer] last reviewed this educational content on 7/1/2019 2000-2021 [employer]. All rights  reserved. This information is not intended as a substitute for professional medical care. Always follow your healthcare professional's instructions.           Patient Education     Healthy Meals for Diabetes     A healthcare provider will help you develop a meal plan that fits your needs.   Ask your healthcare team to help you make a meal plan that fits your needs. Your meal plan tells you when to eat your meals and snacks, what kinds of foods to eat, and how much of each food to eat. You don t have to give up all the foods you like. But you do need to follow some guidelines.  Choose healthy carbohydrates  Starches, sugars, and fiber are all types of carbohydrates. Fiber can help lower your cholesterol and triglycerides. Fiber is also healthy for your heart. You should have 20 to 35 grams of total fiber each day. Fiber-rich foods include:    Whole-grain breads and cereals    Bulgur wheat    Brown rice   Whole-wheat pasta    Fruits and vegetables    Dry beans, and peas   Keep track of the amount of carbohydrates you eat. This can help you keep the right balance of physical activity and medicine. The amount of carbohydrates needed will vary for each person. It depends on many things such as your health, the medicines you take, and how active you are. Your healthcare team will help you figure out the right amount of carbohydrates for you. You may start with around 45 to 60 grams of carbohydrates per meal, depending on your situation.   Here are some examples of foods containing about 15 grams of carbohydrates (1 serving of carbohydrates):    1/2 cup of canned or frozen fruit    A small piece of fresh fruit (4 ounces)    1 slice of bread    1/2 cup of oatmeal    1/3 cup of rice    4 to 6 crackers    1/2 English muffin    1/2 cup of black beans    1/4 of a large baked potato (3 ounces)    2/3 cup of plain fat-free yogurt    1 cup of soup    1/2 cup of casserole    6 chicken nuggets    2-inch-square brownie or cake  without frosting    2 small cookies    1/2 cup of ice cream or sherbet  Choose healthy protein foods  Eating protein that is low in fat can help you control your weight. It also helps keep your heart healthy. Low-fat protein foods include:    Fish    Plant proteins, such as dry beans and peas, nuts, and soy products like tofu and soymilk    Lean meat with all visible fat removed    Poultry with the skin removed    Low-fat or nonfat milk, cheese, and yogurt  Limit unhealthy fats and sugar  Saturated and trans fats are unhealthy for your heart. They raise LDL (bad) cholesterol. Fat is also high in calories, so it can make you gain weight. To cut down on unhealthy fats and sugar, limit these foods:    Butter or margarine    Palm and palm kernel oils and coconut oil    Cream    Cheese    Jeff    Lunch meats   Ice cream    Sweet bakery goods such as pies, muffins, and donuts    Jams and jellies    Candy bars    Regular sodas   How much to eat  The amount of food you eat affects your blood sugar. It also affects your weight. Your healthcare team will tell you how much of each type of food you should eat.    Use measuring cups and spoons and a food scale to measure serving sizes.    Learn what a correct serving size looks like on your plate. This will help when you are away from home and can t measure your servings. For example, a serving of meat is about the palm of your hand.    Eat only the number of servings given on your meal plan for each food. Don t take seconds.    Learn to read food labels. Be sure to look at serving size, total carbohydrates, fiber, calories, sugar, and saturated and trans fats. Look for healthier alternatives to foods that have added sugar.    Plan ahead for parties so you can still have a good time without going overboard with unhealthy food choices. Set a good example yourself by bringing a healthy dish to pot eliks.   Choose healthy snacks  When it comes to snacks, we usually think about  foods with added sugar and fats. But there are many other options for healthier snack choices. Here are a few snack ideas to choose from:  Snacks with less than 5 grams of carbohydrates    1 piece of string cheese    3 celery sticks plus 1 tablespoon of peanut butter    5 cherry tomatoes plus 1 tablespoon of ranch dressing    1 hard-boiled egg    1/4 cup of fresh blueberries     5 baby carrots    1 cup of light popcorn    1/2 cup of sugar-free gelatin    15 almonds  Snacks with about 10 to 20 grams of carbohydrates    1/3 cup of hummus plus 1 cup of fresh cut nonstarchy vegetables (carrots, green peppers, broccoli, celery, or a combination)    1/2 cup of fresh or canned fruit plus 1/4 cup of cottage cheese    1/2 cup of tuna salad with 4 crackers    2 rice cakes and a tablespoon of peanut butter    1 small apple or orange    3 cups light popcorn    1/2 of a turkey sandwich (1 slice of whole-wheat bread, 2 ounces of turkey, and mustard)  Portion sizes are important to controlling your blood sugar and staying at a healthy weight. Stock up on healthy snack items so you always have them on hand.  When to eat  Your meal plan will likely include breakfast, lunch, dinner, and some snacks.    Try to eat your meals and snacks at about the same times each day.    Eat all your meals and snacks. Skipping a meal or snack can make your blood sugar drop too low. It can also cause you to eat too much at the next meal or snack. Then your blood sugar could get too high.  [employer] last reviewed this educational content on 11/1/2018 2000-2021 [employer]. All rights reserved. This information is not intended as a substitute for professional medical care. Always follow your healthcare professional's instructions.

## 2021-06-10 NOTE — PROGRESS NOTES
Assessment & Plan     Hypertension, goal below 140/90  Well controlled on medications   - CBC with platelets  - Comprehensive metabolic panel  - UA reflex to Microscopic and Culture  - losartan (COZAAR) 100 MG tablet; Take 1 tablet (100 mg) by mouth daily  - hydrochlorothiazide (HYDRODIURIL) 25 MG tablet; Take 1 tablet (25 mg) by mouth daily    Screen for colon cancer  Recommend colonoscopy follow up. Due for 5 year colonoscopy  - GASTROENTEROLOGY ADULT REF PROCEDURE ONLY; Future    Hamartoma of left lung (H)  Follow up recommended by pulmonary specialist and patient declined in 2019 to have a repeat procedure. Will reschedule if symptoms recur.     Moderate persistent asthma without complication  Stable on current treatment  - albuterol (PROAIR HFA/PROVENTIL HFA/VENTOLIN HFA) 108 (90 Base) MCG/ACT inhaler; Inhale 2 puffs into the lungs every 6 hours as needed for shortness of breath / dyspnea or wheezing  - montelukast (SINGULAIR) 10 MG tablet; Take 1 tablet (10 mg) by mouth daily    Granulomatous lung disease (H)  Stable     Morbid obesity with BMI of 40.0-44.9, adult (H)  Weight loss counseling done     Elevated blood sugar  A1C is elevated and in diabetic range. Needs to be repeated in 3 months.   - Hemoglobin A1c    Hyperlipidemia LDL goal <130  recheck  - Lipid panel reflex to direct LDL Non-fasting  - Albumin Random Urine Quantitative with Creat Ratio  - simvastatin (ZOCOR) 10 MG tablet; Take 1 tablet (10 mg) by mouth At Bedtime    Screening for prostate cancer  Recommend screening lab  - PSA, screen    Allergic conjunctivitis, bilateral  Itchy eyes and will try eye drops for relief as needed.   - olopatadine (PATANOL) 0.1 % ophthalmic solution; Place 1 drop into both eyes 2 times daily    Gastroesophageal reflux disease with esophagitis, unspecified whether hemorrhage  Takes Nexium as needed     Dysfunction of both eustachian tubes  Refill   - fluticasone (FLONASE) 50 MCG/ACT nasal spray; Spray 1 spray  into both nostrils daily             CONSULTATION/REFERRAL to pulmonary as needed  FUTURE LABS:       - Schedule fasting labs in 3 months  FUTURE APPOINTMENTS:       - Follow-up visit in 3 months or sooner if any questions or concerns.   Work on weight loss  Regular exercise  See Patient Instructions    No follow-ups on file.    Martina Leung MD  [facility]    Lesli SMART is a 65 year old who presents for the following health issues     HPI     Hypertension Follow-up      Do you check your blood pressure regularly outside of the clinic? No     Are you following a low salt diet? Yes    Are your blood pressures ever more than 140 on the top number (systolic) OR more   than 90 on the bottom number (diastolic), for example 140/90? No      How many servings of fruits and vegetables do you eat daily?  2-3    On average, how many sweetened beverages do you drink each day (Examples: soda, juice, sweet tea, etc.  Do NOT count diet or artificially sweetened beverages)?   0    How many days per week do you exercise enough to make your heart beat faster? 5    How many minutes a day do you exercise enough to make your heart beat faster? 20 - 29    How many days per week do you miss taking your medication? 0    Hyperlipidemia Follow-Up      Are you regularly taking any medication or supplement to lower your cholesterol?   Yes- simvastatin 10    Are you having muscle aches or other side effects that you think could be caused by your cholesterol lowering medication?  No    Asthma Follow-Up    Was ACT completed today?    Yes    ACT Total Scores 6/10/2021   ACT TOTAL SCORE -   ASTHMA ER VISITS -   ASTHMA HOSPITALIZATIONS -   ACT TOTAL SCORE (Goal Greater than or Equal to 20) 17   In the past 12 months, how many times did you visit the emergency room for your asthma without being admitted to the hospital? 0   In the past 12 months, how many times were you hospitalized overnight because of your  asthma? 0       How many days per week do you miss taking your asthma controller medication?  0    Please describe any recent triggers for your asthma: None    Have you had any Emergency Room Visits, Urgent Care Visits, or Hospital Admissions since your last office visit?  No    Chronic Kidney Disease Follow-up      Do you take any over the counter pain medicine?: No    Hamartoma of left lung with no recurrent symptoms except maybe some dyspnea on exertion. Has not used inhaler for several weeks. In the past, exacerbation of asthma was much worse until surgery done. Last surgery 2019 with bronchoscopy. Follow up CT stable. Told to follow up if any symptoms worse. Occasional cough.       Review of Systems   Constitutional, HEENT, cardiovascular, pulmonary, gi and gu systems are negative, except as otherwise noted.      Objective    /82   Pulse 115   Wt 139.7 kg (308 lb)   SpO2 95%   BMI 45.48 kg/m    Body mass index is 45.48 kg/m .  Physical Exam   GENERAL: healthy, alert, well nourished, well hydrated, no distress, obese  HENT: ear canals- normal; TMs- normal; Nose- normal; Mouth- no ulcers, no lesions, throat is clear with no erythema or exudate.   NECK: no tenderness, no adenopathy, no asymmetry, no masses, no stiffness; thyroid- normal to palpation  RESP: lungs clear to auscultation - no rales, no rhonchi, no wheezes  CV: regular rates and rhythm, normal S1 S2, no S3 or S4 and no murmur, no click or rub -  ABDOMEN: soft, no tenderness, no  hepatosplenomegaly, no masses, normal bowel sounds  MS: extremities- no gross deformities noted, no edema  SKIN: no suspicious lesions, no rashes  NEURO: strength and tone- normal, sensory exam- grossly normal, mentation- intact, speech- normal, reflexes- symmetric  BACK: no CVA tenderness, no paralumbar tenderness  PSYCH: Alert and oriented times 3; speech- coherent , normal rate and volume; able to articulate logical thoughts, able to abstract reason, no tangential  thoughts, no hallucinations or delusions, affect- normal     Results for orders placed or performed in visit on 06/10/21 (from the past 24 hour(s))   CBC with platelets   Result Value Ref Range    WBC 11.6 (H) 4.0 - 11.0 10e9/L    RBC Count 5.30 4.4 - 5.9 10e12/L    Hemoglobin 14.5 13.3 - 17.7 g/dL    Hematocrit 45.1 40.0 - 53.0 %    MCV 85 78 - 100 fl    MCH 27.4 26.5 - 33.0 pg    MCHC 32.2 31.5 - 36.5 g/dL    RDW 14.6 10.0 - 15.0 %    Platelet Count 292 150 - 450 10e9/L   Hemoglobin A1c   Result Value Ref Range    Hemoglobin A1C 6.7 (H) 0 - 5.6 %   UA reflex to Microscopic and Culture    Specimen: Midstream Urine   Result Value Ref Range    Color Urine Yellow     Appearance Urine Clear     Glucose Urine Negative NEG^Negative mg/dL    Bilirubin Urine Negative NEG^Negative    Ketones Urine Negative NEG^Negative mg/dL    Specific Gravity Urine 1.015 1.003 - 1.035    Blood Urine Negative NEG^Negative    pH Urine 6.0 5.0 - 7.0 pH    Protein Albumin Urine Negative NEG^Negative mg/dL    Urobilinogen Urine 0.2 0.2 - 1.0 EU/dL    Nitrite Urine Negative NEG^Negative    Leukocyte Esterase Urine Negative NEG^Negative    Source Midstream Urine

## 2021-06-11 LAB
CREAT UR-MCNC: 142 MG/DL
MICROALBUMIN UR-MCNC: <5 MG/L
MICROALBUMIN/CREAT UR: NORMAL MG/G CR (ref 0–17)
PSA SERPL-ACNC: 0.65 UG/L (ref 0–4)

## 2021-06-11 ASSESSMENT — ASTHMA QUESTIONNAIRES: ACT_TOTALSCORE: 17

## 2021-06-13 NOTE — RESULT ENCOUNTER NOTE
Dear Arturo    Your test results are attached.    The test for diabetes is elevated and should be repeated in 3 months. It shows early diabetes. You can treat this with healthier eating at this point, and exercise.     The kidney test is normal. The test for prostate cancer was normal and shows low risk. Cholesterol looks good.     Recheck the blood sugar and A1C in 3 months. I will send you some information about diabetic diet and prevention.     Please contact me by [employer] if you have any questions about your labs or management. You may also call my office number [phone] for any questions.     Martina Leung MD

## 2021-09-08 DIAGNOSIS — J30.1 CHRONIC SEASONAL ALLERGIC RHINITIS DUE TO POLLEN: ICD-10-CM

## 2021-09-08 DIAGNOSIS — H69.93 DYSFUNCTION OF BOTH EUSTACHIAN TUBES: ICD-10-CM

## 2021-09-10 RX ORDER — DESLORATADINE 5 MG/1
TABLET ORAL
Qty: 180 TABLET | Refills: 0 | Status: SHIPPED | OUTPATIENT
Start: 2021-09-10 | End: 2021-12-20

## 2021-09-10 RX ORDER — FLUTICASONE PROPIONATE 50 MCG
1 SPRAY, SUSPENSION (ML) NASAL DAILY
Qty: 16 G | Refills: 0 | Status: SHIPPED | OUTPATIENT
Start: 2021-09-10 | End: 2022-07-11

## 2021-09-16 NOTE — PATIENT INSTRUCTIONS
At [facility], we strive to deliver an exceptional experience to you, every time we see you. If you receive a survey, please complete it as we do value your feedback.  If you have MyChart, you can expect to receive results automatically within 24 hours of their completion.  Your provider will send a note interpreting your results as well.   If you do not have MyChart, you should receive your results in about a week by mail.    Your care team:                            Family Medicine Internal Medicine   MD Jae An MD Shantel Branch-Fleming, MD Srinivasa Vaka, MD Katya Belousova, PAEMILY Mckeon, APRN CNP    Jaime Fonseca, MD Pediatrics   Jackson Yung, PAEMILY Cam, CNP MD China Bryant APRN CNP   MD Saavnnah Mcfarland MD Deborah Mielke, MD Kim Thein, APRN [facility]      Clinic hours: Monday - Thursday 7 am-6 pm; Fridays 7 am-5 pm.   Urgent care: Monday - Friday 10 am- 8 pm; Saturday and Sunday 9 am-5 pm.    Clinic: [phone]       [facility]: Monday - Thursday 8 am - 7 pm; Friday 8 am - 6 pm  [facility]: [phone]     Use [email or web address] for 24/7 diagnosis and treatment of dozens of conditions.    Patient Education     Pulmonary Embolism (PE)  A pulmonary embolus is most often due to a blood clot that develops in a deep vein of the leg (deep vein thrombosis). If that clot breaks loose and travels to the lung, it's called a pulmonary embolism (PE). This can cut off blood flow in the lungs.   A blood clot in the lungs is a medical emergency and may cause death.    Healthcare providers use the term venous thromboembolism (VTE) to describe these 2 conditions: deep vein thrombosis and pulmonary embolism. They use the term VTE because the 2 conditions are very closely related. And, because their prevention and treatment are also closely related.       A  pulmonary embolism occurs when a blood clot forms in a vein and travels to the lungs.   How is pulmonary embolism diagnosed?   Your healthcare provider examines you and asks about your symptoms and health history. You may also have one or more of the following:     Blood tests to check for blood clotting or other problems    Imaging tests to look for clots in the veins or lung    Electrocardiography (ECG) to test how well the heart is working  How is pulmonary embolism treated?    Blood-thinning medicines (anticoagulants). These medicines thin the blood. They may be given as a pill, as an injection, or through a tube into a vein (intravenous or IV). Blood thinners help prevent more blood clots from forming. They also help to prevent an existing clot from getting larger.    Thrombolysis. Thrombolytic medicines are used to quickly dissolve a blood clot. A long, narrow tube (catheter) is used to deliver medicine directly to the clot. Thrombolytic medicines increase the risk of bleeding so they are used very carefully.    Inferior vena cava (IVC) filter surgery. The vena cava is the body s largest vein. It carries blood from the body to the heart. A small filter traps blood clots in the lower body and prevents them from traveling to the lungs. The filter is inserted into the vein through a catheter. The filter may be used if blood thinners can't be taken or if they don't work.     Pulmonary embolectomy. This is a procedure to remove a blood clot in the lungs. It may be done with surgery or with a catheter inserted in the body. It may be done when other treatments aren't safe or don't work.    What are the long-term concerns?  With treatment, blood clots are usually dissolved or removed. Some treatments can even help prevent future clots. But having a PE can put you at risk for another life-threatening blood clot. So you will likely need to take anticoagulants to help keep blood clots from forming again. You may need to  take this medicine for months or years.   You may also need to make lifestyle changes. This may include getting more active and eating healthier. You may need to wear elastic (compression) stockings and take breaks on long trips.   Call 911  Call 911 or get emergency help if you have:     Heavy or uncontrolled bleeding    Symptoms of a blood clot that has traveled to the lungs. The symptoms include:  ? Chest pain  ? Trouble breathing  ? Coughing (may cough up blood)  ? Fainting  ? Fast heartbeat  ? Sweating    When to call your healthcare provider  Call your healthcare provider if you have swelling or pain in your leg, arm, or other area. These are symptoms of a blood clot.   You may have bleeding if you take medicine to help prevent blood clots.   Call your healthcare provider if you have symptoms of bleeding. This includes:     Blood in the urine    Bleeding with bowel movements    Bleeding from the nose, gums, a cut, or vagina  Annel last reviewed this educational content on 2/1/2020 2000-2021 [employer]. All rights reserved. This information is not intended as a substitute for professional medical care. Always follow your healthcare professional's instructions.

## 2021-09-16 NOTE — PROGRESS NOTES
"    Assessment & Plan     Multiple subsegmental pulmonary emboli without acute cor pulmonale (H)  Recheck blood work. Exam and symptoms are stable but still tachycardic and low exercise tolerance. Recommend 4-6 weeks off work for recovery and follow up with pulmonary. 6 months minimum for anticoagulation given extent of emboli and risk factors- obesity and previous lung disease, surgery  - Adult Pulmonary Medicine Referral; Future  - CBC with platelets; Future  - Comprehensive metabolic panel; Future  - CBC with platelets  - Comprehensive metabolic panel  - ELIQUIS ANTICOAGULANT 5 MG tablet; Take 1 tablet (5 mg) by mouth 2 times daily    Moderate persistent asthma without complication  Recommend adding symbicort to inhaler  - budesonide-formoterol (SYMBICORT) 80-4.5 MCG/ACT Inhaler; Inhale 2 puffs into the lungs 2 times daily    Hyperlipidemia LDL goal <130  Well controlled on medications     Hypertension, goal below 140/90  Well controlled on medications   - CBC with platelets; Future  - Comprehensive metabolic panel; Future  - CBC with platelets  - Comprehensive metabolic panel    Granulomatous lung disease (H)  Stable     Hamartoma of left lung (H)  History of surgery to remove bulk of hamartoma. Residual stable by last exam.     Morbid obesity with BMI of 40.0-44.9, adult (H)  Weight loss counseling done                BMI:   Estimated body mass index is 44.6 kg/m  as calculated from the following:    Height as of this encounter: 1.753 m (5' 9\").    Weight as of this encounter: 137 kg (302 lb).   Weight management plan: Discussed healthy diet and exercise guidelines    CONSULTATION/REFERRAL to pulmonary clinic  FUTURE LABS:       - Schedule fasting labs in 3 months  FUTURE APPOINTMENTS:       - Follow-up visit in 4-6 weeks to determine back to work or sooner if any questions or concerns.   Work on weight loss  Regular exercise  See Patient Instructions    No follow-ups on file.    Martina Leung MD  M " [facility] GARETT SMART is a 65 year old who presents for the following health issues   HPI       Hospital Follow-up Visit:    Hospital/Nursing Home/IP Rehab Facility: [facility]   Date of Admission: 9-9-2021  Date of Discharge: 9-9-2021  Reason(s) for Admission: Pulmonary embolis      Was your hospitalization related to COVID-19? No   Problems taking medications regularly:  None  Medication changes since discharge: apixaban(Eliquis) 10 mg twice a day for 7 days then 5 mg twice a day continuous.   Problems adhering to non-medication therapy:  None    Summary of hospitalization:  CareEverywhere information obtained and reviewed  Diagnostic Tests/Treatments reviewed.  Follow up needed: recommended pulmonary follow up, tried to admit to hospital but no beds available in [facility].   Other Healthcare Providers Involved in Patient s Care:         None  Update since discharge: stable. Post Discharge Medication Reconciliation: discharge medications reconciled, continue medications without change.  Plan of care communicated with patient       MDM:   Arturo Rahman is a 65 y.o. male comes in for shortness of breath. Patient has had shortness of breath with activity now for about 3 to 4 weeks. Denies chest pain. Denies any fever or acute cough. Patient has been vaccinated for COVID-19. Patient states he has got a little bit of symmetric swelling to his legs which is chronic. No asymmetric swelling of the leg. Patient states the shortness breath is significantly worse with activity. Minimal at rest. Here in the ED the patient be comfortable in no distress. He is a little tachycardic but he has clear lungs and good O2 sats. Patient had an EKG obtained which is unremarkable. Chest x-ray is negative. Laboratory studies including a troponin are negative other than a slightly elevated white blood count of 14.7. Therefore without clear etiology of his dyspnea on exertion I did do a D-dimer which  is elevated at 7.66. Patient underwent a CT pulmonary angiogram which shows bilateral pulmonary emboli with significant clot burden. Mild signs of heart strain. Patient has a negative troponin. Therefore he fits in the nonsubmassive protocol. I did discuss management with the patient. His symptoms of been going on for almost a month and patient is relatively stable. I did recommend hospitalization however. Unfortunately no beds are available at [facility]. I therefore contacted [facility] per patient's request. They unfortunately had no beds. I then contacted the [facility] and [facility] per patient request. They also had no beds available. I offered to contact more facilities including [facility]. Patient however refused he states he just prefer to be discharged home and be treated as an outpatient. Again I did recommend hospitalization because of the significant clot burden. Patient however would feel more comfortable going home and does not want to be transferred to another facility. I suspect patient will be stable since is been going on for 3 to 4 weeks. He is tachycardic but otherwise stable at rest. Negative troponin. Patient was given a first dose of Eliquis in the ED. He will be discharged home with prescription for Eliquis. He is encouraged to follow-up with his primary care physician in about a week for reevaluation. Return immediately to the ED for any new or worsening symptoms. I had a lengthy discussion with both the patient and his wife about outpatient management. Since there were no beds available here and at the other hospitals he requested they both feel comfortable with him being treated as an outpatient.    Diagnosis:   ICD-10-CM   1. Acute pulmonary embolism, unspecified pulmonary embolism type, unspecified whether acute cor pulmonale present ([facility]) I26.99     Disposition:     Discharge    Current Discharge Medication List     New Prescriptions  "  Details   apixaban (ELIQUIS) 5 mg (74 tabs) oral tablet starter pack Take 10 mg (two tablets) by mouth twice daily for 7 days, then 5 mg (one tablet) by mouth twice daily.  Qty: 74 tablet, Refills: 0        Hyperlipidemia Follow-Up      Are you regularly taking any medication or supplement to lower your cholesterol?   Yes- simvastatin 10    Are you having muscle aches or other side effects that you think could be caused by your cholesterol lowering medication?  No    Hypertension Follow-up      Do you check your blood pressure regularly outside of the clinic? Yes     Are you following a low salt diet? Yes    Are your blood pressures ever more than 140 on the top number (systolic) OR more   than 90 on the bottom number (diastolic), for example 140/90? No    Asthma Follow-Up    Was ACT completed today?    Yes    ACT Total Scores 6/10/2021   ACT TOTAL SCORE -   ASTHMA ER VISITS -   ASTHMA HOSPITALIZATIONS -   ACT TOTAL SCORE (Goal Greater than or Equal to 20) 17   In the past 12 months, how many times did you visit the emergency room for your asthma without being admitted to the hospital? 0   In the past 12 months, how many times were you hospitalized overnight because of your asthma? 0          How many days per week do you miss taking your asthma controller medication?  I do not have an asthma controller medication    Please describe any recent triggers for your asthma: upper respiratory infections  Have you had any Emergency Room Visits, Urgent Care Visits, or Hospital Admissions since your last office visit?  Yes  Number of ER or Urgent Care visits for asthma: 1  Really for PE but was dyspneic and thought it was his asthma      Review of Systems   Constitutional, HEENT, cardiovascular, pulmonary, gi and gu systems are negative, except as otherwise noted.      Objective    /82   Pulse 107   Temp 99  F (37.2  C) (Tympanic)   Resp 20   Ht 1.753 m (5' 9\")   Wt 137 kg (302 lb)   SpO2 98%   BMI 44.60 kg/m  "   Body mass index is 44.6 kg/m .  Physical Exam   GENERAL: healthy, alert, well nourished, well hydrated, no distress, obese  HENT: ear canals- normal; TMs- normal; Nose- normal; Mouth- no ulcers, no lesions, throat is clear with no erythema or exudate.   NECK: no tenderness, no adenopathy, no asymmetry, no masses, no stiffness; thyroid- normal to palpation  RESP: lungs clear to auscultation - no rales, no rhonchi, no wheezes  CV: regular rates and rhythm with rapid heart rate, normal S1 S2, no S3 or S4 and no murmur, no click or rub -  ABDOMEN: soft, no tenderness, no  hepatosplenomegaly, no masses, normal bowel sounds  MS: extremities- no gross deformities noted, no edema  SKIN: no suspicious lesions, no rashes  NEURO: strength and tone- normal, sensory exam- grossly normal, mentation- intact, speech- normal, reflexes- symmetric  BACK: no CVA tenderness, no paralumbar tenderness  PSYCH: Alert and oriented times 3; speech- coherent , normal rate and volume; able to articulate logical thoughts, able to abstract reason, no tangential thoughts, no hallucinations or delusions, affect- normal     Office Visit on 06/10/2021   Component Date Value Ref Range Status     Cholesterol 06/10/2021 153  <200 mg/dL Final     Triglycerides 06/10/2021 156* <150 mg/dL Final    Comment: Borderline high:  150-199 mg/dl  High:             200-499 mg/dl  Very high:       >499 mg/dl       HDL Cholesterol 06/10/2021 44  >39 mg/dL Final     LDL Cholesterol Calculated 06/10/2021 78  <100 mg/dL Final    Desirable:       <100 mg/dl     Non HDL Cholesterol 06/10/2021 109  <130 mg/dL Final     WBC 06/10/2021 11.6* 4.0 - 11.0 10e9/L Final     RBC Count 06/10/2021 5.30  4.4 - 5.9 10e12/L Final     Hemoglobin 06/10/2021 14.5  13.3 - 17.7 g/dL Final     Hematocrit 06/10/2021 45.1  40.0 - 53.0 % Final     MCV 06/10/2021 85  78 - 100 fl Final     MCH 06/10/2021 27.4  26.5 - 33.0 pg Final     MCHC 06/10/2021 32.2  31.5 - 36.5 g/dL Final     RDW  06/10/2021 14.6  10.0 - 15.0 % Final     Platelet Count 06/10/2021 292  150 - 450 10e9/L Final     Sodium 06/10/2021 139  133 - 144 mmol/L Final     Potassium 06/10/2021 3.5  3.4 - 5.3 mmol/L Final     Chloride 06/10/2021 104  94 - 109 mmol/L Final     Carbon Dioxide 06/10/2021 29  20 - 32 mmol/L Final     Anion Gap 06/10/2021 6  3 - 14 mmol/L Final     Glucose 06/10/2021 109* 70 - 99 mg/dL Final     Urea Nitrogen 06/10/2021 19  7 - 30 mg/dL Final     Creatinine 06/10/2021 1.25  0.66 - 1.25 mg/dL Final     GFR Estimate 06/10/2021 61  >60 mL/min/[1.73_m2] Final    Comment: Non  GFR Calc  Starting 12/18/2018, serum creatinine based estimated GFR (eGFR) will be   calculated using the Chronic Kidney Disease Epidemiology Collaboration   (CKD-EPI) equation.       GFR Estimate If Black 06/10/2021 70  >60 mL/min/[1.73_m2] Final    Comment:  GFR Calc  Starting 12/18/2018, serum creatinine based estimated GFR (eGFR) will be   calculated using the Chronic Kidney Disease Epidemiology Collaboration   (CKD-EPI) equation.       Calcium 06/10/2021 9.3  8.5 - 10.1 mg/dL Final     Bilirubin Total 06/10/2021 0.4  0.2 - 1.3 mg/dL Final     Albumin 06/10/2021 3.3* 3.4 - 5.0 g/dL Final     Protein Total 06/10/2021 7.3  6.8 - 8.8 g/dL Final     Alkaline Phosphatase 06/10/2021 95  40 - 150 U/L Final     ALT 06/10/2021 38  0 - 70 U/L Final     AST 06/10/2021 18  0 - 45 U/L Final     Creatinine Urine 06/10/2021 142  mg/dL Final     Albumin Urine mg/L 06/10/2021 <5  mg/L Final     Albumin Urine mg/g Cr 06/10/2021 Unable to calculate due to low value  0 - 17 mg/g Cr Final     Color Urine 06/10/2021 Yellow   Final     Appearance Urine 06/10/2021 Clear   Final     Glucose Urine 06/10/2021 Negative  NEG^Negative mg/dL Final     Bilirubin Urine 06/10/2021 Negative  NEG^Negative Final     Ketones Urine 06/10/2021 Negative  NEG^Negative mg/dL Final     Specific Gravity Urine 06/10/2021 1.015  1.003 - 1.035 Final      Blood Urine 06/10/2021 Negative  NEG^Negative Final     pH Urine 06/10/2021 6.0  5.0 - 7.0 pH Final     Protein Albumin Urine 06/10/2021 Negative  NEG^Negative mg/dL Final     Urobilinogen Urine 06/10/2021 0.2  0.2 - 1.0 EU/dL Final     Nitrite Urine 06/10/2021 Negative  NEG^Negative Final     Leukocyte Esterase Urine 06/10/2021 Negative  NEG^Negative Final     Source 06/10/2021 Midstream Urine   Final     Hemoglobin A1C 06/10/2021 6.7* 0 - 5.6 % Final    Comment: Normal <5.7% Prediabetes 5.7-6.4%  Diabetes 6.5% or higher - adopted from ADA   consensus guidelines.       PSA 06/10/2021 0.65  0 - 4 ug/L Final    Assay Method:  Chemiluminescence using Siemens Vista analyzer     Results for orders placed or performed in visit on 09/17/21 (from the past 24 hour(s))   CBC with platelets   Result Value Ref Range    WBC Count 11.9 (H) 4.0 - 11.0 10e3/uL    RBC Count 5.41 4.40 - 5.90 10e6/uL    Hemoglobin 14.5 13.3 - 17.7 g/dL    Hematocrit 45.1 40.0 - 53.0 %    MCV 83 78 - 100 fL    MCH 26.8 26.5 - 33.0 pg    MCHC 32.2 31.5 - 36.5 g/dL    RDW 14.4 10.0 - 15.0 %    Platelet Count 367 150 - 450 10e3/uL   Comprehensive metabolic panel   Result Value Ref Range    Sodium 141 133 - 144 mmol/L    Potassium 3.8 3.4 - 5.3 mmol/L    Chloride 106 94 - 109 mmol/L    Carbon Dioxide (CO2) 32 20 - 32 mmol/L    Anion Gap 3 3 - 14 mmol/L    Urea Nitrogen 19 7 - 30 mg/dL    Creatinine 1.21 0.66 - 1.25 mg/dL    Calcium 9.1 8.5 - 10.1 mg/dL    Glucose 120 (H) 70 - 99 mg/dL    Alkaline Phosphatase 101 40 - 150 U/L    AST 14 0 - 45 U/L    ALT 45 0 - 70 U/L    Protein Total 6.9 6.8 - 8.8 g/dL    Albumin 3.2 (L) 3.4 - 5.0 g/dL    Bilirubin Total 0.5 0.2 - 1.3 mg/dL    GFR Estimate 63 >60 mL/min/1.73m2

## 2021-09-17 ENCOUNTER — OFFICE VISIT (OUTPATIENT)
Dept: FAMILY MEDICINE | Facility: CLINIC | Age: 66
End: 2021-09-17
Payer: COMMERCIAL

## 2021-09-17 VITALS
BODY MASS INDEX: 44.73 KG/M2 | DIASTOLIC BLOOD PRESSURE: 82 MMHG | HEIGHT: 69 IN | OXYGEN SATURATION: 98 % | SYSTOLIC BLOOD PRESSURE: 122 MMHG | WEIGHT: 302 LBS | TEMPERATURE: 99 F | RESPIRATION RATE: 20 BRPM | HEART RATE: 107 BPM

## 2021-09-17 DIAGNOSIS — I10 HYPERTENSION, GOAL BELOW 140/90: ICD-10-CM

## 2021-09-17 DIAGNOSIS — E66.01 MORBID OBESITY WITH BMI OF 40.0-44.9, ADULT (H): ICD-10-CM

## 2021-09-17 DIAGNOSIS — J84.10 GRANULOMATOUS LUNG DISEASE (H): ICD-10-CM

## 2021-09-17 DIAGNOSIS — J45.40 MODERATE PERSISTENT ASTHMA WITHOUT COMPLICATION: ICD-10-CM

## 2021-09-17 DIAGNOSIS — Q85.9 HAMARTOMA OF LEFT LUNG (H): ICD-10-CM

## 2021-09-17 DIAGNOSIS — I26.94 MULTIPLE SUBSEGMENTAL PULMONARY EMBOLI WITHOUT ACUTE COR PULMONALE (H): Primary | ICD-10-CM

## 2021-09-17 DIAGNOSIS — E78.5 HYPERLIPIDEMIA LDL GOAL <130: ICD-10-CM

## 2021-09-17 PROBLEM — I51.7 RIGHT HEART ENLARGEMENT: Status: ACTIVE | Noted: 2021-09-17

## 2021-09-17 LAB
ALBUMIN SERPL-MCNC: 3.2 G/DL (ref 3.4–5)
ALP SERPL-CCNC: 101 U/L (ref 40–150)
ALT SERPL W P-5'-P-CCNC: 45 U/L (ref 0–70)
ANION GAP SERPL CALCULATED.3IONS-SCNC: 3 MMOL/L (ref 3–14)
AST SERPL W P-5'-P-CCNC: 14 U/L (ref 0–45)
BILIRUB SERPL-MCNC: 0.5 MG/DL (ref 0.2–1.3)
BUN SERPL-MCNC: 19 MG/DL (ref 7–30)
CALCIUM SERPL-MCNC: 9.1 MG/DL (ref 8.5–10.1)
CHLORIDE BLD-SCNC: 106 MMOL/L (ref 94–109)
CO2 SERPL-SCNC: 32 MMOL/L (ref 20–32)
CREAT SERPL-MCNC: 1.21 MG/DL (ref 0.66–1.25)
ERYTHROCYTE [DISTWIDTH] IN BLOOD BY AUTOMATED COUNT: 14.4 % (ref 10–15)
GFR SERPL CREATININE-BSD FRML MDRD: 63 ML/MIN/1.73M2
GLUCOSE BLD-MCNC: 120 MG/DL (ref 70–99)
HCT VFR BLD AUTO: 45.1 % (ref 40–53)
HGB BLD-MCNC: 14.5 G/DL (ref 13.3–17.7)
MCH RBC QN AUTO: 26.8 PG (ref 26.5–33)
MCHC RBC AUTO-ENTMCNC: 32.2 G/DL (ref 31.5–36.5)
MCV RBC AUTO: 83 FL (ref 78–100)
PLATELET # BLD AUTO: 367 10E3/UL (ref 150–450)
POTASSIUM BLD-SCNC: 3.8 MMOL/L (ref 3.4–5.3)
PROT SERPL-MCNC: 6.9 G/DL (ref 6.8–8.8)
RBC # BLD AUTO: 5.41 10E6/UL (ref 4.4–5.9)
SODIUM SERPL-SCNC: 141 MMOL/L (ref 133–144)
WBC # BLD AUTO: 11.9 10E3/UL (ref 4–11)

## 2021-09-17 PROCEDURE — 99214 OFFICE O/P EST MOD 30 MIN: CPT | Performed by: FAMILY MEDICINE

## 2021-09-17 PROCEDURE — 80053 COMPREHEN METABOLIC PANEL: CPT | Performed by: FAMILY MEDICINE

## 2021-09-17 PROCEDURE — 85027 COMPLETE CBC AUTOMATED: CPT | Performed by: FAMILY MEDICINE

## 2021-09-17 PROCEDURE — 36415 COLL VENOUS BLD VENIPUNCTURE: CPT | Performed by: FAMILY MEDICINE

## 2021-09-17 RX ORDER — APIXABAN 5 MG/1
5 TABLET, FILM COATED ORAL 2 TIMES DAILY
Qty: 60 TABLET | Refills: 4 | Status: SHIPPED | OUTPATIENT
Start: 2021-09-17 | End: 2021-10-18

## 2021-09-17 RX ORDER — BUDESONIDE AND FORMOTEROL FUMARATE DIHYDRATE 80; 4.5 UG/1; UG/1
2 AEROSOL RESPIRATORY (INHALATION) 2 TIMES DAILY
Qty: 10.2 G | Refills: 3 | Status: SHIPPED | OUTPATIENT
Start: 2021-09-17 | End: 2021-10-18

## 2021-09-17 RX ORDER — APIXABAN 5 MG/1
5 TABLET, FILM COATED ORAL 2 TIMES DAILY
COMMUNITY
Start: 2021-09-10 | End: 2021-09-17

## 2021-09-17 ASSESSMENT — PAIN SCALES - GENERAL: PAINLEVEL: NO PAIN (0)

## 2021-09-17 ASSESSMENT — MIFFLIN-ST. JEOR: SCORE: 2155.24

## 2021-09-19 ENCOUNTER — HEALTH MAINTENANCE LETTER (OUTPATIENT)
Age: 66
End: 2021-09-19

## 2021-09-19 NOTE — RESULT ENCOUNTER NOTE
Dear Arturo    Your test results are attached. I am happy to let you know that they are stable.    The hemoglobin looks good. The white count is down closer to normal from your emergency department visit and this is a good sign. The blood sugar is normal and you do not have diabetes. The kidneys are healthy. We can recheck labs in 2 months.    Please contact me by [email or web address] if you have any questions about your labs or management. You may also call my office number [phone] for any questions.     Martina Leung MD

## 2021-09-20 ENCOUNTER — TELEPHONE (OUTPATIENT)
Dept: FAMILY MEDICINE | Facility: CLINIC | Age: 66
End: 2021-09-20

## 2021-09-20 NOTE — TELEPHONE ENCOUNTER
Form for disability and leave health care provider statement completed. Copy placed in abstract and copy placed in tc bin. Form faxed to [phone],

## 2021-09-23 DIAGNOSIS — I26.94 MULTIPLE SUBSEGMENTAL PULMONARY EMBOLI WITHOUT ACUTE COR PULMONALE (H): Primary | ICD-10-CM

## 2021-10-06 ENCOUNTER — OFFICE VISIT (OUTPATIENT)
Dept: NURSING | Facility: CLINIC | Age: 66
End: 2021-10-06
Payer: COMMERCIAL

## 2021-10-06 ENCOUNTER — ANCILLARY PROCEDURE (OUTPATIENT)
Dept: CT IMAGING | Facility: CLINIC | Age: 66
End: 2021-10-06
Payer: COMMERCIAL

## 2021-10-06 VITALS — BODY MASS INDEX: 44.73 KG/M2 | WEIGHT: 302.9 LBS | OXYGEN SATURATION: 97 % | HEART RATE: 101 BPM

## 2021-10-06 DIAGNOSIS — I26.94 MULTIPLE SUBSEGMENTAL PULMONARY EMBOLI WITHOUT ACUTE COR PULMONALE (H): ICD-10-CM

## 2021-10-06 DIAGNOSIS — J45.40 MODERATE PERSISTENT ASTHMA WITHOUT COMPLICATION: Primary | ICD-10-CM

## 2021-10-06 DIAGNOSIS — J84.10 GRANULOMATOUS LUNG DISEASE (H): ICD-10-CM

## 2021-10-06 LAB
DLCOUNC-%PRED-PRE: 73 %
DLCOUNC-PRE: 19.64 ML/MIN/MMHG
DLCOUNC-PRED: 26.7 ML/MIN/MMHG
ERV-%PRED-PRE: 71 %
ERV-PRE: 0.26 L
ERV-PRED: 0.37 L
EXPTIME-PRE: 6.9 SEC
FEF2575-%PRED-PRE: 121 %
FEF2575-PRE: 3.37 L/SEC
FEF2575-PRED: 2.78 L/SEC
FEFMAX-%PRED-PRE: 83 %
FEFMAX-PRE: 7.38 L/SEC
FEFMAX-PRED: 8.88 L/SEC
FEV1-%PRED-PRE: 82 %
FEV1-PRE: 2.84 L
FEV1FEV6-PRE: 85 %
FEV1FEV6-PRED: 79 %
FEV1FVC-PRE: 85 %
FEV1FVC-PRED: 77 %
FEV1SVC-PRE: 81 %
FEV1SVC-PRED: 71 %
FIFMAX-PRE: 3.68 L/SEC
FRCPLETH-%PRED-PRE: 64 %
FRCPLETH-PRE: 2.33 L
FRCPLETH-PRED: 3.61 L
FVC-%PRED-PRE: 75 %
FVC-PRE: 3.34 L
FVC-PRED: 4.45 L
IC-%PRED-PRE: 72 %
IC-PRE: 3.24 L
IC-PRED: 4.46 L
RVPLETH-%PRED-PRE: 83 %
RVPLETH-PRE: 2.06 L
RVPLETH-PRED: 2.47 L
TLCPLETH-%PRED-PRE: 79 %
TLCPLETH-PRE: 5.57 L
TLCPLETH-PRED: 7.02 L
VA-%PRED-PRE: 81 %
VA-PRE: 5.2 L
VC-%PRED-PRE: 72 %
VC-PRE: 3.51 L
VC-PRED: 4.83 L

## 2021-10-06 PROCEDURE — 94729 DIFFUSING CAPACITY: CPT | Performed by: INTERNAL MEDICINE

## 2021-10-06 PROCEDURE — 94375 RESPIRATORY FLOW VOLUME LOOP: CPT | Performed by: INTERNAL MEDICINE

## 2021-10-06 PROCEDURE — 71250 CT THORAX DX C-: CPT | Performed by: RADIOLOGY

## 2021-10-06 PROCEDURE — 94726 PLETHYSMOGRAPHY LUNG VOLUMES: CPT | Performed by: INTERNAL MEDICINE

## 2021-10-07 NOTE — PROGRESS NOTES
Assessment & Plan     Multiple subsegmental pulmonary emboli without acute cor pulmonale (H)  Continue eliquis twice a day. Doing well on this and may be lifetime medication.   - ELIQUIS ANTICOAGULANT 5 MG tablet; Take 1 tablet (5 mg) by mouth 2 times daily    Type 2 diabetes mellitus without complication, without long-term current use of insulin (H)  recheck  - Hemoglobin A1c; Future  - Hemoglobin A1c    Screen for colon cancer  Send Cologuard    High priority for 2019-nCoV vaccine  given  - COVID-19,PF,PFIZER; Future    Moderate persistent asthma without complication  Refilled and will follow up with pulmonary later this week.   - budesonide-formoterol (SYMBICORT) 80-4.5 MCG/ACT Inhaler; Inhale 2 puffs into the lungs 2 times daily    Hypertension, goal below 140/90  Recheck labs  - CBC with platelets; Future  - Basic metabolic panel; Future  - Basic metabolic panel  - CBC with platelets    Hyperlipidemia LDL goal <130  Stable                FUTURE LABS:       - Schedule fasting labs in 6 months  FUTURE APPOINTMENTS:       - Follow-up visit in 6 months or sooner if any questions or concerns.   Work on weight loss  Regular exercise  See Patient Instructions    Return in about 6 months (around 4/18/2022) for Follow up, New Provider, Medication management.    Martina Leung MD  [facility]    Lesli SMART is a 65 year old who presents for the following health issues     HPI     Diabetes Follow-up      How often are you checking your blood sugar? Not at all    What concerns do you have today about your diabetes? None     Do you have any of these symptoms? (Select all that apply)  No numbness or tingling in feet.  No redness, sores or blisters on feet.  No complaints of excessive thirst.  No reports of blurry vision.  No significant changes to weight.    Have you had a diabetic eye exam in the last 12 months? No        BP Readings from Last 2 Encounters:   10/18/21 122/85    09/17/21 122/82     Hemoglobin A1C (%)   Date Value   06/10/2021 6.7 (H)   07/11/2017 6.1 (H)     LDL Cholesterol Calculated (mg/dL)   Date Value   06/10/2021 78   05/18/2020 81         Hyperlipidemia Follow-Up      Are you regularly taking any medication or supplement to lower your cholesterol?   Yes- simvastatin 10    Are you having muscle aches or other side effects that you think could be caused by your cholesterol lowering medication?  No    Hypertension Follow-up      Do you check your blood pressure regularly outside of the clinic? Yes     Are you following a low salt diet? Yes    Are your blood pressures ever more than 140 on the top number (systolic) OR more   than 90 on the bottom number (diastolic), for example 140/90? No    Asthma Follow-Up    Was ACT completed today?    No  ACT Total Scores 6/10/2021   ACT TOTAL SCORE -   ASTHMA ER VISITS -   ASTHMA HOSPITALIZATIONS -   ACT TOTAL SCORE (Goal Greater than or Equal to 20) 17   In the past 12 months, how many times did you visit the emergency room for your asthma without being admitted to the hospital? 0   In the past 12 months, how many times were you hospitalized overnight because of your asthma? 0     FOLLOW UP pulmonary embolism bilateral. Most days doing well but alternate days is still fatigued and winded. Using Dulera but thought that he was not supposed to use rescue inhaler. We discussed that this is still OK to use when needed, but should not be the only medication used. Taking Eliquis without bleeding or other side effects. No symptoms of PE.        How many days per week do you miss taking your asthma controller medication?  0    Please describe any recent triggers for your asthma: None    Have you had any Emergency Room Visits, Urgent Care Visits, or Hospital Admissions since your last office visit?  No            Review of Systems   Constitutional, HEENT, cardiovascular, pulmonary, gi and gu systems are negative, except as otherwise  noted.      Objective    /85 (BP Location: Left arm, Patient Position: Sitting, Cuff Size: Adult Large)   Pulse 102   Temp 99.5  F (37.5  C) (Tympanic)   Wt 139 kg (306 lb 6.4 oz)   SpO2 97%   BMI 45.25 kg/m    Body mass index is 45.25 kg/m .  Physical Exam   GENERAL: healthy, alert, well nourished, well hydrated, no distress, obese  HENT: ear canals- normal; TMs- normal; Nose- normal; Mouth- no ulcers, no lesions, throat is clear with no erythema or exudate.   NECK: no tenderness, no adenopathy, no asymmetry, no masses, no stiffness; thyroid- normal to palpation  RESP: lungs clear to auscultation - no rales, no rhonchi, no wheezes  CV: regular rates and rhythm, normal S1 S2, no S3 or S4 and no murmur, no click or rub -  ABDOMEN: soft, no tenderness, no  hepatosplenomegaly, no masses, normal bowel sounds  MS: extremities- no gross deformities noted, no edema  SKIN: no suspicious lesions, no rashes  NEURO: strength and tone- normal, sensory exam- grossly normal, mentation- intact, speech- normal, reflexes- symmetric  BACK: no CVA tenderness, no paralumbar tenderness  PSYCH: Alert and oriented times 3; speech- coherent , normal rate and volume; able to articulate logical thoughts, able to abstract reason, no tangential thoughts, no hallucinations or delusions, affect- normal     Office Visit on 10/06/2021   Component Date Value Ref Range Status     FVC-Pred 10/06/2021 4.45  L Final     FVC-Pre 10/06/2021 3.34  L Final     FVC-%Pred-Pre 10/06/2021 75  % Final     FEV1-Pre 10/06/2021 2.84  L Final     FEV1-%Pred-Pre 10/06/2021 82  % Final     FEV1FVC-Pred 10/06/2021 77  % Final     FEV1FVC-Pre 10/06/2021 85  % Final     FEFMax-Pred 10/06/2021 8.88  L/sec Final     FEFMax-Pre 10/06/2021 7.38  L/sec Final     FEFMax-%Pred-Pre 10/06/2021 83  % Final     FEF2575-Pred 10/06/2021 2.78  L/sec Final     FEF2575-Pre 10/06/2021 3.37  L/sec Final     FBL4659-%Pred-Pre 10/06/2021 121  % Final     ExpTime-Pre 10/06/2021  6.90  sec Final     FIFMax-Pre 10/06/2021 3.68  L/sec Final     VC-Pred 10/06/2021 4.83  L Final     VC-Pre 10/06/2021 3.51  L Final     VC-%Pred-Pre 10/06/2021 72  % Final     IC-Pred 10/06/2021 4.46  L Final     IC-Pre 10/06/2021 3.24  L Final     IC-%Pred-Pre 10/06/2021 72  % Final     ERV-Pred 10/06/2021 0.37  L Final     ERV-Pre 10/06/2021 0.26  L Final     ERV-%Pred-Pre 10/06/2021 71  % Final     FEV1FEV6-Pred 10/06/2021 79  % Final     FEV1FEV6-Pre 10/06/2021 85  % Final     FRCPleth-Pred 10/06/2021 3.61  L Final     FRCPleth-Pre 10/06/2021 2.33  L Final     FRCPleth-%Pred-Pre 10/06/2021 64  % Final     RVPleth-Pred 10/06/2021 2.47  L Final     RVPleth-Pre 10/06/2021 2.06  L Final     RVPleth-%Pred-Pre 10/06/2021 83  % Final     TLCPleth-Pred 10/06/2021 7.02  L Final     TLCPleth-Pre 10/06/2021 5.57  L Final     TLCPleth-%Pred-Pre 10/06/2021 79  % Final     DLCOunc-Pred 10/06/2021 26.70  ml/min/mmHg Final     DLCOunc-Pre 10/06/2021 19.64  ml/min/mmHg Final     DLCOunc-%Pred-Pre 10/06/2021 73  % Final     VA-Pre 10/06/2021 5.20  L Final     VA-%Pred-Pre 10/06/2021 81  % Final     FEV1SVC-Pred 10/06/2021 71  % Final     FEV1SVC-Pre 10/06/2021 81  % Final     No results found for this or any previous visit (from the past 24 hour(s)).

## 2021-10-07 NOTE — PATIENT INSTRUCTIONS
At [facility], we strive to deliver an exceptional experience to you, every time we see you. If you receive a survey, please complete it as we do value your feedback.  If you have MyChart, you can expect to receive results automatically within 24 hours of their completion.  Your provider will send a note interpreting your results as well.   If you do not have MyChart, you should receive your results in about a week by mail.    Your care team:                            Family Medicine Internal Medicine   MD Jae An MD Shantel Branch-Fleming, MD Srinivasa Vaka, MD Katya Belousova, PAGigiC  Carola Mckeon, APRN CNP    Jaime Fonseca, MD Pediatrics   Jackson Yung, PAGigiC  Betsy Cam, CNP MD China Bryant APRN CNP   MD Savannah Mcfarland MD Deborah Mielke, MD Kaleigh Nava, APRN [facility]      Clinic hours: Monday - Thursday 7 am-6 pm; Fridays 7 am-5 pm.   Urgent care: Monday - Friday 10 am- 8 pm; Saturday and Sunday 9 am-5 pm.    Clinic: [phone]       [facility]: Monday - Thursday 8 am - 7 pm; Friday 8 am - 6 pm  [facility]: [phone]     Use www.[email or web address].org for 24/7 diagnosis and treatment of dozens of conditions.    Patient Education   Living Healthy with Diabetes  Healthful eating  Healthful eating means eating well-balanced meals and snacks at regular times.    Eat a variety of healthy foods to help control blood glucose (blood sugar).    Space your meals during the day. Try to eat a meal every 4 to 5 hours. Include a variety of foods from all food groups.    If you wish, you may have one or two small snacks between meals. Snacks should be nutritious and lower in calories than a meal. (For example: fat-free yogurt, 1/2 cup fruit, 3 cups popcorn, 1/4 cup nuts). Ask your dietitian about healthful snacks.    Do not skip meals.  There are no foods that you cannot eat. But  it is important to know which foods most affect your blood glucose.  Three main nutrients give the body energy (calories):    Carbohydrate    Protein    Fat    Foods with carbohydrate will raise blood glucose. These foods include breads, pasta, cereals, rice, fruits, milk and yogurt.    Do not avoid carbohydrate. Eating the right amount of carbohydrate at meals and snacks will help to control blood glucose.    Check blood glucose as directed by your health care provider to see how food choices affect it.  Healthy Eating at a Glance  ? Pay attention to portion sizes.  ? Eat a variety of healthy foods every day.  ? Choose foods high in nutrition:    Fruits and vegetables    Beans and legumes    Whole grains    Heart healthy fats    Lean meats and proteins    Foods without added sodium, sugars and fat    Manage your weight  Managing weight is not only about how much you eat, but also about the quality of foods you eat.    Eat smaller portions.    Eat less sugar.    Eat less of the high-fat foods.  ? Eat smaller portions of healthy fats such as nuts, avocado and olives.  ? Limit fried foods, fatty meats (rodriguez, sausage, hot dogs, cold cuts), butter, salad dressings, cream, gravy, chips, bakery items, whole milk, ice cream, pizza, fast food and hard cheeses.    Choose high-fiber foods such as vegetables, fruits and whole-grain breads and cereals.  ? These foods help you to feel more satisfied with your meal or snack. They are full of nutrients.    Eat mindfully  ? Listen to your body's signals for hunger. Eat when you feel hungry and stop when you start to feel full.  ? Avoid eating when bored, sad or upset.  Physical activity  Activity can help control your glucose levels. The American Diabetes Association recommends 150 minutes of moderate physical activity (walking, biking, swimming) a week or 75 minutes of vigorous activity (jogging, aerobics) per week. Add to that strength training (lifting weights, resistance  bands) two or three days a week.  Talk to your doctor before starting an activity program. This is very important if:    You are over age 35.    You have had type 1 diabetes for more than 15 years.    You have had type 2 diabetes for more than 10 years.    You have any risk factors for heart or artery disease (such as high blood pressure, high cholesterol or being overweight).    You have a history of heart or artery disease.    You have any kind of nerve damage (neuropathy).    You have eye disease (retinopathy).  For informational purposes only. Not to replace the advice of your health care provider.  Copyright   2007 [employer]. All rights reserved. [employer] 126325 - REV 03/16.

## 2021-10-18 ENCOUNTER — OFFICE VISIT (OUTPATIENT)
Dept: FAMILY MEDICINE | Facility: CLINIC | Age: 66
End: 2021-10-18
Payer: COMMERCIAL

## 2021-10-18 VITALS
OXYGEN SATURATION: 97 % | SYSTOLIC BLOOD PRESSURE: 122 MMHG | HEART RATE: 102 BPM | WEIGHT: 306.4 LBS | BODY MASS INDEX: 45.25 KG/M2 | DIASTOLIC BLOOD PRESSURE: 85 MMHG | TEMPERATURE: 99.5 F

## 2021-10-18 DIAGNOSIS — J45.40 MODERATE PERSISTENT ASTHMA WITHOUT COMPLICATION: ICD-10-CM

## 2021-10-18 DIAGNOSIS — I10 HYPERTENSION, GOAL BELOW 140/90: ICD-10-CM

## 2021-10-18 DIAGNOSIS — E78.5 HYPERLIPIDEMIA LDL GOAL <130: ICD-10-CM

## 2021-10-18 DIAGNOSIS — I26.94 MULTIPLE SUBSEGMENTAL PULMONARY EMBOLI WITHOUT ACUTE COR PULMONALE (H): Primary | ICD-10-CM

## 2021-10-18 DIAGNOSIS — Z23 HIGH PRIORITY FOR 2019-NCOV VACCINE: ICD-10-CM

## 2021-10-18 DIAGNOSIS — E11.9 TYPE 2 DIABETES MELLITUS WITHOUT COMPLICATION, WITHOUT LONG-TERM CURRENT USE OF INSULIN (H): ICD-10-CM

## 2021-10-18 DIAGNOSIS — Z12.11 SCREEN FOR COLON CANCER: ICD-10-CM

## 2021-10-18 LAB
ANION GAP SERPL CALCULATED.3IONS-SCNC: 5 MMOL/L (ref 3–14)
BUN SERPL-MCNC: 21 MG/DL (ref 7–30)
CALCIUM SERPL-MCNC: 9.1 MG/DL (ref 8.5–10.1)
CHLORIDE BLD-SCNC: 105 MMOL/L (ref 94–109)
CO2 SERPL-SCNC: 30 MMOL/L (ref 20–32)
CREAT SERPL-MCNC: 1.26 MG/DL (ref 0.66–1.25)
ERYTHROCYTE [DISTWIDTH] IN BLOOD BY AUTOMATED COUNT: 15.6 % (ref 10–15)
GFR SERPL CREATININE-BSD FRML MDRD: 60 ML/MIN/1.73M2
GLUCOSE BLD-MCNC: 133 MG/DL (ref 70–99)
HBA1C MFR BLD: 6.7 % (ref 0–5.6)
HCT VFR BLD AUTO: 44.3 % (ref 40–53)
HGB BLD-MCNC: 13.9 G/DL (ref 13.3–17.7)
MCH RBC QN AUTO: 26.7 PG (ref 26.5–33)
MCHC RBC AUTO-ENTMCNC: 31.4 G/DL (ref 31.5–36.5)
MCV RBC AUTO: 85 FL (ref 78–100)
PLATELET # BLD AUTO: 283 10E3/UL (ref 150–450)
POTASSIUM BLD-SCNC: 4 MMOL/L (ref 3.4–5.3)
RBC # BLD AUTO: 5.21 10E6/UL (ref 4.4–5.9)
SODIUM SERPL-SCNC: 140 MMOL/L (ref 133–144)
WBC # BLD AUTO: 11.2 10E3/UL (ref 4–11)

## 2021-10-18 PROCEDURE — 99214 OFFICE O/P EST MOD 30 MIN: CPT | Performed by: FAMILY MEDICINE

## 2021-10-18 PROCEDURE — 83036 HEMOGLOBIN GLYCOSYLATED A1C: CPT | Performed by: FAMILY MEDICINE

## 2021-10-18 PROCEDURE — 36415 COLL VENOUS BLD VENIPUNCTURE: CPT | Performed by: FAMILY MEDICINE

## 2021-10-18 PROCEDURE — 80048 BASIC METABOLIC PNL TOTAL CA: CPT | Performed by: FAMILY MEDICINE

## 2021-10-18 PROCEDURE — 85027 COMPLETE CBC AUTOMATED: CPT | Performed by: FAMILY MEDICINE

## 2021-10-18 RX ORDER — BUDESONIDE AND FORMOTEROL FUMARATE DIHYDRATE 80; 4.5 UG/1; UG/1
2 AEROSOL RESPIRATORY (INHALATION) 2 TIMES DAILY
Qty: 10.2 G | Refills: 3 | Status: SHIPPED | OUTPATIENT
Start: 2021-10-18 | End: 2022-07-11

## 2021-10-18 RX ORDER — APIXABAN 5 MG/1
5 TABLET, FILM COATED ORAL 2 TIMES DAILY
Qty: 180 TABLET | Refills: 3 | Status: SHIPPED | OUTPATIENT
Start: 2021-10-18 | End: 2022-07-11

## 2021-10-19 ENCOUNTER — TELEPHONE (OUTPATIENT)
Dept: FAMILY MEDICINE | Facility: CLINIC | Age: 66
End: 2021-10-19

## 2021-10-19 NOTE — TELEPHONE ENCOUNTER
Received [employer] for [employer] form. Faxed to Lars, [phone], right fax confirmed at 8:26 am today, 10/19/2021. Copy to TC and abstracting.  Catherine Russell MA  [facility]  2nd Floor  Primary Care

## 2021-10-20 NOTE — RESULT ENCOUNTER NOTE
Dear Arturo    Your test results are attached. I am happy to let you know that they are stable.    The diabetes test looks great. Kidney test is stable. The white count is slightly elevated but stable.     Please contact me by [employer] if you have any questions about your labs or management. You may also call my office number [phone] for any questions.     Martina Leung MD

## 2021-10-21 ENCOUNTER — VIRTUAL VISIT (OUTPATIENT)
Dept: PULMONOLOGY | Facility: CLINIC | Age: 66
End: 2021-10-21
Payer: COMMERCIAL

## 2021-10-21 DIAGNOSIS — I26.94 MULTIPLE SUBSEGMENTAL PULMONARY EMBOLI WITHOUT ACUTE COR PULMONALE (H): ICD-10-CM

## 2021-10-21 PROCEDURE — 99204 OFFICE O/P NEW MOD 45 MIN: CPT | Mod: 95 | Performed by: INTERNAL MEDICINE

## 2021-10-21 NOTE — PROGRESS NOTES
BING is a 66 year old who is being evaluated via a billable video visit.      How would you like to obtain your AVS? MyChart  If the video visit is dropped, the invitation should be resent by: Text to cell phone: Mobile  Will anyone else be joining your video visit? No      Video Start Time: 1:30 PM    Subjective   BING is a 66 year old who presents for pulmonary embolism    HPI   Had the utmost pleasure of seeing . Arturo Negron, who is a very pleasant 66-year-old who presents to the clinic for an evaluation of a recently diagnosed pulmonary embolism.  He was previously seen by my partner-Dr. Trent Alberto for endobronchial hematoma still required debulking which was diagnosed a few years earlier.  During encounter with Dr. Alberto in 2019, there was a concern for renarrowing of the left upper lobe bronchus and there are talks about possible bronchoscopy with reexploration to evaluate the need for further debulking, however at Bing noted that he was asymptomatic and will like to place the procedure on hold which was reasonable at the time.  He was recently seen at his PCP-Dr. Leung last month for complaints of shortness of breath at rest.  Investigations obtained including a CT PE study which showed multiple bilateral subsegmental emboli and he was started on Eliquis at the time.  There was radiological evidence of mild heart strain although troponin/proBNP were unremarkable.  No echocardiogram was obtained at that time. He did not require any admission.  He has been adherent on his blood thinners and has noticed an interval improvement in his symptoms.  He is less short of breath although still he continues to have remnant symptoms particularly on moderate exertion.  He had to take time off work due to concerns of not being able to keep up with the demand.  He denies any cough, no chest pains, no fevers, no chest tightness.  He is also on Symbicort which he takes regularly for questionable history of asthma.  He denies  any family history of clots.  No family history of recurrent miscarriages.  No prior history of unilateral leg swellings or DVTs.  He was not recently hospitalized, he did not take any long trips and he denies any recent fractures.  He denies any pedal swellings, orthopnea, no PND.  He is not very active at baseline.  Since being on Eliquis, he denies any increased bleeding.  He has no issues with falls or gait instability.  Former smoker, quit 1991, <1ppd for about 15 to 20 years. 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      Objective           Vitals:  No vitals were obtained today due to virtual visit.    Physical Exam   GENERAL: Healthy, alert and no distress  EYES: Eyes grossly normal to inspection.  No discharge or erythema, or obvious scleral/conjunctival abnormalities.  RESP: No audible wheeze, cough, or visible cyanosis.  No visible retractions or increased work of breathing.    SKIN: Visible skin clear. No significant rash, abnormal pigmentation or lesions.  NEURO: Cranial nerves grossly intact.  Mentation and speech appropriate for age.  PSYCH: Mentation appears normal, affect normal/bright, judgement and insight intact, normal speech and appearance well-groomed.      CT chest - 10/06/2021 - Reviewed and discussed with patient  Continued Airways thickening in the lingula which may  represent chronic inflammatory process. Numerous calcified pulmonary  nodules and mediastinal and bilateral hilar lymph nodes unchanged in  extent, again suggestive of prior granulomatous disease. Unchanged  probable intrapulmonary lymph node in the right middle lobe.  Atherosclerosis especially involving the coronary arteries. Consider  thyroid ultrasound for apparent unchanged prominent right thyroid  nodule compared with  2019. Mild pulmonary artery enlargement, 3.5 cm,  which may indicate pulmonary artery hypertension. Unchanged probable  hepatic cysts.    Assessment and plan  Bilateral pulmonary emboli    It is unclear if this is provoked and he may likely require prolonged or lifelong anticoagulation.  There appears to be evidence of radiologic evidence of mild heart strain but no echocardiogram has been obtained yet.  I will go ahead and order one now.  There has been some interval improvement since starting Eliquis however, he still continues to have remnant symptoms.  Echocardiogram will be helpful to evaluate for evidence of chronic thromboembolic pulmonary hypertension which may require additional medication such as RIOCIGUAT or other interventions.  I will also refer him to hematology to evaluate for familial genetic causes of blood clots but in the meantime I advised that he continues with his blood thinners-Eliquis.  I think he has also a component of deconditioning which may explain some of his symptoms.  I think it safe for him to start pulmonary rehab particularly now that he has been on blood thinners for several weeks.  Unfortunately, CT imaging obtained was done without contrast and we are unable to evaluate for remnant blood clots.  I will go ahead and order a CTA pulmonary for further evaluation.    History of endobronchial hematoma status post cryodebulking in 2018   Previously seen by Dr. Alberto in 2019.  On review of his CT scans, there is some mild narrowing of the left upper lobe but is stable since 2019.  His current symptoms are very unlikely related to this and there is no need for additional work-up or interventions for this.  If he does develop symptoms related to days, I will refer him back to see Dr. Alberto.    All questions were reviewed and addressed with patient    RTC in 12 months with CTA pulmonary    Harry Law MD  Pulmonary, Critical Care and Sleep Medicine  [facility]  Pager:        Video-Visit Details    Type of service:  Video Visit    Video End Time:2:29 PM    Originating Location (pt. Location): Home    Distant Location (provider location):  [facility]     Platform used for Video Visit: [employer]

## 2021-10-21 NOTE — PROGRESS NOTES
BING is a 66 year old who is being evaluated via a billable video visit.      Video visit - patient will login to my chart  How would you like to obtain your AVS? MyChart  If the video visit is dropped, the invitation should be resent by: Text to cell phone: [phone] (H)  Will anyone else be joining your video visit? Cammie Bob LPN  Pulmonary Medicine:  [facility]  Phone: [phone] Fax: [phone]

## 2021-10-27 NOTE — RESULT ENCOUNTER NOTE
Results discussed directly with patient while patient was present. Any further details documented in the note.   Harry Law MD

## 2021-10-29 ENCOUNTER — TELEPHONE (OUTPATIENT)
Dept: FAMILY MEDICINE | Facility: CLINIC | Age: 66
End: 2021-10-29

## 2021-10-29 NOTE — TELEPHONE ENCOUNTER
Received signed [place] [employer] Provider Statement form. Faxed form to Lars, [phone], right fax confirmed at 10:24 am today, 10/29/2021. Copy to TC and abstracting.  Catherine Russell MA  [facility]  2nd Floor  Primary Care

## 2021-11-03 NOTE — PROGRESS NOTES
RECORDS STATUS - ALL OTHER DIAGNOSIS      RECORDS RECEIVED FROM: [facility]   DATE RECEIVED: 12/16/2021   NOTES STATUS DETAILS   OFFICE NOTE from referring provider Complete Epic   Harry Law MD   OFFICE NOTE from medical oncologist N/A    DISCHARGE SUMMARY from hospital Complete Epic 1/11/2018 6/30/2017 Endobronchial Mass   DISCHARGE REPORT from the ER     OPERATIVE REPORT Complete See Biopsy in [facility] 1/11/2018   MEDICATION LIST Complete [facility]   CLINICAL TRIAL TREATMENTS TO DATE     LABS     PATHOLOGY REPORTS Complete 1/11/2018   Lung, lingular mass:   - Benign pulmonary hamartoma    ANYTHING RELATED TO DIAGNOSIS Complete Labs last updated on 10/18/2021   GENONOMIC TESTING     TYPE:     IMAGING (NEED IMAGES & REPORT)     CT SCANS Complete CT Chest 10/6/2021, 3/18/2019 more in PACS   Xray Chest Complete 9/22/2019, 11/12/2018   MRI     MAMMO     ULTRASOUND     PET

## 2021-11-10 ENCOUNTER — IMMUNIZATION (OUTPATIENT)
Dept: NURSING | Facility: CLINIC | Age: 66
End: 2021-11-10
Payer: COMMERCIAL

## 2021-11-10 PROCEDURE — 90471 IMMUNIZATION ADMIN: CPT

## 2021-11-10 PROCEDURE — 90682 RIV4 VACC RECOMBINANT DNA IM: CPT

## 2021-11-10 PROCEDURE — 91300 PR COVID VAC PFIZER DIL RECON 30 MCG/0.3 ML IM: CPT

## 2021-11-10 PROCEDURE — 0004A PR COVID VAC PFIZER DIL RECON 30 MCG/0.3 ML IM: CPT

## 2021-11-15 ENCOUNTER — ANCILLARY PROCEDURE (OUTPATIENT)
Dept: CARDIOLOGY | Facility: CLINIC | Age: 66
End: 2021-11-15
Attending: INTERNAL MEDICINE
Payer: COMMERCIAL

## 2021-11-15 DIAGNOSIS — I26.94 MULTIPLE SUBSEGMENTAL PULMONARY EMBOLI WITHOUT ACUTE COR PULMONALE (H): ICD-10-CM

## 2021-11-15 LAB — LVEF ECHO: NORMAL

## 2021-11-15 PROCEDURE — 93306 TTE W/DOPPLER COMPLETE: CPT | Performed by: STUDENT IN AN ORGANIZED HEALTH CARE EDUCATION/TRAINING PROGRAM

## 2021-11-15 RX ADMIN — Medication 5 ML: at 13:53

## 2021-11-17 NOTE — RESULT ENCOUNTER NOTE
I called patient with results. Normal echocardiogram with normal RV morphology and function. Pulmonary artery pressures were not estimated.

## 2021-11-23 ENCOUNTER — HOSPITAL ENCOUNTER (OUTPATIENT)
Dept: CARDIAC REHAB | Facility: CLINIC | Age: 66
Setting detail: THERAPIES SERIES
End: 2021-11-23
Attending: INTERNAL MEDICINE
Payer: COMMERCIAL

## 2021-11-23 DIAGNOSIS — I26.94 MULTIPLE SUBSEGMENTAL PULMONARY EMBOLI WITHOUT ACUTE COR PULMONALE (H): ICD-10-CM

## 2021-11-23 PROCEDURE — G0238 OTH RESP PROC, INDIV: HCPCS

## 2021-12-02 ENCOUNTER — HOSPITAL ENCOUNTER (OUTPATIENT)
Dept: CARDIAC REHAB | Facility: CLINIC | Age: 66
End: 2021-12-02
Attending: INTERNAL MEDICINE
Payer: COMMERCIAL

## 2021-12-02 PROCEDURE — G0238 OTH RESP PROC, INDIV: HCPCS | Performed by: CLINICAL EXERCISE PHYSIOLOGIST

## 2021-12-07 ENCOUNTER — HOSPITAL ENCOUNTER (OUTPATIENT)
Dept: CARDIAC REHAB | Facility: CLINIC | Age: 66
End: 2021-12-07
Attending: INTERNAL MEDICINE
Payer: COMMERCIAL

## 2021-12-07 PROCEDURE — G0239 OTH RESP PROC, GROUP: HCPCS

## 2021-12-09 ENCOUNTER — HOSPITAL ENCOUNTER (OUTPATIENT)
Dept: CARDIAC REHAB | Facility: CLINIC | Age: 66
End: 2021-12-09
Attending: INTERNAL MEDICINE
Payer: COMMERCIAL

## 2021-12-09 PROCEDURE — G0239 OTH RESP PROC, GROUP: HCPCS | Performed by: CLINICAL EXERCISE PHYSIOLOGIST

## 2021-12-14 ENCOUNTER — HOSPITAL ENCOUNTER (OUTPATIENT)
Dept: CARDIAC REHAB | Facility: CLINIC | Age: 66
End: 2021-12-14
Attending: INTERNAL MEDICINE
Payer: COMMERCIAL

## 2021-12-14 PROCEDURE — G0239 OTH RESP PROC, GROUP: HCPCS

## 2021-12-16 ENCOUNTER — PRE VISIT (OUTPATIENT)
Dept: ONCOLOGY | Facility: CLINIC | Age: 66
End: 2021-12-16

## 2021-12-16 ENCOUNTER — ONCOLOGY VISIT (OUTPATIENT)
Dept: ONCOLOGY | Facility: CLINIC | Age: 66
End: 2021-12-16
Attending: INTERNAL MEDICINE
Payer: COMMERCIAL

## 2021-12-16 VITALS
BODY MASS INDEX: 46.36 KG/M2 | WEIGHT: 313 LBS | SYSTOLIC BLOOD PRESSURE: 149 MMHG | HEIGHT: 69 IN | TEMPERATURE: 98.4 F | DIASTOLIC BLOOD PRESSURE: 93 MMHG | RESPIRATION RATE: 20 BRPM | OXYGEN SATURATION: 98 % | HEART RATE: 104 BPM

## 2021-12-16 DIAGNOSIS — I26.94 MULTIPLE SUBSEGMENTAL PULMONARY EMBOLI WITHOUT ACUTE COR PULMONALE (H): Primary | ICD-10-CM

## 2021-12-16 DIAGNOSIS — D86.9 SARCOIDOSIS: ICD-10-CM

## 2021-12-16 DIAGNOSIS — Z79.01 CHRONIC ANTICOAGULATION: ICD-10-CM

## 2021-12-16 DIAGNOSIS — I26.94 MULTIPLE SUBSEGMENTAL PULMONARY EMBOLI WITHOUT ACUTE COR PULMONALE (H): ICD-10-CM

## 2021-12-16 LAB
ALBUMIN SERPL-MCNC: 3.4 G/DL (ref 3.4–5)
ALP SERPL-CCNC: 95 U/L (ref 40–150)
ALT SERPL W P-5'-P-CCNC: 32 U/L (ref 0–70)
ANION GAP SERPL CALCULATED.3IONS-SCNC: 4 MMOL/L (ref 3–14)
AST SERPL W P-5'-P-CCNC: 16 U/L (ref 0–45)
BASOPHILS # BLD AUTO: 0.1 10E3/UL (ref 0–0.2)
BASOPHILS NFR BLD AUTO: 1 %
BILIRUB SERPL-MCNC: 0.5 MG/DL (ref 0.2–1.3)
BUN SERPL-MCNC: 19 MG/DL (ref 7–30)
CALCIUM SERPL-MCNC: 9.3 MG/DL (ref 8.5–10.1)
CHLORIDE BLD-SCNC: 104 MMOL/L (ref 94–109)
CO2 SERPL-SCNC: 32 MMOL/L (ref 20–32)
CREAT SERPL-MCNC: 1.51 MG/DL (ref 0.66–1.25)
EOSINOPHIL # BLD AUTO: 0.2 10E3/UL (ref 0–0.7)
EOSINOPHIL NFR BLD AUTO: 2 %
ERYTHROCYTE [DISTWIDTH] IN BLOOD BY AUTOMATED COUNT: 15.5 % (ref 10–15)
FACTOR 2 INTERPRETATION: ABNORMAL
FACTOR V INTERPRETATION: ABNORMAL
GFR SERPL CREATININE-BSD FRML MDRD: 48 ML/MIN/1.73M2
GLUCOSE BLD-MCNC: 131 MG/DL (ref 70–99)
HCT VFR BLD AUTO: 46 % (ref 40–53)
HGB BLD-MCNC: 14.8 G/DL (ref 13.3–17.7)
IMM GRANULOCYTES # BLD: 0 10E3/UL
IMM GRANULOCYTES NFR BLD: 0 %
LAB DIRECTOR COMMENTS: ABNORMAL
LAB DIRECTOR DISCLAIMER: ABNORMAL
LAB DIRECTOR INTERPRETATION: ABNORMAL
LAB DIRECTOR METHODOLOGY: ABNORMAL
LAB DIRECTOR RESULTS: ABNORMAL
LYMPHOCYTES # BLD AUTO: 1.6 10E3/UL (ref 0.8–5.3)
LYMPHOCYTES NFR BLD AUTO: 14 %
MCH RBC QN AUTO: 27.3 PG (ref 26.5–33)
MCHC RBC AUTO-ENTMCNC: 32.2 G/DL (ref 31.5–36.5)
MCV RBC AUTO: 85 FL (ref 78–100)
MONOCYTES # BLD AUTO: 0.9 10E3/UL (ref 0–1.3)
MONOCYTES NFR BLD AUTO: 8 %
NEUTROPHILS # BLD AUTO: 8.8 10E3/UL (ref 1.6–8.3)
NEUTROPHILS NFR BLD AUTO: 75 %
NRBC # BLD AUTO: 0 10E3/UL
NRBC BLD AUTO-RTO: 0 /100
PLATELET # BLD AUTO: 283 10E3/UL (ref 150–450)
POTASSIUM BLD-SCNC: 3.8 MMOL/L (ref 3.4–5.3)
PROT SERPL-MCNC: 7.6 G/DL (ref 6.8–8.8)
RBC # BLD AUTO: 5.42 10E6/UL (ref 4.4–5.9)
SODIUM SERPL-SCNC: 140 MMOL/L (ref 133–144)
SPECIMEN DESCRIPTION: ABNORMAL
WBC # BLD AUTO: 11.6 10E3/UL (ref 4–11)

## 2021-12-16 PROCEDURE — 81240 F2 GENE: CPT

## 2021-12-16 PROCEDURE — 99204 OFFICE O/P NEW MOD 45 MIN: CPT | Performed by: INTERNAL MEDICINE

## 2021-12-16 PROCEDURE — 82164 ANGIOTENSIN I ENZYME TEST: CPT | Mod: 90

## 2021-12-16 PROCEDURE — 85025 COMPLETE CBC W/AUTO DIFF WBC: CPT

## 2021-12-16 PROCEDURE — 81241 F5 GENE: CPT

## 2021-12-16 PROCEDURE — G0452 MOLECULAR PATHOLOGY INTERPR: HCPCS | Performed by: PATHOLOGY

## 2021-12-16 PROCEDURE — 80053 COMPREHEN METABOLIC PANEL: CPT

## 2021-12-16 PROCEDURE — 36415 COLL VENOUS BLD VENIPUNCTURE: CPT

## 2021-12-16 ASSESSMENT — PAIN SCALES - GENERAL: PAINLEVEL: NO PAIN (0)

## 2021-12-16 ASSESSMENT — MIFFLIN-ST. JEOR: SCORE: 2200.39

## 2021-12-16 NOTE — PROGRESS NOTES
[facility] Hematology / Oncology  Initial Visit / Consultation Note Dec 16, 2021  Name: Arturo Rahman  :  1955  MRN:  [id number]    --------------------    Assessment:  Pulmonary embolism, unprovoked:  Over the course of our visit, Arturo and I reviewed the natural history of venous thromboembolism. We reviewed indications for treatment, duration of therapy, as well as logistics, risks and benefits associated with various anticoagulation options (heparin, LMWH, DOAC, coumadin). Based upon his clinical presentation and workup to date, we agreed to classify this as a first, unprovoked episode.  As a result, we would recommend indefinite anticoagulation for now.  We do not have plans for repeat imaging at this time.  We agreed to proceed w/ testing for factor V Leiden and prothrombin gene mutation as this was the indication for his consult, but reviewed that the results would not change our management at this time; the remainder of a thrombophilia workup cannot be tested easily while on his current DOAC.  Would recommend age-appropriate cancer surveillance.  Question whether the calcified nodules are contributing; check ACE level.  Plans in place for additional evaluation of thyroid.  Breathing takes some time to improve and finally to start coming around.  Should also be noted that Arturo doesn't move much self-admittedly and confirmed by steps; sedentary activity did not help.  Arturo was agreeable with this plan; all questions answered and addressed.  Recommended wearing a life alert bracelet.  RTC 12 months.    Thank you kindly for this consultation.  Francis Camara MD    --------------------    Interval History:  Alicia Morris presents for evaluation of pulmonary embolism.  # Completing twice a week pulm rehab; exertion continues to cause some dyspnea, but oxygen sats remain normal.  # No bleeding or major bruising from anticoagulation.  # No prior history of VTE.  # No family history of  "VTE.  # Father was on blood thinners for \"clogged arteries\".  # Looking back, things started to kick off end of July.  # History of lung hematoma; pinched airway; status cryoablation.  # Steps per day 5,000, but unsure.  # Prior surgeries: Right meniscal repair (no prophylaxis); tonsillectomy much younger.  # Prior hormonal therapy: NA.  # Prior long-distance travel: None.    Swelling / Immobility / Circulatory:  # Recent or major surgery: No.  # Recent fracture or trauma: No.  # Recent long-distance car or air travel: No.  # Recent hospitalization: No.  # Recent other procedures: Yes: see above.    Family history:  # Family history of VTE: No.  # Family history of sudden cardiac death: No.    Personal risk factors:  # Obesity: Yes.  # Smoker: No: quit late 90s..  # Birth control (for women): NA.  # Recent pregnancy (for women): NA.  # Hormone replacment: No.  # Other medications associated with VTE: No.  # Cancer diagnosis / chemotherapy: No.  # Indwelling central line: No.  # Heart failure: No.  # Nephrotic syndrome: No.  # CKD / ESLD: No.  # Chronic inflammation: No.  # Chronic infection: No.  # Autoimmunity: No.  # Varicosities lower extremities: No.    --------------------    Review of Systems:  10 point ROS negative except for that above.    Past Medical / Surgical History:  Past Medical History:   Diagnosis Date     Allergies      Asthma, moderate persistent      FH: colon cancer     colonoscopy q 5yr.  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     HTN (hypertension)      Hyperlipidemia      Past Surgical History:   Procedure Laterality Date     ARTHROSCOPY KNEE Right 11/16/2018    Procedure: Right knee arthroscopy, medial meniscal and chondral debridement;  Surgeon: Manoj Augustin MD;  Location: [facility] OR     BRONCHOSCOPY FLEXIBLE AND RIGID N/A 7/19/2017    Procedure: BRONCHOSCOPY FLEXIBLE AND RIGID;   Bronchoscopy, Tumor Debulking with " Cryoprobe and Argon Plasma Coagulation;  Surgeon: Albino Crump MD;  Location: [facility] OR     BRONCHOSCOPY FLEXIBLE,L CRYOBIOPSY N/A 2018    Procedure: BRONCHOSCOPY FLEXIBLE, CRYOBIOPSY;  Flexible Bronchoscopy, Rigid Bronchoscopy, Tumor Debulking With Cryoprobe;  Surgeon: Albino Crump MD;  Location: [facility] OR     COLONOSCOPY      + polyp, +FH, repeat in 5 years     COMBINED ENDOSCOPY UPPER WITH ARGON PLASMA COAGULATOR (APC) N/A 2017    Procedure: COMBINED ENDOSCOPY UPPER WITH ARGON PLASMA COAGULATOR (APC);;  Surgeon: Albino Crump MD;  Location: [facility] OR     TONSILLECTOMY       VASECTOMY         Family History:  Family History   Problem Relation Age of Onset     Arthritis Mother      Hypertension Father      Cerebrovascular Disease Father      Asthma Daughter      Allergies Daughter      Hypertension Sister      Cancer - colorectal Sister         dx age 50       Social History:  Social History     Socioeconomic History     Marital status:      Spouse name: None     Number of children: 2     Years of education: None     Highest education level: None   Occupational History     Employer: [employer]   Tobacco Use     Smoking status: Former Smoker     Quit date: 10/28/1991     Years since quittin.1     Smokeless tobacco: Never Used   Substance and Sexual Activity     Alcohol use: Yes     Comment: rare     Drug use: No     Sexual activity: Yes     Partners: Female     Birth control/protection: None   Other Topics Concern     Parent/sibling w/ CABG, MI or angioplasty before 65F 55M? No   Social History Narrative     None     Social Determinants of Health     Financial Resource Strain: Not on file   Food Insecurity: Not on file   Transportation Needs: Not on file   Physical Activity: Not on file   Stress: Not on file   Social Connections: Not on file   Intimate Partner Violence: Not on file   Housing Stability: Not on file       Medications / Allergies:  Reviewed in  "EMR.    --------------------    Physical Exam:  VS: BP (!) 149/93 (BP Location: Right arm)   Pulse 104   Temp 98.4  F (36.9  C) (Oral)   Resp 20   Ht 1.753 m (5' 9.02\")   Wt 142 kg (313 lb)   SpO2 98%   BMI 46.20 kg/m    GEN: Well appearing.  HEENT: PERRL, EOMI, no scleral icterus or injection; OP clear, moist mucous membranes, no oral lesions.  NECK: Supple.  ARA: No cervical, supraclavicular, axillary or inguinal ARA.  CHEST: Breathing comfortably, good airflow bilaterally, no crackles, no wheezing.  CV: RRR, s1/s2, no murmurs; strong distal pulses.  ABD: Non-tender, non-distended, soft, positive bowel sounds.  EXT: Warm and well perfused; no edema; no clubbing.  SKIN: Warm and dry, no visible rashes.  NEURO: Alert, engaged; CN 2-12 grossly intact; no gross sensorimotor deficits; gait and coordination intact; speech clear and fluent.    Labs / Imaging / Path:  We reviewed labs and personally reviewed his imaging together.    "

## 2021-12-16 NOTE — NURSING NOTE
"Oncology Rooming Note    December 16, 2021 12:58 PM   Arturo Rahman is a 66 year old male who presents for:    Chief Complaint   Patient presents with     Oncology Clinic Visit     New Patient     Initial Vitals: BP (!) 149/93 (BP Location: Right arm)   Pulse 104   Temp 98.4  F (36.9  C) (Oral)   Resp 20   Ht 1.753 m (5' 9.02\")   Wt 142 kg (313 lb)   SpO2 98%   BMI 46.20 kg/m   Estimated body mass index is 46.2 kg/m  as calculated from the following:    Height as of this encounter: 1.753 m (5' 9.02\").    Weight as of this encounter: 142 kg (313 lb). Body surface area is 2.63 meters squared.  No Pain (0) Comment: Data Unavailable   No LMP for male patient.  Allergies reviewed: Yes  Medications reviewed: Yes    Medications: Medication refills not needed today.  Pharmacy name entered into DSTLD: [id number] - [place], [place] - [address]    Clinical concerns: New Patient       Nelly Chang LPN            "

## 2021-12-16 NOTE — LETTER
2021         RE: Arturo Rahman  [address] [place] [address]        Dear Colleague,    Thank you for referring your patient, Arturo Rahman, to the [facility]. Please see a copy of my visit note below.    [facility] Hematology / Oncology  Initial Visit / Consultation Note Dec 16, 2021  Name: Arturo Rahman  :  1955  MRN:  [id number]    --------------------    Assessment:  Pulmonary embolism, unprovoked:  Over the course of our visit, Arturo and I reviewed the natural history of venous thromboembolism. We reviewed indications for treatment, duration of therapy, as well as logistics, risks and benefits associated with various anticoagulation options (heparin, LMWH, DOAC, coumadin). Based upon his clinical presentation and workup to date, we agreed to classify this as a first, unprovoked episode.  As a result, we would recommend indefinite anticoagulation for now.  We do not have plans for repeat imaging at this time.  We agreed to proceed w/ testing for factor V Leiden and prothrombin gene mutation as this was the indication for his consult, but reviewed that the results would not change our management at this time; the remainder of a thrombophilia workup cannot be tested easily while on his current DOAC.  Would recommend age-appropriate cancer surveillance.  Question whether the calcified nodules are contributing; check ACE level.  Plans in place for additional evaluation of thyroid.  Breathing takes some time to improve and finally to start coming around.  Should also be noted that Arturo doesn't move much self-admittedly and confirmed by steps; sedentary activity did not help.  Arturo was agreeable with this plan; all questions answered and addressed.  Recommended wearing a life alert bracelet.  RTC 12 months.    Thank you kindly for this consultation.  Francis Camara MD    --------------------    Interval History:  #  "Arturo presents for evaluation of pulmonary embolism.  # Completing twice a week pulm rehab; exertion continues to cause some dyspnea, but oxygen sats remain normal.  # No bleeding or major bruising from anticoagulation.  # No prior history of VTE.  # No family history of VTE.  # Father was on blood thinners for \"clogged arteries\".  # Looking back, things started to kick off end of July.  # History of lung hematoma; pinched airway; status cryoablation.  # Steps per day 5,000, but unsure.  # Prior surgeries: Right meniscal repair (no prophylaxis); tonsillectomy much younger.  # Prior hormonal therapy: NA.  # Prior long-distance travel: None.    Swelling / Immobility / Circulatory:  # Recent or major surgery: No.  # Recent fracture or trauma: No.  # Recent long-distance car or air travel: No.  # Recent hospitalization: No.  # Recent other procedures: Yes: see above.    Family history:  # Family history of VTE: No.  # Family history of sudden cardiac death: No.    Personal risk factors:  # Obesity: Yes.  # Smoker: No: quit late 90s..  # Birth control (for women): NA.  # Recent pregnancy (for women): NA.  # Hormone replacment: No.  # Other medications associated with VTE: No.  # Cancer diagnosis / chemotherapy: No.  # Indwelling central line: No.  # Heart failure: No.  # Nephrotic syndrome: No.  # CKD / ESLD: No.  # Chronic inflammation: No.  # Chronic infection: No.  # Autoimmunity: No.  # Varicosities lower extremities: No.    --------------------    Review of Systems:  10 point ROS negative except for that above.    Past Medical / Surgical History:  Past Medical History:   Diagnosis Date     Allergies      Asthma, moderate persistent      FH: colon cancer     colonoscopy q 5yr.  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     HTN (hypertension)      Hyperlipidemia      Past Surgical History:   Procedure Laterality Date     ARTHROSCOPY KNEE Right " 2018    Procedure: Right knee arthroscopy, medial meniscal and chondral debridement;  Surgeon: Manoj Augustin MD;  Location: [facility] OR     BRONCHOSCOPY FLEXIBLE AND RIGID N/A 2017    Procedure: BRONCHOSCOPY FLEXIBLE AND RIGID;   Bronchoscopy, Tumor Debulking with Cryoprobe and Argon Plasma Coagulation;  Surgeon: Albino Crump MD;  Location: [facility] OR     BRONCHOSCOPY FLEXIBLE,L CRYOBIOPSY N/A 2018    Procedure: BRONCHOSCOPY FLEXIBLE, CRYOBIOPSY;  Flexible Bronchoscopy, Rigid Bronchoscopy, Tumor Debulking With Cryoprobe;  Surgeon: Albino Crump MD;  Location: [facility] OR     COLONOSCOPY      + polyp, +FH, repeat in 5 years     COMBINED ENDOSCOPY UPPER WITH ARGON PLASMA COAGULATOR (APC) N/A 2017    Procedure: COMBINED ENDOSCOPY UPPER WITH ARGON PLASMA COAGULATOR (APC);;  Surgeon: Albino Crump MD;  Location: [facility] OR     TONSILLECTOMY       VASECTOMY         Family History:  Family History   Problem Relation Age of Onset     Arthritis Mother      Hypertension Father      Cerebrovascular Disease Father      Asthma Daughter      Allergies Daughter      Hypertension Sister      Cancer - colorectal Sister         dx age 50       Social History:  Social History     Socioeconomic History     Marital status:      Spouse name: None     Number of children: 2     Years of education: None     Highest education level: None   Occupational History     Employer: [employer]   Tobacco Use     Smoking status: Former Smoker     Quit date: 10/28/1991     Years since quittin.1     Smokeless tobacco: Never Used   Substance and Sexual Activity     Alcohol use: Yes     Comment: rare     Drug use: No     Sexual activity: Yes     Partners: Female     Birth control/protection: None   Other Topics Concern     Parent/sibling w/ CABG, MI or angioplasty before 65F 55M? No   Social History Narrative     None     Social Determinants of Health     Financial Resource Strain: Not on file   Food  "Insecurity: Not on file   Transportation Needs: Not on file   Physical Activity: Not on file   Stress: Not on file   Social Connections: Not on file   Intimate Partner Violence: Not on file   Housing Stability: Not on file       Medications / Allergies:  Reviewed in EMR.    --------------------    Physical Exam:  VS: BP (!) 149/93 (BP Location: Right arm)   Pulse 104   Temp 98.4  F (36.9  C) (Oral)   Resp 20   Ht 1.753 m (5' 9.02\")   Wt 142 kg (313 lb)   SpO2 98%   BMI 46.20 kg/m    Gen: ***    Labs / Imaging / Path:  We reviewed ***        Again, thank you for allowing me to participate in the care of your patient.        Sincerely,        Francis Camara MD    "

## 2021-12-18 DIAGNOSIS — J30.1 CHRONIC SEASONAL ALLERGIC RHINITIS DUE TO POLLEN: ICD-10-CM

## 2021-12-18 LAB — ACE SERPL-CCNC: 39 U/L

## 2021-12-20 RX ORDER — DESLORATADINE 5 MG/1
TABLET ORAL
Qty: 180 TABLET | Refills: 0 | Status: SHIPPED | OUTPATIENT
Start: 2021-12-20 | End: 2022-04-14

## 2021-12-20 NOTE — TELEPHONE ENCOUNTER
Routing to provider to review and advise.    Radha Montague RN, BSN  [facility]

## 2021-12-28 ENCOUNTER — HOSPITAL ENCOUNTER (OUTPATIENT)
Dept: CARDIAC REHAB | Facility: CLINIC | Age: 66
End: 2021-12-28
Attending: INTERNAL MEDICINE
Payer: COMMERCIAL

## 2021-12-28 PROCEDURE — G0239 OTH RESP PROC, GROUP: HCPCS

## 2021-12-30 ENCOUNTER — HOSPITAL ENCOUNTER (OUTPATIENT)
Dept: CARDIAC REHAB | Facility: CLINIC | Age: 66
End: 2021-12-30
Attending: INTERNAL MEDICINE
Payer: COMMERCIAL

## 2021-12-30 PROCEDURE — G0239 OTH RESP PROC, GROUP: HCPCS | Performed by: CLINICAL EXERCISE PHYSIOLOGIST

## 2022-01-05 ENCOUNTER — VIRTUAL VISIT (OUTPATIENT)
Dept: ONCOLOGY | Facility: CLINIC | Age: 67
End: 2022-01-05
Payer: COMMERCIAL

## 2022-01-05 DIAGNOSIS — D68.51 HETEROZYGOUS FACTOR V LEIDEN MUTATION (H): ICD-10-CM

## 2022-01-05 DIAGNOSIS — I26.94 MULTIPLE SUBSEGMENTAL PULMONARY EMBOLI WITHOUT ACUTE COR PULMONALE (H): Primary | ICD-10-CM

## 2022-01-05 DIAGNOSIS — J84.10 GRANULOMATOUS LUNG DISEASE (H): ICD-10-CM

## 2022-01-05 PROCEDURE — 99214 OFFICE O/P EST MOD 30 MIN: CPT | Mod: 95 | Performed by: INTERNAL MEDICINE

## 2022-01-05 NOTE — LETTER
2022         RE: Arturo Rahman  [address] [place] [address]        Dear Colleague,    Thank you for referring your patient, Arturo Rahman, to the [facility] [place]. Please see a copy of my visit note below.    [facility] Hematology / Oncology  Progress Note 2022  Name: Arturo Rahman  :  1955    MRN:  [id number]    --------------------    Assessment / Plan:  Unprovoked PE  Heterozygous FVL  Granulomatous disease lungs    Clinically well; recovering from PE symptoms wise; tolerating chronic anticoagulation.    Reviewed heterozygous FVL testing; likely explains part of the unprovoked nature, but would not .  With unprovoked nature of VTE, recommendations would be for indefinite AC regardless of FVL results.  First degree family members could be tested, particularly if results could impact post-op prophylaxis or choice of birth control for women.    Reviewed ACEI level normal with granulomatous disease seen on imaging.    All questions answered and addressed.  RTC 12 months.    Francis Camara MD  Total phone visit time 11 mins.    --------------------    Interval History:  Arturo returns for follow up of VTE.  No worsening shortness of breath or cough.  Breathing slowly continues to get better.  No major bleeding or bruising with chronic anticoagulation..      --------------------    Physical Exam:  Phone visit.    Labs / Imaging / Path:  We reviewed labs.        Again, thank you for allowing me to participate in the care of your patient.        Sincerely,        Francis Camara MD

## 2022-01-05 NOTE — PROGRESS NOTES
[facility] Hematology / Oncology  Progress Note 2022  Name: Arturo Rahman  :  1955    MRN:  [id number]    --------------------    Assessment / Plan:  Unprovoked PE  Heterozygous FVL  Granulomatous disease lungs    Clinically well; recovering from PE symptoms wise; tolerating chronic anticoagulation.    Reviewed heterozygous FVL testing; likely explains part of the unprovoked nature, but would not .  With unprovoked nature of VTE, recommendations would be for indefinite AC regardless of FVL results.  First degree family members could be tested, particularly if results could impact post-op prophylaxis or choice of birth control for women.    Reviewed ACEI level normal with granulomatous disease seen on imaging.    All questions answered and addressed.  RTC 12 months.    Francis Camara MD  Total phone visit time 11 mins.    --------------------    Interval History:  Arturo returns for follow up of VTE.  No worsening shortness of breath or cough.  Breathing slowly continues to get better.  No major bleeding or bruising with chronic anticoagulation..      --------------------    Physical Exam:  Phone visit.    Labs / Imaging / Path:  We reviewed labs.

## 2022-01-09 ENCOUNTER — HEALTH MAINTENANCE LETTER (OUTPATIENT)
Age: 67
End: 2022-01-09

## 2022-01-09 PROBLEM — D68.51 HETEROZYGOUS FACTOR V LEIDEN MUTATION (H): Status: ACTIVE | Noted: 2022-01-09

## 2022-01-25 ENCOUNTER — HOSPITAL ENCOUNTER (OUTPATIENT)
Dept: CARDIAC REHAB | Facility: CLINIC | Age: 67
End: 2022-01-25
Attending: INTERNAL MEDICINE
Payer: COMMERCIAL

## 2022-01-25 PROCEDURE — G0239 OTH RESP PROC, GROUP: HCPCS | Performed by: REHABILITATION PRACTITIONER

## 2022-03-06 ENCOUNTER — HEALTH MAINTENANCE LETTER (OUTPATIENT)
Age: 67
End: 2022-03-06

## 2022-04-13 DIAGNOSIS — J30.1 CHRONIC SEASONAL ALLERGIC RHINITIS DUE TO POLLEN: ICD-10-CM

## 2022-04-14 RX ORDER — DESLORATADINE 5 MG/1
5 TABLET ORAL DAILY
Qty: 90 TABLET | Refills: 0 | Status: SHIPPED | OUTPATIENT
Start: 2022-04-14 | End: 2022-07-11

## 2022-04-14 NOTE — TELEPHONE ENCOUNTER
primary care provider retired  Refilled x1 in her absence  Needs to establish with new provider for refills

## 2022-04-15 NOTE — TELEPHONE ENCOUNTER
TT pt, advised that he is not ready to set up an appt with a new PCP, pt will call back when ready

## 2022-04-20 ENCOUNTER — OFFICE VISIT (OUTPATIENT)
Dept: PULMONOLOGY | Facility: CLINIC | Age: 67
End: 2022-04-20
Payer: COMMERCIAL

## 2022-04-20 ENCOUNTER — ANCILLARY PROCEDURE (OUTPATIENT)
Dept: CT IMAGING | Facility: CLINIC | Age: 67
End: 2022-04-20
Payer: COMMERCIAL

## 2022-04-20 VITALS
RESPIRATION RATE: 18 BRPM | SYSTOLIC BLOOD PRESSURE: 154 MMHG | DIASTOLIC BLOOD PRESSURE: 83 MMHG | HEART RATE: 109 BPM | OXYGEN SATURATION: 98 % | BODY MASS INDEX: 46.65 KG/M2 | HEIGHT: 69 IN | WEIGHT: 315 LBS

## 2022-04-20 DIAGNOSIS — I26.94 MULTIPLE SUBSEGMENTAL PULMONARY EMBOLI WITHOUT ACUTE COR PULMONALE (H): Primary | ICD-10-CM

## 2022-04-20 DIAGNOSIS — I26.94 MULTIPLE SUBSEGMENTAL PULMONARY EMBOLI WITHOUT ACUTE COR PULMONALE (H): ICD-10-CM

## 2022-04-20 PROCEDURE — 99215 OFFICE O/P EST HI 40 MIN: CPT | Performed by: INTERNAL MEDICINE

## 2022-04-20 PROCEDURE — 71275 CT ANGIOGRAPHY CHEST: CPT | Mod: GC | Performed by: RADIOLOGY

## 2022-04-20 RX ORDER — IOPAMIDOL 755 MG/ML
83 INJECTION, SOLUTION INTRAVASCULAR ONCE
Status: COMPLETED | OUTPATIENT
Start: 2022-04-20 | End: 2022-04-20

## 2022-04-20 RX ADMIN — IOPAMIDOL 83 ML: 755 INJECTION, SOLUTION INTRAVASCULAR at 13:46

## 2022-04-20 ASSESSMENT — PAIN SCALES - GENERAL: PAINLEVEL: NO PAIN (0)

## 2022-04-20 NOTE — PROGRESS NOTES
"Arturo Rahman's goals for this visit include: Return  He requests these members of his care team be copied on today's visit information: PCP    PCP: Martina Leung    Referring Provider:  No referring provider defined for this encounter.    BP (!) 154/83   Pulse 109   Resp 18   Ht 1.753 m (5' 9\")   Wt 143.7 kg (316 lb 12.8 oz)   SpO2 98%   BMI 46.78 kg/m      Do you need any medication refills at today's visit? N    Marcella Bob LPN  Pulmonary Medicine:  [facility]  Phone: [phone] Fax: [phone]      "

## 2022-04-20 NOTE — PROGRESS NOTES
Pulmonary Clinic Return Patient Visit  Reason for Visit: Pulmonary embolism  History of Present Illness  Mr. Arturo Negron is a very pleasant 66-year-old who presents to the clinic for follow-up for pulmonary embolism.  I last saw him in clinic in 10/2021.  To briefly review, he was previously seen by my partner-Dr. Trent Alberto for endobronchial hematoma and required debulking which was diagnosed a few years earlier.  During encounter with Dr. Alberto in 2019, there was a concern for renarrowing of the left upper lobe bronchus and there are talks about possible bronchoscopy with reexploration to evaluate the need for further debulking, however at [place] noted that he was asymptomatic and will like to place the procedure on hold which was reasonable at the time.  He was diagnosed with multiple bilateral subsegmental emboli following sudden onset of increasing shortness of breath and he was started on Eliquis at the time.  There was radiological evidence of mild heart strain although troponin/proBNP were unremarkable.   He did not require any admission.  When I saw him in clinic, he was on Eliquis and had made some progress with improved shortness of breath and was slowly getting back to his baseline.  I had ordered a repeat CT chest to evaluate for complete resolution of the previously pulmonary embolism.  An echocardiogram was not helpful in estimating pulmonary artery pressures but with no overt signs of right heart strain.  Unfortunately, since the last time we saw in clinic, Jayson was diagnosed with COVID-19 infection with mild symptoms.  This is accompanied with significant debilitation and exercise intolerance which had set him back as he was significantly making his way back to improved exercise tolerance and back to baseline.  Prior to his COVID-19 bound, he was able to walk on a treadmill for half an hour and he had lost about 10 pounds.  Unfortunately, he has regained his weight.    Today, still short of breath with  prolonged walking on ground surface.  He struggles with walking upstairs.  Still continues to have significant exercise intolerance, however most of this was following his bout with COVID-19.  He has uses Eliquis adherently and he has been no bleeding complications.  He was also seen by his PCP and the plan is for lifelong anticoagulation due to the unprovoked nature.  He has no other complaints today  Former smoker, quit 1991, <1ppd for about 15 to 20 years. 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    Review of Systems:  10 of 14 systems reviewed and are negative unless otherwise stated in HPI.    Past Medical History:   Diagnosis Date     Allergies      Asthma, moderate persistent      FH: colon cancer     colonoscopy q 5yr.  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     HTN (hypertension)      Hyperlipidemia        Past Surgical History:   Procedure Laterality Date     ARTHROSCOPY KNEE Right 11/16/2018    Procedure: Right knee arthroscopy, medial meniscal and chondral debridement;  Surgeon: Mnaoj Augustin MD;  Location: [facility] OR     BRONCHOSCOPY FLEXIBLE AND RIGID N/A 7/19/2017    Procedure: BRONCHOSCOPY FLEXIBLE AND RIGID;   Bronchoscopy, Tumor Debulking with Cryoprobe and Argon Plasma Coagulation;  Surgeon: Albino Crump MD;  Location: [facility]     BRONCHOSCOPY FLEXIBLE,L CRYOBIOPSY N/A 1/11/2018    Procedure: BRONCHOSCOPY FLEXIBLE, CRYOBIOPSY;  Flexible Bronchoscopy, Rigid Bronchoscopy, Tumor Debulking With Cryoprobe;  Surgeon: Albino Crump MD;  Location: [facility] OR     COLONOSCOPY  11/08    + polyp, +FH, repeat in 5 years     COMBINED ENDOSCOPY UPPER WITH ARGON PLASMA COAGULATOR (APC) N/A 7/19/2017    Procedure: COMBINED ENDOSCOPY UPPER WITH ARGON PLASMA COAGULATOR (APC);;  Surgeon: Albino Crump MD;  Location: [facility] OR     TONSILLECTOMY       VASECTOMY         Family History   Problem Relation Age of  Onset     Arthritis Mother      Hypertension Father      Cerebrovascular Disease Father      Asthma Daughter      Allergies Daughter      Hypertension Sister      Cancer - colorectal Sister         dx age 50       Social History     Socioeconomic History     Marital status:      Number of children: 2   Occupational History     Employer: [employer]   Tobacco Use     Smoking status: Former Smoker     Quit date: 10/28/1991     Years since quittin.4     Smokeless tobacco: Never Used   Substance and Sexual Activity     Alcohol use: Yes     Comment: rare     Drug use: No     Sexual activity: Yes     Partners: Female     Birth control/protection: None   Other Topics Concern     Parent/sibling w/ CABG, MI or angioplasty before 65F 55M? No         Allergies   Allergen Reactions     Lisinopril Cough         Current Outpatient Medications:      albuterol (PROAIR HFA/PROVENTIL HFA/VENTOLIN HFA) 108 (90 Base) MCG/ACT inhaler, Inhale 2 puffs into the lungs every 6 hours as needed for shortness of breath / dyspnea or wheezing, Disp: 18 g, Rfl: 3     desloratadine (CLARINEX) 5 MG tablet, Take 1 tablet (5 mg) by mouth daily, Disp: 90 tablet, Rfl: 0     ELIQUIS ANTICOAGULANT 5 MG tablet, Take 1 tablet (5 mg) by mouth 2 times daily, Disp: 180 tablet, Rfl: 3     esomeprazole (NEXIUM) 40 MG DR capsule, Take 1 capsule (40 mg) by mouth every morning (before breakfast) Take 30-60 minutes before eating., Disp: 90 capsule, Rfl: 3     fluticasone (FLONASE) 50 MCG/ACT nasal spray, Spray 1 spray into both nostrils daily, Disp: 16 g, Rfl: 0     hydrochlorothiazide (HYDRODIURIL) 25 MG tablet, Take 1 tablet (25 mg) by mouth daily, Disp: 90 tablet, Rfl: 3     losartan (COZAAR) 100 MG tablet, Take 1 tablet (100 mg) by mouth daily, Disp: 90 tablet, Rfl: 3     montelukast (SINGULAIR) 10 MG tablet, Take 1 tablet (10 mg) by mouth daily, Disp: 90 tablet, Rfl: 3     Multiple Vitamins-Minerals (MULTIVITAL PO), Take 1 tablet by mouth  "daily, Disp: , Rfl:      olopatadine (PATANOL) 0.1 % ophthalmic solution, Place 1 drop into both eyes 2 times daily, Disp: 5 mL, Rfl: 1     senna-docusate (SENOKOT-S;PERICOLACE) 8.6-50 MG per tablet, Take 1-2 tablets by mouth 2 times daily, Disp: 30 tablet, Rfl: 0     simvastatin (ZOCOR) 10 MG tablet, Take 1 tablet (10 mg) by mouth At Bedtime, Disp: 90 tablet, Rfl: 3     budesonide-formoterol (SYMBICORT) 80-4.5 MCG/ACT Inhaler, Inhale 2 puffs into the lungs 2 times daily (Patient not taking: Reported on 4/20/2022), Disp: 10.2 g, Rfl: 3    Current Facility-Administered Medications:      lidocaine (PF) (XYLOCAINE) 1 % injection 4 mL, 4 mL, , , Bill Ortiz MD, 4 mL at 07/31/18 1422    Facility-Administered Medications Ordered in Other Visits:      sodium chloride (PF) 0.9% PF flush 10 mL, 10 mL, Intravenous, Once, Manjinder Luevano MD      Physical Exam:  BP (!) 154/83   Pulse 109   Resp 18   Ht 1.753 m (5' 9\")   Wt 143.7 kg (316 lb 12.8 oz)   SpO2 98%   BMI 46.78 kg/m    GENERAL: Well developed, well nourished, alert, and in no apparent distress.  HEENT: Normocephalic, atraumatic. PERRL, EOMI. Oral mucosa is moist. No perioral cyanosis.  NECK: supple, no masses, no thyromegaly.  RESP:  Normal respiratory effort.  CTAB.  No rales, wheezes, rhonchi.  No cyanosis or clubbing.  CV: Normal S1, S2, regular rhythm, normal rate. No murmur.  No LE edema.   ABDOMEN:  Soft, non-tender, non-distended.   SKIN: warm and dry. No rash.  NEURO: AAOx3.  Normal gait.  Fluent speech.  PSYCH: mentation appears normal.     Results:  Imaging (personally reviewed in clinic today): Echocardiography Study 11/15/2021 01:29 PM  Interpretation Summary  Global and regional left ventricular function is normal with an EF of 55-60%.  Right ventricular function, chamber size, wall motion, and thickness are normal.  Pulmonary artery systolic pressure cannot be assessed.  No pericardial effusion is present.    Assessment and " Plan:   Bilateral pulmonary emboli    CT chest today shows interval resolution of bilateral pulmonary embolism.  Pulmonary artery is slightly elevated at 3.3 cm however previous echocardiogram did not show any evidence of overt right heart strain although there was some difficulty with pulmonary artery pressure estimation.  He continues on Eliquis and he may likely require this lifelong.    He appears to have shown some significant improvement while on Eliquis however he did have a setback following his relatively recent bout with COVID-19 with mild symptoms.  He appeared to have had accompanying deconditioning and some weight gain following this setback.  Imaging does not show any evidence of COVID-19 associated parenchyma changes.  He had previously attended pulmonary rehab and I advised him to continue some of the exercises he has learned on his own to get back in shape as well as also try to lose some weight with healthy living and improved activity  History of endobronchial hematoma status post cryodebulking in 2018   Previously seen by Dr. Alberto in 2019.  On review of his CT scans from today, there is stable mild narrowing of the left upper lobe since 2019.    All questions were reviewed and addressed with patient  RTC in 12 months     I spent a total of 40 minutes face to face with Arturo Rahman during today's office visit. Over 50% of this time was spent counseling the patient and/or coordinating care regarding their pulmonary disease.      Harry Law MD  Pulmonary, Critical Care and Sleep Medicine  [facility]  Pager: [phone]         The above note was dictated using voice recognition software and may include typographical errors. Please contact the author for any clarifications.

## 2022-05-02 ENCOUNTER — OFFICE VISIT (OUTPATIENT)
Dept: URGENT CARE | Facility: URGENT CARE | Age: 67
End: 2022-05-02
Payer: COMMERCIAL

## 2022-05-02 VITALS
RESPIRATION RATE: 20 BRPM | HEART RATE: 113 BPM | TEMPERATURE: 99.4 F | SYSTOLIC BLOOD PRESSURE: 143 MMHG | DIASTOLIC BLOOD PRESSURE: 94 MMHG | OXYGEN SATURATION: 100 %

## 2022-05-02 DIAGNOSIS — J01.10 ACUTE NON-RECURRENT FRONTAL SINUSITIS: Primary | ICD-10-CM

## 2022-05-02 DIAGNOSIS — J32.9 BACTERIAL SINUSITIS: ICD-10-CM

## 2022-05-02 DIAGNOSIS — B96.89 BACTERIAL SINUSITIS: ICD-10-CM

## 2022-05-02 PROCEDURE — 99213 OFFICE O/P EST LOW 20 MIN: CPT | Performed by: NURSE PRACTITIONER

## 2022-05-02 NOTE — PATIENT INSTRUCTIONS
Home care for sinusitis includes;   Humidifier in room if available. May use flonase daily to help decrease swelling and dry up drainage. Recommend saline lavage to help remove mucous and moisturize sinuses. Mucinex for mucous and cough.     If symptoms not improving in 3-5 days may start oral antibiotic.   Take 2 times daily with food as this may cause stomach upset.     Please follow up in clinic, with your primary care provider if symptoms not improving with treatment.

## 2022-05-02 NOTE — PROGRESS NOTES
Assessment & Plan      Diagnosis Comments   1. Acute non-recurrent frontal sinusitis     2. Bacterial sinusitis  amoxicillin-clavulanate (AUGMENTIN) 875-125 MG tablet        Patient Instructions   Home care for sinusitis includes;   Humidifier in room if available. May use flonase daily to help decrease swelling and dry up drainage. Recommend saline lavage to help remove mucous and moisturize sinuses. Mucinex for mucous and cough.     If symptoms not improving in 3-5 days may start oral antibiotic.   Take 2 times daily with food as this may cause stomach upset.     Please follow up in clinic, with your primary care provider if symptoms not improving with treatment.                   JOSH [facility] C[facility] GARETT SMART is a 66 year old male who presents to clinic today for the following health issues:  Chief Complaint   Patient presents with     Sinus Problem     Have sinus drainage for a week now causing cough and fatigue from it      HPI    Patient presents to clinic with a 7-10 day history of sinus congestion and drainage. Has been taking otc cough and cold with tylenol with some relief.   Cough at night that he feels is from PND.      URI Adult    Onset of symptoms was 5 day(s) ago.  Course of illness is worsening.    Severity moderate  Current and Associated symptoms: runny nose, stuffy nose and facial pain/pressure  Treatment measures tried include Tylenol/Ibuprofen, Antihistamine, Fluids and Rest.  Predisposing factors include None.    Blood pressure elevated denies chest pain, headache,         Review of Systems  Constitutional, HEENT, cardiovascular, pulmonary, gi and gu systems are negative, except as otherwise noted.      Objective    BP (!) 143/94 (BP Location: Right arm, Patient Position: Sitting, Cuff Size: Adult Large)   Pulse 113   Temp 99.4  F (37.4  C) (Tympanic)   Resp 20   SpO2 100%   Physical Exam   GENERAL: alert, over weight and  fatigued  EYES: Eyes grossly normal to inspection, PERRL and conjunctivae and sclerae normal  HENT: normal cephalic/atraumatic, both ears: erythematous, nose and mouth without ulcers or lesions, nasal mucosa edematous , rhinorrhea clear and yellow, oropharynx clear, oral mucous membranes moist and sinuses: frontal, ethmoid tenderness on left  NECK: bilateral anterior cervical adenopathy, no asymmetry, masses, or scars and thyroid normal to palpation  RESP: lungs clear to auscultation - no rales, rhonchi or wheezes  CV: regular rate and rhythm, normal S1 S2, no S3 or S4, no murmur, click or rub, no peripheral edema and peripheral pulses strong  MS: no gross musculoskeletal defects noted, no edema

## 2022-06-26 ENCOUNTER — HEALTH MAINTENANCE LETTER (OUTPATIENT)
Age: 67
End: 2022-06-26

## 2022-07-11 ENCOUNTER — OFFICE VISIT (OUTPATIENT)
Dept: FAMILY MEDICINE | Facility: CLINIC | Age: 67
End: 2022-07-11
Payer: COMMERCIAL

## 2022-07-11 VITALS
DIASTOLIC BLOOD PRESSURE: 104 MMHG | SYSTOLIC BLOOD PRESSURE: 158 MMHG | BODY MASS INDEX: 46.65 KG/M2 | WEIGHT: 315 LBS | OXYGEN SATURATION: 95 % | HEIGHT: 69 IN | RESPIRATION RATE: 16 BRPM | TEMPERATURE: 98.7 F | HEART RATE: 86 BPM

## 2022-07-11 DIAGNOSIS — Z12.5 SCREENING FOR PROSTATE CANCER: ICD-10-CM

## 2022-07-11 DIAGNOSIS — E78.5 HYPERLIPIDEMIA LDL GOAL <130: ICD-10-CM

## 2022-07-11 DIAGNOSIS — Z12.11 SCREEN FOR COLON CANCER: ICD-10-CM

## 2022-07-11 DIAGNOSIS — E11.9 TYPE 2 DIABETES MELLITUS WITHOUT COMPLICATION, WITHOUT LONG-TERM CURRENT USE OF INSULIN (H): ICD-10-CM

## 2022-07-11 DIAGNOSIS — I26.94 MULTIPLE SUBSEGMENTAL PULMONARY EMBOLI WITHOUT ACUTE COR PULMONALE (H): ICD-10-CM

## 2022-07-11 DIAGNOSIS — Q85.9 HAMARTOMA OF LEFT LUNG (H): ICD-10-CM

## 2022-07-11 DIAGNOSIS — J45.40 MODERATE PERSISTENT ASTHMA WITHOUT COMPLICATION: Primary | ICD-10-CM

## 2022-07-11 DIAGNOSIS — J30.1 CHRONIC SEASONAL ALLERGIC RHINITIS DUE TO POLLEN: ICD-10-CM

## 2022-07-11 DIAGNOSIS — I10 HYPERTENSION, GOAL BELOW 140/90: ICD-10-CM

## 2022-07-11 DIAGNOSIS — K21.00 GASTROESOPHAGEAL REFLUX DISEASE WITH ESOPHAGITIS, UNSPECIFIED WHETHER HEMORRHAGE: ICD-10-CM

## 2022-07-11 DIAGNOSIS — E66.01 MORBID OBESITY WITH BODY MASS INDEX (BMI) OF 45.0 TO 49.9 IN ADULT (H): ICD-10-CM

## 2022-07-11 DIAGNOSIS — E66.01 MORBID OBESITY (H): ICD-10-CM

## 2022-07-11 DIAGNOSIS — H69.93 DYSFUNCTION OF BOTH EUSTACHIAN TUBES: ICD-10-CM

## 2022-07-11 LAB
ALBUMIN SERPL-MCNC: 3.3 G/DL (ref 3.4–5)
ALP SERPL-CCNC: 98 U/L (ref 40–150)
ALT SERPL W P-5'-P-CCNC: 37 U/L (ref 0–70)
ANION GAP SERPL CALCULATED.3IONS-SCNC: 6 MMOL/L (ref 3–14)
AST SERPL W P-5'-P-CCNC: 17 U/L (ref 0–45)
BILIRUB SERPL-MCNC: 0.3 MG/DL (ref 0.2–1.3)
BUN SERPL-MCNC: 21 MG/DL (ref 7–30)
CALCIUM SERPL-MCNC: 9.4 MG/DL (ref 8.5–10.1)
CHLORIDE BLD-SCNC: 108 MMOL/L (ref 94–109)
CO2 SERPL-SCNC: 28 MMOL/L (ref 20–32)
CREAT SERPL-MCNC: 1.13 MG/DL (ref 0.66–1.25)
GFR SERPL CREATININE-BSD FRML MDRD: 73 ML/MIN/1.73M2
GLUCOSE BLD-MCNC: 133 MG/DL (ref 70–99)
HBA1C MFR BLD: 7.1 % (ref 0–5.6)
LDLC SERPL CALC-MCNC: 98 MG/DL
POTASSIUM BLD-SCNC: 3.7 MMOL/L (ref 3.4–5.3)
PROT SERPL-MCNC: 7.4 G/DL (ref 6.8–8.8)
PSA SERPL-MCNC: 0.63 UG/L (ref 0–4)
SODIUM SERPL-SCNC: 142 MMOL/L (ref 133–144)

## 2022-07-11 PROCEDURE — 99214 OFFICE O/P EST MOD 30 MIN: CPT | Performed by: NURSE PRACTITIONER

## 2022-07-11 PROCEDURE — 82043 UR ALBUMIN QUANTITATIVE: CPT | Performed by: NURSE PRACTITIONER

## 2022-07-11 PROCEDURE — 36415 COLL VENOUS BLD VENIPUNCTURE: CPT | Performed by: NURSE PRACTITIONER

## 2022-07-11 PROCEDURE — 83036 HEMOGLOBIN GLYCOSYLATED A1C: CPT | Performed by: NURSE PRACTITIONER

## 2022-07-11 PROCEDURE — 80053 COMPREHEN METABOLIC PANEL: CPT | Performed by: NURSE PRACTITIONER

## 2022-07-11 PROCEDURE — 83721 ASSAY OF BLOOD LIPOPROTEIN: CPT | Performed by: NURSE PRACTITIONER

## 2022-07-11 PROCEDURE — G0103 PSA SCREENING: HCPCS | Performed by: NURSE PRACTITIONER

## 2022-07-11 RX ORDER — ESOMEPRAZOLE MAGNESIUM 40 MG/1
40 CAPSULE, DELAYED RELEASE ORAL
Qty: 90 CAPSULE | Refills: 3 | Status: SHIPPED | OUTPATIENT
Start: 2022-07-11 | End: 2023-07-07

## 2022-07-11 RX ORDER — ALBUTEROL SULFATE 90 UG/1
2 AEROSOL, METERED RESPIRATORY (INHALATION) EVERY 6 HOURS PRN
Qty: 18 G | Refills: 3 | Status: SHIPPED | OUTPATIENT
Start: 2022-07-11 | End: 2023-07-17

## 2022-07-11 RX ORDER — MONTELUKAST SODIUM 10 MG/1
1 TABLET ORAL DAILY
Qty: 90 TABLET | Refills: 3 | Status: SHIPPED | OUTPATIENT
Start: 2022-07-11 | End: 2023-07-12

## 2022-07-11 RX ORDER — LOSARTAN POTASSIUM 100 MG/1
100 TABLET ORAL DAILY
Qty: 90 TABLET | Refills: 3 | Status: SHIPPED | OUTPATIENT
Start: 2022-07-11 | End: 2023-07-11

## 2022-07-11 RX ORDER — FLUTICASONE PROPIONATE 50 MCG
1 SPRAY, SUSPENSION (ML) NASAL DAILY
Qty: 32 G | Refills: 4 | Status: SHIPPED | OUTPATIENT
Start: 2022-07-11 | End: 2023-09-28

## 2022-07-11 RX ORDER — HYDROCHLOROTHIAZIDE 25 MG/1
25 TABLET ORAL DAILY
Qty: 90 TABLET | Refills: 3 | Status: SHIPPED | OUTPATIENT
Start: 2022-07-11 | End: 2023-07-11

## 2022-07-11 RX ORDER — DESLORATADINE 5 MG/1
5 TABLET ORAL DAILY
Qty: 90 TABLET | Refills: 3 | Status: SHIPPED | OUTPATIENT
Start: 2022-07-11 | End: 2023-07-07

## 2022-07-11 RX ORDER — APIXABAN 5 MG/1
5 TABLET, FILM COATED ORAL 2 TIMES DAILY
Qty: 180 TABLET | Refills: 3 | Status: SHIPPED | OUTPATIENT
Start: 2022-07-11 | End: 2023-07-11

## 2022-07-11 RX ORDER — SIMVASTATIN 10 MG
10 TABLET ORAL AT BEDTIME
Qty: 90 TABLET | Refills: 3 | Status: SHIPPED | OUTPATIENT
Start: 2022-07-11 | End: 2023-07-11

## 2022-07-11 ASSESSMENT — PAIN SCALES - GENERAL: PAINLEVEL: NO PAIN (0)

## 2022-07-11 NOTE — PATIENT INSTRUCTIONS
Please keep track of your blood pressures over the next 2 weeks and let me know if >140/90. If it is, we will add another agent          Patient Education    At [facility], we strive to deliver an exceptional experience to you, every time we see you. If you receive a survey, please complete it as we do value your feedback.  If you have MyChart, you can expect to receive results automatically within 24 hours of their completion.  Your provider will send a note interpreting your results as well.   If you do not have MyChart, you should receive your results in about a week by mail.    Your care team:                            Family Medicine Internal Medicine   MD Jae An MD Shantel Branch-Fleming, MD Srinivasa Vaka, MD Katya Belousova, JOSH Hernandez CNP, MD ([facility]   JIM Brady MD Paula Brito, MD Amelia Massimini APRN CNP Kim Thein, APRN CNP Bethany Templen, MD             Clinic hours: Monday - Thursday 7 am-6 pm; Fridays 7 am-5 pm.   Urgent care: Monday - Friday 10 am- 8 pm; Saturday and Sunday 9 am-5 pm.    Clinic: [phone]       [facility]: Monday - Thursday 8 am - 7 pm; Friday 8 am - 6 pm  [facility]: [phone]

## 2022-07-11 NOTE — PROGRESS NOTES
Assessment & Plan     Moderate persistent asthma without complication  Refilled.  - albuterol (PROAIR HFA/PROVENTIL HFA/VENTOLIN HFA) 108 (90 Base) MCG/ACT inhaler; Inhale 2 puffs into the lungs every 6 hours as needed for shortness of breath / dyspnea or wheezing  - montelukast (SINGULAIR) 10 MG tablet; Take 1 tablet (10 mg) by mouth daily    Chronic seasonal allergic rhinitis due to pollen  Refilled.  - desloratadine (CLARINEX) 5 MG tablet; Take 1 tablet (5 mg) by mouth daily    Type 2 diabetes mellitus without complication, without long-term current use of insulin (H)  Well controlled.  - HEMOGLOBIN A1C; Future  - Albumin Random Urine Quantitative with Creat Ratio; Future  - Comprehensive metabolic panel (BMP + Alb, Alk Phos, ALT, AST, Total. Bili, TP); Future  - HEMOGLOBIN A1C  - Albumin Random Urine Quantitative with Creat Ratio  - Comprehensive metabolic panel (BMP + Alb, Alk Phos, ALT, AST, Total. Bili, TP)    Multiple subsegmental pulmonary emboli without acute cor pulmonale (H)  Refilled. Will be on for life.   - ELIQUIS ANTICOAGULANT 5 MG tablet; Take 1 tablet (5 mg) by mouth 2 times daily    Gastroesophageal reflux disease with esophagitis, unspecified whether hemorrhage  Refilled.  - esomeprazole (NEXIUM) 40 MG DR capsule; Take 1 capsule (40 mg) by mouth every morning (before breakfast) Take 30-60 minutes before eating.    Dysfunction of both eustachian tubes  Refilled.  - fluticasone (FLONASE) 50 MCG/ACT nasal spray; Spray 1 spray into both nostrils daily    Hypertension, goal below 140/90  Elevated today. Diet/exercise.   - Albumin Random Urine Quantitative with Creat Ratio; Future  - hydrochlorothiazide (HYDRODIURIL) 25 MG tablet; Take 1 tablet (25 mg) by mouth daily  - losartan (COZAAR) 100 MG tablet; Take 1 tablet (100 mg) by mouth daily  - Comprehensive metabolic panel (BMP + Alb, Alk Phos, ALT, AST, Total. Bili, TP); Future  - Albumin Random Urine Quantitative with Creat Ratio  - Comprehensive  "metabolic panel (BMP + Alb, Alk Phos, ALT, AST, Total. Bili, TP)    Hyperlipidemia LDL goal <130  Controlled. Refilled.  - simvastatin (ZOCOR) 10 MG tablet; Take 1 tablet (10 mg) by mouth At Bedtime  - Comprehensive metabolic panel (BMP + Alb, Alk Phos, ALT, AST, Total. Bili, TP); Future  - LDL cholesterol direct; Future  - Comprehensive metabolic panel (BMP + Alb, Alk Phos, ALT, AST, Total. Bili, TP)  - LDL cholesterol direct    Screen for colon cancer  - Fecal colorectal cancer screen (FIT); Future  - Fecal colorectal cancer screen (FIT)    Screening for prostate cancer  - PSA, screen; Future  - PSA, screen    Hamartoma of left lung (H)  Stable. Follows with specialist.    Morbid obesity with body mass index (BMI) of 45.0 to 49.9 in adult (H)  Diet/exercise.         BMI:   Estimated body mass index is 47.26 kg/m  as calculated from the following:    Height as of this encounter: 1.753 m (5' 9\").    Weight as of this encounter: 145.2 kg (320 lb).   Weight management plan: Discussed healthy diet and exercise guidelines    See Patient Instructions    Return in about 3 months (around 10/11/2022).     The benefits, risks and potential side effects were discussed in detail. Black box warnings discussed as relevant. All patient questions were answered. The patient was instructed to follow up immediately if any adverse reactions develop.    Return precautions discussed, including when to seek urgent/emergent care.    Patient verbalizes understanding and agrees with plan of care. Patient stable for discharge.      JOSH GREEN [facility]    Lesli SMART is a 66 year old, presenting for the following health issues:  Hypertension, Lipids, and Asthma      History of Present Illness       Reason for visit:  Need my meds refilled    He eats 2-3 servings of fruits and vegetables daily.He consumes 1 sweetened beverage(s) daily.He exercises with enough effort to increase his heart " "rate 10 to 19 minutes per day.  He exercises with enough effort to increase his heart rate 3 or less days per week.   He is taking medications regularly.       Hyperlipidemia Follow-Up      Are you regularly taking any medication or supplement to lower your cholesterol?   Yes- zocor    Are you having muscle aches or other side effects that you think could be caused by your cholesterol lowering medication?  No    Hypertension Follow-up      Do you check your blood pressure regularly outside of the clinic? No     Are you following a low salt diet? Yes    Are your blood pressures ever more than 140 on the top number (systolic) OR more   than 90 on the bottom number (diastolic), for example 140/90? n/a    Asthma Follow-Up    Was ACT completed today?    Yes    ACT Total Scores 7/12/2022   ACT TOTAL SCORE -   ASTHMA ER VISITS -   ASTHMA HOSPITALIZATIONS -   ACT TOTAL SCORE (Goal Greater than or Equal to 20) 19   In the past 12 months, how many times did you visit the emergency room for your asthma without being admitted to the hospital? 0   In the past 12 months, how many times were you hospitalized overnight because of your asthma? 0         How many days per week do you miss taking your asthma controller medication?  0    Please describe any recent triggers for your asthma: None    Have you had any Emergency Room Visits, Urgent Care Visits, or Hospital Admissions since your last office visit?  No        Review of Systems   Constitutional, HEENT, cardiovascular, pulmonary, gi and gu systems are negative, except as otherwise noted.      Objective    BP (!) 158/104   Pulse 86   Temp 98.7  F (37.1  C) (Tympanic)   Resp 16   Ht 1.753 m (5' 9\")   Wt 145.2 kg (320 lb)   SpO2 95%   BMI 47.26 kg/m    Body mass index is 47.26 kg/m .  Physical Exam   GENERAL: healthy, alert and no distress  EYES: Eyes grossly normal to inspection, PERRL and conjunctivae and sclerae normal  HENT: ear canals and TM's normal, nose and mouth " without ulcers or lesions  NECK: no adenopathy, no asymmetry, masses, or scars and thyroid normal to palpation  RESP: lungs clear to auscultation - no rales, rhonchi or wheezes  CV: regular rate and rhythm, normal S1 S2, no S3 or S4, no murmur, click or rub, no peripheral edema and peripheral pulses strong  ABDOMEN: soft, nontender, d bowel sounds normal  MS: no gross musculoskeletal defects noted, no edema  SKIN: no suspicious lesions or rashes  NEURO: Normal strength and tone, mentation intact and speech normal  PSYCH: mentation appears normal, affect normal/bright    Results for orders placed or performed in visit on 07/11/22   HEMOGLOBIN A1C     Status: Abnormal   Result Value Ref Range    Hemoglobin A1C 7.1 (H) 0.0 - 5.6 %   Albumin Random Urine Quantitative with Creat Ratio     Status: None   Result Value Ref Range    Creatinine Urine mg/dL 130 mg/dL    Albumin Urine mg/L 6 mg/L    Albumin Urine mg/g Cr 4.62 0.00 - 17.00 mg/g Cr   Comprehensive metabolic panel (BMP + Alb, Alk Phos, ALT, AST, Total. Bili, TP)     Status: Abnormal   Result Value Ref Range    Sodium 142 133 - 144 mmol/L    Potassium 3.7 3.4 - 5.3 mmol/L    Chloride 108 94 - 109 mmol/L    Carbon Dioxide (CO2) 28 20 - 32 mmol/L    Anion Gap 6 3 - 14 mmol/L    Urea Nitrogen 21 7 - 30 mg/dL    Creatinine 1.13 0.66 - 1.25 mg/dL    Calcium 9.4 8.5 - 10.1 mg/dL    Glucose 133 (H) 70 - 99 mg/dL    Alkaline Phosphatase 98 40 - 150 U/L    AST 17 0 - 45 U/L    ALT 37 0 - 70 U/L    Protein Total 7.4 6.8 - 8.8 g/dL    Albumin 3.3 (L) 3.4 - 5.0 g/dL    Bilirubin Total 0.3 0.2 - 1.3 mg/dL    GFR Estimate 73 >60 mL/min/1.73m2   LDL cholesterol direct     Status: Normal   Result Value Ref Range    LDL Cholesterol Direct 98 <100 mg/dL   PSA, screen     Status: Normal   Result Value Ref Range    Prostate Specific Antigen Screen 0.63 0.00 - 4.00 ug/L                   .  ..

## 2022-07-12 LAB
CREAT UR-MCNC: 130 MG/DL
MICROALBUMIN UR-MCNC: 6 MG/L
MICROALBUMIN/CREAT UR: 4.62 MG/G CR (ref 0–17)

## 2022-07-12 ASSESSMENT — ASTHMA QUESTIONNAIRES: ACT_TOTALSCORE: 19

## 2022-10-05 ENCOUNTER — VIRTUAL VISIT (OUTPATIENT)
Dept: FAMILY MEDICINE | Facility: CLINIC | Age: 67
End: 2022-10-05
Payer: COMMERCIAL

## 2022-10-05 DIAGNOSIS — R06.83 SNORING: ICD-10-CM

## 2022-10-05 DIAGNOSIS — I26.94 MULTIPLE SUBSEGMENTAL PULMONARY EMBOLI WITHOUT ACUTE COR PULMONALE (H): ICD-10-CM

## 2022-10-05 DIAGNOSIS — E11.9 TYPE 2 DIABETES MELLITUS WITHOUT COMPLICATION, WITHOUT LONG-TERM CURRENT USE OF INSULIN (H): ICD-10-CM

## 2022-10-05 DIAGNOSIS — I10 HYPERTENSION, GOAL BELOW 140/90: Primary | ICD-10-CM

## 2022-10-05 DIAGNOSIS — E66.01 MORBID OBESITY (H): ICD-10-CM

## 2022-10-05 PROCEDURE — 99214 OFFICE O/P EST MOD 30 MIN: CPT | Mod: 95 | Performed by: PREVENTIVE MEDICINE

## 2022-10-05 RX ORDER — AMLODIPINE BESYLATE 5 MG/1
5 TABLET ORAL DAILY
Qty: 30 TABLET | Refills: 1 | Status: SHIPPED | OUTPATIENT
Start: 2022-10-05 | End: 2022-10-25 | Stop reason: DRUGHIGH

## 2022-10-05 ASSESSMENT — ASTHMA QUESTIONNAIRES
QUESTION_2 LAST FOUR WEEKS HOW OFTEN HAVE YOU HAD SHORTNESS OF BREATH: ONCE A DAY
QUESTION_3 LAST FOUR WEEKS HOW OFTEN DID YOUR ASTHMA SYMPTOMS (WHEEZING, COUGHING, SHORTNESS OF BREATH, CHEST TIGHTNESS OR PAIN) WAKE YOU UP AT NIGHT OR EARLIER THAN USUAL IN THE MORNING: NOT AT ALL
ACT_TOTALSCORE: 18
ACT_TOTALSCORE: 18
QUESTION_4 LAST FOUR WEEKS HOW OFTEN HAVE YOU USED YOUR RESCUE INHALER OR NEBULIZER MEDICATION (SUCH AS ALBUTEROL): NOT AT ALL
QUESTION_5 LAST FOUR WEEKS HOW WOULD YOU RATE YOUR ASTHMA CONTROL: SOMEWHAT CONTROLLED
QUESTION_1 LAST FOUR WEEKS HOW MUCH OF THE TIME DID YOUR ASTHMA KEEP YOU FROM GETTING AS MUCH DONE AT WORK, SCHOOL OR AT HOME: SOME OF THE TIME

## 2022-10-05 NOTE — PROGRESS NOTES
BING is a 66 year old who is being evaluated via a billable video visit.      How would you like to obtain your AVS? MyChart  If the video visit is dropped, the invitation should be resent by: Text to cell phone: [phone]  Will anyone else be joining your video visit? No          Assessment & Plan     Hypertension, goal below 140/90  -elevated readings at home  -added Amlodipine 5 mg daily, can titrate up depending on how the blood pressure does, patient will update with readings via My Chart  -discussed that he can also schedule a Blood pressure check with support staff and bring in his home blood pressure machine  - amLODIPine (NORVASC) 5 MG tablet  Dispense: 30 tablet; Refill: 1  -continue Losartan 100 mg daily  -continue hydrochlorothiazide 25 mg daily     Morbid obesity (H)  Wt Readings from Last 2 Encounters:   07/11/22 145.2 kg (320 lb)   04/20/22 143.7 kg (316 lb 12.8 oz)   -this increases risk of sleep apnea     Type 2 diabetes mellitus without complication, without long-term current use of insulin (H)  -diet controlled   Lab Results   Component Value Date    A1C 7.1 07/11/2022    A1C 6.7 10/18/2021    A1C 6.7 06/10/2021    A1C 6.1 07/11/2017    A1C 6.4 03/21/2016    A1C 6.3 12/01/2015       Multiple subsegmental pulmonary emboli without acute cor pulmonale (H)  -on Eliquis    Snoring  -no past evaluation for sleep apnea, referral to Sleep Specialist provided   - Adult Sleep Eval & Management  Referral      Prescription drug management  25 minutes spent on the date of the encounter doing chart review, history and exam, documentation and further activities per the note         Return in about 3 months (around 1/5/2023) for Follow up, using a video visit, in person.    Charlee Crane MD MPH    [facility]   BING is a 66 year old presenting for the following health issues:  Hypertension      HPI       History of Present Illness:  Do you check your blood  pressure regularly outside of the clinic?: Yes  Are your blood pressures ever more than 140 on the top number (systolic) OR more than 90 on the bottom number (diastolic)? (For example, greater than 140/90): Yes  Are you following a low salt diet?: No        Hypertension Follow-up      Do you check your blood pressure regularly outside of the clinic? Yes     Are you following a low salt diet? No    Are your blood pressures ever more than 140 on the top number (systolic) OR more   than 90 on the bottom number (diastolic), for example 140/90? Yes      How many servings of fruits and vegetables do you eat daily?  2-3    On average, how many sweetened beverages do you drink each day (Examples: soda, juice, sweet tea, etc.  Do NOT count diet or artificially sweetened beverages)?   1    How many days per week do you exercise enough to make your heart beat faster? 3 or less    How many minutes a day do you exercise enough to make your heart beat faster? 20 - 29    How many days per week do you miss taking your medication? 0    Has had high readings+  Has been taking medications consistently  No headaches, couple of weeks ago had a slight headache  No vision changes  No chest pain  No new shortness of breath  No pedal edema   Some snoring, no past sleep apnea evaluation     Has had high blood pressure readings:    9/29 - 7 pm 173/104  9/30 - 9 am 143/108  10/1  - 2 pm 148/122  -  5 pm 162/102  10/2 - 10 am 176/105  -  5 pm 144/111  10/3 - 7 am 142/110    Diabetes:  -does not check glucose at home     Review of Systems   Constitutional, HEENT, cardiovascular, pulmonary, gi and gu systems are negative, except as otherwise noted.      Objective           Vitals:  No vitals were obtained today due to virtual visit.    Physical Exam   GENERAL: Healthy, alert and no distress  EYES: Eyes grossly normal to inspection.  No discharge or erythema, or obvious scleral/conjunctival abnormalities.  RESP: No audible wheeze, cough, or  visible cyanosis.  No visible retractions or increased work of breathing.    SKIN: Visible skin clear. No significant rash, abnormal pigmentation or lesions.  NEURO: Cranial nerves grossly intact.  Mentation and speech appropriate for age.  PSYCH: Mentation appears normal, affect normal/bright, judgement and insight intact, normal speech and appearance well-groomed.    No results found for this or any previous visit (from the past 24 hour(s)).      Video-Visit Details    Video Start Time: 4:22 PM    Type of service:  Video Visit    Video End Time:4:36 PM    Originating Location (pt. Location): Home    Distant Location (provider location):  [facility]     Platform used for Video Visit: StudyTube

## 2022-10-05 NOTE — PATIENT INSTRUCTIONS
Referral Details    Referred By  Referred To   Charlee Crane MD [address]   [place] [facility] [address]   Phone: [phone]   Fax: [phone]    Diagnoses: Snoring   Order: Adult Sleep Eval & Management  Referral       Comment: Please be aware that coverage of these services is subject to the terms and limitations of your health insurance plan.  Call member services at your health plan with any benefit or coverage questions.   [facility] will call you to coordinate your care as prescribed by your provider. If you don't hear from a representative within 2 business days, please call [phone].

## 2022-10-24 ENCOUNTER — MYC MEDICAL ADVICE (OUTPATIENT)
Dept: FAMILY MEDICINE | Facility: CLINIC | Age: 67
End: 2022-10-24

## 2022-10-25 ENCOUNTER — TELEPHONE (OUTPATIENT)
Dept: FAMILY MEDICINE | Facility: CLINIC | Age: 67
End: 2022-10-25
Payer: COMMERCIAL

## 2022-10-25 ENCOUNTER — DOCUMENTATION ONLY (OUTPATIENT)
Dept: FAMILY MEDICINE | Facility: CLINIC | Age: 67
End: 2022-10-25

## 2022-10-25 VITALS — DIASTOLIC BLOOD PRESSURE: 81 MMHG | SYSTOLIC BLOOD PRESSURE: 124 MMHG

## 2022-10-25 DIAGNOSIS — I10 HYPERTENSION, GOAL BELOW 140/90: Primary | ICD-10-CM

## 2022-10-25 RX ORDER — AMLODIPINE BESYLATE 10 MG/1
10 TABLET ORAL DAILY
Qty: 90 TABLET | Refills: 1 | Status: SHIPPED | OUTPATIENT
Start: 2022-10-25 | End: 2023-04-10

## 2022-10-25 NOTE — PROGRESS NOTES
Blood pressure values in the chart were updated based on patient report via My Chart.  Charlee Crane MD MPH

## 2022-10-27 ENCOUNTER — ALLIED HEALTH/NURSE VISIT (OUTPATIENT)
Dept: FAMILY MEDICINE | Facility: CLINIC | Age: 67
End: 2022-10-27
Payer: COMMERCIAL

## 2022-10-27 VITALS — DIASTOLIC BLOOD PRESSURE: 94 MMHG | OXYGEN SATURATION: 98 % | HEART RATE: 88 BPM | SYSTOLIC BLOOD PRESSURE: 149 MMHG

## 2022-10-27 DIAGNOSIS — Z01.30 BP CHECK: Primary | ICD-10-CM

## 2022-10-27 PROCEDURE — 99207 PR NO CHARGE NURSE ONLY: CPT

## 2022-10-27 NOTE — PROGRESS NOTES
I met with Arturo Rahman at the request of Royce to recheck his blood pressure.  Blood pressure medications on the med list were reviewed with patient.    Patient has taken all medications as per usual regimen: Yes  Patient reports tolerating them without any issues or concerns: No    Vitals:    10/27/22 1105   BP: (!) 153/96   BP Location: Left arm   Patient Position: Sitting   Cuff Size: Adult Large   Pulse: 96       After 5 minutes, the patient's blood pressure remained greater than or equal to 140/90.    Is the patient currently having any chest pain? No  Does the patient currently have a headache? No  Does the patient currently have any vision changes? No  Does the patient currently have any nausea? No  Does the patient currently have any abdominal pain? No    The previous encounter was reviewed.  The patient was discharged and the note will be sent to the provider for final review.

## 2022-11-21 ENCOUNTER — HEALTH MAINTENANCE LETTER (OUTPATIENT)
Age: 67
End: 2022-11-21

## 2022-11-21 NOTE — TELEPHONE ENCOUNTER
Patient Quality Outreach    Patient is due for the following:   Hypertension -  BP check    Next Steps:   Schedule a nurse only visit for blood pressure check    Type of outreach:    Sent [employer] message.      Questions for provider review:    None     Diane L. Schoenherr, RN         urticaria fromunknown source; tx with benadryl , steriords and reassess

## 2023-01-09 ENCOUNTER — LAB (OUTPATIENT)
Dept: LAB | Facility: CLINIC | Age: 68
End: 2023-01-09
Payer: COMMERCIAL

## 2023-01-09 ENCOUNTER — ONCOLOGY VISIT (OUTPATIENT)
Dept: ONCOLOGY | Facility: CLINIC | Age: 68
End: 2023-01-09
Attending: INTERNAL MEDICINE
Payer: COMMERCIAL

## 2023-01-09 VITALS
DIASTOLIC BLOOD PRESSURE: 75 MMHG | OXYGEN SATURATION: 98 % | WEIGHT: 314 LBS | HEIGHT: 69 IN | HEART RATE: 108 BPM | BODY MASS INDEX: 46.51 KG/M2 | SYSTOLIC BLOOD PRESSURE: 107 MMHG

## 2023-01-09 DIAGNOSIS — Z86.711 HISTORY OF PULMONARY EMBOLISM: Primary | ICD-10-CM

## 2023-01-09 DIAGNOSIS — Z79.01 CHRONIC ANTICOAGULATION: ICD-10-CM

## 2023-01-09 DIAGNOSIS — D68.51 HETEROZYGOUS FACTOR V LEIDEN MUTATION (H): ICD-10-CM

## 2023-01-09 DIAGNOSIS — I26.94 MULTIPLE SUBSEGMENTAL PULMONARY EMBOLI WITHOUT ACUTE COR PULMONALE (H): ICD-10-CM

## 2023-01-09 DIAGNOSIS — I26.94 MULTIPLE SUBSEGMENTAL PULMONARY EMBOLI WITHOUT ACUTE COR PULMONALE (H): Primary | ICD-10-CM

## 2023-01-09 DIAGNOSIS — E11.9 TYPE 2 DIABETES MELLITUS WITHOUT COMPLICATION, WITHOUT LONG-TERM CURRENT USE OF INSULIN (H): ICD-10-CM

## 2023-01-09 LAB
ALBUMIN SERPL-MCNC: 3.3 G/DL (ref 3.4–5)
ALP SERPL-CCNC: 110 U/L (ref 40–150)
ALT SERPL W P-5'-P-CCNC: 36 U/L (ref 0–70)
ANION GAP SERPL CALCULATED.3IONS-SCNC: 8 MMOL/L (ref 3–14)
AST SERPL W P-5'-P-CCNC: 15 U/L (ref 0–45)
BASOPHILS # BLD AUTO: 0.1 10E3/UL (ref 0–0.2)
BASOPHILS NFR BLD AUTO: 0 %
BILIRUB SERPL-MCNC: 0.5 MG/DL (ref 0.2–1.3)
BUN SERPL-MCNC: 18 MG/DL (ref 7–30)
CALCIUM SERPL-MCNC: 9.4 MG/DL (ref 8.5–10.1)
CHLORIDE BLD-SCNC: 106 MMOL/L (ref 94–109)
CO2 SERPL-SCNC: 28 MMOL/L (ref 20–32)
CREAT SERPL-MCNC: 1.06 MG/DL (ref 0.66–1.25)
EOSINOPHIL # BLD AUTO: 0.2 10E3/UL (ref 0–0.7)
EOSINOPHIL NFR BLD AUTO: 2 %
ERYTHROCYTE [DISTWIDTH] IN BLOOD BY AUTOMATED COUNT: 15 % (ref 10–15)
GFR SERPL CREATININE-BSD FRML MDRD: 78 ML/MIN/1.73M2
GLUCOSE BLD-MCNC: 221 MG/DL (ref 70–99)
HBA1C MFR BLD: 8.3 % (ref 0–5.6)
HCT VFR BLD AUTO: 45 % (ref 40–53)
HGB BLD-MCNC: 14.4 G/DL (ref 13.3–17.7)
IMM GRANULOCYTES # BLD: 0.1 10E3/UL
IMM GRANULOCYTES NFR BLD: 1 %
LYMPHOCYTES # BLD AUTO: 1.5 10E3/UL (ref 0.8–5.3)
LYMPHOCYTES NFR BLD AUTO: 12 %
MCH RBC QN AUTO: 27.1 PG (ref 26.5–33)
MCHC RBC AUTO-ENTMCNC: 32 G/DL (ref 31.5–36.5)
MCV RBC AUTO: 85 FL (ref 78–100)
MONOCYTES # BLD AUTO: 0.8 10E3/UL (ref 0–1.3)
MONOCYTES NFR BLD AUTO: 7 %
NEUTROPHILS # BLD AUTO: 9.6 10E3/UL (ref 1.6–8.3)
NEUTROPHILS NFR BLD AUTO: 78 %
NRBC # BLD AUTO: 0 10E3/UL
NRBC BLD AUTO-RTO: 0 /100
PLATELET # BLD AUTO: 298 10E3/UL (ref 150–450)
POTASSIUM BLD-SCNC: 3.4 MMOL/L (ref 3.4–5.3)
PROT SERPL-MCNC: 7.4 G/DL (ref 6.8–8.8)
RBC # BLD AUTO: 5.31 10E6/UL (ref 4.4–5.9)
SODIUM SERPL-SCNC: 142 MMOL/L (ref 133–144)
WBC # BLD AUTO: 12.3 10E3/UL (ref 4–11)

## 2023-01-09 PROCEDURE — 80053 COMPREHEN METABOLIC PANEL: CPT

## 2023-01-09 PROCEDURE — 85025 COMPLETE CBC W/AUTO DIFF WBC: CPT

## 2023-01-09 PROCEDURE — 99214 OFFICE O/P EST MOD 30 MIN: CPT | Mod: 25 | Performed by: INTERNAL MEDICINE

## 2023-01-09 PROCEDURE — 36415 COLL VENOUS BLD VENIPUNCTURE: CPT

## 2023-01-09 PROCEDURE — 83036 HEMOGLOBIN GLYCOSYLATED A1C: CPT

## 2023-01-09 PROCEDURE — 90471 IMMUNIZATION ADMIN: CPT | Performed by: NURSE PRACTITIONER

## 2023-01-09 PROCEDURE — 90662 IIV NO PRSV INCREASED AG IM: CPT | Performed by: NURSE PRACTITIONER

## 2023-01-09 ASSESSMENT — PAIN SCALES - GENERAL: PAINLEVEL: NO PAIN (0)

## 2023-01-09 NOTE — PROGRESS NOTES
"[facility] Hematology / Oncology  Progress Note 2022  Name: Arturo Rahman  :  1955    MRN:  [id number]    --------------------    Assessment / Plan:  Unprovoked PE  Heterozygous FVL  Granulomatous disease lungs    Clinically well; recovered from PE symptoms wise; tolerating chronic anticoagulation.    Given unprovoked nature of VTE episode, recommend indefinite AC regardless of FVL status.    Reviewed importance of getting back to PCP for cardiac / pulm eval.    RTC PRN now that he has PCP; would recommend monitoring plat and creat and ALT on chronic AC.    Francis Camara MD    --------------------    Interval History:  Arturo returns for follow up of VTE.  No worsening shortness of breath or cough.  Tolerating chronic AC well and w.o major or nuisance bleeding.  Otherwise, no new health issues.  Some dyspnea w/ heavy exertion, but fine at rest; feels like its cardiac in nature.  Some forward health progress complicated by covid x 2.    --------------------    Physical Exam:  VS: /75 (BP Location: Left arm, Patient Position: Chair, Cuff Size: Adult Large)   Pulse 108   Ht 1.753 m (5' 9\")   Wt 142.4 kg (314 lb)   SpO2 98%   BMI 46.37 kg/m    Gen: Well-appearing.  CV: RRR (not tachy), s1/s2, strong pulses.  RESP: CTAB; no wheezing.    Labs / Imaging / Path:  We reviewed CBC, CMP.    "

## 2023-01-09 NOTE — LETTER
"    2023         RE: Arturo Rahman  [address] [place] [address]        Dear Colleague,    Thank you for referring your patient, Arturo Rahman, to the [facility]. Please see a copy of my visit note below.    [facility] Hematology / Oncology  Progress Note 2022  Name: Arturo Rahman  :  1955    MRN:  [id number]    --------------------    Assessment / Plan:  Unprovoked PE  Heterozygous FVL  Granulomatous disease lungs    Clinically well; recovered from PE symptoms wise; tolerating chronic anticoagulation.    Given unprovoked nature of VTE episode, recommend indefinite AC regardless of FVL status.    Reviewed importance of getting back to PCP for cardiac / pulm eval.    RTC PRN now that he has PCP; would recommend monitoring plat and creat and ALT on chronic AC.    Francis Camara MD    --------------------    Interval History:  Arturo returns for follow up of VTE.  No worsening shortness of breath or cough.  Tolerating chronic AC well and w.o major or nuisance bleeding.  Otherwise, no new health issues.  Some dyspnea w/ heavy exertion, but fine at rest; feels like its cardiac in nature.  Some forward health progress complicated by covid x 2.    --------------------    Physical Exam:  VS: /75 (BP Location: Left arm, Patient Position: Chair, Cuff Size: Adult Large)   Pulse 108   Ht 1.753 m (5' 9\")   Wt 142.4 kg (314 lb)   SpO2 98%   BMI 46.37 kg/m    Gen: Well-appearing.  CV: RRR (not tachy), s1/s2, strong pulses.  RESP: CTAB; no wheezing.    Labs / Imaging / Path:  We reviewed CBC, CMP.        Again, thank you for allowing me to participate in the care of your patient.        Sincerely,        Francis Camara MD    "

## 2023-01-09 NOTE — PROGRESS NOTES
Lab orders placed per provider order at last checkout on 1/5/2022:    BJT MG 12 months 15 mins w/ labs (CBC, CMP)      Melisa Gomez RN

## 2023-01-09 NOTE — NURSING NOTE
"Oncology Rooming Note    January 9, 2023 2:27 PM   Arturo Rahman is a 67 year old male who presents for:    Chief Complaint   Patient presents with     Oncology Clinic Visit     Follow up     Initial Vitals: /75 (BP Location: Left arm, Patient Position: Chair, Cuff Size: Adult Large)   Pulse 108   Ht 1.753 m (5' 9\")   Wt 142.4 kg (314 lb)   SpO2 98%   BMI 46.37 kg/m   Estimated body mass index is 46.37 kg/m  as calculated from the following:    Height as of this encounter: 1.753 m (5' 9\").    Weight as of this encounter: 142.4 kg (314 lb). Body surface area is 2.63 meters squared.  No Pain (0) Comment: Data Unavailable   No LMP for male patient.  Allergies reviewed: Yes  Medications reviewed: Yes    Medications: Medication refills not needed today.  Pharmacy name entered into [employer]: [id number] - [place], [place] - [address]    Clinical concerns: NO Dr. Camara was notified.      Daniella Bagley CMA              "

## 2023-04-12 ENCOUNTER — TELEPHONE (OUTPATIENT)
Dept: FAMILY MEDICINE | Facility: CLINIC | Age: 68
End: 2023-04-12
Payer: COMMERCIAL

## 2023-04-12 NOTE — TELEPHONE ENCOUNTER
Patient Quality Outreach    Patient is due for the following:   Diabetes -  A1C and Eye Exam  Asthma  -  ACT needed  Colon Cancer Screening  Physical Annual Wellness Visit      Topic Date Due     Pneumococcal Vaccine (2 - PCV) 08/08/2018     COVID-19 Vaccine (4 - Booster for Pfizer series) 01/05/2022     Diptheria Tetanus Pertussis (DTAP/TDAP/TD) Vaccine (2 - Td or Tdap) 10/12/2022       Next Steps:   Schedule a Annual Wellness Visit    Type of outreach:    Sent Better Weekdays message. and Sent letter.      Questions for provider review:    None     Serena Price MA

## 2023-04-12 NOTE — LETTER
[address] [address]  [phone]  Dept: [phone]    April 12, 2023    Arturo Rahman  [address]  [facility] [address]    Dear BING,    At [facility] we care about your health and are committed to providing quality patient care.     Here is a list of Health Maintenance topics that are due now or due soon:  Health Maintenance Due   Topic Date Due    DIABETIC FOOT EXAM  Never done    EYE EXAM  Never done    COLORECTAL CANCER SCREENING  10/13/2013    Pneumococcal Vaccine: 65+ Years (2 - PCV) 08/08/2018    ADVANCE CARE PLANNING  03/11/2019    ASTHMA ACTION PLAN  07/12/2019    COVID-19 Vaccine (4 - Booster for Pfizer series) 01/05/2022    LIPID  06/10/2022    MEDICARE ANNUAL WELLNESS VISIT  Never done    AORTIC ANEURYSM SCREENING (SYSTEM ASSIGNED)  Never done    DTAP/TDAP/TD IMMUNIZATION (2 - Td or Tdap) 10/12/2022    PHQ-2 (once per calendar year)  01/01/2023    ASTHMA CONTROL TEST  04/05/2023    A1C  04/09/2023        We are recommending that you:  Schedule a COLONOSCOPY to assess for colon cancer (due every 10 years or 5 years in higher risk situations.)       Colon cancer is now the second leading cause of cancer-related deaths in the [place] for both men and women and there are over 130,000 new cases and 50,000 deaths per year from colon cancer.  Colonoscopies can prevent 90-95% of these deaths.  Problem lesions can be removed before they ever become cancer.  This test is not only looking for cancer, but also getting rid of precancerious lesions.    If you are under/uninsured, we recommend you contact the [employer] program. [employer] is a free colorectal cancer screening program that provides colonoscopies for eligible under/uninsured [place] men and women. If you are interested in receiving a free colonoscopy, please call [employer] at [phone] (mention code [employer]) to see  if you re eligible.     If you do not wish to do a colonoscopy or cannot afford to do one, at this time, there is another option. It is called a FIT test or Fecal Immunochemical Occult Blood Test (take home stool sample kit).  It does not replace the colonoscopy for colorectal cancer screening, but it can detect hidden bleeding in the lower colon.  It does need to be repeated every year and if a positive result is obtained, you would be referred for a colonoscopy.    If you have completed either one of these tests at another facility, please call/respond to this message with the details of when and where the tests were done and if they were normal or not. Or have the records sent to our clinic so that we can best coordinate your care.    To schedule an appointment or discuss this further, you may contact us by phone at the [facility] at [phone] or online through the patient portal/[email or web address] @ [email or web address]    Thank you for trusting [facility] and we appreciate the opportunity to serve you.  We look forward to supporting your healthcare needs in the future.    Your partners in health,      Quality Committee at [facility]

## 2023-04-16 ENCOUNTER — HEALTH MAINTENANCE LETTER (OUTPATIENT)
Age: 68
End: 2023-04-16

## 2023-04-24 ENCOUNTER — OFFICE VISIT (OUTPATIENT)
Dept: PULMONOLOGY | Facility: CLINIC | Age: 68
End: 2023-04-24
Payer: COMMERCIAL

## 2023-04-24 VITALS
WEIGHT: 314 LBS | SYSTOLIC BLOOD PRESSURE: 133 MMHG | BODY MASS INDEX: 46.37 KG/M2 | DIASTOLIC BLOOD PRESSURE: 89 MMHG | OXYGEN SATURATION: 97 % | HEART RATE: 109 BPM

## 2023-04-24 DIAGNOSIS — R06.09 DYSPNEA ON EXERTION: ICD-10-CM

## 2023-04-24 DIAGNOSIS — I26.94 MULTIPLE SUBSEGMENTAL PULMONARY EMBOLI WITHOUT ACUTE COR PULMONALE (H): Primary | ICD-10-CM

## 2023-04-24 PROCEDURE — 99215 OFFICE O/P EST HI 40 MIN: CPT | Performed by: INTERNAL MEDICINE

## 2023-04-24 ASSESSMENT — PAIN SCALES - GENERAL: PAINLEVEL: NO PAIN (0)

## 2023-04-24 NOTE — NURSING NOTE
"Chief Complaint   Patient presents with     RECHECK       Initial /89 (BP Location: Left arm, Patient Position: Sitting, Cuff Size: Adult Large)   Pulse 109   Wt 142.4 kg (314 lb)   SpO2 97%   BMI 46.37 kg/m   Estimated body mass index is 46.37 kg/m  as calculated from the following:    Height as of 1/9/23: 1.753 m (5' 9\").    Weight as of this encounter: 142.4 kg (314 lb)..  He requests these members of his care team be copied on today's visit information:     PCP: Charlee Crane      Do you need any medication refills at today's visit? NO    LIVAN Ren   Email: [email or web address]  [facility] - Rheumatology  Phone: [phone]  Fax: [phone]      "

## 2023-04-24 NOTE — PROGRESS NOTES
Pulmonary Clinic Return Patient Visit  Reason for Visit: Pulmonary embolism/SOB  History of Present Illness  Mr. Arturo Negron is a very pleasant 67-year-old who presents to the clinic for follow-up for pulmonary embolism.  I last saw him in clinic in 4/2022  Hx of endobronchial hematoma status post cryodebulking in 2018 with concern for renarrowing of the left upper lobe bronchus and there are talks about possible bronchoscopy with reexploration to evaluate the need for further debulking, however at [facility] noted that he was asymptomatic and further exploration was not done.  He was diagnosed with multiple bilateral subsegmental emboli following sudden onset of increasing shortness of breath and he was started on Eliquis at the time. There was radiological evidence of mild heart strain although troponin/proBNP were unremarkable.   He did not require any admission. He had done well with Elliquis and interval improvement in SOB.  Today, still has remnant SOB which limits him from strenuous activities. He has appeared to have had accompanying deconditioning and some weight gain in the last few years.  He had previously attended pulmonary rehab but he has not consistently tried some of the exercises he has learned on his own to get back in shape as well as also try to lose some weight with healthy living and improved activity. There is no increased pedal edema and there has been no increased     Review of Systems:  10 of 14 systems reviewed and are negative unless otherwise stated in HPI.    Past Medical History:   Diagnosis Date     Allergies      Asthma, moderate persistent      FH: colon cancer     colonoscopy q 5yr.  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     HTN (hypertension)      Hyperlipidemia        Past Surgical History:   Procedure Laterality Date     ARTHROSCOPY KNEE Right 11/16/2018    Procedure: Right knee arthroscopy, medial meniscal  and chondral debridement;  Surgeon: Manoj Augustin MD;  Location: [facility] OR     BRONCHOSCOPY FLEXIBLE AND RIGID N/A 2017    Procedure: BRONCHOSCOPY FLEXIBLE AND RIGID;   Bronchoscopy, Tumor Debulking with Cryoprobe and Argon Plasma Coagulation;  Surgeon: Albino Crump MD;  Location: [facility] OR     BRONCHOSCOPY FLEXIBLE,L CRYOBIOPSY N/A 2018    Procedure: BRONCHOSCOPY FLEXIBLE, CRYOBIOPSY;  Flexible Bronchoscopy, Rigid Bronchoscopy, Tumor Debulking With Cryoprobe;  Surgeon: Albino Crump MD;  Location: [facility] OR     COLONOSCOPY      + polyp, +FH, repeat in 5 years     COMBINED ENDOSCOPY UPPER WITH ARGON PLASMA COAGULATOR (APC) N/A 2017    Procedure: COMBINED ENDOSCOPY UPPER WITH ARGON PLASMA COAGULATOR (APC);;  Surgeon: Albino Crump MD;  Location: [facility] OR     TONSILLECTOMY       VASECTOMY         Family History   Problem Relation Age of Onset     Arthritis Mother      Hypertension Father      Cerebrovascular Disease Father      Asthma Daughter      Allergies Daughter      Hypertension Sister      Cancer - colorectal Sister         dx age 50       Social History     Socioeconomic History     Marital status:      Number of children: 2   Occupational History     Employer: [employer]   Tobacco Use     Smoking status: Former Smoker     Quit date: 10/28/1991     Years since quittin.4     Smokeless tobacco: Never Used   Substance and Sexual Activity     Alcohol use: Yes     Comment: rare     Drug use: No     Sexual activity: Yes     Partners: Female     Birth control/protection: None   Other Topics Concern     Parent/sibling w/ CABG, MI or angioplasty before 65F 55M? No         Allergies   Allergen Reactions     Lisinopril Cough         Current Outpatient Medications:      albuterol (PROAIR HFA/PROVENTIL HFA/VENTOLIN HFA) 108 (90 Base) MCG/ACT inhaler, Inhale 2 puffs into the lungs every 6 hours as needed for shortness of breath / dyspnea or wheezing, Disp: 18 g, Rfl:  3     amLODIPine (NORVASC) 10 MG tablet, Take 1 tablet (10 mg) by mouth daily For blood pressure, Disp: 90 tablet, Rfl: 2     desloratadine (CLARINEX) 5 MG tablet, Take 1 tablet (5 mg) by mouth daily, Disp: 90 tablet, Rfl: 3     ELIQUIS ANTICOAGULANT 5 MG tablet, Take 1 tablet (5 mg) by mouth 2 times daily, Disp: 180 tablet, Rfl: 3     esomeprazole (NEXIUM) 40 MG DR capsule, Take 1 capsule (40 mg) by mouth every morning (before breakfast) Take 30-60 minutes before eating., Disp: 90 capsule, Rfl: 3     fluticasone (FLONASE) 50 MCG/ACT nasal spray, Spray 1 spray into both nostrils daily, Disp: 32 g, Rfl: 4     hydrochlorothiazide (HYDRODIURIL) 25 MG tablet, Take 1 tablet (25 mg) by mouth daily, Disp: 90 tablet, Rfl: 3     losartan (COZAAR) 100 MG tablet, Take 1 tablet (100 mg) by mouth daily, Disp: 90 tablet, Rfl: 3     montelukast (SINGULAIR) 10 MG tablet, Take 1 tablet (10 mg) by mouth daily, Disp: 90 tablet, Rfl: 3     Multiple Vitamins-Minerals (MULTIVITAL PO), Take 1 tablet by mouth daily, Disp: , Rfl:      simvastatin (ZOCOR) 10 MG tablet, Take 1 tablet (10 mg) by mouth At Bedtime, Disp: 90 tablet, Rfl: 3    Current Facility-Administered Medications:      lidocaine (PF) (XYLOCAINE) 1 % injection 4 mL, 4 mL, , , Bill Ortiz MD, 4 mL at 07/31/18 1422    Facility-Administered Medications Ordered in Other Visits:      sodium chloride (PF) 0.9% PF flush 10 mL, 10 mL, Intravenous, Once, Manjinder Luevano MD      Physical Exam:  /89 (BP Location: Left arm, Patient Position: Sitting, Cuff Size: Adult Large)   Pulse 109   Wt 142.4 kg (314 lb)   SpO2 97%   BMI 46.37 kg/m    GENERAL: Well developed, well nourished, alert, and in no apparent distress.  HEENT: Normocephalic, atraumatic. PERRL, EOMI. Oral mucosa is moist. No perioral cyanosis.  NECK: supple, no masses, no thyromegaly.  RESP:  Normal respiratory effort.  CTAB.  No rales, wheezes, rhonchi.  No cyanosis or clubbing.  CV: Normal  S1, S2, regular rhythm, normal rate. No murmur.  No LE edema.   ABDOMEN:  Soft, non-tender, non-distended.   SKIN: warm and dry. No rash.  NEURO: AAOx3.  Normal gait.  Fluent speech.  PSYCH: mentation appears normal.     Results:  Imaging (personally reviewed in clinic today): Echocardiography Study 11/15/2021 01:29 PM  Interpretation Summary  Global and regional left ventricular function is normal with an EF of 55-60%.  Right ventricular function, chamber size, wall motion, and thickness are normal.  Pulmonary artery systolic pressure cannot be assessed.  No pericardial effusion is present.    IMPRESSION:   1. Exam is negative for acute pulmonary embolism.    2. Sequela of prior granulomatous disease.   3. Stable 6 mm pulmonary nodule in the right middle lobe, likely a fissural lymph node.   4. Stable fibrotic changes and no wall thickening/bronchiectasis in the lingula. No new acute consolidation.  5. Probable pulmonary artery hypertension, with pulmonary artery caliber of 3.5 cm.    Assessment and Plan:   Bilateral pulmonary emboli/SOB  Previous CT chest shows interval resolution of bilateral pulmonary embolism and he is on long term anticoagulation. Pulmonary artery was slightly elevated at 3.3 cm however previous echocardiogram did not show any evidence of overt right heart strain although there was some difficulty with pulmonary artery pressure estimation.   He appears to have shown some significant improvement while on Eliquis but appeared to have had accompanying deconditioning and some weight gain in the last few years.  He had previously attended pulmonary rehab and I advised him to continue some of the exercises he has learned on his own to get back in shape as well as also try to lose some weight with healthy living and improved activity. I offered referral to weight management but he declined.  History of endobronchial hematoma status post cryodebulking in 2018   Previously seen by Dr. Alberto in 2019.  On  review of his most recent scans, there is stable mild narrowing of the left upper lobe since 2019.    All questions were reviewed and addressed with patient  RTC as needed    I spent a total of 40 minutes face to face with Arturo Rahman during today's office visit. Over 50% of this time was spent counseling the patient and/or coordinating care regarding their pulmonary disease.    Harry Law MD  Pulmonary, Critical Care and Sleep Medicine  [email or web address]  Pager: [phone]         The above note was dictated using voice recognition software and may include typographical errors. Please contact the author for any clarifications.

## 2023-06-01 ENCOUNTER — TRANSFERRED RECORDS (OUTPATIENT)
Dept: MULTI SPECIALTY CLINIC | Facility: CLINIC | Age: 68
End: 2023-06-01

## 2023-06-01 LAB — RETINOPATHY: NORMAL

## 2023-07-02 ENCOUNTER — OFFICE VISIT (OUTPATIENT)
Dept: URGENT CARE | Facility: URGENT CARE | Age: 68
End: 2023-07-02
Payer: COMMERCIAL

## 2023-07-02 VITALS
TEMPERATURE: 98 F | HEART RATE: 104 BPM | SYSTOLIC BLOOD PRESSURE: 115 MMHG | OXYGEN SATURATION: 95 % | DIASTOLIC BLOOD PRESSURE: 76 MMHG | RESPIRATION RATE: 18 BRPM | BODY MASS INDEX: 43.98 KG/M2 | WEIGHT: 297.8 LBS

## 2023-07-02 DIAGNOSIS — E11.65 UNCONTROLLED TYPE 2 DIABETES MELLITUS WITH HYPERGLYCEMIA (H): Primary | ICD-10-CM

## 2023-07-02 DIAGNOSIS — R73.09 ELEVATED GLUCOSE: ICD-10-CM

## 2023-07-02 LAB — GLUCOSE BLD-MCNC: >444 MG/DL (ref 60–99)

## 2023-07-02 PROCEDURE — 99204 OFFICE O/P NEW MOD 45 MIN: CPT | Performed by: PHYSICIAN ASSISTANT

## 2023-07-02 PROCEDURE — 82947 ASSAY GLUCOSE BLOOD QUANT: CPT | Performed by: PHYSICIAN ASSISTANT

## 2023-07-02 PROCEDURE — 82947 ASSAY GLUCOSE BLOOD QUANT: CPT | Mod: 59 | Performed by: PHYSICIAN ASSISTANT

## 2023-07-02 PROCEDURE — 36415 COLL VENOUS BLD VENIPUNCTURE: CPT | Performed by: PHYSICIAN ASSISTANT

## 2023-07-02 NOTE — PROGRESS NOTES
Assessment & Plan     1. Uncontrolled type 2 diabetes mellitus with hyperglycemia (H)      2. Elevated glucose    - Glucose, whole blood; Future  - Glucose, whole blood  - Glucose    Possible DKA with in clinic glucose very high undetectable. He will go to the ED now for treatment.               Arturo Villanueva PA-C  [facility] SMART is a 67 year old male who presents to clinic today for the following health issues:  Chief Complaint   Patient presents with     Blood Sugar Problem     Blood sugar over 600 on Saturday (07/01). Pt has not been diagnosed with diabetes. Used wife's blood sugar meter to check      Urinary Frequency     Fatigue     Polydipsia     Dry mouth and very thirsty.      HPI    Here for elevated glucoses. He has been thirsty, fatigued, weight loss, dry mouth. Glucose 600+ yesterday. 520 last night. Started about 6 days ago with increased symptoms. 1/2023 had an A1C of 8.3 for routine blood work for hematology. He had a 6.7 A1C last year but he says no told him he was diabetic. He has had no follow up.           Review of Systems        Objective    /76 (BP Location: Left arm, Patient Position: Sitting, Cuff Size: Adult Large)   Pulse 104   Temp 98  F (36.7  C) (Tympanic)   Resp 18   Wt 135.1 kg (297 lb 12.8 oz)   SpO2 95%   BMI 43.98 kg/m    Physical Exam  Vitals and nursing note reviewed.   Constitutional:       General: He is not in acute distress.     Appearance: He is well-developed. He is not diaphoretic.   HENT:      Head: Normocephalic and atraumatic.      Right Ear: Tympanic membrane and external ear normal.      Left Ear: Tympanic membrane and external ear normal.   Eyes:      Pupils: Pupils are equal, round, and reactive to light.   Cardiovascular:      Rate and Rhythm: Normal rate and regular rhythm.   Pulmonary:      Effort: Pulmonary effort is normal. No respiratory distress.      Breath sounds: Normal breath sounds.    Musculoskeletal:      Cervical back: Normal range of motion and neck supple.   Lymphadenopathy:      Cervical: No cervical adenopathy.   Skin:     General: Skin is warm and dry.   Neurological:      Mental Status: He is alert.      Cranial Nerves: No cranial nerve deficit.

## 2023-07-03 ENCOUNTER — TELEPHONE (OUTPATIENT)
Dept: FAMILY MEDICINE | Facility: CLINIC | Age: 68
End: 2023-07-03
Payer: COMMERCIAL

## 2023-07-03 LAB
FASTING STATUS PATIENT QL REPORTED: YES
GLUCOSE SERPL-MCNC: 482 MG/DL (ref 70–99)

## 2023-07-03 NOTE — TELEPHONE ENCOUNTER
Reason for Call:  Appointment Request    Patient requesting this type of appt:  Hospital/ED Follow-Up     Requested provider: Charlee Crane    Reason patient unable to be scheduled: Not within requested timeframe    When does patient want to be seen/preferred time: 3-7 days    Comments: PT unable to have a follow up until 7/12 in [place].  PT was told to be seen by PCP on Monday 7/10    Could we send this information to you in [facility] or would you prefer to receive a phone call?:   Patient would prefer a phone call   Okay to leave a detailed message?: Yes at Home number on file [phone] (home)    Call taken on 7/3/2023 at 10:39 AM by Olesya Johnson

## 2023-07-05 NOTE — TELEPHONE ENCOUNTER
Dr Crane isn't in on Monday 7/10, okay to use same day on Royce's schedule 7/11 or 7/12 if patient prefers.    Mary Tan M.D.

## 2023-07-11 ENCOUNTER — OFFICE VISIT (OUTPATIENT)
Dept: FAMILY MEDICINE | Facility: CLINIC | Age: 68
End: 2023-07-11
Payer: COMMERCIAL

## 2023-07-11 VITALS
WEIGHT: 294.6 LBS | HEIGHT: 69 IN | SYSTOLIC BLOOD PRESSURE: 121 MMHG | DIASTOLIC BLOOD PRESSURE: 82 MMHG | RESPIRATION RATE: 16 BRPM | OXYGEN SATURATION: 95 % | BODY MASS INDEX: 43.63 KG/M2 | HEART RATE: 101 BPM | TEMPERATURE: 98.6 F

## 2023-07-11 DIAGNOSIS — I10 HYPERTENSION, GOAL BELOW 140/90: ICD-10-CM

## 2023-07-11 DIAGNOSIS — J84.10 GRANULOMATOUS LUNG DISEASE (H): ICD-10-CM

## 2023-07-11 DIAGNOSIS — E11.65 TYPE 2 DIABETES MELLITUS WITH HYPERGLYCEMIA, WITH LONG-TERM CURRENT USE OF INSULIN (H): Primary | ICD-10-CM

## 2023-07-11 DIAGNOSIS — I26.94 MULTIPLE SUBSEGMENTAL PULMONARY EMBOLI WITHOUT ACUTE COR PULMONALE (H): ICD-10-CM

## 2023-07-11 DIAGNOSIS — E66.01 MORBID OBESITY (H): ICD-10-CM

## 2023-07-11 DIAGNOSIS — Z79.4 TYPE 2 DIABETES MELLITUS WITH HYPERGLYCEMIA, WITH LONG-TERM CURRENT USE OF INSULIN (H): Primary | ICD-10-CM

## 2023-07-11 DIAGNOSIS — Q85.9 HAMARTOMA OF LEFT LUNG (H): ICD-10-CM

## 2023-07-11 DIAGNOSIS — R06.83 SNORING: ICD-10-CM

## 2023-07-11 PROCEDURE — 99215 OFFICE O/P EST HI 40 MIN: CPT | Performed by: PREVENTIVE MEDICINE

## 2023-07-11 RX ORDER — HYDROCHLOROTHIAZIDE 25 MG/1
25 TABLET ORAL DAILY
Qty: 90 TABLET | Refills: 3 | Status: SHIPPED | OUTPATIENT
Start: 2023-07-11 | End: 2024-08-13

## 2023-07-11 RX ORDER — APIXABAN 5 MG/1
5 TABLET, FILM COATED ORAL 2 TIMES DAILY
Qty: 180 TABLET | Refills: 3 | Status: SHIPPED | OUTPATIENT
Start: 2023-07-11 | End: 2024-08-13

## 2023-07-11 RX ORDER — PEN NEEDLE, DIABETIC 30 GX3/16"
1 NEEDLE, DISPOSABLE MISCELLANEOUS DAILY
Qty: 90 EACH | Refills: 1 | Status: SHIPPED | OUTPATIENT
Start: 2023-07-11 | End: 2024-04-22

## 2023-07-11 RX ORDER — LOSARTAN POTASSIUM 100 MG/1
100 TABLET ORAL DAILY
Qty: 90 TABLET | Refills: 3 | Status: SHIPPED | OUTPATIENT
Start: 2023-07-11 | End: 2024-08-13

## 2023-07-11 RX ORDER — ATORVASTATIN CALCIUM 20 MG/1
20 TABLET, FILM COATED ORAL DAILY
Qty: 90 TABLET | Refills: 3 | Status: SHIPPED | OUTPATIENT
Start: 2023-07-11 | End: 2024-06-24

## 2023-07-11 ASSESSMENT — ASTHMA QUESTIONNAIRES: ACT_TOTALSCORE: 17

## 2023-07-11 NOTE — PROGRESS NOTES
Assessment & Plan     Type 2 diabetes mellitus with hyperglycemia, with long-term current use of insulin (H)  -Hemoglobin A1c had increased to 12.1 on 7/2/23  - Lipid panel reflex to direct LDL Non-fasting  - HEMOGLOBIN A1C  - Albumin Random Urine Quantitative with Creat Ratio  - TSH with free T4 reflex  - atorvastatin (LIPITOR) 20 MG tablet  Dispense: 90 tablet; Refill: 3  - Comprehensive metabolic panel (BMP + Alb, Alk Phos, ALT, AST, Total. Bili, TP)  - insulin glargine (LANTUS PEN) 100 UNIT/ML pen  Dispense: 15 mL; Refill: 0  - semaglutide (OZEMPIC) 2 MG/3ML pen  Dispense: 3 mL; Refill: 1  - Insulin Pen Needle (PEN NEEDLES) 32G X 4 MM MISC  Dispense: 90 each; Refill: 1  - Continuous Blood Gluc  (FREESTYLE GONZALES 2 READER) TABBY  Dispense: 1 each; Refill: 0  - Continuous Blood Gluc Sensor (FREESTYLE GONZALES 2 SENSOR) MISC  Dispense: 2 each; Refill: 5    -Discussed the need to start insulin  -Written instructions for titration of Lantus provided  -Will defer metformin for now due to decreased kidney function during recent emergency room visit  -Also started semaglutide to help with hyperglycemia and morbid obesity  -There is no personal or family history of medullary thyroid cancer or pancreatitis  -GI side effects reviewed of semaglutide  -We will titrate dose up of semaglutide as tolerated  -Discussed that semaglutide will be a long-term medication when used for weight loss, weight recurs with discontinuation of medication   -recheck labs in 3 months      Morbid obesity (H)  -Patient has been making lifestyle changes, has started portion control and has lost some weight    Wt Readings from Last 2 Encounters:   07/11/23 133.6 kg (294 lb 9.6 oz)   07/02/23 135.1 kg (297 lb 12.8 oz)       Hypertension, goal below 140/90  -Blood pressure readings are at goal  -Continue current medication, refills provided  - hydrochlorothiazide (HYDRODIURIL) 25 MG tablet  Dispense: 90 tablet; Refill: 3  - Comprehensive  "metabolic panel (BMP + Alb, Alk Phos, ALT, AST, Total. Bili, TP)  - losartan (COZAAR) 100 MG tablet  Dispense: 90 tablet; Refill: 3    Multiple subsegmental pulmonary emboli without acute cor pulmonale (H)  -Patient is on longterm anticoagulation  - CBC with platelets  - Comprehensive metabolic panel (BMP + Alb, Alk Phos, ALT, AST, Total. Bili, TP)  - ELIQUIS ANTICOAGULANT 5 MG tablet  Dispense: 180 tablet; Refill: 3  -saw Pulmonary 4/23    Granulomatous lung disease (H)  -per Pulmonary     Hamartoma of left lung (H)  Hx of endobronchial hematoma status post cryodebulking in 2018     Snoring  -had been provided Sleep clinic referral in the past  -would like to wait on getting tested for sleep apnea       Ordering of each unique test  Prescription drug management  45 minutes spent by me on the date of the encounter doing chart review, history and exam, documentation and further activities per the note     MED REC REQUIRED  Post Medication Reconciliation Status: discharge medications reconciled and changed, per note/orders  BMI:   Estimated body mass index is 43.5 kg/m  as calculated from the following:    Height as of this encounter: 1.753 m (5' 9\").    Weight as of this encounter: 133.6 kg (294 lb 9.6 oz).   Weight management plan: Discussed healthy diet and exercise guidelines        Charlee Crane MD MPH    [facility]    Lesli SMART is a 67 year old, presenting for the following health issues:  Hospital F/U        7/11/2023    11:07 AM   Additional Questions   Roomed by veronica   Accompanied by self         7/11/2023    11:07 AM   Patient Reported Additional Medications   Patient reports taking the following new medications metformin     HPI     ED/UC Followup:    Facility:  [facility]  Date of visit: 07/02/2023  Reason for visit: elevated blood sugar    Current Status: symptoms are better, not sure what the readings are.     HbA1C was 12.1 on " "7/2/23  Does not have his own glucometer to monitor glucose with     Has been eating less   Portion control  Exercise none regular     Did not see Sleep clinic, does have a history of snoring and fatigue     Had eye exam outside of [place] about a month ago.    The following is a summary:    \"MDM:   Arturo Rahman is a 67 y.o. male presenting today with new onset diabetes and hyperglycemia. Patient was mildly hypovolemic. He was given some IV fluids in the emergency department. Patient has no significant metabolic derangement with the exception of his hyperglycemia. I did check an A1c and this shows that his average glucose has been 301. He arrives with a glucose of 474. Patient was started on metformin. He has no evidence for significant acidosis, infectious process, ACS. Patient feels comfortable going home. He has diabetic wife as well as family members who can help him with neck steps in management of his diabetes. I think that it is safe to let him go home with a course of metformin to take twice daily until he follows up with his PMD this coming week. He will return if he has new or worsening symptoms. In the meantime we discussed dietary recommendations, exercise and medication adherence as well as return precautions.    DIAGNOSIS:  ICD-10-CM   1. Hyperglycemia due to diabetes mellitus (HCC) E11.65        Review of Systems   Constitutional, HEENT, cardiovascular, pulmonary, gi and gu systems are negative, except as otherwise noted.      Objective    /82 (BP Location: Left arm, Patient Position: Sitting, Cuff Size: Adult Large)   Pulse 101   Temp 98.6  F (37  C) (Oral)   Resp 16   Ht 1.753 m (5' 9\")   Wt 133.6 kg (294 lb 9.6 oz)   SpO2 95%   BMI 43.50 kg/m    Body mass index is 43.5 kg/m .  Physical Exam   GENERAL APPEARANCE: healthy, alert and no distress  EYES: Eyes grossly normal to inspection and conjunctivae and sclerae normal  RESP: lungs clear to auscultation - no rales, rhonchi or " wheezes  CV: regular rates and rhythm, normal S1 S2  ABDOMEN: soft, non-tender and no rebound or guarding   MS: extremities normal- no gross deformities noted and peripheral pulses normal  SKIN: no suspicious lesions or rashes  NEURO: Normal strength and tone, mentation intact and speech normal  PSYCH: mentation appears normal      No results found for this or any previous visit (from the past 24 hour(s)).

## 2023-07-24 NOTE — BRIEF OP NOTE
POST OPERATIVE NOTE-IMMEDIATE :    Date of surgery: 11/16/2018    Preoperative Diagnosis:  medial meniscus tear    Postoperative Diagnosis:  Right knee medial meniscus tear    Procedures:  Procedure(s):  Right knee arthroscopy, medial meniscal and chondral debridement    Prosthetic Devices: See Op Note    Surgeon(s) and Assistants (if any):  Attending Surgeon: Manoj Augustin MD, MS  Assistant: Handy Castelan PA-C    Anesthesia:  Other    Antibiotics: 3g Ancef    IV Fluids: 900cc LR    UOP: 0, no blank    Drains: none    Specimens: none    Complications: None apparent.    Tourniquet Time: 15 minutes @ 300mmHg    Findings/Conclusions: complex medial meniscus tear, body/posterior aspect junction. See Op Note for further detail.    Estimated Blood Loss: 1ml    Post Op Plan:  *Rest   *Ice   *Elevation   *Weight bearing as tolerated, crutches as needed   *oral pain medications  *Daily asa x2 weeks  *Home exercise program   *Return to clinic 2 weeks for wound check, suture removal, sooner if needed      Handy Castelan PA-C, CAQ (Ortho)  Supervising Physician: Manoj Augustin M.D., M.S.  Dept. of Orthopaedic Surgery  [facility]        
[FreeTextEntry1] : \par \par 57 year old female presents for f/u & consultation regarding her abnormal pap & inadequate colpo\par Plan: LEEP\par ASCUS +HPV \par had colposcopy last year & was ASCUS +HPV; he was unable to obtain ECC with last colpo\par \par PMH: supracervical hysterectomy \par

## 2023-07-25 ENCOUNTER — OFFICE VISIT (OUTPATIENT)
Dept: FAMILY MEDICINE | Facility: CLINIC | Age: 68
End: 2023-07-25
Payer: COMMERCIAL

## 2023-07-25 VITALS
TEMPERATURE: 97.8 F | BODY MASS INDEX: 42.98 KG/M2 | HEIGHT: 69 IN | OXYGEN SATURATION: 97 % | SYSTOLIC BLOOD PRESSURE: 108 MMHG | WEIGHT: 290.2 LBS | DIASTOLIC BLOOD PRESSURE: 76 MMHG | HEART RATE: 103 BPM | RESPIRATION RATE: 18 BRPM

## 2023-07-25 DIAGNOSIS — E11.65 UNCONTROLLED TYPE 2 DIABETES MELLITUS WITH HYPERGLYCEMIA (H): ICD-10-CM

## 2023-07-25 DIAGNOSIS — N50.89 SCROTAL SWELLING: Primary | ICD-10-CM

## 2023-07-25 DIAGNOSIS — Z12.11 SCREEN FOR COLON CANCER: ICD-10-CM

## 2023-07-25 PROCEDURE — 90472 IMMUNIZATION ADMIN EACH ADD: CPT | Performed by: FAMILY MEDICINE

## 2023-07-25 PROCEDURE — 90677 PCV20 VACCINE IM: CPT | Performed by: FAMILY MEDICINE

## 2023-07-25 PROCEDURE — 90471 IMMUNIZATION ADMIN: CPT | Performed by: FAMILY MEDICINE

## 2023-07-25 PROCEDURE — 90715 TDAP VACCINE 7 YRS/> IM: CPT | Performed by: FAMILY MEDICINE

## 2023-07-25 PROCEDURE — 99213 OFFICE O/P EST LOW 20 MIN: CPT | Mod: 25 | Performed by: FAMILY MEDICINE

## 2023-07-25 ASSESSMENT — PAIN SCALES - GENERAL: PAINLEVEL: NO PAIN (0)

## 2023-07-25 NOTE — PROGRESS NOTES
Assessment & Plan     Scrotal swelling  - noticed swelling in right side of his scrotum for the past 4 days.  -Margins defined, nontender, skin nonpinchable, oval, roughly 2 x 3 cm swelling  -Bilateral testis descended and palpable.    PLAN:  -Ultrasound ordered.  -HbA1c of 12.1- 3 weeks ago.  Notified Jayson that surgical intervention if necessary will be hard with high blood sugars.    - US Testicular & Scrotum w Doppler Ltd; Future    Diabetes mellitus, type 2 (H)  -HbA1c of 12.1, recently started on insulin and Ozempic by PCP on 7/11/2023.  -Jayson is compliant with medications.  -Jayson reported eye check last month-no retinopathy changes.    Screen for colon cancer  -Jayson denied colonoscopy referral at this time and preferred to optimize sugars before proceeding with colonoscopy.                 Priscila Robertson MD  [facility]    Lesli SMART is a 67 year old, presenting for the following health issues:  Penis/Scrotum Problem (Lump on scrotum- right side. )        7/25/2023     3:42 PM   Additional Questions   Roomed by mariola   Accompanied by self         7/25/2023     3:42 PM   Patient Reported Additional Medications   Patient reports taking the following new medications none     History of Present Illness       Reason for visit:  Lump in scotum  Symptom onset:  3-7 days ago  Symptom intensity:  Mild  Symptom progression:  Worsening  Had these symptoms before:  No    He eats 4 or more servings of fruits and vegetables daily.He exercises with enough effort to increase his heart rate 9 or less minutes per day.  He exercises with enough effort to increase his heart rate 3 or less days per week.   He is taking medications regularly.       Concern - Lump on right scrotum  Onset: 4 days ago- sat  Description: Seemed like Pimple without the head to pop (like a lump under the skin), yesterday getting bigger, under the skin. 3rd day it was painful- notice the  "pain a little bit today. Sensitive to touch.  Intensity: mild  Progression of Symptoms:  worsening- getting bigger in size last 2 days.  Accompanying Signs & Symptoms: None  Previous history of similar problem: None  Precipitating factors:        Worsened by: None  Alleviating factors:        Improved by: None  Therapies tried and outcome: None        Review of Systems   Constitutional, HEENT, cardiovascular, pulmonary, gi and gu systems are negative, except as otherwise noted.      Objective    /76 (BP Location: Right arm, Patient Position: Sitting, Cuff Size: Adult Large)   Pulse 103   Temp 97.8  F (36.6  C) (Oral)   Resp 18   Ht 1.759 m (5' 9.25\")   Wt 131.6 kg (290 lb 3.2 oz)   SpO2 97%   BMI 42.55 kg/m    Body mass index is 42.55 kg/m .  Physical Exam   GENERAL: healthy, alert and no distress  NECK: no adenopathy, no asymmetry, masses, or scars and thyroid normal to palpation  RESP: lungs clear to auscultation - no rales, rhonchi or wheezes  CV: regular rate and rhythm, normal S1 S2, no S3 or S4, no murmur, click or rub, no peripheral edema and peripheral pulses strong  ABDOMEN: soft, nontender, no hepatosplenomegaly, no masses and bowel sounds normal  MS: no gross musculoskeletal defects noted, no edema                      "

## 2023-07-26 ENCOUNTER — ANCILLARY PROCEDURE (OUTPATIENT)
Dept: ULTRASOUND IMAGING | Facility: CLINIC | Age: 68
End: 2023-07-26
Attending: FAMILY MEDICINE
Payer: COMMERCIAL

## 2023-07-26 DIAGNOSIS — N50.89 SCROTAL SWELLING: ICD-10-CM

## 2023-07-26 PROCEDURE — 93976 VASCULAR STUDY: CPT

## 2023-07-26 PROCEDURE — 76870 US EXAM SCROTUM: CPT

## 2023-08-18 ENCOUNTER — TELEPHONE (OUTPATIENT)
Dept: FAMILY MEDICINE | Facility: CLINIC | Age: 68
End: 2023-08-18
Payer: COMMERCIAL

## 2023-08-18 NOTE — TELEPHONE ENCOUNTER
MTM referral from: [facility] visit (referral by provider)    MTM referral outreach attempt #2 on August 18, 2023 at 11:34 AM      Outcome: Patient not reachable after several attempts, will route to MTM Pharmacist/Provider as an FYI.  [facility] scheduling number is [phone].  Thank you for the referral.    Use Private Pay for the carrier/Plan on the flowsheet    Yanet Reeves CPhT  [facility]

## 2023-08-21 DIAGNOSIS — E11.65 TYPE 2 DIABETES MELLITUS WITH HYPERGLYCEMIA, WITH LONG-TERM CURRENT USE OF INSULIN (H): ICD-10-CM

## 2023-08-21 DIAGNOSIS — Z79.4 TYPE 2 DIABETES MELLITUS WITH HYPERGLYCEMIA, WITH LONG-TERM CURRENT USE OF INSULIN (H): ICD-10-CM

## 2023-08-23 RX ORDER — INSULIN GLARGINE 100 [IU]/ML
34 INJECTION, SOLUTION SUBCUTANEOUS AT BEDTIME
Qty: 15 ML | Refills: 0 | Status: SHIPPED | OUTPATIENT
Start: 2023-08-23 | End: 2023-09-28

## 2023-08-23 NOTE — TELEPHONE ENCOUNTER
Called pt and scheduled lab appt. Pt tried scheduling with MTM but they are not covered under his insurance. Please advise.

## 2023-08-23 NOTE — TELEPHONE ENCOUNTER
Thank you for the clarification.  Please schedule with me then (Virtual OK) a few days after labs are done.    Charlee Crane MD MPH

## 2023-08-30 ENCOUNTER — MYC MEDICAL ADVICE (OUTPATIENT)
Dept: FAMILY MEDICINE | Facility: CLINIC | Age: 68
End: 2023-08-30
Payer: COMMERCIAL

## 2023-09-17 ENCOUNTER — HEALTH MAINTENANCE LETTER (OUTPATIENT)
Age: 68
End: 2023-09-17

## 2023-09-18 ENCOUNTER — TELEPHONE (OUTPATIENT)
Dept: FAMILY MEDICINE | Facility: CLINIC | Age: 68
End: 2023-09-18
Payer: COMMERCIAL

## 2023-09-18 NOTE — TELEPHONE ENCOUNTER
Patient Quality Outreach    Patient is due for the following:   Diabetes -  A1C, LDL (Fasting), Microalbumin, and Foot Exam  Asthma  -  Asthma follow-up visit  Colon Cancer Screening      Topic Date Due    COVID-19 Vaccine (5 - Pfizer series) 06/12/2023    Flu Vaccine (1) 09/01/2023       Next Steps:   Schedule a     Type of outreach:    Sent [employer] message.    Next Steps:  Reach out within 90 days via [employer].    Max number of attempts reached: No. Will try again in 90 days if patient still on fail list.    Questions for provider review:    None           Serena Price MA

## 2023-09-19 ENCOUNTER — LAB (OUTPATIENT)
Dept: LAB | Facility: CLINIC | Age: 68
End: 2023-09-19
Payer: COMMERCIAL

## 2023-09-19 DIAGNOSIS — I26.94 MULTIPLE SUBSEGMENTAL PULMONARY EMBOLI WITHOUT ACUTE COR PULMONALE (H): ICD-10-CM

## 2023-09-19 DIAGNOSIS — E11.65 TYPE 2 DIABETES MELLITUS WITH HYPERGLYCEMIA, WITH LONG-TERM CURRENT USE OF INSULIN (H): ICD-10-CM

## 2023-09-19 DIAGNOSIS — Z79.4 TYPE 2 DIABETES MELLITUS WITH HYPERGLYCEMIA, WITH LONG-TERM CURRENT USE OF INSULIN (H): ICD-10-CM

## 2023-09-19 DIAGNOSIS — I10 HYPERTENSION, GOAL BELOW 140/90: ICD-10-CM

## 2023-09-19 LAB
ALBUMIN SERPL BCG-MCNC: 3.7 G/DL (ref 3.5–5.2)
ALP SERPL-CCNC: 89 U/L (ref 40–129)
ALT SERPL W P-5'-P-CCNC: 22 U/L (ref 0–70)
ANION GAP SERPL CALCULATED.3IONS-SCNC: 14 MMOL/L (ref 7–15)
AST SERPL W P-5'-P-CCNC: 18 U/L (ref 0–45)
BILIRUB SERPL-MCNC: 0.5 MG/DL
BUN SERPL-MCNC: 19.4 MG/DL (ref 8–23)
CALCIUM SERPL-MCNC: 9.3 MG/DL (ref 8.8–10.2)
CHLORIDE SERPL-SCNC: 110 MMOL/L (ref 98–107)
CHOLEST SERPL-MCNC: 138 MG/DL
CREAT SERPL-MCNC: 1.18 MG/DL (ref 0.67–1.17)
CREAT UR-MCNC: 227 MG/DL
DEPRECATED HCO3 PLAS-SCNC: 27 MMOL/L (ref 22–29)
EGFRCR SERPLBLD CKD-EPI 2021: 68 ML/MIN/1.73M2
ERYTHROCYTE [DISTWIDTH] IN BLOOD BY AUTOMATED COUNT: 14.2 % (ref 10–15)
GLUCOSE SERPL-MCNC: 103 MG/DL (ref 70–99)
HBA1C MFR BLD: 10.9 % (ref 0–5.6)
HCT VFR BLD AUTO: 41.1 % (ref 40–53)
HDLC SERPL-MCNC: 39 MG/DL
HGB BLD-MCNC: 13.2 G/DL (ref 13.3–17.7)
LDLC SERPL CALC-MCNC: 76 MG/DL
MCH RBC QN AUTO: 27.2 PG (ref 26.5–33)
MCHC RBC AUTO-ENTMCNC: 32.1 G/DL (ref 31.5–36.5)
MCV RBC AUTO: 85 FL (ref 78–100)
MICROALBUMIN UR-MCNC: <12 MG/L
MICROALBUMIN/CREAT UR: NORMAL MG/G{CREAT}
NONHDLC SERPL-MCNC: 99 MG/DL
PLATELET # BLD AUTO: 316 10E3/UL (ref 150–450)
POTASSIUM SERPL-SCNC: 3.5 MMOL/L (ref 3.4–5.3)
PROT SERPL-MCNC: 6.9 G/DL (ref 6.4–8.3)
RBC # BLD AUTO: 4.85 10E6/UL (ref 4.4–5.9)
SODIUM SERPL-SCNC: 151 MMOL/L (ref 136–145)
TRIGL SERPL-MCNC: 116 MG/DL
TSH SERPL DL<=0.005 MIU/L-ACNC: 1.62 UIU/ML (ref 0.3–4.2)
WBC # BLD AUTO: 11.7 10E3/UL (ref 4–11)

## 2023-09-19 PROCEDURE — 80061 LIPID PANEL: CPT

## 2023-09-19 PROCEDURE — 82570 ASSAY OF URINE CREATININE: CPT

## 2023-09-19 PROCEDURE — 83036 HEMOGLOBIN GLYCOSYLATED A1C: CPT

## 2023-09-19 PROCEDURE — 85027 COMPLETE CBC AUTOMATED: CPT

## 2023-09-19 PROCEDURE — 36415 COLL VENOUS BLD VENIPUNCTURE: CPT

## 2023-09-19 PROCEDURE — 84443 ASSAY THYROID STIM HORMONE: CPT

## 2023-09-19 PROCEDURE — 82043 UR ALBUMIN QUANTITATIVE: CPT

## 2023-09-19 PROCEDURE — 80053 COMPREHEN METABOLIC PANEL: CPT

## 2023-09-28 ENCOUNTER — VIRTUAL VISIT (OUTPATIENT)
Dept: FAMILY MEDICINE | Facility: CLINIC | Age: 68
End: 2023-09-28
Payer: COMMERCIAL

## 2023-09-28 DIAGNOSIS — H69.93 DYSFUNCTION OF BOTH EUSTACHIAN TUBES: ICD-10-CM

## 2023-09-28 DIAGNOSIS — E66.01 MORBID OBESITY (H): ICD-10-CM

## 2023-09-28 DIAGNOSIS — Z79.4 TYPE 2 DIABETES MELLITUS WITH HYPERGLYCEMIA, WITH LONG-TERM CURRENT USE OF INSULIN (H): Primary | ICD-10-CM

## 2023-09-28 DIAGNOSIS — E11.65 TYPE 2 DIABETES MELLITUS WITH HYPERGLYCEMIA, WITH LONG-TERM CURRENT USE OF INSULIN (H): Primary | ICD-10-CM

## 2023-09-28 DIAGNOSIS — K21.00 GASTROESOPHAGEAL REFLUX DISEASE WITH ESOPHAGITIS, UNSPECIFIED WHETHER HEMORRHAGE: ICD-10-CM

## 2023-09-28 DIAGNOSIS — E78.5 HYPERLIPIDEMIA LDL GOAL <100: ICD-10-CM

## 2023-09-28 DIAGNOSIS — R06.83 SNORING: ICD-10-CM

## 2023-09-28 DIAGNOSIS — Z12.11 SCREEN FOR COLON CANCER: ICD-10-CM

## 2023-09-28 DIAGNOSIS — I10 HYPERTENSION, GOAL BELOW 140/90: ICD-10-CM

## 2023-09-28 DIAGNOSIS — I26.94 MULTIPLE SUBSEGMENTAL PULMONARY EMBOLI WITHOUT ACUTE COR PULMONALE (H): ICD-10-CM

## 2023-09-28 DIAGNOSIS — J45.40 MODERATE PERSISTENT ASTHMA WITHOUT COMPLICATION: ICD-10-CM

## 2023-09-28 DIAGNOSIS — J84.10 GRANULOMATOUS LUNG DISEASE (H): ICD-10-CM

## 2023-09-28 PROCEDURE — 99443 PR PHYSICIAN TELEPHONE EVALUATION 21-30 MIN: CPT | Mod: 93 | Performed by: PREVENTIVE MEDICINE

## 2023-09-28 RX ORDER — FLUTICASONE PROPIONATE 50 MCG
1 SPRAY, SUSPENSION (ML) NASAL DAILY
Qty: 32 G | Refills: 4 | Status: SHIPPED | OUTPATIENT
Start: 2023-09-28

## 2023-09-28 RX ORDER — MONTELUKAST SODIUM 10 MG/1
1 TABLET ORAL DAILY
Qty: 90 TABLET | Refills: 0 | Status: SHIPPED | OUTPATIENT
Start: 2023-09-28 | End: 2023-12-28

## 2023-09-28 RX ORDER — ESOMEPRAZOLE MAGNESIUM 40 MG/1
40 CAPSULE, DELAYED RELEASE ORAL
Qty: 90 CAPSULE | Refills: 0 | Status: SHIPPED | OUTPATIENT
Start: 2023-09-28 | End: 2023-12-28

## 2023-09-28 RX ORDER — INSULIN GLARGINE 100 [IU]/ML
46 INJECTION, SOLUTION SUBCUTANEOUS AT BEDTIME
Qty: 45 ML | Refills: 1
Start: 2023-09-28 | End: 2023-10-03

## 2023-09-28 RX ORDER — LANCETS
EACH MISCELLANEOUS
Qty: 100 EACH | Refills: 6 | Status: SHIPPED | OUTPATIENT
Start: 2023-09-28

## 2023-09-28 NOTE — PROGRESS NOTES
BING is a 67 year old who is being evaluated via a billable telephone visit.      What phone number would you like to be contacted at? [phone]   How would you like to obtain your AVS? Rocio    Distant Location (provider location):  Off-site    Assessment & Plan     Type 2 diabetes mellitus with hyperglycemia, with long-term current use of insulin (H)  -continue Lantus 46 units daily  -recheck labs in 2-3 months  -Metformin stopped in the past due to low renal function   -readings are good at home  -Patient requested regular glucometer in addition to CGM   - blood glucose monitoring (NO BRAND SPECIFIED) meter device kit  Dispense: 1 kit; Refill: 0  - thin (NO BRAND SPECIFIED) lancets  Dispense: 100 each; Refill: 6  - blood glucose (NO BRAND SPECIFIED) test strip  Dispense: 100 strip; Refill: 6  - insulin glargine (LANTUS SOLOSTAR) 100 UNIT/ML pen  Dispense: 45 mL; Refill: 1  - Semaglutide, 1 MG/DOSE, (OZEMPIC) 4 MG/3ML pen  Dispense: 9 mL; Refill: 0  - Continuous Blood Gluc Sensor (FREESTYLE GONZALES 2 SENSOR) MISC  Dispense: 6 each; Refill: 3  - AMB Adult Diabetes Educator Referral  - CBC with platelets  - Basic metabolic panel  (Ca, Cl, CO2, Creat, Gluc, K, Na, BUN)  - Hemoglobin A1c    Lab Results   Component Value Date    A1C 10.9 09/19/2023    A1C 8.3 01/09/2023    A1C 7.1 07/11/2022    A1C 6.7 10/18/2021    A1C 6.7 06/10/2021    A1C 6.1 07/11/2017    A1C 6.4 03/21/2016    A1C 6.3 12/01/2015         Multiple subsegmental pulmonary emboli without acute cor pulmonale (H)  -on Eliquis  -has been seen by Oncology  -Lifelong anti coagulation   - CBC with platelets    Morbid obesity (H)  -tolerated medication without side effects  -ran out of 0.5 mg dose a few days ago   - Semaglutide, 1 MG/DOSE, (OZEMPIC) 4 MG/3ML pen  Dispense: 9 mL; Refill: 0  -no personal or family history of medullary thyroid cancer or pancreatitis    Granulomatous lung disease (H)  -followed by Pulmonary     Hypertension, goal below  140/90  -readings are at goal   - Basic metabolic panel  (Ca, Cl, CO2, Creat, Gluc, K, Na, BUN)    Hyperlipidemia LDL goal <100  -continue statin     Snoring  -declined Sleep referral at this time     Dysfunction of both eustachian tubes  - fluticasone (FLONASE) 50 MCG/ACT nasal spray  Dispense: 32 g; Refill: 4    Screen for colon cancer  - Colonoscopy Screening  Referral    Gastroesophageal reflux disease with esophagitis, unspecified whether hemorrhage  -understands risk of bone loss with long term use   - esomeprazole (NEXIUM) 40 MG DR capsule  Dispense: 90 capsule; Refill: 0    Moderate persistent asthma without complication  - montelukast (SINGULAIR) 10 MG tablet  Dispense: 90 tablet; Refill: 0      Ordering of each unique test  Prescription drug management  60 minutes spent by me on the date of the encounter doing chart review, history and exam, documentation and further activities per the note        Charlee Crane MD MPH    [facility]    Lesli SMART is a 67 year old, presenting for the following health issues:  No chief complaint on file.        9/28/2023     8:03 AM   Additional Questions   Roomed by moe   Accompanied by self       HPI       Used to be on Metformin but was stopped due to low renal function.    Most recent weight was 287 lbs  Does not want to do sleep study at this time.    Diabetes Follow-up    How often are you checking your blood sugar? Four or more times daily  Blood sugar testing frequency justification:  Uncontrolled diabetes  What time of day are you checking your blood sugars (select all that apply)?   All day   Have you had any blood sugars above 200?  No  Have you had any blood sugars below 70?  No  What symptoms do you notice when your blood sugar is low?  None  What concerns do you have today about your diabetes? None   Do you have any of these symptoms? (Select all that apply)  No numbness or tingling in feet.  No redness, sores or  "blisters on feet.  No complaints of excessive thirst.  No reports of blurry vision.  No significant changes to weight.  Have you had a diabetic eye exam in the last 12 months? Yes     Glucose readings are in the green       BP Readings from Last 2 Encounters:   07/25/23 108/76   07/11/23 121/82     Hemoglobin A1C (%)   Date Value   09/19/2023 10.9 (H)   01/09/2023 8.3 (H)   06/10/2021 6.7 (H)   07/11/2017 6.1 (H)     LDL Cholesterol Calculated (mg/dL)   Date Value   09/19/2023 76   06/10/2021 78   05/18/2020 81     LDL Cholesterol Direct (mg/dL)   Date Value   07/11/2022 98         How many servings of fruits and vegetables do you eat daily?  2-3  On average, how many sweetened beverages do you drink each day (Examples: soda, juice, sweet tea, etc.  Do NOT count diet or artificially sweetened beverages)?   1 a week   How many days per week do you exercise enough to make your heart beat faster? 3 or less  How many minutes a day do you exercise enough to make your heart beat faster? 30 - 60  How many days per week do you miss taking your medication? 0    Hyperlipidemia Follow-Up    Are you regularly taking any medication or supplement to lower your cholesterol?   Yes- statin  Are you having muscle aches or other side effects that you think could be caused by your cholesterol lowering medication?  No    Hypertension Follow-up    Do you check your blood pressure regularly outside of the clinic? Yes   Are you following a low salt diet? Yes  Are your blood pressures ever more than 140 on the top number (systolic) OR more   than 90 on the bottom number (diastolic), for example 140/90? No      Review of Systems   Constitutional, HEENT, cardiovascular, pulmonary, gi and gu systems are negative, except as otherwise noted.      Objective    Vitals - Patient Reported  Weight (Patient Reported): 131.5 kg (290 lb)  Height (Patient Reported): 175.3 cm (5' 9\")  BMI (Based on Pt Reported Ht/Wt): 42.83  Pain Score: No Pain " (0)        Physical Exam   healthy, alert, and no distress  PSYCH: Alert and oriented times 3; coherent speech, normal   rate and volume, able to articulate logical thoughts, able   to abstract reason, no tangential thoughts, no hallucinations   or delusions  His affect is normal  RESP: No cough, no audible wheezing, able to talk in full sentences  Remainder of exam unable to be completed due to telephone visits      Lab on 09/19/2023   Component Date Value Ref Range Status    Cholesterol 09/19/2023 138  <200 mg/dL Final    Triglycerides 09/19/2023 116  <150 mg/dL Final    Direct Measure HDL 09/19/2023 39 (L)  >=40 mg/dL Final    LDL Cholesterol Calculated 09/19/2023 76  <=100 mg/dL Final    Non HDL Cholesterol 09/19/2023 99  <130 mg/dL Final    Hemoglobin A1C 09/19/2023 10.9 (H)  0.0 - 5.6 % Final    Creatinine Urine mg/dL 09/19/2023 227.0  mg/dL Final    The reference ranges have not been established in urine creatinine. The results should be integrated into the clinical context for interpretation.    Albumin Urine mg/L 09/19/2023 <12.0  mg/L Final    The reference ranges have not been established in urine albumin. The results should be integrated into the clinical context for interpretation.    Albumin Urine mg/g Cr 09/19/2023    Final    Unable to calculate, urine albumin and/or urine creatinine is outside detectable limits.  Microalbuminuria is defined as an albumin:creatinine ratio of 17 to 299 for males and 25 to 299 for females. A ratio of albumin:creatinine of 300 or higher is indicative of overt proteinuria.  Due to biologic variability, positive results should be confirmed by a second, first-morning random or 24-hour timed urine specimen. If there is discrepancy, a third specimen is recommended. When 2 out of 3 results are in the microalbuminuria range, this is evidence for incipient nephropathy and warrants increased efforts at glucose control, blood pressure control, and institution of therapy with an  angiotensin-converting-enzyme (ACE) inhibitor (if the patient can tolerate it).      TSH 09/19/2023 1.62  0.30 - 4.20 uIU/mL Final    WBC Count 09/19/2023 11.7 (H)  4.0 - 11.0 10e3/uL Final    RBC Count 09/19/2023 4.85  4.40 - 5.90 10e6/uL Final    Hemoglobin 09/19/2023 13.2 (L)  13.3 - 17.7 g/dL Final    Hematocrit 09/19/2023 41.1  40.0 - 53.0 % Final    MCV 09/19/2023 85  78 - 100 fL Final    MCH 09/19/2023 27.2  26.5 - 33.0 pg Final    MCHC 09/19/2023 32.1  31.5 - 36.5 g/dL Final    RDW 09/19/2023 14.2  10.0 - 15.0 % Final    Platelet Count 09/19/2023 316  150 - 450 10e3/uL Final    Sodium 09/19/2023 151 (H)  136 - 145 mmol/L Final    Potassium 09/19/2023 3.5  3.4 - 5.3 mmol/L Final    Chloride 09/19/2023 110 (H)  98 - 107 mmol/L Final    Carbon Dioxide (CO2) 09/19/2023 27  22 - 29 mmol/L Final    Anion Gap 09/19/2023 14  7 - 15 mmol/L Final    Urea Nitrogen 09/19/2023 19.4  8.0 - 23.0 mg/dL Final    Creatinine 09/19/2023 1.18 (H)  0.67 - 1.17 mg/dL Final    Calcium 09/19/2023 9.3  8.8 - 10.2 mg/dL Final    Glucose 09/19/2023 103 (H)  70 - 99 mg/dL Final    Alkaline Phosphatase 09/19/2023 89  40 - 129 U/L Final    AST 09/19/2023 18  0 - 45 U/L Final    Reference intervals for this test were updated on 6/12/2023 to more accurately reflect our healthy population. There may be differences in the flagging of prior results with similar values performed with this method. Interpretation of those prior results can be made in the context of the updated reference intervals.    ALT 09/19/2023 22  0 - 70 U/L Final    Reference intervals for this test were updated on 6/12/2023 to more accurately reflect our healthy population. There may be differences in the flagging of prior results with similar values performed with this method. Interpretation of those prior results can be made in the context of the updated reference intervals.      Protein Total 09/19/2023 6.9  6.4 - 8.3 g/dL Final    Albumin 09/19/2023 3.7  3.5 - 5.2 g/dL  Final    Bilirubin Total 09/19/2023 0.5  <=1.2 mg/dL Final    GFR Estimate 09/19/2023 68  >60 mL/min/1.73m2 Final             Phone call duration: 27 minutes

## 2023-09-29 ENCOUNTER — TELEPHONE (OUTPATIENT)
Dept: FAMILY MEDICINE | Facility: CLINIC | Age: 68
End: 2023-09-29
Payer: COMMERCIAL

## 2023-09-29 NOTE — CONFIDENTIAL NOTE
Reviewed chart. Pt is on insulin already and saw provider yesterday. A1C is down to 10.9 from 12.1 in July at [facility]. I could not find any other options at those clinics on patient's requested day for in-person, but he is also on to wait until appt on 10/31.     Laura Doll, Lita, and Loren please keep him in mind if any cancels for a Tuesday in-person.Thanks!    Maddy Morgan RN, [facility]

## 2023-09-29 NOTE — TELEPHONE ENCOUNTER
FYI - Status Update    Who is Calling: patient    Update: Patient scheduled for diabetes education 10/31/2023. Preference was a Tuesday/in person visit at [facility] or [facility]. Patient wants to schedule next available appointment on 10/31. Please contact patient if he should be seen sooner.    Does caller want a call/response back: No

## 2023-09-29 NOTE — TELEPHONE ENCOUNTER
Ok for patient to wait until 10/31 since A1C<12% but did notice a 2:30pm cancellation and notified patient. He has to check to see if wife can make it- will let us know by Monday if wants to keep appointment for 10/3 at 2:30pm.    Sharmila Thompson RDN, LD, [facility]

## 2023-10-03 ENCOUNTER — ALLIED HEALTH/NURSE VISIT (OUTPATIENT)
Dept: EDUCATION SERVICES | Facility: CLINIC | Age: 68
End: 2023-10-03
Payer: COMMERCIAL

## 2023-10-03 DIAGNOSIS — E11.65 TYPE 2 DIABETES MELLITUS WITH HYPERGLYCEMIA, WITH LONG-TERM CURRENT USE OF INSULIN (H): Primary | ICD-10-CM

## 2023-10-03 DIAGNOSIS — Z79.4 TYPE 2 DIABETES MELLITUS WITH HYPERGLYCEMIA, WITH LONG-TERM CURRENT USE OF INSULIN (H): Primary | ICD-10-CM

## 2023-10-03 PROCEDURE — G0108 DIAB MANAGE TRN  PER INDIV: HCPCS | Mod: AE | Performed by: DIETITIAN, REGISTERED

## 2023-10-03 RX ORDER — INSULIN GLARGINE 100 [IU]/ML
41 INJECTION, SOLUTION SUBCUTANEOUS AT BEDTIME
Qty: 45 ML | Refills: 1 | Status: SHIPPED | OUTPATIENT
Start: 2023-10-03 | End: 2024-04-22

## 2023-10-03 NOTE — LETTER
10/3/2023         RE: Arturo Rahman  [address]  [facility] [id number]        Dear Colleague,    Thank you for referring your patient, Arturo Rahman, to the [facility]. Please see a copy of my visit note below.    Diabetes Self-Management Education & Support    Presents for: Initial Assessment for new diagnosis    Type of Service: In Person Visit    Assessment Type:   REPORTS:              Pt verbalized understanding of concepts discussed and recommendations provided today.       Continue education with the following diabetes management concepts: Healthy Eating, Taking Medication, and Problem Solving    ASSESSMENT:  Jayson says diabetes is new to him and denies prior education on it. Educated on the physiology of diabetes, difference between Type 1 and 2, alternating checking times to help identify patterns, how often to check blood glucose, what the A1C means and reviewed target blood glucose levels.  Discussed benefits of being active to help with better blood glucose utilization and how he could go for a short walk if seeing high blood glucose, to try to help lower it faster.    Jasyon is already wearing the Raoul 2 and taking Lantus every evening and just started the 1mg weekly Ozempic injections without issues. He is seeing excellent blood glucose control today with 100% time in range. Had occasional hypoglycemia when camping but blood glucose was 69 and 70 mg/dL and did not have symptoms. Did review what to watch education on hypoglycemia signs/symptoms, treatment, and prevention. He was encouraged to carry a rapid-acting glucose source at all times.     Says on occasion has extreme fatigue so suggested lowering Lantus by 5 units (10%) and see if he feels better. Also informed him may see blood glucose lower with higher Ozempic dose so have plan in place to lower Lantus by 5 units if pre-meal blood glucose drops into the 70's. He is agreeable to this  plan.    Reviewed carbohydrate sources and instructed on carbohydrate counting and label reading.  Provided general recommendations for him to consume 30-60 gm carbs at meals and limit snacks to 15-30 grams.  Cautioned against under-consuming carbs since may cause liver to produce glucose, leading to erratic blood glucose levels.  Used food models to help demonstrate portion control.  Patient to receive additional education when returns for follow up.  Pt seems receptive and verbalized understanding of concepts discussed and recommendations provided.          Glucose Patterns & Trends:  Patient seeing great blood glucose control with 100% time in range.    PLAN    -Lower Lantus to 41 units to see if it helps with fatigue (0-0-0-46 -->0-0-0-41 unit(s))  -Carry rapid-acting glucose at all times when on insulin    Topics to cover at upcoming visits: Taking Medication, Problem Solving, and Reducing Risks    Follow-up: 12/5/23    See Care Plan for co-developed, patient-state behavior change goals.  AVS provided for patient today.    Education Materials Provided:  [facility] Healthy Living with Diabetes Book and Carbohydrate Counting      SUBJECTIVE/OBJECTIVE:  Presents for: Initial Assessment for new diagnosis  Accompanied by: Self, Spouse (Sharyn)  Diabetes education in the past 24mo: No  Focus of Visit: CGM, Assistance w/ making life changes  Type of CGM visit: Personal CGM Follow-up  Diabetes type: Type 2  Date of diagnosis: 7/11/2023  Disease course: Stable  How confident are you filling out medical forms by yourself:: Quite a bit  Diabetes management related comments/concerns: Says he is started with diabetes not too long ago.  Wants to know what is going on and what he should and should not do.    Says just increased Ozempic to 1 mg weekly. Not feeling as hungry with it. Not sure if helping blood glucose. He already started the Raoul 2.     Says he started to lose weight prior to diagnosis- was cutting back  "and trying to eat less. Lost 20 lbs before diagnosis, now lost 30 lbs. Elevated A1C on 10/18/21.    Transportation concerns: No  Difficulty affording diabetes medication?: No  Difficulty affording diabetes testing supplies?: No  Other concerns:: Glasses  Cultural Influences/Ethnic Background:  Choose not to answer      Diabetes Symptoms & Complications:  Fatigue: Sometimes  Neuropathy: No  Polydipsia: No  Polyphagia: No  Polyuria: No  Visual change: Sometimes  Slow healing wounds: No  Complications assessed today?: Yes  Autonomic neuropathy: No  CVA: No  Heart disease: No  Nephropathy: No  Peripheral neuropathy: No  Peripheral Vascular Disease: No  Retinopathy: No  Sexual dysfunction: No    Patient Problem List and Family Medical History reviewed for relevant medical history, current medical status, and diabetes risk factors.    Vitals:  There were no vitals taken for this visit.  Estimated body mass index is 42.55 kg/m  as calculated from the following:    Height as of 7/25/23: 1.759 m (5' 9.25\").    Weight as of 7/25/23: 131.6 kg (290 lb 3.2 oz).   Last 3 BP:   BP Readings from Last 3 Encounters:   07/25/23 108/76   07/11/23 121/82   07/02/23 115/76       History   Smoking Status     Former     Types: Cigarettes     Quit date: 10/28/1991   Smokeless Tobacco     Never       Labs:  Lab Results   Component Value Date    A1C 10.9 09/19/2023    A1C 6.7 06/10/2021     Lab Results   Component Value Date     09/19/2023     01/09/2023     06/10/2021     Lab Results   Component Value Date    LDL 76 09/19/2023    LDL 98 07/11/2022    LDL 78 06/10/2021     HDL Cholesterol   Date Value Ref Range Status   06/10/2021 44 >39 mg/dL Final     Direct Measure HDL   Date Value Ref Range Status   09/19/2023 39 (L) >=40 mg/dL Final   ]  GFR Estimate   Date Value Ref Range Status   09/19/2023 68 >60 mL/min/1.73m2 Final   06/10/2021 61 >60 mL/min/[1.73_m2] Final     Comment:     Non  GFR Calc  Starting " 12/18/2018, serum creatinine based estimated GFR (eGFR) will be   calculated using the Chronic Kidney Disease Epidemiology Collaboration   (CKD-EPI) equation.       GFR Estimate If Black   Date Value Ref Range Status   06/10/2021 70 >60 mL/min/[1.73_m2] Final     Comment:      GFR Calc  Starting 12/18/2018, serum creatinine based estimated GFR (eGFR) will be   calculated using the Chronic Kidney Disease Epidemiology Collaboration   (CKD-EPI) equation.       Lab Results   Component Value Date    CR 1.18 09/19/2023    CR 1.25 06/10/2021     No results found for: MICROALBUMIN    Healthy Eating:  Healthy Eating Assessed Today: Yes  Cultural/Tenriism diet restrictions?: No  Meal planning/habits: Avoiding sweets, Carb counting, Low carb, Smaller portions, Keeps food records  How many times a week on average do you eat food made away from home (restaurant/take-out)?: 0  Meals include: Breakfast, Lunch, Dinner, Morning Snack, Afternoon Snack, Evening Snack  Breakfast: yogurt and coffee OR 2 eggs, 1.5 rodriguez, 2oz OJ, coffee OR 3 brakfast links, 2 eggs, 2 English muffin OR yogurt, 2 breakfast links, coffee  Lunch: hamburger on bun, onion, mushroom, carrots cake OR egg salad, 2 bread, lettuce leaf, 1T. miracle whip OR chow mein OR 1.5 sandwich  (sloppy les) and orange  Dinner: steak, 12 kinkle fries, ketchup, dressing, veggies, milk OR 3 hard tacos OR fish, raw veggies, claire milk  Snacks: Am: peach OR mini mounds; PM: apple  OR jerky OR raw veggies  Other: HS: cheetos, whisky, diet pepsi, carrot cake OR apple OR popcorn  Beverages: Water, Coffee, Milk    Being Active:  Being Active Assessed Today: Yes  Exercise:: Currently not exercising  Barrier to exercise: Time    Monitoring:  Monitoring Assessed Today: Yes  Did patient bring glucose meter to appointment? : Yes  Blood Glucose Meter: FreeStyle, CGM (Raoul 2)  Times checking blood sugar at home (number): 5+  Times checking blood sugar at home (per): Day  Blood  glucose trend: No change      Taking Medications:  Diabetes Medication(s)       Insulin       insulin glargine (LANTUS SOLOSTAR) 100 UNIT/ML pen    Inject 46 Units Subcutaneous At Bedtime      Incretin Mimetic Agents       Semaglutide, 1 MG/DOSE, (OZEMPIC) 4 MG/3ML pen    Inject 1 mg Subcutaneous every 7 days            Taking Medication Assessed Today: Yes  Current Treatments: Diet, Insulin Injections  Problems taking diabetes medications regularly?: No  Diabetes medication side effects?: No    Problem Solving:  Problem Solving Assessed Today: Yes  Is the patient at risk for hypoglycemia?: Yes  Hypoglycemia Frequency: Weekly  Hypoglycemia Treatment: Other food (Ice-cream bar OR small bag chips)              Reducing Risks:  Reducing Risks Assessed Today: No  Diabetes Risks: Age over 45 years  CAD Risks: Diabetes Mellitus, Obesity, Sedentary lifestyle  Has dilated eye exam at least once a year?: Yes  Sees dentist every 6 months?: No  Feet checked by healthcare provider in the last year?: No    Healthy Coping:  Healthy Coping Assessed Today: Yes  Emotional response to diabetes: Ready to learn, Acceptance  Informal Support system:: Partner  Stage of change: ACTION (Actively working towards change)  Patient Activation Measure Survey Score:      6/13/2012     9:00 AM   GEOVANY Score (Last Two)   GEOVANY Raw Score 47   Activation Score 77.5   GEOVANY Level 4         Care Plan and Education Provided:  Care Plan: Diabetes   Updates made by Sharmila Thompson RD since 10/3/2023 12:00 AM        Problem: HbA1C Not In Goal         Goal: Establish Regular Follow-Ups with PCP         Task: Discuss with PCP the recommended timing for patient's next follow up visit(s)    Responsible User: Sharmila Thompson RD        Task: Discuss schedule for PCP visits with patient    Responsible User: Sharmila Thompson RD        Goal: Get HbA1C Level in Goal         Task: Educate patient on diabetes education self-management topics    Responsible User: Jay  Sharmila MURO RD        Task: Educate patient on benefits of regular glucose monitoring Completed 10/3/2023   Responsible User: Sharmila Thompson RD        Task: Refer patient to appropriate extended care team member, as needed (Medication Therapy Management, Behavioral Health, Physical Therapy, etc.)    Responsible User: Sharmila Thompson RD        Task: Discuss diabetes treatment plan with patient Completed 10/3/2023   Responsible User: Sharmila Thompson RD        Problem: Diabetes Self-Management Education Needed to Optimize Self-Care Behaviors         Goal: Understand diabetes pathophysiology and disease progression         Task: Provide education on diabetes pathophysiology and disease progression specfic to patient's diabetes type Completed 10/3/2023   Responsible User: Sharmila Thompson RD        Goal: Healthy Eating - follow a healthy eating pattern for diabetes         Task: Provide education on portion control and consistency in amount, composition and timing of food intake Completed 10/3/2023   Responsible User: Sharmila Thompson RD        Task: Provide education on managing carbohydrate intake (carbohydrate counting, plate planning method, etc.) Completed 10/3/2023   Responsible User: Sharmila Thompson RD        Task: Provide education on weight management    Responsible User: Sharmila Thompson RD        Task: Provide education on heart healthy eating    Responsible User: Sharmila Thompson RD   Note:    Encouraged lean meats today       Task: Provide education on eating out    Responsible User: Sharmila Thompson RD        Task: Develop individualized healthy eating plan with patient Completed 10/3/2023   Responsible User: Sharmila Thompson RD        Goal: Being Active - get regular physical activity, working up to at least 150 minutes per week         Task: Provide education on relationship of activity to glucose and precautions to take if at risk for low glucose Completed 10/3/2023   Responsible User: Sharmila Thompson  JORGE MURO        Task: Discuss barriers to physical activity with patient    Responsible User: Sharmila Thompson RD        Task: Develop physical activity plan with patient    Responsible User: Sharmila Thompson RD        Task: Explore community resources including walking groups, assistance programs, and home videos    Responsible User: Sharmila Thompson RD        Goal: Monitoring - monitor glucose and ketones as directed         Task: Provide education on blood glucose monitoring (purpose, proper technique, frequency, glucose targets, interpreting results, when to use glucose control solution, sharps disposal)    Responsible User: Sharmila Thompson RD   Note:    Reviewed ideal pre/post meal blood glucose.       Task: Provide education on continuous glucose monitoring (sensor placement, use of lucila or /reader, understanding glucose trends, alerts and alarms, differences between sensor glucose and blood glucose) Completed 10/3/2023   Responsible User: Sharmila Thompson RD        Task: Provide education on ketone monitoring (when to monitor, frequency, etc.)    Responsible User: Sharmila Thompson RD        Goal: Taking Medication - patient is consistently taking medications as directed         Task: Provide education on action of prescribed medication, including when to take and possible side effects Completed 10/3/2023   Responsible User: Sharmila Thompson RD        Task: Provide education on insulin and injectable diabetes medications, including administration, storage, site selection and rotation for injection sites    Responsible User: Sharmila Thompson RD        Task: Discuss barriers to medication adherence with patient and provide management technique ideas as appropriate    Responsible User: Sharimla Thompson RD        Task: Provide education on frequency and refill details of medications    Responsible User: Sharmila Thompson RD        Goal: Problem Solving - know how to prevent and manage short-term diabetes  complications    This Visit's Progress: 70%   Note:    Carry source of rapid-acting glucose at all times in case of hypoglycemia.       Task: Provide education on high blood glucose - causes, signs/symptoms, prevention and treatment Completed 10/3/2023   Responsible User: Sharmila Thompson RD        Task: Provide education on low blood glucose - causes, signs/symptoms, prevention, treatment, carrying a carbohydrate source at all times, and medical identification Completed 10/3/2023   Responsible User: Sharmila Thompson RD        Task: Provide education on safe travel with diabetes    Responsible User: Sharmila Thompson RD        Task: Provide education on how to care for diabetes on sick days    Responsible User: Sharmila Thompson RD        Task: Provide education on when to call a health care provider    Responsible User: Sharmila Thompson RD        Goal: Reducing Risks - know how to prevent and treat long-term diabetes complications         Task: Provide education on major complications of diabetes, prevention, early diagnostic measures and treatment of complications    Responsible User: Sharmila Thompson RD        Task: Provide education on recommended care for dental, eye and foot health    Responsible User: Sharmila Thompson RD        Task: Provide education on Hemoglobin A1c - goals and relationship to blood glucose levels Completed 10/3/2023   Responsible User: Sharmila Thompson RD        Task: Provide education on recommendations for heart health - lipid levels and goals, blood pressure and goals, and aspirin therapy, if indicated    Responsible User: Sharmila Thompson RD        Task: Provide education on tobacco cessation    Responsible User: Sharmila Thompson RD        Goal: Healthy Coping - use available resources to cope with the challenges of managing diabetes         Task: Discuss recognizing feelings about having diabetes    Responsible User: Sharmila Thompson RD        Task: Provide education on the benefits of  making appropriate lifestyle changes    Responsible User: Sharmila Thompson RD        Task: Provide education on benefits of utilizing support systems    Responsible User: Sharmila Thompson RD        Task: Discuss methods for coping with stress    Responsible User: Sharmila Thompson RD        Task: Provide education on when to seek professional counseling    Responsible User: Sharmila Thompson RD Kaydee Brown, RDN, LD, [facility]     Time Spent: 70 minutes  Encounter Type: Individual    Any diabetes medication dose changes were made via the CDE Protocol per the patient's referring provider. A copy of this encounter was shared with the provider.

## 2023-10-03 NOTE — PATIENT INSTRUCTIONS
Try lowering Lantus to 41 units and see if it helps with fatigue.    2. If it does for a bit or you start to see blood glucose start to fall into the 70's, lower Lantus by another 5 units.     3. Time in range is 70% of the time or more.    4. Ideal before meal blood glucose:  mg/dL.    2 hours after you start eating: want blood glucose to lower back below 180 mg/dL    5. Signs/symptoms of low blood sugar include (but are not limited to): sweating, shaking, feeling dizzy or weak, extreme hunger, headache, feeling crabby or confused, numbness or tingling of mouth/lips.  If you have these symptoms check your blood sugar if you can and then consume carbohydrate (sugar)  This is a helpful reminder on how to treat a blood sugar if you are low:  If your blood sugar is < 70 mg/dL, consume 15 grams of fast-acting carbohydrate (4 glucose tabs OR 4 oz juice or non-diet pop OR 2 Tbsp dried fruit OR 5-7 lifesavers OR 1 Tbsp sugar) and wait 15 minutes. Check blood sugar again and if not rising, repeat.  If your blood sugar is < 50 mg/dL, consume 30 grams of fast-acting carbohydrate (8 glucose tabs OR 8 oz juice or non-diet pop OR 4 Tbsp dried fruit OR 10-14 lifesavers OR 2 Tbsp sugar) and wait 15 minutes. Check blood sugar again and if not rising, repeat.    FOLLOW UP APPOINTMENT: In person follow up on Tuesday, December 5th at 9am at [facility]    Sharmila Thompson RDN, SHAKIRA, [facility]   [phone]

## 2023-10-03 NOTE — PROGRESS NOTES
Diabetes Self-Management Education & Support    Presents for: Initial Assessment for new diagnosis    Type of Service: In Person Visit    Assessment Type:   REPORTS:              Pt verbalized understanding of concepts discussed and recommendations provided today.       Continue education with the following diabetes management concepts: Healthy Eating, Taking Medication, and Problem Solving    ASSESSMENT:  Jayson says diabetes is new to him and denies prior education on it. Educated on the physiology of diabetes, difference between Type 1 and 2, alternating checking times to help identify patterns, how often to check blood glucose, what the A1C means and reviewed target blood glucose levels.  Discussed benefits of being active to help with better blood glucose utilization and how he could go for a short walk if seeing high blood glucose, to try to help lower it faster.    Jayson is already wearing the Raoul 2 and taking Lantus every evening and just started the 1mg weekly Ozempic injections without issues. He is seeing excellent blood glucose control today with 100% time in range. Had occasional hypoglycemia when camping but blood glucose was 69 and 70 mg/dL and did not have symptoms. Did review what to watch education on hypoglycemia signs/symptoms, treatment, and prevention. He was encouraged to carry a rapid-acting glucose source at all times.     Says on occasion has extreme fatigue so suggested lowering Lantus by 5 units (10%) and see if he feels better. Also informed him may see blood glucose lower with higher Ozempic dose so have plan in place to lower Lantus by 5 units if pre-meal blood glucose drops into the 70's. He is agreeable to this plan.    Reviewed carbohydrate sources and instructed on carbohydrate counting and label reading.  Provided general recommendations for him to consume 30-60 gm carbs at meals and limit snacks to 15-30 grams.  Cautioned against under-consuming carbs since may cause liver to  produce glucose, leading to erratic blood glucose levels.  Used food models to help demonstrate portion control.  Patient to receive additional education when returns for follow up.  Pt seems receptive and verbalized understanding of concepts discussed and recommendations provided.          Glucose Patterns & Trends:  Patient seeing great blood glucose control with 100% time in range.    PLAN    -Lower Lantus to 41 units to see if it helps with fatigue (0-0-0-46 -->0-0-0-41 unit(s))  -Carry rapid-acting glucose at all times when on insulin    Topics to cover at upcoming visits: Taking Medication, Problem Solving, and Reducing Risks    Follow-up: 12/5/23    See Care Plan for co-developed, patient-state behavior change goals.  AVS provided for patient today.    Education Materials Provided:  [employer] Healthy Living with Diabetes Book and Carbohydrate Counting      SUBJECTIVE/OBJECTIVE:  Presents for: Initial Assessment for new diagnosis  Accompanied by: Self, Spouse (Sharyn)  Diabetes education in the past 24mo: No  Focus of Visit: CGM, Assistance w/ making life changes  Type of CGM visit: Personal CGM Follow-up  Diabetes type: Type 2  Date of diagnosis: 7/11/2023  Disease course: Stable  How confident are you filling out medical forms by yourself:: Quite a bit  Diabetes management related comments/concerns: Says he is started with diabetes not too long ago.  Wants to know what is going on and what he should and should not do.    Says just increased Ozempic to 1 mg weekly. Not feeling as hungry with it. Not sure if helping blood glucose. He already started the Raoul 2.     Says he started to lose weight prior to diagnosis- was cutting back and trying to eat less. Lost 20 lbs before diagnosis, now lost 30 lbs. Elevated A1C on 10/18/21.    Transportation concerns: No  Difficulty affording diabetes medication?: No  Difficulty affording diabetes testing supplies?: No  Other concerns:: Glasses  Cultural  "Influences/Ethnic Background:  Choose not to answer      Diabetes Symptoms & Complications:  Fatigue: Sometimes  Neuropathy: No  Polydipsia: No  Polyphagia: No  Polyuria: No  Visual change: Sometimes  Slow healing wounds: No  Complications assessed today?: Yes  Autonomic neuropathy: No  CVA: No  Heart disease: No  Nephropathy: No  Peripheral neuropathy: No  Peripheral Vascular Disease: No  Retinopathy: No  Sexual dysfunction: No    Patient Problem List and Family Medical History reviewed for relevant medical history, current medical status, and diabetes risk factors.    Vitals:  There were no vitals taken for this visit.  Estimated body mass index is 42.55 kg/m  as calculated from the following:    Height as of 7/25/23: 1.759 m (5' 9.25\").    Weight as of 7/25/23: 131.6 kg (290 lb 3.2 oz).   Last 3 BP:   BP Readings from Last 3 Encounters:   07/25/23 108/76   07/11/23 121/82   07/02/23 115/76       History   Smoking Status    Former    Types: Cigarettes    Quit date: 10/28/1991   Smokeless Tobacco    Never       Labs:  Lab Results   Component Value Date    A1C 10.9 09/19/2023    A1C 6.7 06/10/2021     Lab Results   Component Value Date     09/19/2023     01/09/2023     06/10/2021     Lab Results   Component Value Date    LDL 76 09/19/2023    LDL 98 07/11/2022    LDL 78 06/10/2021     HDL Cholesterol   Date Value Ref Range Status   06/10/2021 44 >39 mg/dL Final     Direct Measure HDL   Date Value Ref Range Status   09/19/2023 39 (L) >=40 mg/dL Final   ]  GFR Estimate   Date Value Ref Range Status   09/19/2023 68 >60 mL/min/1.73m2 Final   06/10/2021 61 >60 mL/min/[1.73_m2] Final     Comment:     Non  GFR Calc  Starting 12/18/2018, serum creatinine based estimated GFR (eGFR) will be   calculated using the Chronic Kidney Disease Epidemiology Collaboration   (CKD-EPI) equation.       GFR Estimate If Black   Date Value Ref Range Status   06/10/2021 70 >60 mL/min/[1.73_m2] Final     " Comment:      GFR Calc  Starting 12/18/2018, serum creatinine based estimated GFR (eGFR) will be   calculated using the Chronic Kidney Disease Epidemiology Collaboration   (CKD-EPI) equation.       Lab Results   Component Value Date    CR 1.18 09/19/2023    CR 1.25 06/10/2021     No results found for: MICROALBUMIN    Healthy Eating:  Healthy Eating Assessed Today: Yes  Cultural/Jehovah's witness diet restrictions?: No  Meal planning/habits: Avoiding sweets, Carb counting, Low carb, Smaller portions, Keeps food records  How many times a week on average do you eat food made away from home (restaurant/take-out)?: 0  Meals include: Breakfast, Lunch, Dinner, Morning Snack, Afternoon Snack, Evening Snack  Breakfast: yogurt and coffee OR 2 eggs, 1.5 rodriguez, 2oz OJ, coffee OR 3 brakfast links, 2 eggs, 2 English muffin OR yogurt, 2 breakfast links, coffee  Lunch: hamburger on bun, onion, mushroom, carrots cake OR egg salad, 2 bread, lettuce leaf, 1T. miracle whip OR chow mein OR 1.5 sandwich  (sloppy les) and orange  Dinner: steak, 12 kinkle fries, ketchup, dressing, veggies, milk OR 3 hard tacos OR fish, raw veggies, claire milk  Snacks: Am: peach OR mini mounds; PM: apple  OR jerky OR raw veggies  Other: HS: cheetos, whisky, diet pepsi, carrot cake OR apple OR popcorn  Beverages: Water, Coffee, Milk    Being Active:  Being Active Assessed Today: Yes  Exercise:: Currently not exercising  Barrier to exercise: Time    Monitoring:  Monitoring Assessed Today: Yes  Did patient bring glucose meter to appointment? : Yes  Blood Glucose Meter: FreeStyle, CGM (Raoul 2)  Times checking blood sugar at home (number): 5+  Times checking blood sugar at home (per): Day  Blood glucose trend: No change      Taking Medications:  Diabetes Medication(s)       Insulin       insulin glargine (LANTUS SOLOSTAR) 100 UNIT/ML pen    Inject 46 Units Subcutaneous At Bedtime      Incretin Mimetic Agents       Semaglutide, 1 MG/DOSE, (OZEMPIC) 4  MG/3ML pen    Inject 1 mg Subcutaneous every 7 days            Taking Medication Assessed Today: Yes  Current Treatments: Diet, Insulin Injections  Problems taking diabetes medications regularly?: No  Diabetes medication side effects?: No    Problem Solving:  Problem Solving Assessed Today: Yes  Is the patient at risk for hypoglycemia?: Yes  Hypoglycemia Frequency: Weekly  Hypoglycemia Treatment: Other food (Ice-cream bar OR small bag chips)              Reducing Risks:  Reducing Risks Assessed Today: No  Diabetes Risks: Age over 45 years  CAD Risks: Diabetes Mellitus, Obesity, Sedentary lifestyle  Has dilated eye exam at least once a year?: Yes  Sees dentist every 6 months?: No  Feet checked by healthcare provider in the last year?: No    Healthy Coping:  Healthy Coping Assessed Today: Yes  Emotional response to diabetes: Ready to learn, Acceptance  Informal Support system:: Partner  Stage of change: ACTION (Actively working towards change)  Patient Activation Measure Survey Score:      6/13/2012     9:00 AM   GEOVANY Score (Last Two)   GEOVANY Raw Score 47   Activation Score 77.5   GEOVANY Level 4         Care Plan and Education Provided:  Care Plan: Diabetes   Updates made by Sharmila Thompson RD since 10/3/2023 12:00 AM        Problem: HbA1C Not In Goal         Goal: Establish Regular Follow-Ups with PCP         Task: Discuss with PCP the recommended timing for patient's next follow up visit(s)    Responsible User: Sharmila Thompson RD        Task: Discuss schedule for PCP visits with patient    Responsible User: Sharmila Thompson RD        Goal: Get HbA1C Level in Goal         Task: Educate patient on diabetes education self-management topics    Responsible User: Sharmila Thompson RD        Task: Educate patient on benefits of regular glucose monitoring Completed 10/3/2023   Responsible User: Sharmila Thompson RD        Task: Refer patient to appropriate extended care team member, as needed (Medication Therapy Management,  Behavioral Health, Physical Therapy, etc.)    Responsible User: Sharmila Thompson RD        Task: Discuss diabetes treatment plan with patient Completed 10/3/2023   Responsible User: Sharmila Thompson RD        Problem: Diabetes Self-Management Education Needed to Optimize Self-Care Behaviors         Goal: Understand diabetes pathophysiology and disease progression         Task: Provide education on diabetes pathophysiology and disease progression specfic to patient's diabetes type Completed 10/3/2023   Responsible User: Sharmila Thompson RD        Goal: Healthy Eating - follow a healthy eating pattern for diabetes         Task: Provide education on portion control and consistency in amount, composition and timing of food intake Completed 10/3/2023   Responsible User: Sharmila Thompson RD        Task: Provide education on managing carbohydrate intake (carbohydrate counting, plate planning method, etc.) Completed 10/3/2023   Responsible User: Sharmila Thompson RD        Task: Provide education on weight management    Responsible User: Sharmila Thompson RD        Task: Provide education on heart healthy eating    Responsible User: Sharmila Thompson RD   Note:    Encouraged lean meats today       Task: Provide education on eating out    Responsible User: Sharmila Thompson RD        Task: Develop individualized healthy eating plan with patient Completed 10/3/2023   Responsible User: Sharmila Thompson RD        Goal: Being Active - get regular physical activity, working up to at least 150 minutes per week         Task: Provide education on relationship of activity to glucose and precautions to take if at risk for low glucose Completed 10/3/2023   Responsible User: Sharmila Thompson RD        Task: Discuss barriers to physical activity with patient    Responsible User: Sharmila Thompson RD        Task: Develop physical activity plan with patient    Responsible User: Sharmila Thompson RD        Task: Explore community resources  including walking groups, assistance programs, and home videos    Responsible User: Sharmila Thompson RD        Goal: Monitoring - monitor glucose and ketones as directed         Task: Provide education on blood glucose monitoring (purpose, proper technique, frequency, glucose targets, interpreting results, when to use glucose control solution, sharps disposal)    Responsible User: Sharmila Thompson RD   Note:    Reviewed ideal pre/post meal blood glucose.       Task: Provide education on continuous glucose monitoring (sensor placement, use of lucila or /reader, understanding glucose trends, alerts and alarms, differences between sensor glucose and blood glucose) Completed 10/3/2023   Responsible User: Sharmila Thompson RD        Task: Provide education on ketone monitoring (when to monitor, frequency, etc.)    Responsible User: Sharmila Thompson RD        Goal: Taking Medication - patient is consistently taking medications as directed         Task: Provide education on action of prescribed medication, including when to take and possible side effects Completed 10/3/2023   Responsible User: Sharmila Thompson RD        Task: Provide education on insulin and injectable diabetes medications, including administration, storage, site selection and rotation for injection sites    Responsible User: Sharmila Thompson RD        Task: Discuss barriers to medication adherence with patient and provide management technique ideas as appropriate    Responsible User: Sharmila Thompson RD        Task: Provide education on frequency and refill details of medications    Responsible User: Sharmila Thompson RD        Goal: Problem Solving - know how to prevent and manage short-term diabetes complications    This Visit's Progress: 70%   Note:    Carry source of rapid-acting glucose at all times in case of hypoglycemia.       Task: Provide education on high blood glucose - causes, signs/symptoms, prevention and treatment Completed 10/3/2023    Responsible User: Sharmila Thompson RD        Task: Provide education on low blood glucose - causes, signs/symptoms, prevention, treatment, carrying a carbohydrate source at all times, and medical identification Completed 10/3/2023   Responsible User: Sharmila Thompson RD        Task: Provide education on safe travel with diabetes    Responsible User: Sharmila Thompson RD        Task: Provide education on how to care for diabetes on sick days    Responsible User: Sharmila Thompson RD        Task: Provide education on when to call a health care provider    Responsible User: Sharmila Thompson RD        Goal: Reducing Risks - know how to prevent and treat long-term diabetes complications         Task: Provide education on major complications of diabetes, prevention, early diagnostic measures and treatment of complications    Responsible User: Sharmila Thompson RD        Task: Provide education on recommended care for dental, eye and foot health    Responsible User: Sharmila Thompson RD        Task: Provide education on Hemoglobin A1c - goals and relationship to blood glucose levels Completed 10/3/2023   Responsible User: Sharmila Thompson RD        Task: Provide education on recommendations for heart health - lipid levels and goals, blood pressure and goals, and aspirin therapy, if indicated    Responsible User: Sharmila Thompson RD        Task: Provide education on tobacco cessation    Responsible User: Sharmila Thompson RD        Goal: Healthy Coping - use available resources to cope with the challenges of managing diabetes         Task: Discuss recognizing feelings about having diabetes    Responsible User: Sharmila Thompson RD        Task: Provide education on the benefits of making appropriate lifestyle changes    Responsible User: Sharmila Thompson RD        Task: Provide education on benefits of utilizing support systems    Responsible User: Sharmila Thompson RD        Task: Discuss methods for coping with stress     Responsible User: Sharmila Thompson RD        Task: Provide education on when to seek professional counseling    Responsible User: Sharmila Thompson RD Kaydee Brown, RDN, LD, [facility]     Time Spent: 70 minutes  Encounter Type: Individual    Any diabetes medication dose changes were made via the CDE Protocol per the patient's referring provider. A copy of this encounter was shared with the provider.

## 2023-11-03 ENCOUNTER — MYC MEDICAL ADVICE (OUTPATIENT)
Dept: EDUCATION SERVICES | Facility: CLINIC | Age: 68
End: 2023-11-03
Payer: COMMERCIAL

## 2023-11-03 ENCOUNTER — MYC MEDICAL ADVICE (OUTPATIENT)
Dept: FAMILY MEDICINE | Facility: CLINIC | Age: 68
End: 2023-11-03
Payer: COMMERCIAL

## 2023-11-08 DIAGNOSIS — J30.1 CHRONIC SEASONAL ALLERGIC RHINITIS DUE TO POLLEN: ICD-10-CM

## 2023-11-08 RX ORDER — DESLORATADINE 5 MG/1
5 TABLET ORAL DAILY
Qty: 90 TABLET | Refills: 0 | Status: SHIPPED | OUTPATIENT
Start: 2023-11-08 | End: 2023-12-28

## 2023-11-10 NOTE — TELEPHONE ENCOUNTER
Yogi Doll,    I reviewed the notes from the hospital. When he is ready to be restarted on Ozempic would definitely start at a low dose and slowly taper up. He likely had gastroparesis too.     Thank you for your help.    Charlee Crane MD MPH

## 2023-11-13 ENCOUNTER — MYC MEDICAL ADVICE (OUTPATIENT)
Dept: FAMILY MEDICINE | Facility: CLINIC | Age: 68
End: 2023-11-13
Payer: COMMERCIAL

## 2023-11-14 NOTE — TELEPHONE ENCOUNTER
Please schedule a visit to discuss medication changes, or patient can discuss his concerns when he sees primary care on 11/20/23.    Thank you,  Charlee Crane MD MPH

## 2023-11-16 ENCOUNTER — VIRTUAL VISIT (OUTPATIENT)
Dept: FAMILY MEDICINE | Facility: CLINIC | Age: 68
End: 2023-11-16
Payer: COMMERCIAL

## 2023-11-16 DIAGNOSIS — E66.01 MORBID OBESITY (H): ICD-10-CM

## 2023-11-16 DIAGNOSIS — E11.65 TYPE 2 DIABETES MELLITUS WITH HYPERGLYCEMIA, WITH LONG-TERM CURRENT USE OF INSULIN (H): ICD-10-CM

## 2023-11-16 DIAGNOSIS — A41.9 SEPSIS, DUE TO UNSPECIFIED ORGANISM, UNSPECIFIED WHETHER ACUTE ORGAN DYSFUNCTION PRESENT (H): Primary | ICD-10-CM

## 2023-11-16 DIAGNOSIS — Z79.4 TYPE 2 DIABETES MELLITUS WITH HYPERGLYCEMIA, WITH LONG-TERM CURRENT USE OF INSULIN (H): ICD-10-CM

## 2023-11-16 DIAGNOSIS — I26.94 MULTIPLE SUBSEGMENTAL PULMONARY EMBOLI WITHOUT ACUTE COR PULMONALE (H): ICD-10-CM

## 2023-11-16 PROCEDURE — 99442 PR PHYSICIAN TELEPHONE EVALUATION 11-20 MIN: CPT | Mod: 93 | Performed by: PREVENTIVE MEDICINE

## 2023-11-16 ASSESSMENT — ASTHMA QUESTIONNAIRES: ACT_TOTALSCORE: 21

## 2023-11-16 NOTE — PROGRESS NOTES
"    Instructions Relayed to Patient by Virtual Roomer:     Patient is active on [employer]:   Relayed following to patient: \"It looks like you are active on [employer], are you able to join the visit this way? If not, do you need us to send you a link now or would you like your provider to send a link via text or email when they are ready to initiate the visit?\"    Reminded patient to ensure they were logged on to virtual visit by arrival time listed. Documented in appointment notes if patient had flexibility to initiate visit sooner than arrival time. If pediatric virtual visit, ensured pediatric patient along with parent/guardian will be present for video visit.     Patient offered the website [email or web address] and/or phone number to [employer] Help line: [phone]     BING is a 68 year old who is being evaluated via a billable telephone visit.      What phone number would you like to be contacted at? [phone]   How would you like to obtain your AVS? [facility]    Distant Location (provider location):  Off-site    Assessment & Plan     Sepsis, due to unspecified organism, unspecified whether acute organ dysfunction present (H)  -hospital records reviewed  -sepsis with Acute gastroenteritis  -Needs recheck of CBC and BMP, had leucocytosis and acute kidney injury  -Metformin not being used due to low renal function in the past and acute kidney injury  -Ozempic was stopped in the hospital due to concerns that the medication had triggered emesis and GI symptoms, will keep on hold for now.  - CBC with platelets and differential  - Basic metabolic panel  (Ca, Cl, CO2, Creat, Gluc, K, Na, BUN)  -On Omnicef and Flagyl for a total of 7 days     Type 2 diabetes mellitus with hyperglycemia, with long-term current use of insulin (H)  -glucose readings are normal at home   - Adult Endocrinology  Referral  - Hemoglobin A1c  -referral to Endocrine provided, unable to use GLP 1 receptor agonists at this " time due to side effects requiring hospital admission.  -will defer medications like Mounjaro as well.  -also need medication for weight loss      Lab Results   Component Value Date    A1C 10.9 09/19/2023    A1C 8.3 01/09/2023    A1C 7.1 07/11/2022    A1C 6.7 10/18/2021    A1C 6.7 06/10/2021    A1C 6.1 07/11/2017    A1C 6.4 03/21/2016    A1C 6.3 12/01/2015         Multiple subsegmental pulmonary emboli without acute cor pulmonale (H)  -on Eliquis     Morbid obesity (H)  -need input from Endocrine about medications for weight loss  -patient needs an appetite suppressant  -Ozempic stopped due to side effects  -Defer Phentermine-Topirimate due to treatment for Hypertension       Review of external notes as documented elsewhere in note  25 minutes spent by me on the date of the encounter doing chart review, history and exam, documentation and further activities per the note         Charlee Crane MD MPH    [facility]    Lesli SMART is a 68 year old, presenting for the following health issues:  Recheck Medication (Ozempic - No longer able to take - would like to discuss options)        11/16/2023    10:48 AM   Additional Questions   Roomed by Jena LEHMAN   Accompanied by Self       Still has some loose stools  1-2 episodes per day  No blood  No abdominal pain  NO fever  No emesis  Taking antibiotics    No longer on Ozempic  Glucose at home 100-130 mg/dl, fasting  No low glucose      History of Present Illness       Reason for visit:  Medication Recheck    He eats 2-3 servings of fruits and vegetables daily.He consumes 0 sweetened beverage(s) daily.He exercises with enough effort to increase his heart rate 9 or less minutes per day.  He exercises with enough effort to increase his heart rate 3 or less days per week.   He is taking medications regularly.       Admitted to the hospital 11/9/23:    Admission Date: 11/9/2023  Discharge Date: 11/14/2023  Disposition: Home, self care    DISCHARGE  DIAGNOSES  Sepsis due to gastroenteritis with acute diarrhea likely related to underlying infection versus medication (Ozempic)  Acute kidney injury--resolved  Hypokalemia-resolved  Poorly controlled diabetes mellitus with A1c of 10.9  Hypertension  History of pulmonary emboli on apixaban  History of granulomatous lung disease  Moderate persistent asthma    HOSPITAL COURSE    68-year-old male with history of poorly controlled type 2 diabetes mellitus with A1c of 10.9 hypertension, hyperlipidemia and history of granulomatous lung disease and moderate persistent asthma presents to the ER for evaluation of persistent vomiting associated with diarrhea area, weakness and generalized abdominal discomfort.  Patient was found to have sepsis due to gastroenteritis likely related to underlying infection versus. Criteria for sepsis and admission include leukocytosis, tachycardia, acute kidney injury. CT abdomen/pelvis shows no acute inflammatory process. Patient had negative COVID-19 PCR. Negative C. difficile. Negative stool pathogen panel.  Patient was treated with IV fluid and IV antibiotics per sepsis protocol initially. Patient's symptoms improved. Patient was transitioned to oral antibiotics. Leukocytosis resolved. Acute kidney injury resolved with fluids. Creatinine back to baseline.  Patient is recommended to discontinue Ozempic which may have contributed to his acute gastroenteritis with diarrhea and vomiting.    Was treated with Omnicef BID for 7 days and Flagyl BID for 7 days     Metformin (stopped due to acute kidney injury) and Ozempic stopped in the hospital    Diabetes Follow-up    Pt would like to discuss options as unable to take Ozempic anymore     How often are you checking your blood sugar? Three times daily  Blood sugar testing frequency justification:  Patient modifying lifestyle changes (diet, exercise) with blood sugars  What time of day are you checking your blood sugars (select all that apply)?   Before meals and At bedtime  Have you had any blood sugars above 200?  No  Have you had any blood sugars below 70?  No  What symptoms do you notice when your blood sugar is low?  None  What concerns do you have today about your diabetes? None   Do you have any of these symptoms? (Select all that apply)  No numbness or tingling in feet.  No redness, sores or blisters on feet.  No complaints of excessive thirst.  No reports of blurry vision.  No significant changes to weight.  Have you had a diabetic eye exam in the last 12 months? Yes- Date of last eye exam: May 2023,  Location: [facility]         BP Readings from Last 2 Encounters:   07/25/23 108/76   07/11/23 121/82     Hemoglobin A1C (%)   Date Value   09/19/2023 10.9 (H)   01/09/2023 8.3 (H)   06/10/2021 6.7 (H)   07/11/2017 6.1 (H)     LDL Cholesterol Calculated (mg/dL)   Date Value   09/19/2023 76   06/10/2021 78   05/18/2020 81     LDL Cholesterol Direct (mg/dL)   Date Value   07/11/2022 98             Review of Systems   Constitutional, HEENT, cardiovascular, pulmonary, gi and gu systems are negative, except as otherwise noted.      Objective           Vitals:  No vitals were obtained today due to virtual visit.    Physical Exam   healthy, alert, and no distress  PSYCH: Alert and oriented times 3; coherent speech, normal   rate and volume, able to articulate logical thoughts, able   to abstract reason, no tangential thoughts, no hallucinations   or delusions  His affect is normal  RESP: No cough, no audible wheezing, able to talk in full sentences  Remainder of exam unable to be completed due to telephone visits    No results found for this or any previous visit (from the past 24 hour(s)).          Phone call duration: 13 minutes

## 2023-11-16 NOTE — PATIENT INSTRUCTIONS
Referral Details    Referred By  Referred To   Charlee Crane MD [address]   [place] [address]   Phone: [phone]   Fax: [phone]    Diagnoses: Type 2 diabetes mellitus with hyperglycemia, with long-term current use of insulin (H)   Order: Adult Endocrinology  Referral       Comment: Please be aware that coverage of these services is subject to the terms and limitations of your health insurance plan.  Call member services at your health plan with any benefit or coverage questions.   [facility] will call you to coordinate your care as prescribed by your provider. If you don't hear from a representative within 2 business days, please call [phone].         negative

## 2023-11-26 ENCOUNTER — HEALTH MAINTENANCE LETTER (OUTPATIENT)
Age: 68
End: 2023-11-26

## 2023-11-30 ENCOUNTER — LAB (OUTPATIENT)
Dept: LAB | Facility: CLINIC | Age: 68
End: 2023-11-30
Payer: COMMERCIAL

## 2023-11-30 DIAGNOSIS — E11.65 TYPE 2 DIABETES MELLITUS WITH HYPERGLYCEMIA, WITH LONG-TERM CURRENT USE OF INSULIN (H): ICD-10-CM

## 2023-11-30 DIAGNOSIS — Z79.4 TYPE 2 DIABETES MELLITUS WITH HYPERGLYCEMIA, WITH LONG-TERM CURRENT USE OF INSULIN (H): ICD-10-CM

## 2023-11-30 DIAGNOSIS — A41.9 SEPSIS, DUE TO UNSPECIFIED ORGANISM, UNSPECIFIED WHETHER ACUTE ORGAN DYSFUNCTION PRESENT (H): ICD-10-CM

## 2023-11-30 LAB
ANION GAP SERPL CALCULATED.3IONS-SCNC: 11 MMOL/L (ref 7–15)
BASOPHILS # BLD AUTO: 0 10E3/UL (ref 0–0.2)
BASOPHILS NFR BLD AUTO: 0 %
BUN SERPL-MCNC: 22.5 MG/DL (ref 8–23)
CALCIUM SERPL-MCNC: 9.4 MG/DL (ref 8.8–10.2)
CHLORIDE SERPL-SCNC: 104 MMOL/L (ref 98–107)
CREAT SERPL-MCNC: 1.15 MG/DL (ref 0.67–1.17)
DEPRECATED HCO3 PLAS-SCNC: 28 MMOL/L (ref 22–29)
EGFRCR SERPLBLD CKD-EPI 2021: 70 ML/MIN/1.73M2
EOSINOPHIL # BLD AUTO: 0.6 10E3/UL (ref 0–0.7)
EOSINOPHIL NFR BLD AUTO: 6 %
ERYTHROCYTE [DISTWIDTH] IN BLOOD BY AUTOMATED COUNT: 16 % (ref 10–15)
GLUCOSE SERPL-MCNC: 120 MG/DL (ref 70–99)
HBA1C MFR BLD: 6.4 % (ref 0–5.6)
HCT VFR BLD AUTO: 44.6 % (ref 40–53)
HGB BLD-MCNC: 13.7 G/DL (ref 13.3–17.7)
IMM GRANULOCYTES # BLD: 0 10E3/UL
IMM GRANULOCYTES NFR BLD: 0 %
LYMPHOCYTES # BLD AUTO: 1.7 10E3/UL (ref 0.8–5.3)
LYMPHOCYTES NFR BLD AUTO: 17 %
MCH RBC QN AUTO: 26.8 PG (ref 26.5–33)
MCHC RBC AUTO-ENTMCNC: 30.7 G/DL (ref 31.5–36.5)
MCV RBC AUTO: 87 FL (ref 78–100)
MONOCYTES # BLD AUTO: 0.8 10E3/UL (ref 0–1.3)
MONOCYTES NFR BLD AUTO: 8 %
NEUTROPHILS # BLD AUTO: 6.8 10E3/UL (ref 1.6–8.3)
NEUTROPHILS NFR BLD AUTO: 68 %
PLATELET # BLD AUTO: 357 10E3/UL (ref 150–450)
POTASSIUM SERPL-SCNC: 3.9 MMOL/L (ref 3.4–5.3)
RBC # BLD AUTO: 5.12 10E6/UL (ref 4.4–5.9)
SODIUM SERPL-SCNC: 143 MMOL/L (ref 135–145)
WBC # BLD AUTO: 10 10E3/UL (ref 4–11)

## 2023-11-30 PROCEDURE — 80048 BASIC METABOLIC PNL TOTAL CA: CPT

## 2023-11-30 PROCEDURE — 85025 COMPLETE CBC W/AUTO DIFF WBC: CPT

## 2023-11-30 PROCEDURE — 83036 HEMOGLOBIN GLYCOSYLATED A1C: CPT

## 2023-11-30 PROCEDURE — 36415 COLL VENOUS BLD VENIPUNCTURE: CPT

## 2023-11-30 NOTE — RESULT ENCOUNTER NOTE
Arturo,     Three month glucose number Hemoglobin A1C has improved significantly to 6.4 and is at goal.  This is excellent improvement.  Basic blood count is not showing anemia or infection.     Please do not hesitate to call us at [phone] if you have any questions or concerns.    Thank you,    Charlee Crane MD MPH

## 2023-12-01 NOTE — RESULT ENCOUNTER NOTE
Arturo,    Electrolytes, glucose, and kidney function are normal.    Please do not hesitate to call us at [phone] if you have any questions or concerns.    Thank you,    Charlee Crane MD MPH

## 2023-12-05 ENCOUNTER — ALLIED HEALTH/NURSE VISIT (OUTPATIENT)
Dept: EDUCATION SERVICES | Facility: CLINIC | Age: 68
End: 2023-12-05
Payer: COMMERCIAL

## 2023-12-05 ENCOUNTER — IMMUNIZATION (OUTPATIENT)
Dept: NURSING | Facility: CLINIC | Age: 68
End: 2023-12-05
Payer: COMMERCIAL

## 2023-12-05 DIAGNOSIS — Z79.4 TYPE 2 DIABETES MELLITUS WITH HYPERGLYCEMIA, WITH LONG-TERM CURRENT USE OF INSULIN (H): Primary | ICD-10-CM

## 2023-12-05 DIAGNOSIS — E11.65 TYPE 2 DIABETES MELLITUS WITH HYPERGLYCEMIA, WITH LONG-TERM CURRENT USE OF INSULIN (H): Primary | ICD-10-CM

## 2023-12-05 PROCEDURE — G0108 DIAB MANAGE TRN  PER INDIV: HCPCS | Mod: AE | Performed by: DIETITIAN, REGISTERED

## 2023-12-05 PROCEDURE — 90471 IMMUNIZATION ADMIN: CPT

## 2023-12-05 PROCEDURE — 91320 SARSCV2 VAC 30MCG TRS-SUC IM: CPT

## 2023-12-05 PROCEDURE — 90480 ADMN SARSCOV2 VAC 1/ONLY CMP: CPT

## 2023-12-05 PROCEDURE — 90662 IIV NO PRSV INCREASED AG IM: CPT

## 2023-12-05 NOTE — PROGRESS NOTES
Diabetes Self-Management Education & Support    Presents for: Follow-up    Type of Service: In Person Visit      REPORTS:          Pt verbalized understanding of concepts discussed and recommendations provided today.       Continue education with the following diabetes management concepts: Healthy Eating    ASSESSMENT:  Jayson is seeing great blood glucose control with only insulin. Satiety effects from Ozempic are holding and he is finding he is not eating more and is mindful about hunger now that he is off of it. He does not think he'd try a medication in this class again in the future due to the bacterial overgrowth over time so this was added to the allergies list for him. Did briefly discuss other possible medications for weight loss (SGLT-2 , possibly DPP-4) but right now good control with current medications. Encourage Jayson to try to sustain healthy eating changes. He plans to meet with Endocrinology in March and will call with questions. Pt verbalized understanding of concepts discussed and recommendations provided.         Glucose Patterns & Trends:  Time in range of  mg/dL, 99% of the time. and Time below range of 70 mg/dL, 0% of the time.    PLAN    -Continue 35 unit(s) of Lantus at bedtime  -Get RSV, flu and Covid vaccine  -Continue mindful eating    Topics to cover at upcoming visits: Taking Medication and Problem Solving    Follow-up: PRN. Patient may be changing to Medicare so not sure if he will have a deductible to meet so wants to wait on follow up. Plans to see Endocrinology in March (Veronica Pradhan).    See Care Plan for co-developed, patient-state behavior change goals.  AVS provided for patient today.    Education Materials Provided:  No new materials provided today      SUBJECTIVE/OBJECTIVE:  Presents for: Follow-up  Accompanied by: Self, Spouse (Sharyn)  Diabetes education in the past 24mo: Yes  Focus of Visit: CGM, Assistance w/ making life changes  Type of CGM visit: Personal CGM  "Follow-up  Diabetes type: Type 2  Date of diagnosis: 7/11/2023  Disease course: Stable  How confident are you filling out medical forms by yourself:: Quite a bit  Diabetes management related comments/concerns: Says he has to wait for March for an appointment. Says the Ozempic is likely cause of bacterial infection.  Says right now BG is higher than they were. Says eating different things since off Ozempic- eating different things. Put on a new patch/sensor and is 20-30 pts high. Says prior to this, was reading low.  Says having some glitches.      Taking 35 unit(s) of Lantus at night. Says not have any side effects since Ozempic stopped. Probably will not take this class of medications in the future unless really good reason for it due to this experience. Noticed that his stomach felt hard with food while on it and sometimes had burps that almost had a fermented taste.    Says last week, maintained weight. Says he is down about 38-40 lbs since June. He is happy if he can continue to maintain or lose weight.    Transportation concerns: No  Difficulty affording diabetes medication?: No  Difficulty affording diabetes testing supplies?: No  Other concerns:: Glasses  Cultural Influences/Ethnic Background:  Choose not to answer      Diabetes Symptoms & Complications:  Fatigue: Sometimes  Neuropathy: No  Polydipsia: No  Polyphagia: No  Polyuria: No  Visual change: Sometimes  Slow healing wounds: No  Complications assessed today?: Yes  Autonomic neuropathy: No  CVA: No  Heart disease: No  Nephropathy: No  Peripheral neuropathy: No  Peripheral Vascular Disease: No  Retinopathy: No  Sexual dysfunction: No    Patient Problem List and Family Medical History reviewed for relevant medical history, current medical status, and diabetes risk factors.    Vitals:  There were no vitals taken for this visit.  Estimated body mass index is 42.55 kg/m  as calculated from the following:    Height as of 7/25/23: 1.759 m (5' 9.25\").    Weight " "as of 7/25/23: 131.6 kg (290 lb 3.2 oz).   Last 3 BP:   BP Readings from Last 3 Encounters:   07/25/23 108/76   07/11/23 121/82   07/02/23 115/76       History   Smoking Status    Former    Types: Cigarettes    Quit date: 10/28/1991   Smokeless Tobacco    Never       Labs:  Lab Results   Component Value Date    A1C 6.4 11/30/2023    A1C 6.7 06/10/2021     Lab Results   Component Value Date     11/30/2023     01/09/2023     06/10/2021     Lab Results   Component Value Date    LDL 76 09/19/2023    LDL 98 07/11/2022    LDL 78 06/10/2021     HDL Cholesterol   Date Value Ref Range Status   06/10/2021 44 >39 mg/dL Final     Direct Measure HDL   Date Value Ref Range Status   09/19/2023 39 (L) >=40 mg/dL Final   ]  GFR Estimate   Date Value Ref Range Status   11/30/2023 70 >60 mL/min/1.73m2 Final   06/10/2021 61 >60 mL/min/[1.73_m2] Final     Comment:     Non  GFR Calc  Starting 12/18/2018, serum creatinine based estimated GFR (eGFR) will be   calculated using the Chronic Kidney Disease Epidemiology Collaboration   (CKD-EPI) equation.       GFR Estimate If Black   Date Value Ref Range Status   06/10/2021 70 >60 mL/min/[1.73_m2] Final     Comment:      GFR Calc  Starting 12/18/2018, serum creatinine based estimated GFR (eGFR) will be   calculated using the Chronic Kidney Disease Epidemiology Collaboration   (CKD-EPI) equation.       Lab Results   Component Value Date    CR 1.15 11/30/2023    CR 1.25 06/10/2021     No results found for: \"MICROALBUMIN\"    Healthy Eating:  Healthy Eating Assessed Today: Yes  Cultural/Quaker diet restrictions?: No  Meal planning/habits: Avoiding sweets, Carb counting, Low carb, Smaller portions, Keeps food records  How many times a week on average do you eat food made away from home (restaurant/take-out)?: 0  Meals include: Breakfast, Lunch, Dinner, Morning Snack, Afternoon Snack, Evening Snack  Breakfast: 1 cup yogurt OR rodriguez, eggs and " toast OR protein shake  Lunch: leftovers  Dinner: soup OR steak with baked potato (cut way back)  Snacks: Am: pear or apple; PM: apple OR small bag of chips OR small slice apple pie  Other: HS: cheetos, whisky, diet pepsi, carrot cake OR apple OR popcorn  Beverages: Water, Coffee, Milk    Being Active:  Being Active Assessed Today: Yes  Exercise:: Currently not exercising  Barrier to exercise: Time    Monitoring:  Monitoring Assessed Today: Yes  Did patient bring glucose meter to appointment? : Yes  Blood Glucose Meter: FreeStyle, CGM (Raoul 2)  Times checking blood sugar at home (number): 5+  Times checking blood sugar at home (per): Day  Blood glucose trend: No change      Taking Medications:  Diabetes Medication(s)       Insulin       insulin glargine (LANTUS SOLOSTAR) 100 UNIT/ML pen    Inject 41 Units Subcutaneous At Bedtime            Taking Medication Assessed Today: Yes  Current Treatments: Diet, Insulin Injections  Problems taking diabetes medications regularly?: No  Diabetes medication side effects?: No    Problem Solving:  Problem Solving Assessed Today: Yes  Is the patient at risk for hypoglycemia?: Yes  Hypoglycemia Frequency: Rarely  Hypoglycemia Treatment: Other food (Ice-cream bar OR small bag chips)              Reducing Risks:  Reducing Risks Assessed Today: Yes  Diabetes Risks: Age over 45 years  CAD Risks: Diabetes Mellitus, Obesity, Sedentary lifestyle  Has dilated eye exam at least once a year?: Yes  Sees dentist every 6 months?: No  Feet checked by healthcare provider in the last year?: No    Healthy Coping:  Healthy Coping Assessed Today: Yes  Emotional response to diabetes: Ready to learn, Acceptance  Informal Support system:: Partner  Stage of change: ACTION (Actively working towards change)  Patient Activation Measure Survey Score:      6/13/2012     9:00 AM   GEOVANY Score (Last Two)   GEOVANY Raw Score 47   Activation Score 77.5   GEOVANY Level 4         Care Plan and Education Provided:  Care Plan:  Diabetes   Updates made by Sharmila Thompson RD since 12/5/2023 12:00 AM        Problem: HbA1C Not In Goal         Goal: Establish Regular Follow-Ups with PCP         Task: Discuss schedule for PCP visits with patient Completed 12/5/2023   Responsible User: Sharmila Thompson RD        Goal: Get HbA1C Level in Goal         Task: Educate patient on diabetes education self-management topics Completed 12/5/2023   Responsible User: Sharmila Thompson RD        Task: Refer patient to appropriate extended care team member, as needed (Medication Therapy Management, Behavioral Health, Physical Therapy, etc.)    Responsible User: Sharmila Thompson RD        Problem: Diabetes Self-Management Education Needed to Optimize Self-Care Behaviors         Goal: Healthy Eating - follow a healthy eating pattern for diabetes         Task: Provide education on weight management Completed 12/5/2023   Responsible User: Sharmila Thompson RD        Task: Provide education on heart healthy eating Completed 12/5/2023   Responsible User: Sharmila Thompson RD   Note:    Encouraged lean meats today       Goal: Being Active - get regular physical activity, working up to at least 150 minutes per week         Task: Discuss barriers to physical activity with patient Completed 12/5/2023   Responsible User: Sharmila Thompson RD        Task: Develop physical activity plan with patient    Responsible User: Sharmila Thompson RD        Goal: Problem Solving - know how to prevent and manage short-term diabetes complications    This Visit's Progress: 70%   Recent Progress: 70%   Note:    Carry source of rapid-acting glucose at all times in case of hypoglycemia.       Task: Provide education on how to care for diabetes on sick days Completed 12/5/2023   Responsible User: Sharmila Thompson RD        Task: Provide education on when to call a health care provider Completed 12/5/2023   Responsible User: Sharmila Thompson RD        Goal: Reducing Risks - know how to prevent  and treat long-term diabetes complications         Task: Provide education on major complications of diabetes, prevention, early diagnostic measures and treatment of complications Completed 12/5/2023   Responsible User: Sharmila Thompson RD        Task: Provide education on recommended care for dental, eye and foot health Completed 12/5/2023   Responsible User: Sharmila Thompson RD        Goal: Healthy Coping - use available resources to cope with the challenges of managing diabetes         Task: Provide education on the benefits of making appropriate lifestyle changes Completed 12/5/2023   Responsible User: Sharmila Thompson RD Kaydee Brown, RDN, LD, [facility]     Time Spent: 60 minutes  Encounter Type: Individual    Any diabetes medication dose changes were made via the CDE Protocol per the patient's referring provider. A copy of this encounter was shared with the provider.

## 2023-12-05 NOTE — LETTER
12/5/2023         RE: Arturo Rahman  [address] [facility] [address]        Dear Colleague,    Thank you for referring your patient, Arturo Rahman, to the [facility]. Please see a copy of my visit note below.    Diabetes Self-Management Education & Support    Presents for: Follow-up    Type of Service: In Person Visit      REPORTS:          Pt verbalized understanding of concepts discussed and recommendations provided today.       Continue education with the following diabetes management concepts: Healthy Eating    ASSESSMENT:  Jayson is seeing great blood glucose control with only insulin. Satiety effects from Ozempic are holding and he is finding he is not eating more and is mindful about hunger now that he is off of it. He does not think he'd try a medication in this class again in the future due to the bacterial overgrowth over time so this was added to the allergies list for him. Did briefly discuss other possible medications for weight loss (SGLT-2 , possibly DPP-4) but right now good control with current medications. Encourage Jayson to try to sustain healthy eating changes. He plans to meet with Endocrinology in March and will call with questions. Pt verbalized understanding of concepts discussed and recommendations provided.         Glucose Patterns & Trends:  Time in range of  mg/dL, 99% of the time. and Time below range of 70 mg/dL, 0% of the time.    PLAN    -Continue 35 unit(s) of Lantus at bedtime  -Get RSV, flu and Covid vaccine  -Continue mindful eating    Topics to cover at upcoming visits: Taking Medication and Problem Solving    Follow-up: PRN. Patient may be changing to Medicare so not sure if he will have a deductible to meet so wants to wait on follow up. Plans to see Endocrinology in March (Veronica Pradhan).    See Care Plan for co-developed, patient-state behavior change goals.  AVS provided for patient today.    Education Materials  Provided:  No new materials provided today      SUBJECTIVE/OBJECTIVE:  Presents for: Follow-up  Accompanied by: Self, Spouse (Sharyn)  Diabetes education in the past 24mo: Yes  Focus of Visit: CGM, Assistance w/ making life changes  Type of CGM visit: Personal CGM Follow-up  Diabetes type: Type 2  Date of diagnosis: 7/11/2023  Disease course: Stable  How confident are you filling out medical forms by yourself:: Quite a bit  Diabetes management related comments/concerns: Says he has to wait for March for an appointment. Says the Ozempic is likely cause of bacterial infection.  Says right now BG is higher than they were. Says eating different things since off Ozempic- eating different things. Put on a new patch/sensor and is 20-30 pts high. Says prior to this, was reading low.  Says having some glitches.      Taking 35 unit(s) of Lantus at night. Says not have any side effects since Ozempic stopped. Probably will not take this class of medications in the future unless really good reason for it due to this experience. Noticed that his stomach felt hard with food while on it and sometimes had burps that almost had a fermented taste.    Says last week, maintained weight. Says he is down about 38-40 lbs since June. He is happy if he can continue to maintain or lose weight.    Transportation concerns: No  Difficulty affording diabetes medication?: No  Difficulty affording diabetes testing supplies?: No  Other concerns:: Glasses  Cultural Influences/Ethnic Background:  Choose not to answer      Diabetes Symptoms & Complications:  Fatigue: Sometimes  Neuropathy: No  Polydipsia: No  Polyphagia: No  Polyuria: No  Visual change: Sometimes  Slow healing wounds: No  Complications assessed today?: Yes  Autonomic neuropathy: No  CVA: No  Heart disease: No  Nephropathy: No  Peripheral neuropathy: No  Peripheral Vascular Disease: No  Retinopathy: No  Sexual dysfunction: No    Patient Problem List and Family Medical History reviewed  "for relevant medical history, current medical status, and diabetes risk factors.    Vitals:  There were no vitals taken for this visit.  Estimated body mass index is 42.55 kg/m  as calculated from the following:    Height as of 7/25/23: 1.759 m (5' 9.25\").    Weight as of 7/25/23: 131.6 kg (290 lb 3.2 oz).   Last 3 BP:   BP Readings from Last 3 Encounters:   07/25/23 108/76   07/11/23 121/82   07/02/23 115/76       History   Smoking Status     Former     Types: Cigarettes     Quit date: 10/28/1991   Smokeless Tobacco     Never       Labs:  Lab Results   Component Value Date    A1C 6.4 11/30/2023    A1C 6.7 06/10/2021     Lab Results   Component Value Date     11/30/2023     01/09/2023     06/10/2021     Lab Results   Component Value Date    LDL 76 09/19/2023    LDL 98 07/11/2022    LDL 78 06/10/2021     HDL Cholesterol   Date Value Ref Range Status   06/10/2021 44 >39 mg/dL Final     Direct Measure HDL   Date Value Ref Range Status   09/19/2023 39 (L) >=40 mg/dL Final   ]  GFR Estimate   Date Value Ref Range Status   11/30/2023 70 >60 mL/min/1.73m2 Final   06/10/2021 61 >60 mL/min/[1.73_m2] Final     Comment:     Non  GFR Calc  Starting 12/18/2018, serum creatinine based estimated GFR (eGFR) will be   calculated using the Chronic Kidney Disease Epidemiology Collaboration   (CKD-EPI) equation.       GFR Estimate If Black   Date Value Ref Range Status   06/10/2021 70 >60 mL/min/[1.73_m2] Final     Comment:      GFR Calc  Starting 12/18/2018, serum creatinine based estimated GFR (eGFR) will be   calculated using the Chronic Kidney Disease Epidemiology Collaboration   (CKD-EPI) equation.       Lab Results   Component Value Date    CR 1.15 11/30/2023    CR 1.25 06/10/2021     No results found for: \"MICROALBUMIN\"    Healthy Eating:  Healthy Eating Assessed Today: Yes  Cultural/Denominational diet restrictions?: No  Meal planning/habits: Avoiding sweets, Carb counting, Low " carb, Smaller portions, Keeps food records  How many times a week on average do you eat food made away from home (restaurant/take-out)?: 0  Meals include: Breakfast, Lunch, Dinner, Morning Snack, Afternoon Snack, Evening Snack  Breakfast: 1 cup yogurt OR rodriguez, eggs and toast OR protein shake  Lunch: leftovers  Dinner: soup OR steak with baked potato (cut way back)  Snacks: Am: pear or apple; PM: apple OR small bag of chips OR small slice apple pie  Other: HS: cheetos, whisky, diet pepsi, carrot cake OR apple OR popcorn  Beverages: Water, Coffee, Milk    Being Active:  Being Active Assessed Today: Yes  Exercise:: Currently not exercising  Barrier to exercise: Time    Monitoring:  Monitoring Assessed Today: Yes  Did patient bring glucose meter to appointment? : Yes  Blood Glucose Meter: FreeStyle, CGM (Raoul 2)  Times checking blood sugar at home (number): 5+  Times checking blood sugar at home (per): Day  Blood glucose trend: No change      Taking Medications:  Diabetes Medication(s)       Insulin       insulin glargine (LANTUS SOLOSTAR) 100 UNIT/ML pen    Inject 41 Units Subcutaneous At Bedtime            Taking Medication Assessed Today: Yes  Current Treatments: Diet, Insulin Injections  Problems taking diabetes medications regularly?: No  Diabetes medication side effects?: No    Problem Solving:  Problem Solving Assessed Today: Yes  Is the patient at risk for hypoglycemia?: Yes  Hypoglycemia Frequency: Rarely  Hypoglycemia Treatment: Other food (Ice-cream bar OR small bag chips)              Reducing Risks:  Reducing Risks Assessed Today: Yes  Diabetes Risks: Age over 45 years  CAD Risks: Diabetes Mellitus, Obesity, Sedentary lifestyle  Has dilated eye exam at least once a year?: Yes  Sees dentist every 6 months?: No  Feet checked by healthcare provider in the last year?: No    Healthy Coping:  Healthy Coping Assessed Today: Yes  Emotional response to diabetes: Ready to learn, Acceptance  Informal Support  system:: Partner  Stage of change: ACTION (Actively working towards change)  Patient Activation Measure Survey Score:      6/13/2012     9:00 AM   GEOVANY Score (Last Two)   GEOVANY Raw Score 47   Activation Score 77.5   GEOVANY Level 4         Care Plan and Education Provided:  Care Plan: Diabetes   Updates made by Sharmila Thompson RD since 12/5/2023 12:00 AM        Problem: HbA1C Not In Goal         Goal: Establish Regular Follow-Ups with PCP         Task: Discuss schedule for PCP visits with patient Completed 12/5/2023   Responsible User: Sharmila Thompson RD        Goal: Get HbA1C Level in Goal         Task: Educate patient on diabetes education self-management topics Completed 12/5/2023   Responsible User: Sharmila Thompson RD        Task: Refer patient to appropriate extended care team member, as needed (Medication Therapy Management, Behavioral Health, Physical Therapy, etc.)    Responsible User: Sharmila Thompson RD        Problem: Diabetes Self-Management Education Needed to Optimize Self-Care Behaviors         Goal: Healthy Eating - follow a healthy eating pattern for diabetes         Task: Provide education on weight management Completed 12/5/2023   Responsible User: Sharmila Thompson RD        Task: Provide education on heart healthy eating Completed 12/5/2023   Responsible User: Sharmila Thompson RD   Note:    Encouraged lean meats today       Goal: Being Active - get regular physical activity, working up to at least 150 minutes per week         Task: Discuss barriers to physical activity with patient Completed 12/5/2023   Responsible User: Sharmila Thompson RD        Task: Develop physical activity plan with patient    Responsible User: Sharmila Thompson RD        Goal: Problem Solving - know how to prevent and manage short-term diabetes complications    This Visit's Progress: 70%   Recent Progress: 70%   Note:    Carry source of rapid-acting glucose at all times in case of hypoglycemia.       Task: Provide education on  how to care for diabetes on sick days Completed 12/5/2023   Responsible User: Sharmila Thompson RD        Task: Provide education on when to call a health care provider Completed 12/5/2023   Responsible User: Sharmila Thompson RD        Goal: Reducing Risks - know how to prevent and treat long-term diabetes complications         Task: Provide education on major complications of diabetes, prevention, early diagnostic measures and treatment of complications Completed 12/5/2023   Responsible User: Sharmila Thompson RD        Task: Provide education on recommended care for dental, eye and foot health Completed 12/5/2023   Responsible User: Sharmila Thompson RD        Goal: Healthy Coping - use available resources to cope with the challenges of managing diabetes         Task: Provide education on the benefits of making appropriate lifestyle changes Completed 12/5/2023   Responsible User: Sharmila Thompson RD Kaydee Brown, RDN, LD, [facility]     Time Spent: 60 minutes  Encounter Type: Individual    Any diabetes medication dose changes were made via the CDE Protocol per the patient's referring provider. A copy of this encounter was shared with the provider.

## 2023-12-05 NOTE — PATIENT INSTRUCTIONS
Continue 35 units of Lantus at bedtime. Blood glucose control is looking great today.     2. Doing a great job with watching portions.    3. Being sick may raise blood glucose.   Good idea to check blood glucose more often when you are not feeling well, continue to take diabetes medications as directed, maintain high fluid intake and eat regular meals (some carbohydrates throughout the day). If you are vomiting or seeing blood glucose >240 mg/dL for more than a day, contact your provider.    4. Eligible for RSV, flu and covid vaccines    FOLLOW UP: Call if blood glucose changes or start seeing less than 70% time in range so we can meet again.    Sharmila Thompson RDN, LD, [facility]   [phone]

## 2023-12-28 DIAGNOSIS — I10 HYPERTENSION, GOAL BELOW 140/90: ICD-10-CM

## 2023-12-28 DIAGNOSIS — K21.00 GASTROESOPHAGEAL REFLUX DISEASE WITH ESOPHAGITIS, UNSPECIFIED WHETHER HEMORRHAGE: ICD-10-CM

## 2023-12-28 DIAGNOSIS — J45.40 MODERATE PERSISTENT ASTHMA WITHOUT COMPLICATION: ICD-10-CM

## 2023-12-28 DIAGNOSIS — J30.1 CHRONIC SEASONAL ALLERGIC RHINITIS DUE TO POLLEN: ICD-10-CM

## 2023-12-28 RX ORDER — MONTELUKAST SODIUM 10 MG/1
1 TABLET ORAL DAILY
Qty: 90 TABLET | Refills: 0 | Status: SHIPPED | OUTPATIENT
Start: 2023-12-28 | End: 2024-04-22

## 2023-12-28 RX ORDER — AMLODIPINE BESYLATE 10 MG/1
TABLET ORAL
Qty: 90 TABLET | Refills: 0 | Status: SHIPPED | OUTPATIENT
Start: 2023-12-28 | End: 2024-04-16

## 2023-12-28 RX ORDER — ESOMEPRAZOLE MAGNESIUM 40 MG/1
40 CAPSULE, DELAYED RELEASE ORAL
Qty: 90 CAPSULE | Refills: 0 | Status: SHIPPED | OUTPATIENT
Start: 2023-12-28 | End: 2024-04-22

## 2023-12-28 RX ORDER — DESLORATADINE 5 MG/1
5 TABLET ORAL DAILY
Qty: 90 TABLET | Refills: 0 | Status: SHIPPED | OUTPATIENT
Start: 2023-12-28 | End: 2024-04-16

## 2024-01-12 ENCOUNTER — ALLIED HEALTH/NURSE VISIT (OUTPATIENT)
Dept: FAMILY MEDICINE | Facility: CLINIC | Age: 69
End: 2024-01-12
Payer: COMMERCIAL

## 2024-01-12 ENCOUNTER — OFFICE VISIT (OUTPATIENT)
Dept: ENDOCRINOLOGY | Facility: CLINIC | Age: 69
End: 2024-01-12
Attending: PREVENTIVE MEDICINE
Payer: COMMERCIAL

## 2024-01-12 VITALS
SYSTOLIC BLOOD PRESSURE: 123 MMHG | BODY MASS INDEX: 41.93 KG/M2 | WEIGHT: 286 LBS | RESPIRATION RATE: 16 BRPM | DIASTOLIC BLOOD PRESSURE: 81 MMHG | HEART RATE: 103 BPM | OXYGEN SATURATION: 98 %

## 2024-01-12 DIAGNOSIS — E11.65 TYPE 2 DIABETES MELLITUS WITH HYPERGLYCEMIA, WITH LONG-TERM CURRENT USE OF INSULIN (H): ICD-10-CM

## 2024-01-12 DIAGNOSIS — Z23 NEED FOR VACCINATION: Primary | ICD-10-CM

## 2024-01-12 DIAGNOSIS — E66.01 MORBID OBESITY (H): Primary | ICD-10-CM

## 2024-01-12 DIAGNOSIS — Z79.4 TYPE 2 DIABETES MELLITUS WITH HYPERGLYCEMIA, WITH LONG-TERM CURRENT USE OF INSULIN (H): ICD-10-CM

## 2024-01-12 PROCEDURE — 90471 IMMUNIZATION ADMIN: CPT

## 2024-01-12 PROCEDURE — 90678 RSV VACC PREF BIVALENT IM: CPT

## 2024-01-12 PROCEDURE — 99207 PR NO CHARGE NURSE ONLY: CPT

## 2024-01-12 PROCEDURE — 99204 OFFICE O/P NEW MOD 45 MIN: CPT | Performed by: PHYSICIAN ASSISTANT

## 2024-01-12 NOTE — PATIENT INSTRUCTIONS
[facility]-Department of Endocrinology  Diabetes Educators:   Kelli Mata, RN and Sara Prescott RN  Clinic Nurse: WADE Kasper  CMA's: Laxmi Kramer and Jevon   EMT: Jay  Scheduling/Clinic phone number : [phone]   Clinic Fax: [phone]  On-Call Endocrine at the [facility] (after hours/weekends): [phone] option 4    Please call the number below to schedule your labs.  [facility] [phone]   [facility] [phone]   [place] [phone]   [facility]  [phone]   [facility] [phone]   [place] [phone]   [facility] [place] [place]) [phone]   [facility]   [place] [phone]   [place] [phone]   [place] [phone]   [place] [phone]     Please reach out to the following centers to schedule your imaging appointment:  Imaging (DEXA, CT, MRI, XRAY)    [facility] ([facility], [facility] [place]) [phone]   [facility] ([place], [place]) [phone]   [facility] ([facility]) [phone]   [facility] ([facility]) [phone]     Appointment Reminders:  * Please bring meter with for staff to download  * If you are due ONLY for an A1C, it is scheduled with the nurse and will be done in clinic. You do not need to schedule a lab appointment. Fasting is not required for an A1C.  * Refill request should be submitted to your pharmacy. They will contact clinic for approval.

## 2024-01-12 NOTE — LETTER
"    1/12/2024         RE: Arturo Rahman  [address] [facility] [address]        Dear Colleague,    Thank you for referring your patient, Arturo Rahman, to the [facility]. Please see a copy of my visit note below.    Assessment/Plan :   Type 2 DM. Jayson is doing great. He feels comfortable taking Lantus every day. He has no questions regarding insulin usage and he has not had any adverse effects from any of his medications. He does worry about the possible side effects connected to oral medications and he would prefer to continue with insulin, if possible. We did discuss the added cardiovascular benefits of some oral medications and we discussed how oral medications can affect insulin resistance. At present time, he is doing well and we will continue with once daily Lantus. We reviewed his CGMS report and we discussed his occasional post-prandial spikes. I encouraged him to keep \"experimenting\" with different foods. We will plan a follow-up appt in May. At that time, he will have retired and switched to Medicare. If he has any problems before that visit, he can reach out to our office.       I have independently reviewed and interpreted labs, imaging as indicated.      Chief complaint:  Arturo is a 68 year old male seen in consultation at the request of Dr. Crane for new onset Type 2 DM.    I have reviewed Care Everywhere including [facility], Critical access hospital, [facility], [facility], [facility], [facility] , [facility], [place] lab reports, imaging reports and provider notes as indicated.      HISTORY OF PRESENT ILLNESS  Jayson was diagnosed with diabetes in July of 2023. He had started losing weight without making any adjustments to his diet and exercise. He then developed dry mouth and excess thirst. His wife has diabetes so he used her meter to check his blood sugar and it was very elevated. He went to an urgent care and his blood sugar was over 500 mg/dl. He was " sent to the ER for possible DKA. He did not have any signs of significant acidosis. He was given IV fluids while at the ER and he was discharged with a prescription for twice daily metformin. He had a follow-up with his primary care and states that the metformin was discontinued and he was started on Lantus and Ozempic. He had a great response and later discontinued the Ozempic.    At present time, he is taking Lantus 35 unit(s) every evening. He continues to work on making healthy dietary decisions. He has also been monitoring his blood sugars closely with the FreeStyle Raoul sensor. He feels like things are going really well. He would prefer to continue with Lantus versus taking oral medications. He denies any problems with severe hyperglycemia and/or hypoglycemia. His vision is blurry but he went in for an eye exam right before he was diagnosed with diabetes. Looking back, he thinks the blurred vision was due to hyperglycemia, not a change in his vision. He also denies any problems with numbness/tingling in his feet.     His diabetes is complicated by hyperlipidemia, obesity, factor V Leiden mutation, HTN, and GERD.    Endocrine relevant labs are as follows:   Latest Reference Range & Units 11/30/23 08:43   Hemoglobin A1C 0.0 - 5.6 % 6.4 (H)   (H): Data is abnormally high     Latest Reference Range & Units 09/19/23 07:28   Albumin Urine mg/L mg/L <12.0     REVIEW OF SYSTEMS    Endocrine: positive for negative, diabetes, and obesity  Skin: negative  Eyes: negative for, visual blurring, redness, tearing  Ears/Nose/Throat: negative  Respiratory: No shortness of breath, dyspnea on exertion, cough, or hemoptysis  Cardiovascular: negative for, chest pain, dyspnea on exertion, lower extremity edema, and exercise intolerance  Gastrointestinal: negative for, nausea, vomiting, constipation, and diarrhea  Genitourinary: negative for, nocturia, dysuria, frequency, and urgency  Musculoskeletal: negative for, muscular  weakness, nocturnal cramping, and foot pain  Neurologic: negative for, local weakness, numbness or tingling of hands, and numbness or tingling of feet  Psychiatric: negative  Hematologic/Lymphatic/Immunologic: negative    Past Medical History  Past Medical History:   Diagnosis Date     Allergies      Asthma, moderate persistent      FH: colon cancer     colonoscopy q 5yr.  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     HTN (hypertension)      Hyperlipidemia        Medications  Current Outpatient Medications   Medication Sig Dispense Refill     albuterol (PROAIR HFA/PROVENTIL HFA/VENTOLIN HFA) 108 (90 Base) MCG/ACT inhaler Inhale 2 puffs into the lungs every 6 hours as needed for shortness of breath or wheezing 18 g 0     amLODIPine (NORVASC) 10 MG tablet Take 1 tablet (10 mg) by mouth once daily for blood pressure 90 tablet 0     atorvastatin (LIPITOR) 20 MG tablet Take 1 tablet (20 mg) by mouth daily 90 tablet 3     blood glucose (NO BRAND SPECIFIED) test strip Use to test blood sugar 3 times daily or as directed. To accompany: Blood Glucose Monitor Brands: per insurance. 100 strip 6     blood glucose monitoring (NO BRAND SPECIFIED) meter device kit Use to test blood sugar 3 times daily or as directed. Preferred blood glucose meter OR supplies to accompany: Blood Glucose Monitor Brands: per insurance. 1 kit 0     Continuous Blood Gluc Sensor (FREESTYLE GONZALES 2 SENSOR) [facility] 1 each every 14 days Use 1 sensor every 14 days. Use to read blood sugars per 's instructions. 6 each 3     desloratadine (CLARINEX) 5 MG tablet Take 1 tablet (5 mg) by mouth daily 90 tablet 0     ELIQUIS ANTICOAGULANT 5 MG tablet Take 1 tablet (5 mg) by mouth 2 times daily 180 tablet 3     esomeprazole (NEXIUM) 40 MG DR capsule Take 1 capsule (40 mg) by mouth every morning (before breakfast) Take 30-60 minutes before eating. 90 capsule 0     fluticasone (FLONASE) 50  MCG/ACT nasal spray Spray 1 spray into both nostrils daily 32 g 4     hydrochlorothiazide (HYDRODIURIL) 25 MG tablet Take 1 tablet (25 mg) by mouth daily 90 tablet 3     insulin glargine (LANTUS SOLOSTAR) 100 UNIT/ML pen Inject 41 Units Subcutaneous At Bedtime 45 mL 1     Insulin Pen Needle (PEN NEEDLES) 32G X 4 MM MISC 1 each daily 90 each 1     losartan (COZAAR) 100 MG tablet Take 1 tablet (100 mg) by mouth daily 90 tablet 3     montelukast (SINGULAIR) 10 MG tablet Take 1 tablet (10 mg) by mouth daily 90 tablet 0     Multiple Vitamins-Minerals (MULTIVITAL PO) Take 1 tablet by mouth daily       thin (NO BRAND SPECIFIED) lancets Use with lanceting device. To accompany: Blood Glucose Monitor Brands: per insurance. 100 each 6       Allergies  Allergies   Allergen Reactions     Ozempic (0.25 Or 0.5 Mg-Dose) [Semaglutide(0.25 Or 0.5mg-Dos)] GI Disturbance     Bacteria overgrowth in intestines - was hospitalized for this     Lisinopril Cough         Family History  family history includes Allergies in his daughter; Arthritis in his mother; Asthma in his daughter; Cancer - colorectal in his sister; Cerebrovascular Disease in his father; Hypertension in his father and sister.    Social History  Social History     Tobacco Use     Smoking status: Former     Types: Cigarettes     Quit date: 10/28/1991     Years since quittin.2     Smokeless tobacco: Never   Vaping Use     Vaping Use: Never used   Substance Use Topics     Alcohol use: Yes     Comment: rare     Drug use: No       Physical Exam  /81 (BP Location: Right arm, Patient Position: Sitting, Cuff Size: Adult Large)   Pulse 103   Resp 16   Wt 129.7 kg (286 lb)   SpO2 98%   BMI 41.93 kg/m    Body mass index is 41.93 kg/m .  GENERAL :  In no apparent distress  SKIN: Normal color, normal temperature, texture.  No hirsutism, alopecia or purple striae.     EYES: PERRL, EOMI, No scleral icterus,  No proptosis, conjunctival redness, stare, retraction  RESP:  Lungs clear to auscultation bilaterally  CARDIAC: Regular rate and rhythm, normal S1 S2, without murmurs, rubs or gallops    NEURO: awake, alert, responds appropriately to questions.  Cranial nerves intact.   Moves all extremities; Gait normal.  No tremor of the outstretched hand.   EXTREMITIES: No clubbing, cyanosis or edema.    DATA REVIEW  FreeStyle LibreView  Time in target range 93%  High 7%  Current Ave  mg/dl        Again, thank you for allowing me to participate in the care of your patient.        Sincerely,        Veronica Pradhan PA-C

## 2024-01-12 NOTE — PROGRESS NOTES
Prior to immunization administration, verified patients identity using patient s name and date of birth. Please see Immunization Activity for additional information.     Screening Questionnaire for Adult Immunization    Are you sick today?   No   Do you have allergies to medications, food, a vaccine component or latex?   No   Have you ever had a serious reaction after receiving a vaccination?   No   Do you have a long-term health problem with heart, lung, kidney, or metabolic disease (e.g., diabetes), asthma, a blood disorder, no spleen, complement component deficiency, a cochlear implant, or a spinal fluid leak?  Are you on long-term aspirin therapy?   Yes   Do you have cancer, leukemia, HIV/AIDS, or any other immune system problem?   No   Do you have a parent, brother, or sister with an immune system problem?   No   In the past 3 months, have you taken medications that affect  your immune system, such as prednisone, other steroids, or anticancer drugs; drugs for the treatment of rheumatoid arthritis, Crohn s disease, or psoriasis; or have you had radiation treatments?   No   Have you had a seizure, or a brain or other nervous system problem?   No   During the past year, have you received a transfusion of blood or blood    products, or been given immune (gamma) globulin or antiviral drug?   No   For women: Are you pregnant or is there a chance you could become       pregnant during the next month?   No   Have you received any vaccinations in the past 4 weeks?   No     Immunization questionnaire was positive for at least one answer.  Notified Dr. Crane.    I have reviewed the following standing orders:   This patient is due and qualifies for the RSV vaccine.    Click here for RSV Vaccine Standing Order    I have reviewed the vaccines inclusion and exclusion criteria; No concerns regarding eligibility.     Patient instructed to remain in clinic for 15 minutes afterwards, and to report any adverse reactions.      Screening performed by Jojo Cohen MA on 1/12/2024 at 9:56 AM.

## 2024-01-12 NOTE — NURSING NOTE
Arturo Rahman's goals for this visit include:   Chief Complaint   Patient presents with    New Patient     DMII     He requests these members of his care team be copied on today's visit information: yes    PCP: Charlee Crane    Referring Provider:  Charlee Crane MD  [id number] [place] [address]    /81 (BP Location: Right arm, Patient Position: Sitting, Cuff Size: Adult Large)   Pulse 103   Resp 16   Wt 129.7 kg (286 lb)   SpO2 98%   BMI 41.93 kg/m      Do you need any medication refills at today's visit? None    Jay Graves, EMT

## 2024-01-12 NOTE — PROGRESS NOTES
"Assessment/Plan :   Type 2 DM. Jayson is doing great. He feels comfortable taking Lantus every day. He has no questions regarding insulin usage and he has not had any adverse effects from any of his medications. He does worry about the possible side effects connected to oral medications and he would prefer to continue with insulin, if possible. We did discuss the added cardiovascular benefits of some oral medications and we discussed how oral medications can affect insulin resistance. At present time, he is doing well and we will continue with once daily Lantus. We reviewed his CGMS report and we discussed his occasional post-prandial spikes. I encouraged him to keep \"experimenting\" with different foods. We will plan a follow-up appt in May. At that time, he will have retired and switched to Medicare. If he has any problems before that visit, he can reach out to our office.       I have independently reviewed and interpreted labs, imaging as indicated.      Chief complaint:  Arturo is a 68 year old male seen in consultation at the request of Dr. Crane for new onset Type 2 DM.    I have reviewed Care Everywhere including [facility], [facility], [facility], [facility], [facility] , [facility], [place] lab reports, imaging reports and provider notes as indicated.      HISTORY OF PRESENT ILLNESS  Jayson was diagnosed with diabetes in July of 2023. He had started losing weight without making any adjustments to his diet and exercise. He then developed dry mouth and excess thirst. His wife has diabetes so he used her meter to check his blood sugar and it was very elevated. He went to an urgent care and his blood sugar was over 500 mg/dl. He was sent to the ER for possible DKA. He did not have any signs of significant acidosis. He was given IV fluids while at the ER and he was discharged with a prescription for twice daily metformin. He had a follow-up with his primary care and states that the metformin " was discontinued and he was started on Lantus and Ozempic. He had a great response and later discontinued the Ozempic.    At present time, he is taking Lantus 35 unit(s) every evening. He continues to work on making healthy dietary decisions. He has also been monitoring his blood sugars closely with the FreeStyle Raoul sensor. He feels like things are going really well. He would prefer to continue with Lantus versus taking oral medications. He denies any problems with severe hyperglycemia and/or hypoglycemia. His vision is blurry but he went in for an eye exam right before he was diagnosed with diabetes. Looking back, he thinks the blurred vision was due to hyperglycemia, not a change in his vision. He also denies any problems with numbness/tingling in his feet.     His diabetes is complicated by hyperlipidemia, obesity, factor V Leiden mutation, HTN, and GERD.    Endocrine relevant labs are as follows:   Latest Reference Range & Units 11/30/23 08:43   Hemoglobin A1C 0.0 - 5.6 % 6.4 (H)   (H): Data is abnormally high     Latest Reference Range & Units 09/19/23 07:28   Albumin Urine mg/L mg/L <12.0     REVIEW OF SYSTEMS    Endocrine: positive for negative, diabetes, and obesity  Skin: negative  Eyes: negative for, visual blurring, redness, tearing  Ears/Nose/Throat: negative  Respiratory: No shortness of breath, dyspnea on exertion, cough, or hemoptysis  Cardiovascular: negative for, chest pain, dyspnea on exertion, lower extremity edema, and exercise intolerance  Gastrointestinal: negative for, nausea, vomiting, constipation, and diarrhea  Genitourinary: negative for, nocturia, dysuria, frequency, and urgency  Musculoskeletal: negative for, muscular weakness, nocturnal cramping, and foot pain  Neurologic: negative for, local weakness, numbness or tingling of hands, and numbness or tingling of feet  Psychiatric: negative  Hematologic/Lymphatic/Immunologic: negative    Past Medical History  Past Medical History:    Diagnosis Date    Allergies     Asthma, moderate persistent     FH: colon cancer     colonoscopy q 5yr.  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    HTN (hypertension)     Hyperlipidemia        Medications  Current Outpatient Medications   Medication Sig Dispense Refill    albuterol (PROAIR HFA/PROVENTIL HFA/VENTOLIN HFA) 108 (90 Base) MCG/ACT inhaler Inhale 2 puffs into the lungs every 6 hours as needed for shortness of breath or wheezing 18 g 0    amLODIPine (NORVASC) 10 MG tablet Take 1 tablet (10 mg) by mouth once daily for blood pressure 90 tablet 0    atorvastatin (LIPITOR) 20 MG tablet Take 1 tablet (20 mg) by mouth daily 90 tablet 3    blood glucose (NO BRAND SPECIFIED) test strip Use to test blood sugar 3 times daily or as directed. To accompany: Blood Glucose Monitor Brands: per insurance. 100 strip 6    blood glucose monitoring (NO BRAND SPECIFIED) meter device kit Use to test blood sugar 3 times daily or as directed. Preferred blood glucose meter OR supplies to accompany: Blood Glucose Monitor Brands: per insurance. 1 kit 0    Continuous Blood Gluc Sensor (FREESTYLE GONZALES 2 SENSOR) [facility] 1 each every 14 days Use 1 sensor every 14 days. Use to read blood sugars per 's instructions. 6 each 3    desloratadine (CLARINEX) 5 MG tablet Take 1 tablet (5 mg) by mouth daily 90 tablet 0    ELIQUIS ANTICOAGULANT 5 MG tablet Take 1 tablet (5 mg) by mouth 2 times daily 180 tablet 3    esomeprazole (NEXIUM) 40 MG DR capsule Take 1 capsule (40 mg) by mouth every morning (before breakfast) Take 30-60 minutes before eating. 90 capsule 0    fluticasone (FLONASE) 50 MCG/ACT nasal spray Spray 1 spray into both nostrils daily 32 g 4    hydrochlorothiazide (HYDRODIURIL) 25 MG tablet Take 1 tablet (25 mg) by mouth daily 90 tablet 3    insulin glargine (LANTUS SOLOSTAR) 100 UNIT/ML pen Inject 41 Units Subcutaneous At Bedtime 45 mL 1    Insulin Pen  Needle (PEN NEEDLES) 32G X 4 MM MISC 1 each daily 90 each 1    losartan (COZAAR) 100 MG tablet Take 1 tablet (100 mg) by mouth daily 90 tablet 3    montelukast (SINGULAIR) 10 MG tablet Take 1 tablet (10 mg) by mouth daily 90 tablet 0    Multiple Vitamins-Minerals (MULTIVITAL PO) Take 1 tablet by mouth daily      thin (NO BRAND SPECIFIED) lancets Use with lanceting device. To accompany: Blood Glucose Monitor Brands: per insurance. 100 each 6       Allergies  Allergies   Allergen Reactions    Ozempic (0.25 Or 0.5 Mg-Dose) [Semaglutide(0.25 Or 0.5mg-Dos)] GI Disturbance     Bacteria overgrowth in intestines - was hospitalized for this    Lisinopril Cough         Family History  family history includes Allergies in his daughter; Arthritis in his mother; Asthma in his daughter; Cancer - colorectal in his sister; Cerebrovascular Disease in his father; Hypertension in his father and sister.    Social History  Social History     Tobacco Use    Smoking status: Former     Types: Cigarettes     Quit date: 10/28/1991     Years since quittin.2    Smokeless tobacco: Never   Vaping Use    Vaping Use: Never used   Substance Use Topics    Alcohol use: Yes     Comment: rare    Drug use: No       Physical Exam  /81 (BP Location: Right arm, Patient Position: Sitting, Cuff Size: Adult Large)   Pulse 103   Resp 16   Wt 129.7 kg (286 lb)   SpO2 98%   BMI 41.93 kg/m    Body mass index is 41.93 kg/m .  GENERAL :  In no apparent distress  SKIN: Normal color, normal temperature, texture.  No hirsutism, alopecia or purple striae.     EYES: PERRL, EOMI, No scleral icterus,  No proptosis, conjunctival redness, stare, retraction  RESP: Lungs clear to auscultation bilaterally  CARDIAC: Regular rate and rhythm, normal S1 S2, without murmurs, rubs or gallops    NEURO: awake, alert, responds appropriately to questions.  Cranial nerves intact.   Moves all extremities; Gait normal.  No tremor of the outstretched hand.   EXTREMITIES: No  clubbing, cyanosis or edema.    DATA REVIEW  FreeStyle LibreView  Time in target range 93%  High 7%  Current Ave  mg/dl

## 2024-03-14 ENCOUNTER — LAB (OUTPATIENT)
Dept: LAB | Facility: CLINIC | Age: 69
End: 2024-03-14
Payer: COMMERCIAL

## 2024-03-14 DIAGNOSIS — E11.65 TYPE 2 DIABETES MELLITUS WITH HYPERGLYCEMIA, WITH LONG-TERM CURRENT USE OF INSULIN (H): ICD-10-CM

## 2024-03-14 DIAGNOSIS — Z79.4 TYPE 2 DIABETES MELLITUS WITH HYPERGLYCEMIA, WITH LONG-TERM CURRENT USE OF INSULIN (H): ICD-10-CM

## 2024-03-14 DIAGNOSIS — I26.94 MULTIPLE SUBSEGMENTAL PULMONARY EMBOLI WITHOUT ACUTE COR PULMONALE (H): ICD-10-CM

## 2024-03-14 DIAGNOSIS — E11.9 DIABETES MELLITUS, TYPE 2 (H): Primary | ICD-10-CM

## 2024-03-14 DIAGNOSIS — I10 HYPERTENSION, GOAL BELOW 140/90: ICD-10-CM

## 2024-03-14 LAB
ERYTHROCYTE [DISTWIDTH] IN BLOOD BY AUTOMATED COUNT: 15.4 % (ref 10–15)
HBA1C MFR BLD: 6.9 % (ref 0–5.6)
HCT VFR BLD AUTO: 43.5 % (ref 40–53)
HGB BLD-MCNC: 13.6 G/DL (ref 13.3–17.7)
MCH RBC QN AUTO: 25.7 PG (ref 26.5–33)
MCHC RBC AUTO-ENTMCNC: 31.3 G/DL (ref 31.5–36.5)
MCV RBC AUTO: 82 FL (ref 78–100)
PLATELET # BLD AUTO: 310 10E3/UL (ref 150–450)
RBC # BLD AUTO: 5.29 10E6/UL (ref 4.4–5.9)
WBC # BLD AUTO: 11.2 10E3/UL (ref 4–11)

## 2024-03-14 PROCEDURE — 83036 HEMOGLOBIN GLYCOSYLATED A1C: CPT

## 2024-03-14 PROCEDURE — 80048 BASIC METABOLIC PNL TOTAL CA: CPT

## 2024-03-14 PROCEDURE — 36415 COLL VENOUS BLD VENIPUNCTURE: CPT

## 2024-03-14 PROCEDURE — 85027 COMPLETE CBC AUTOMATED: CPT

## 2024-03-14 NOTE — RESULT ENCOUNTER NOTE
Arturo,     Three month glucose number Hemoglobin A1C is at goal. Continue to work with the Diabetes specialists.  Complete blood count is stable.     Please do not hesitate to call us at [phone] if you have any questions or concerns.    Thank you,    Charlee Crane MD MPH

## 2024-03-15 LAB
ANION GAP SERPL CALCULATED.3IONS-SCNC: 11 MMOL/L (ref 7–15)
BUN SERPL-MCNC: 21.9 MG/DL (ref 8–23)
CALCIUM SERPL-MCNC: 9.1 MG/DL (ref 8.8–10.2)
CHLORIDE SERPL-SCNC: 104 MMOL/L (ref 98–107)
CREAT SERPL-MCNC: 1.23 MG/DL (ref 0.67–1.17)
DEPRECATED HCO3 PLAS-SCNC: 28 MMOL/L (ref 22–29)
EGFRCR SERPLBLD CKD-EPI 2021: 65 ML/MIN/1.73M2
GLUCOSE SERPL-MCNC: 111 MG/DL (ref 70–99)
POTASSIUM SERPL-SCNC: 3.9 MMOL/L (ref 3.4–5.3)
SODIUM SERPL-SCNC: 143 MMOL/L (ref 135–145)

## 2024-03-15 NOTE — RESULT ENCOUNTER NOTE
Arturo,     Electrolytes and glucose are within normal limits.  Kidney function is borderline low but stable.     Please do not hesitate to call us at [phone] if you have any questions or concerns.    Thank you,    Charlee Crane MD MPH

## 2024-03-19 ENCOUNTER — ALLIED HEALTH/NURSE VISIT (OUTPATIENT)
Dept: EDUCATION SERVICES | Facility: CLINIC | Age: 69
End: 2024-03-19
Payer: COMMERCIAL

## 2024-03-19 DIAGNOSIS — Z79.4 TYPE 2 DIABETES MELLITUS WITH HYPERGLYCEMIA, WITH LONG-TERM CURRENT USE OF INSULIN (H): Primary | ICD-10-CM

## 2024-03-19 DIAGNOSIS — E11.65 TYPE 2 DIABETES MELLITUS WITH HYPERGLYCEMIA, WITH LONG-TERM CURRENT USE OF INSULIN (H): Primary | ICD-10-CM

## 2024-03-19 PROCEDURE — G0108 DIAB MANAGE TRN  PER INDIV: HCPCS | Mod: AE | Performed by: DIETITIAN, REGISTERED

## 2024-03-19 RX ORDER — BLOOD-GLUCOSE SENSOR
1 EACH MISCELLANEOUS
Qty: 6 EACH | Refills: 3 | Status: SHIPPED | OUTPATIENT
Start: 2024-03-19

## 2024-03-19 NOTE — PATIENT INSTRUCTIONS
Check on placement when you have a low blood glucose alarm.  Check that any supplements are below 500 mg ascorbic acid.   -Ordered the Raoul 3 today so hopefully it works better for you.     2. Ok to have vitamin C from fruit-mostly watching the 500 mg limit from supplements.     3. Check with pharmacist about grapefruit in AM if you take Lipitor at night.     4. If considering trying Metformin, can try to pair with more activity to lower blood glucose as well as increasing fiber from food (whole grains, high fiber foods, beans, nuts, fruits and vegetables).     FOLLOW UP APPOINTMENT: In person follow up on Tuesday, July 16th at 9am at [facility].     Sharmila Thompson RDN, LD, [facility]   [phone]    Raoul 3 Instructions:  1. The first hour after you place the sensor is the warm up period (black out period).  You will need to carry your blood glucose meter and check blood glucose during this time.     2. Check blood sugar if feeling symptoms of hypoglycemia but meter is not displaying a low number- may be some variability between sensor and meter at times.     3. Change sensor every 14 days.     4. The Raoul 3 will pull blood glucose directly into your phone. Raoul 3 has alarms. You can adjust both the high and low blood glucose alarm (when they will go off) or turn off high blood glucose alarm if alarming too much.  You cannot turn off the critical low blood glucose alarm.     5. Watch trend arrows- will let you know if blood sugars are rising, falling or stable at the time you check.     6. It is okay to shower, bathe, and swim (up to 3 feet deep for 30 minutes) with sensor.      7. Remove the sensor if you need to have an MRI or CT scan.     8. Do not cover the sensor with extra adhesive (the small hole in the center of the sensor must remain uncovered).  If you have trouble with sensor falling off, these are approved products to help with sensor adhesion: Torbot Skin Tac, SKIN-PREP Protective Barrier  Wipe and Mastisol Liquid Adhesive     9. Check the dose if you take a multivitamin or Vitamin C (ascorbic acid). You want to limit daily intake of ascorbic acid to 500 mg/day or less, or it may falsely raise sensor glucose readings. This is more of a concern if you are on insulin or medication that can cause low blood glucose as you may miss a severe low glucose event.    Support:   If you have any trouble with use or if the sensor falls off early, call the customer service number for Raoul located on the sensor's box. They can often help troubleshoot or replace sensor if needed.   Raoul Customer Service: [phone]    Online form to request a new sensor: [email or web address]      Louise:   [email or web address] (need to set up an account with email address)

## 2024-03-19 NOTE — PROGRESS NOTES
Diabetes Self-Management Education & Support    Presents for: CGM Review    Type of Service: In Person Visit      REPORTS:            Pt verbalized understanding of concepts discussed and recommendations provided today.       Continue education with the following diabetes management concepts: Healthy Eating, Monitoring, and Taking Medication    ASSESSMENT:  Patient continues to see good blood glucose control on 35 units of Lantus and no other medications. He was wondering about Metformin and possibly getting off insulin. Discussed the potential for it but not guaranteed to be able to stop Lantus- plan usually is to reduce Lantus when Metformin is started and to lower lantus as Metformin is tapered up. Discussion other lifestyle changes that may continue to lower blood glucose such as adding daily activity (patient plans to retire in April) as well as eating more high fiber foods to see how blood glucose respond. He plans to discuss more with Veronica Pradhan at his Endocrinology follow up in May.    In the meantime, no changes recommended to medications today since patient continues to see >90% time in range. Discussed sensor options and patient would like to stay with Raoul. Checked for phone compatibility and ordered the Raoul 3 sensors for him to try to see if this works better for accuracy; another benefit that won't have to scan his blood glucose all the time and will automatically go to his phone. Answered his food questions on counting fiber with carbohydrates and suggested talking to pharmacist about grapefruit as always heard best to avoid with statins but he says he was told by an old MD ok to eat grapefruit in AM if taking statins in PM. Pt verbalized understanding of concepts discussed and recommendations provided.         Glucose Patterns & Trends:  Time in range of  mg/dL, 97% of the time. and Time below range of 70 mg/dL, 1% of the time. Says all low blood glucose on sensor when checked were false  "low- blood glucose was >70 mg/dL.    PLAN    -No change to medications recommended today.  -Try Raoul 3 to see if works better.    Topics to cover at upcoming visits: Healthy Eating, Monitoring, and Taking Medication    Follow-up: 7/16/24    See Care Plan for co-developed, patient-state behavior change goals.  AVS provided for patient today.    Education Materials Provided:  No new materials provided today      SUBJECTIVE/OBJECTIVE:  Presents for: CGM Review  Accompanied by: Self, Spouse  Diabetes education in the past 24mo: Yes  Diabetes type: Type 2  Date of diagnosis: 7/11/23  Disease course: Stable  Diabetes management related comments/concerns: Says noticing that the first day he wear the sensor, the BG readings are off. Now he is seeing \"off\" readings in the middle and last 1-2 days at the end of the sensor session. Wondering if the Raoul 3 is an option for him or may work better?    Says Lantus is at 35 units a day.     Not as many veggies recently but trying to eat more higher fiber foods. Was wondering how to count carbohydrates in \"low-carbohydrate\" tortillas since carbohydrates similar but fiber content higher than in normal tortillas. Planning to retire in April.    Transportation concerns: No  Difficulty affording diabetes medication?: No  Difficulty affording diabetes testing supplies?: No  Other concerns:: Glasses  Cultural Influences/Ethnic Background:  Choose not to answer      Diabetes Symptoms & Complications:  Diabetes Related Symptoms: None  Weight trend: Stable  Symptom course: Stable  Disease course: Stable  Complications assessed today?: Yes  Heart disease: Other (Heart weakness and pulmonary clots)    Patient Problem List and Family Medical History reviewed for relevant medical history, current medical status, and diabetes risk factors.    Vitals:  There were no vitals taken for this visit.  Estimated body mass index is 41.93 kg/m  as calculated from the following:    Height as of 7/25/23: " "1.759 m (5' 9.25\").    Weight as of 1/12/24: 129.7 kg (286 lb).   Last 3 BP:   BP Readings from Last 3 Encounters:   01/12/24 123/81   07/25/23 108/76   07/11/23 121/82       History   Smoking Status    Former    Types: Cigarettes    Quit date: 10/28/1991   Smokeless Tobacco    Never       Labs:  Lab Results   Component Value Date    A1C 6.9 03/14/2024    A1C 6.7 06/10/2021     Lab Results   Component Value Date     03/14/2024     01/09/2023     06/10/2021     Lab Results   Component Value Date    LDL 76 09/19/2023    LDL 98 07/11/2022    LDL 78 06/10/2021     HDL Cholesterol   Date Value Ref Range Status   06/10/2021 44 >39 mg/dL Final     Direct Measure HDL   Date Value Ref Range Status   09/19/2023 39 (L) >=40 mg/dL Final   ]  GFR Estimate   Date Value Ref Range Status   03/14/2024 65 >60 mL/min/1.73m2 Final   06/10/2021 61 >60 mL/min/[1.73_m2] Final     Comment:     Non  GFR Calc  Starting 12/18/2018, serum creatinine based estimated GFR (eGFR) will be   calculated using the Chronic Kidney Disease Epidemiology Collaboration   (CKD-EPI) equation.       GFR Estimate If Black   Date Value Ref Range Status   06/10/2021 70 >60 mL/min/[1.73_m2] Final     Comment:      GFR Calc  Starting 12/18/2018, serum creatinine based estimated GFR (eGFR) will be   calculated using the Chronic Kidney Disease Epidemiology Collaboration   (CKD-EPI) equation.       Lab Results   Component Value Date    CR 1.23 03/14/2024    CR 1.25 06/10/2021     No results found for: \"MICROALBUMIN\"    Healthy Eating:  Healthy Eating Assessed Today: Yes  Cultural/Quaker diet restrictions?: No  Meal planning/habits: Carb counting, Smaller portions  Who purchases food in  your home?: Spouse  Meals include: Breakfast, Lunch, Dinner, Morning Snack, Evening Snack  Breakfast: yogurt and grapefruit OR eggs with ham/sausage/rodriguez and 1-2 cups coffee  Lunch: leftovers (soup, casserole, steak) and " water  Dinner: sloppy joes OR bean soup OR casserole and 12 oz milk  Snacks: AM: orange; PM: none OR small bag chips OR small bag jerky (0.7 oz)  Other: HS: popcorn OR Root beer float OR chips and cheese and 24 oz water  Beverages: Water, Coffee, Diet soda, Milk (SF soda on occasion.)    Being Active:  Being Active Assessed Today: Yes  Exercise:: Currently not exercising    Monitoring:  Monitoring Assessed Today: Yes  Did patient bring glucose meter to appointment? : Yes  Blood Glucose Meter: CGM  Times checking blood sugar at home (number): 4  Times checking blood sugar at home (per): Day      Taking Medications:  Diabetes Medication(s)       Insulin       insulin glargine (LANTUS SOLOSTAR) 100 UNIT/ML pen Inject 41 Units Subcutaneous At Bedtime            Taking Medication Assessed Today: Yes  Current Treatments: Insulin Injections  Problems taking diabetes medications regularly?: No    Problem Solving:  Problem Solving Assessed Today: No  Is the patient at risk for hypoglycemia?: Yes  Hypoglycemia Frequency: Rarely    Hypoglycemia Symptoms  Hypoglycemia: None    Hypoglycemia Complications  Hypoglycemia Complications: None    Reducing Risks:  Reducing Risks Assessed Today: No    Healthy Coping:  Healthy Coping Assessed Today: Yes  Emotional response to diabetes: Ready to learn, Acceptance  Informal Support system:: Spouse  Stage of change: MAINTENANCE (Working to maintain change, with risk of relapse)  Patient Activation Measure Survey Score:      6/13/2012     9:00 AM   GEOVANY Score (Last Two)   GEOVANY Raw Score 47   Activation Score 77.5   GEOVANY Level 4         Care Plan and Education Provided:  Care Plan: Diabetes   Updates made by Sharmila Thompson RD since 3/19/2024 12:00 AM        Problem: HbA1C Not In Goal         Goal: Establish Regular Follow-Ups with PCP         Task: Discuss with PCP the recommended timing for patient's next follow up visit(s)    Responsible User: Sharmila Thompson RD        Task: Discuss  schedule for PCP visits with patient    Responsible User: Sharmila Thompson RD        Goal: Get HbA1C Level in Goal         Task: Educate patient on diabetes education self-management topics    Responsible User: Sharmila Thompson RD        Task: Educate patient on benefits of regular glucose monitoring Completed 3/19/2024   Responsible User: Sharmila Thompson RD        Task: Refer patient to appropriate extended care team member, as needed (Medication Therapy Management, Behavioral Health, Physical Therapy, etc.)    Responsible User: Sharmila Thompson RD        Task: Discuss diabetes treatment plan with patient    Responsible User: Sharmila Thompson RD        Problem: Diabetes Self-Management Education Needed to Optimize Self-Care Behaviors         Goal: Understand diabetes pathophysiology and disease progression         Task: Provide education on diabetes pathophysiology and disease progression specfic to patient's diabetes type Completed 3/19/2024   Responsible User: Sharmila Thompson RD        Goal: Healthy Eating - follow a healthy eating pattern for diabetes         Task: Provide education on portion control and consistency in amount, composition and timing of food intake Completed 3/19/2024   Responsible User: Sharmila Thompson RD        Task: Provide education on managing carbohydrate intake (carbohydrate counting, plate planning method, etc.) Completed 3/19/2024   Responsible User: Sharmila Thompson RD        Task: Provide education on weight management    Responsible User: Sharmila Thompson RD        Task: Provide education on heart healthy eating Completed 3/19/2024   Responsible User: Sharmila Thompson RD        Task: Provide education on eating out    Responsible User: Sharmila Thompson RD        Task: Develop individualized healthy eating plan with patient    Responsible User: Sharmila Thompson RD        Goal: Being Active - get regular physical activity, working up to at least 150 minutes per week         Task:  Provide education on relationship of activity to glucose and precautions to take if at risk for low glucose Completed 3/19/2024   Responsible User: Sharmila Thompson RD        Task: Discuss barriers to physical activity with patient    Responsible User: Sharmila Thompson RD        Task: Develop physical activity plan with patient    Responsible User: Sharmila Thompson RD        Task: Explore community resources including walking groups, assistance programs, and home videos    Responsible User: Sharmila Thompson RD        Goal: Monitoring - monitor glucose and ketones as directed    This Visit's Progress: 0%   Note:    Try the Raoul 3 and see if it works better for you.       Task: Provide education on blood glucose monitoring (purpose, proper technique, frequency, glucose targets, interpreting results, when to use glucose control solution, sharps disposal)    Responsible User: Sharmila Thompson RD        Task: Provide education on continuous glucose monitoring (sensor placement, use of lucila or /reader, understanding glucose trends, alerts and alarms, differences between sensor glucose and blood glucose) Completed 3/19/2024   Responsible User: Sharmila Thompson RD        Task: Provide education on ketone monitoring (when to monitor, frequency, etc.)    Responsible User: Sharmila Thompson RD        Goal: Taking Medication - patient is consistently taking medications as directed         Task: Provide education on action of prescribed medication, including when to take and possible side effects Completed 3/19/2024   Responsible User: Sharmila Thompson RD        Task: Provide education on insulin and injectable diabetes medications, including administration, storage, site selection and rotation for injection sites    Responsible User: Sharmila Thompson RD        Task: Discuss barriers to medication adherence with patient and provide management technique ideas as appropriate    Responsible User: Sharmila Thompson RD         Task: Provide education on frequency and refill details of medications    Responsible User: Sharmila Thompson RD        Goal: Problem Solving - know how to prevent and manage short-term diabetes complications         Task: Provide education on high blood glucose - causes, signs/symptoms, prevention and treatment    Responsible User: Sharmila Thompson RD        Task: Provide education on low blood glucose - causes, signs/symptoms, prevention, treatment, carrying a carbohydrate source at all times, and medical identification    Responsible User: Sharmila Thompson RD        Task: Provide education on safe travel with diabetes    Responsible User: Sharmila Thompson RD        Task: Provide education on how to care for diabetes on sick days    Responsible User: Sharmila Thompson RD        Task: Provide education on when to call a health care provider    Responsible User: Sharmila Thompson RD        Goal: Reducing Risks - know how to prevent and treat long-term diabetes complications         Task: Provide education on major complications of diabetes, prevention, early diagnostic measures and treatment of complications    Responsible User: Sharmila Thompson RD        Task: Provide education on recommended care for dental, eye and foot health    Responsible User: Sharmila Thompson RD        Task: Provide education on Hemoglobin A1c - goals and relationship to blood glucose levels    Responsible User: Sharmila Thompson RD        Task: Provide education on recommendations for heart health - lipid levels and goals, blood pressure and goals, and aspirin therapy, if indicated    Responsible User: Sharmila Thompson RD        Task: Provide education on tobacco cessation    Responsible User: Sharmila Thompson RD        Goal: Healthy Coping - use available resources to cope with the challenges of managing diabetes         Task: Discuss recognizing feelings about having diabetes    Responsible User: Sharmila Thompson RD        Task: Provide  education on the benefits of making appropriate lifestyle changes    Responsible User: Sharmila Thompson RD        Task: Provide education on benefits of utilizing support systems    Responsible User: Sharmila Thompson RD        Task: Discuss methods for coping with stress    Responsible User: Sharmila Thompson RD        Task: Provide education on when to seek professional counseling    Responsible User: Sharmila Thompson RD Kaydee Brown, RDN, LD, [facility]     Time Spent: 60 minutes  Encounter Type: Individual    Any diabetes medication dose changes were made via the CDE Protocol per the patient's referring provider. A copy of this encounter was shared with the provider.

## 2024-03-19 NOTE — LETTER
3/19/2024         RE: Arturo Rahman  [address] [facility] [address]        Dear Colleague,    Thank you for referring your patient, Arturo Rahman, to the [facility]. Please see a copy of my visit note below.    Diabetes Self-Management Education & Support    Presents for: CGM Review    Type of Service: In Person Visit      REPORTS:            Pt verbalized understanding of concepts discussed and recommendations provided today.       Continue education with the following diabetes management concepts: Healthy Eating, Monitoring, and Taking Medication    ASSESSMENT:  Patient continues to see good blood glucose control on 35 units of Lantus and no other medications. He was wondering about Metformin and possibly getting off insulin. Discussed the potential for it but not guaranteed to be able to stop Lantus- plan usually is to reduce Lantus when Metformin is started and to lower lantus as Metformin is tapered up. Discussion other lifestyle changes that may continue to lower blood glucose such as adding daily activity (patient plans to retire in April) as well as eating more high fiber foods to see how blood glucose respond. He plans to discuss more with Veronica Pradhan at his Endocrinology follow up in May.    In the meantime, no changes recommended to medications today since patient continues to see >90% time in range. Discussed sensor options and patient would like to stay with [employer]. Checked for phone compatibility and ordered the Raoul 3 sensors for him to try to see if this works better for accuracy; another benefit that won't have to scan his blood glucose all the time and will automatically go to his phone. Answered his food questions on counting fiber with carbohydrates and suggested talking to pharmacist about grapefruit as always heard best to avoid with statins but he says he was told by an old MD ok to eat grapefruit in AM if taking statins in PM. Pt  "verbalized understanding of concepts discussed and recommendations provided.         Glucose Patterns & Trends:  Time in range of  mg/dL, 97% of the time. and Time below range of 70 mg/dL, 1% of the time. Says all low blood glucose on sensor when checked were false low- blood glucose was >70 mg/dL.    PLAN    -No change to medications recommended today.  -Try Raoul 3 to see if works better.    Topics to cover at upcoming visits: Healthy Eating, Monitoring, and Taking Medication    Follow-up: 7/16/24    See Care Plan for co-developed, patient-state behavior change goals.  AVS provided for patient today.    Education Materials Provided:  No new materials provided today      SUBJECTIVE/OBJECTIVE:  Presents for: CGM Review  Accompanied by: Self, Spouse  Diabetes education in the past 24mo: Yes  Diabetes type: Type 2  Date of diagnosis: 7/11/23  Disease course: Stable  Diabetes management related comments/concerns: Says noticing that the first day he wear the sensor, the BG readings are off. Now he is seeing \"off\" readings in the middle and last 1-2 days at the end of the sensor session. Wondering if the Raoul 3 is an option for him or may work better?    Says Lantus is at 35 units a day.     Not as many veggies recently but trying to eat more higher fiber foods. Was wondering how to count carbohydrates in \"low-carbohydrate\" tortillas since carbohydrates similar but fiber content higher than in normal tortillas. Planning to retire in April.    Transportation concerns: No  Difficulty affording diabetes medication?: No  Difficulty affording diabetes testing supplies?: No  Other concerns:: Glasses  Cultural Influences/Ethnic Background:  Choose not to answer      Diabetes Symptoms & Complications:  Diabetes Related Symptoms: None  Weight trend: Stable  Symptom course: Stable  Disease course: Stable  Complications assessed today?: Yes  Heart disease: Other (Heart weakness and pulmonary clots)    Patient Problem List " "and Family Medical History reviewed for relevant medical history, current medical status, and diabetes risk factors.    Vitals:  There were no vitals taken for this visit.  Estimated body mass index is 41.93 kg/m  as calculated from the following:    Height as of 7/25/23: 1.759 m (5' 9.25\").    Weight as of 1/12/24: 129.7 kg (286 lb).   Last 3 BP:   BP Readings from Last 3 Encounters:   01/12/24 123/81   07/25/23 108/76   07/11/23 121/82       History   Smoking Status     Former     Types: Cigarettes     Quit date: 10/28/1991   Smokeless Tobacco     Never       Labs:  Lab Results   Component Value Date    A1C 6.9 03/14/2024    A1C 6.7 06/10/2021     Lab Results   Component Value Date     03/14/2024     01/09/2023     06/10/2021     Lab Results   Component Value Date    LDL 76 09/19/2023    LDL 98 07/11/2022    LDL 78 06/10/2021     HDL Cholesterol   Date Value Ref Range Status   06/10/2021 44 >39 mg/dL Final     Direct Measure HDL   Date Value Ref Range Status   09/19/2023 39 (L) >=40 mg/dL Final   ]  GFR Estimate   Date Value Ref Range Status   03/14/2024 65 >60 mL/min/1.73m2 Final   06/10/2021 61 >60 mL/min/[1.73_m2] Final     Comment:     Non  GFR Calc  Starting 12/18/2018, serum creatinine based estimated GFR (eGFR) will be   calculated using the Chronic Kidney Disease Epidemiology Collaboration   (CKD-EPI) equation.       GFR Estimate If Black   Date Value Ref Range Status   06/10/2021 70 >60 mL/min/[1.73_m2] Final     Comment:      GFR Calc  Starting 12/18/2018, serum creatinine based estimated GFR (eGFR) will be   calculated using the Chronic Kidney Disease Epidemiology Collaboration   (CKD-EPI) equation.       Lab Results   Component Value Date    CR 1.23 03/14/2024    CR 1.25 06/10/2021     No results found for: \"MICROALBUMIN\"    Healthy Eating:  Healthy Eating Assessed Today: Yes  Cultural/Mormonism diet restrictions?: No  Meal planning/habits: Carb " counting, Smaller portions  Who purchases food in  your home?: Spouse  Meals include: Breakfast, Lunch, Dinner, Morning Snack, Evening Snack  Breakfast: yogurt and grapefruit OR eggs with ham/sausage/rodriguez and 1-2 cups coffee  Lunch: leftovers (soup, casserole, steak) and water  Dinner: sloppy joes OR bean soup OR casserole and 12 oz milk  Snacks: AM: orange; PM: none OR small bag chips OR small bag jerky (0.7 oz)  Other: HS: popcorn OR Root beer float OR chips and cheese and 24 oz water  Beverages: Water, Coffee, Diet soda, Milk (SF soda on occasion.)    Being Active:  Being Active Assessed Today: Yes  Exercise:: Currently not exercising    Monitoring:  Monitoring Assessed Today: Yes  Did patient bring glucose meter to appointment? : Yes  Blood Glucose Meter: CGM  Times checking blood sugar at home (number): 4  Times checking blood sugar at home (per): Day      Taking Medications:  Diabetes Medication(s)       Insulin       insulin glargine (LANTUS SOLOSTAR) 100 UNIT/ML pen Inject 41 Units Subcutaneous At Bedtime            Taking Medication Assessed Today: Yes  Current Treatments: Insulin Injections  Problems taking diabetes medications regularly?: No    Problem Solving:  Problem Solving Assessed Today: No  Is the patient at risk for hypoglycemia?: Yes  Hypoglycemia Frequency: Rarely    Hypoglycemia Symptoms  Hypoglycemia: None    Hypoglycemia Complications  Hypoglycemia Complications: None    Reducing Risks:  Reducing Risks Assessed Today: No    Healthy Coping:  Healthy Coping Assessed Today: Yes  Emotional response to diabetes: Ready to learn, Acceptance  Informal Support system:: Spouse  Stage of change: MAINTENANCE (Working to maintain change, with risk of relapse)  Patient Activation Measure Survey Score:      6/13/2012     9:00 AM   GEOVANY Score (Last Two)   GEOVANY Raw Score 47   Activation Score 77.5   GEOVANY Level 4         Care Plan and Education Provided:  Care Plan: Diabetes   Updates made by Sharmila Thompson RD  since 3/19/2024 12:00 AM        Problem: HbA1C Not In Goal         Goal: Establish Regular Follow-Ups with PCP         Task: Discuss with PCP the recommended timing for patient's next follow up visit(s)    Responsible User: Sharmila Thompson RD        Task: Discuss schedule for PCP visits with patient    Responsible User: Sharmila Thompson RD        Goal: Get HbA1C Level in Goal         Task: Educate patient on diabetes education self-management topics    Responsible User: Sharmila Thompson RD        Task: Educate patient on benefits of regular glucose monitoring Completed 3/19/2024   Responsible User: Sharmila Thompson RD        Task: Refer patient to appropriate extended care team member, as needed (Medication Therapy Management, Behavioral Health, Physical Therapy, etc.)    Responsible User: Sharmila Thompson RD        Task: Discuss diabetes treatment plan with patient    Responsible User: Sharmila Thompson RD        Problem: Diabetes Self-Management Education Needed to Optimize Self-Care Behaviors         Goal: Understand diabetes pathophysiology and disease progression         Task: Provide education on diabetes pathophysiology and disease progression specfic to patient's diabetes type Completed 3/19/2024   Responsible User: Sharmila Thompson RD        Goal: Healthy Eating - follow a healthy eating pattern for diabetes         Task: Provide education on portion control and consistency in amount, composition and timing of food intake Completed 3/19/2024   Responsible User: Sharmila Thompson RD        Task: Provide education on managing carbohydrate intake (carbohydrate counting, plate planning method, etc.) Completed 3/19/2024   Responsible User: Sharmila Thompson RD        Task: Provide education on weight management    Responsible User: Sharmila Thompson RD        Task: Provide education on heart healthy eating Completed 3/19/2024   Responsible User: Sharmila Thompson RD        Task: Provide education on eating out     Responsible User: Sharmila Thompson RD        Task: Develop individualized healthy eating plan with patient    Responsible User: Sharmila Thompson RD        Goal: Being Active - get regular physical activity, working up to at least 150 minutes per week         Task: Provide education on relationship of activity to glucose and precautions to take if at risk for low glucose Completed 3/19/2024   Responsible User: Sharmila Thompson RD        Task: Discuss barriers to physical activity with patient    Responsible User: Sharmila Thompson RD        Task: Develop physical activity plan with patient    Responsible User: Sharmila Thompson RD        Task: Explore community resources including walking groups, assistance programs, and home videos    Responsible User: Sharmila Thompson RD        Goal: Monitoring - monitor glucose and ketones as directed    This Visit's Progress: 0%   Note:    Try the Raoul 3 and see if it works better for you.       Task: Provide education on blood glucose monitoring (purpose, proper technique, frequency, glucose targets, interpreting results, when to use glucose control solution, sharps disposal)    Responsible User: Sharmila Thompson RD        Task: Provide education on continuous glucose monitoring (sensor placement, use of lucila or /reader, understanding glucose trends, alerts and alarms, differences between sensor glucose and blood glucose) Completed 3/19/2024   Responsible User: Sharmila Thompson RD        Task: Provide education on ketone monitoring (when to monitor, frequency, etc.)    Responsible User: Sharmila Thompson RD        Goal: Taking Medication - patient is consistently taking medications as directed         Task: Provide education on action of prescribed medication, including when to take and possible side effects Completed 3/19/2024   Responsible User: Sharmila Thompson RD        Task: Provide education on insulin and injectable diabetes medications, including administration,  storage, site selection and rotation for injection sites    Responsible User: Sharmila Thompson RD        Task: Discuss barriers to medication adherence with patient and provide management technique ideas as appropriate    Responsible User: Sharmila Thompson RD        Task: Provide education on frequency and refill details of medications    Responsible User: Sharmila Thompson RD        Goal: Problem Solving - know how to prevent and manage short-term diabetes complications         Task: Provide education on high blood glucose - causes, signs/symptoms, prevention and treatment    Responsible User: Sharmila Thompson RD        Task: Provide education on low blood glucose - causes, signs/symptoms, prevention, treatment, carrying a carbohydrate source at all times, and medical identification    Responsible User: Sharmila Thompson RD        Task: Provide education on safe travel with diabetes    Responsible User: Sharmila Thompson RD        Task: Provide education on how to care for diabetes on sick days    Responsible User: Sharmila Thompson RD        Task: Provide education on when to call a health care provider    Responsible User: Sharmila Thompson RD        Goal: Reducing Risks - know how to prevent and treat long-term diabetes complications         Task: Provide education on major complications of diabetes, prevention, early diagnostic measures and treatment of complications    Responsible User: Sharmila Thompson RD        Task: Provide education on recommended care for dental, eye and foot health    Responsible User: Sharmila Thompson RD        Task: Provide education on Hemoglobin A1c - goals and relationship to blood glucose levels    Responsible User: Sharmila Thompson RD        Task: Provide education on recommendations for heart health - lipid levels and goals, blood pressure and goals, and aspirin therapy, if indicated    Responsible User: Sharmila Thompson RD        Task: Provide education on tobacco cessation     Responsible User: Sharmila Thompson RD        Goal: Healthy Coping - use available resources to cope with the challenges of managing diabetes         Task: Discuss recognizing feelings about having diabetes    Responsible User: Sharmila Thompson RD        Task: Provide education on the benefits of making appropriate lifestyle changes    Responsible User: Sharmila Thompson RD        Task: Provide education on benefits of utilizing support systems    Responsible User: Sharmila Thompson RD        Task: Discuss methods for coping with stress    Responsible User: Sharmila Thompson RD        Task: Provide education on when to seek professional counseling    Responsible User: Sharmila Thompson RD Kaydee Brown, RDN, LD, [facility]     Time Spent: 60 minutes  Encounter Type: Individual    Any diabetes medication dose changes were made via the CDE Protocol per the patient's referring provider. A copy of this encounter was shared with the provider.

## 2024-04-13 DIAGNOSIS — I10 HYPERTENSION, GOAL BELOW 140/90: ICD-10-CM

## 2024-04-13 DIAGNOSIS — J30.1 CHRONIC SEASONAL ALLERGIC RHINITIS DUE TO POLLEN: ICD-10-CM

## 2024-04-16 DIAGNOSIS — J45.40 MODERATE PERSISTENT ASTHMA WITHOUT COMPLICATION: ICD-10-CM

## 2024-04-16 RX ORDER — ALBUTEROL SULFATE 90 UG/1
2 AEROSOL, METERED RESPIRATORY (INHALATION) EVERY 6 HOURS PRN
Qty: 18 G | Refills: 0 | Status: SHIPPED | OUTPATIENT
Start: 2024-04-16

## 2024-04-16 RX ORDER — DESLORATADINE 5 MG/1
5 TABLET ORAL DAILY
Qty: 90 TABLET | Refills: 0 | Status: SHIPPED | OUTPATIENT
Start: 2024-04-16 | End: 2024-07-16

## 2024-04-16 RX ORDER — AMLODIPINE BESYLATE 10 MG/1
TABLET ORAL
Qty: 90 TABLET | Refills: 0 | Status: SHIPPED | OUTPATIENT
Start: 2024-04-16 | End: 2024-07-15

## 2024-04-17 NOTE — TELEPHONE ENCOUNTER
losartan-hydrochlorothiazide (HYZAAR) 100-25 MG per tablet      Last Written Prescription Date: 12/15/16  Last Fill Quantity: 90, # refills: 1  Last Office Visit with [facility], [facility] or [facility] prescribing provider: 5/31/17       Potassium   Date Value Ref Range Status   07/22/2016 4.2 mmol/L Final     Creatinine   Date Value Ref Range Status   07/22/2016 1.13 mg/dL Final     BP Readings from Last 3 Encounters:   05/31/17 116/75   08/01/16 123/84   03/21/16 130/90           Tone Faarax  [facility]   Glaucoma --uses prescription drops

## 2024-04-22 DIAGNOSIS — Z79.4 TYPE 2 DIABETES MELLITUS WITH HYPERGLYCEMIA, WITH LONG-TERM CURRENT USE OF INSULIN (H): ICD-10-CM

## 2024-04-22 DIAGNOSIS — E11.65 TYPE 2 DIABETES MELLITUS WITH HYPERGLYCEMIA, WITH LONG-TERM CURRENT USE OF INSULIN (H): ICD-10-CM

## 2024-04-22 DIAGNOSIS — J45.40 MODERATE PERSISTENT ASTHMA WITHOUT COMPLICATION: ICD-10-CM

## 2024-04-22 DIAGNOSIS — K21.00 GASTROESOPHAGEAL REFLUX DISEASE WITH ESOPHAGITIS, UNSPECIFIED WHETHER HEMORRHAGE: ICD-10-CM

## 2024-04-22 RX ORDER — INSULIN GLARGINE 100 [IU]/ML
41 INJECTION, SOLUTION SUBCUTANEOUS AT BEDTIME
Qty: 45 ML | Refills: 0 | Status: SHIPPED | OUTPATIENT
Start: 2024-04-22

## 2024-04-22 RX ORDER — ESOMEPRAZOLE MAGNESIUM 40 MG/1
40 CAPSULE, DELAYED RELEASE ORAL
Qty: 90 CAPSULE | Refills: 1 | Status: SHIPPED | OUTPATIENT
Start: 2024-04-22

## 2024-04-22 RX ORDER — FLURBIPROFEN SODIUM 0.3 MG/ML
SOLUTION/ DROPS OPHTHALMIC
Qty: 100 EACH | Refills: 0 | Status: SHIPPED | OUTPATIENT
Start: 2024-04-22

## 2024-04-22 RX ORDER — MONTELUKAST SODIUM 10 MG/1
1 TABLET ORAL DAILY
Qty: 90 TABLET | Refills: 1 | Status: SHIPPED | OUTPATIENT
Start: 2024-04-22

## 2024-04-22 NOTE — TELEPHONE ENCOUNTER
Refill sent. Diabetes is being managed by Endocrine and refill requests should go to them in the future. Thank you, Charlee Crane MD MPH

## 2024-05-16 ENCOUNTER — OFFICE VISIT (OUTPATIENT)
Dept: ENDOCRINOLOGY | Facility: CLINIC | Age: 69
End: 2024-05-16
Payer: COMMERCIAL

## 2024-05-16 ENCOUNTER — OFFICE VISIT (OUTPATIENT)
Dept: FAMILY MEDICINE | Facility: CLINIC | Age: 69
End: 2024-05-16
Payer: COMMERCIAL

## 2024-05-16 ENCOUNTER — ANCILLARY PROCEDURE (OUTPATIENT)
Dept: GENERAL RADIOLOGY | Facility: CLINIC | Age: 69
End: 2024-05-16
Attending: PHYSICIAN ASSISTANT
Payer: COMMERCIAL

## 2024-05-16 VITALS
BODY MASS INDEX: 43.63 KG/M2 | HEIGHT: 69 IN | RESPIRATION RATE: 20 BRPM | HEART RATE: 79 BPM | OXYGEN SATURATION: 96 % | WEIGHT: 294.6 LBS | SYSTOLIC BLOOD PRESSURE: 124 MMHG | TEMPERATURE: 97.7 F | DIASTOLIC BLOOD PRESSURE: 87 MMHG

## 2024-05-16 VITALS
BODY MASS INDEX: 42.58 KG/M2 | SYSTOLIC BLOOD PRESSURE: 130 MMHG | HEART RATE: 89 BPM | OXYGEN SATURATION: 96 % | RESPIRATION RATE: 16 BRPM | DIASTOLIC BLOOD PRESSURE: 90 MMHG | WEIGHT: 290.4 LBS

## 2024-05-16 DIAGNOSIS — E11.65 TYPE 2 DIABETES MELLITUS WITH HYPERGLYCEMIA, WITH LONG-TERM CURRENT USE OF INSULIN (H): Primary | ICD-10-CM

## 2024-05-16 DIAGNOSIS — Z79.4 TYPE 2 DIABETES MELLITUS WITH HYPERGLYCEMIA, WITH LONG-TERM CURRENT USE OF INSULIN (H): Primary | ICD-10-CM

## 2024-05-16 DIAGNOSIS — R05.1 ACUTE COUGH: ICD-10-CM

## 2024-05-16 DIAGNOSIS — E11.65 TYPE 2 DIABETES MELLITUS WITH HYPERGLYCEMIA, WITH LONG-TERM CURRENT USE OF INSULIN (H): ICD-10-CM

## 2024-05-16 DIAGNOSIS — Z79.4 TYPE 2 DIABETES MELLITUS WITH HYPERGLYCEMIA, WITH LONG-TERM CURRENT USE OF INSULIN (H): ICD-10-CM

## 2024-05-16 DIAGNOSIS — J18.9 PNEUMONIA OF LEFT LOWER LOBE DUE TO INFECTIOUS ORGANISM: Primary | ICD-10-CM

## 2024-05-16 DIAGNOSIS — E66.01 MORBID OBESITY (H): ICD-10-CM

## 2024-05-16 PROCEDURE — 99213 OFFICE O/P EST LOW 20 MIN: CPT | Performed by: PHYSICIAN ASSISTANT

## 2024-05-16 PROCEDURE — 99214 OFFICE O/P EST MOD 30 MIN: CPT | Performed by: PHYSICIAN ASSISTANT

## 2024-05-16 PROCEDURE — 71046 X-RAY EXAM CHEST 2 VIEWS: CPT | Mod: TC | Performed by: RADIOLOGY

## 2024-05-16 RX ORDER — METFORMIN HCL 500 MG
500 TABLET, EXTENDED RELEASE 24 HR ORAL 2 TIMES DAILY WITH MEALS
Qty: 180 TABLET | Refills: 1 | Status: SHIPPED | OUTPATIENT
Start: 2024-05-16

## 2024-05-16 RX ORDER — AZITHROMYCIN 250 MG/1
TABLET, FILM COATED ORAL
Qty: 6 TABLET | Refills: 0 | Status: SHIPPED | OUTPATIENT
Start: 2024-05-16 | End: 2024-05-21

## 2024-05-16 ASSESSMENT — ASTHMA QUESTIONNAIRES
QUESTION_3 LAST FOUR WEEKS HOW OFTEN DID YOUR ASTHMA SYMPTOMS (WHEEZING, COUGHING, SHORTNESS OF BREATH, CHEST TIGHTNESS OR PAIN) WAKE YOU UP AT NIGHT OR EARLIER THAN USUAL IN THE MORNING: ONCE OR TWICE
QUESTION_2 LAST FOUR WEEKS HOW OFTEN HAVE YOU HAD SHORTNESS OF BREATH: MORE THAN ONCE A DAY
ACT_TOTALSCORE: 18
ACT_TOTALSCORE: 18
QUESTION_1 LAST FOUR WEEKS HOW MUCH OF THE TIME DID YOUR ASTHMA KEEP YOU FROM GETTING AS MUCH DONE AT WORK, SCHOOL OR AT HOME: NONE OF THE TIME
QUESTION_4 LAST FOUR WEEKS HOW OFTEN HAVE YOU USED YOUR RESCUE INHALER OR NEBULIZER MEDICATION (SUCH AS ALBUTEROL): ONCE A WEEK OR LESS
QUESTION_5 LAST FOUR WEEKS HOW WOULD YOU RATE YOUR ASTHMA CONTROL: WELL CONTROLLED

## 2024-05-16 NOTE — LETTER
5/16/2024         RE: Arturo Rahman  [address] [place] [address]        Dear Colleague,    Thank you for referring your patient, Arturo Rahman, to the [facility]. Please see a copy of my visit note below.        Assessment/Plan :   Type 2 DM. Jayson continues to work on managing his blood sugars. He is doing great. His blood sugars are very stable. We had previously discussed going back on metformin, to see if we can get him off insulin. He would like to give it a try. I will send in a new prescription for metformin  mg twice daily. He is to start with 1 tablet with breakfast or dinner. If he is tolerating the once daily dosing, he can increase to two tablets after 1 week. We also discussed how to titrate down on insulin based on his morning blood sugars. If he has any problems, he is to contact our office. We will follow-up in 6 mos.       I have independently reviewed and interpreted labs, imaging as indicated.      Chief complaint:  Arturo is a 68 year old male who returns for follow-up of Type 2 DM.     I have reviewed Care Everywhere including [facility], [facility], [facility], [place], [facility], [facility] , [facility], [place] lab reports, imaging reports and provider notes as indicated.      HISTORY OF PRESENT ILLNESS  Jayson continues to work on managing his blood sugars. He is currently taking 35 unit(s) of Lantus daily. This dose seems to be working really well. He is monitoring his blood sugars closely with the FreeStyle Raoul 3 sensor. He has not had any problems with severe hyperglycemia and/or hypoglycemia. His current time in target range is over 95%.    Jayson also denies any problems with blurred vision. He also denies any issues with numbness/tingling in his feet. He has been trying to eat healthy and he really works hard to keep his blood sugars within target range. He has been battling a head cold and chest congestion for  the last two weeks. He was surprised that his blood sugars have not been more elevated.     Jayson was diagnosed with diabetes in July of 2023. He had started losing weight without making any adjustments to his diet and exercise. He then developed dry mouth and excess thirst. His wife has diabetes so he used her meter to check his blood sugar and it was very elevated. He went to an urgent care and his blood sugar was over 500 mg/dl. He was sent to the ER for possible DKA. He did not have any signs of significant acidosis. He was given IV fluids while at the ER and he was discharged with a prescription for twice daily metformin. He had a follow-up with his primary care provider who was concerned about overall kidney function, so she discontinued metformin and started him on Lantus and Ozempic. He had an allergic reaction to the Ozempic and so he has been managing his blood sugars with Lantus, alone.     Endocrine relevant labs are as follows:   Latest Reference Range & Units 03/14/24 07:59   Hemoglobin A1C 0.0 - 5.6 % 6.9 (H)   (H): Data is abnormally high   Latest Reference Range & Units 11/30/23 08:43   Hemoglobin A1C 0.0 - 5.6 % 6.4 (H)   (H): Data is abnormally high   Latest Reference Range & Units 09/19/23 07:28   Albumin Urine mg/L mg/L <12.0     REVIEW OF SYSTEMS    Endocrine: positive for diabetes and obesity  Skin: negative  Eyes: negative for, visual blurring, redness, tearing  Ears/Nose/Throat: negative  Respiratory: No shortness of breath, dyspnea on exertion, cough, or hemoptysis  Cardiovascular: negative for, chest pain, dyspnea on exertion, lower extremity edema, and exercise intolerance  Gastrointestinal: negative for, nausea, vomiting, constipation, and diarrhea  Genitourinary: negative for, nocturia, dysuria, frequency, and urgency  Musculoskeletal: negative for, muscular weakness, nocturnal cramping, and foot pain  Neurologic: negative for, local weakness, numbness or tingling of hands, and numbness  or tingling of feet  Psychiatric: negative  Hematologic/Lymphatic/Immunologic: negative    Past Medical History  Past Medical History:   Diagnosis Date     Allergies      Asthma, moderate persistent      FH: colon cancer     colonoscopy q 5yr.  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     HTN (hypertension)      Hyperlipidemia        Medications  Current Outpatient Medications   Medication Sig Dispense Refill     albuterol (PROAIR HFA/PROVENTIL HFA/VENTOLIN HFA) 108 (90 Base) MCG/ACT inhaler Inhale 2 puffs into the lungs every 6 hours as needed for shortness of breath or wheezing 18 g 0     amLODIPine (NORVASC) 10 MG tablet Take 1 tablet (10 mg) by mouth once daily for blood pressure 90 tablet 0     atorvastatin (LIPITOR) 20 MG tablet Take 1 tablet (20 mg) by mouth daily 90 tablet 3     blood glucose (NO BRAND SPECIFIED) test strip Use to test blood sugar 3 times daily or as directed. To accompany: Blood Glucose Monitor Brands: per insurance. 100 strip 6     blood glucose monitoring (NO BRAND SPECIFIED) meter device kit Use to test blood sugar 3 times daily or as directed. Preferred blood glucose meter OR supplies to accompany: Blood Glucose Monitor Brands: per insurance. 1 kit 0     Continuous Blood Gluc Sensor (FREESTYLE GONZALES 2 SENSOR) MISC 1 each every 14 days Use 1 sensor every 14 days. Use to read blood sugars per 's instructions. 6 each 3     Continuous Blood Gluc Sensor (FREESTYLE GONZALES 3 SENSOR) MISC 1 each every 14 days Use 1 sensor every 14 days. Use to read blood sugars per 's instructions. 6 each 3     desloratadine (CLARINEX) 5 MG tablet Take 1 tablet (5 mg) by mouth daily 90 tablet 0     ELIQUIS ANTICOAGULANT 5 MG tablet Take 1 tablet (5 mg) by mouth 2 times daily 180 tablet 3     esomeprazole (NEXIUM) 40 MG DR capsule Take 1 capsule (40 mg) by mouth every morning (before breakfast) Take 30-60 minutes before  eating. 90 capsule 1     fluticasone (FLONASE) 50 MCG/ACT nasal spray Spray 1 spray into both nostrils daily 32 g 4     hydrochlorothiazide (HYDRODIURIL) 25 MG tablet Take 1 tablet (25 mg) by mouth daily 90 tablet 3     LANTUS SOLOSTAR 100 UNIT/ML soln Inject 41 Units Subcutaneous At Bedtime 45 mL 0     losartan (COZAAR) 100 MG tablet Take 1 tablet (100 mg) by mouth daily 90 tablet 3     montelukast (SINGULAIR) 10 MG tablet Take 1 tablet (10 mg) by mouth daily 90 tablet 1     Multiple Vitamins-Minerals (MULTIVITAL PO) Take 1 tablet by mouth daily       thin (NO BRAND SPECIFIED) lancets Use with lanceting device. To accompany: Blood Glucose Monitor Brands: per insurance. 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    Social History  Social History     Tobacco Use     Smoking status: Former     Current packs/day: 0.00     Types: Cigarettes     Quit date: 10/28/1991     Years since quittin.5     Smokeless tobacco: Never   Vaping Use     Vaping status: Never Used   Substance Use Topics     Alcohol use: Yes     Comment: rare     Drug use: No       Physical Exam  BP (!) 130/90 (BP Location: Left arm, Patient Position: Sitting, Cuff Size: Adult Large)   Pulse 89   Resp 16   Wt 131.7 kg (290 lb 6.4 oz)   SpO2 96%   BMI 42.58 kg/m    Body mass index is 42.58 kg/m .  GENERAL :  In no apparent distress  SKIN: Normal color, normal temperature, texture.  No hirsutism, alopecia or purple striae.     EYES: PERRL, EOMI, No  scleral icterus,  No proptosis, conjunctival redness, stare, retraction  RESP: Lungs clear to auscultation bilaterally  CARDIAC: Regular rate and rhythm, normal S1 S2, without murmurs, rubs or gallops        NEURO: awake, alert, responds appropriately to questions.  Cranial nerves intact.   Moves all extremities; Gait normal.  No tremor of the outstretched hand.   EXTREMITIES: No clubbing, cyanosis or edema.    DATA REVIEW  FreeStyle LibreView  Time in target range 96%  High 4%  Current Ave  mg/dl         Again, thank you for allowing me to participate in the care of your patient.        Sincerely,        Veronica Pradhan PA-C

## 2024-05-16 NOTE — PROGRESS NOTES
"  Assessment & Plan     Pneumonia of left lower lobe due to infectious organism  Will treat. I discussed with the patient risks and benefits of the new medication prescribed including potential side effects.  The patient had opportunity to ask questions and is comfortable with and interested in medications as prescribed.   - azithromycin (ZITHROMAX) 250 MG tablet; Take 2 tablets (500 mg) by mouth daily for 1 day, THEN 1 tablet (250 mg) daily for 4 days.  - amoxicillin-clavulanate (AUGMENTIN) 875-125 MG tablet; Take 1 tablet by mouth 2 times daily for 7 days    Acute cough  - XR Chest 2 Views; Future    Type 2 diabetes mellitus with hyperglycemia, with long-term current use of insulin (H)  Discussed increased risks associated with illness in the presence of T2DM.           BMI  Estimated body mass index is 43.19 kg/m  as calculated from the following:    Height as of this encounter: 1.759 m (5' 9.25\").    Weight as of this encounter: 133.6 kg (294 lb 9.6 oz).   Weight management plan: Discussed healthy diet and exercise guidelines          Lesli SMART is a 68 year old, presenting for the following health issues:  URI (Congestion, upper chest feels tight comes and goes and cough x 2 weeks. Lungs sound crackly at Endocrinology office visit today )        5/16/2024    11:16 AM   Additional Questions   Roomed by An V.         5/16/2024    11:16 AM   Patient Reported Additional Medications   Patient reports taking the following new medications none     History of Present Illness       Reason for visit:  Congested settled into lungs  Symptom onset:  1-2 weeks ago  Symptoms include:  Congested and tight chest  Symptom intensity:  Moderate  Symptom progression:  Staying the same  Had these symptoms before:  No    He eats 2-3 servings of fruits and vegetables daily.He consumes 0 sweetened beverage(s) daily.He exercises with enough effort to increase his heart rate 10 to 19 minutes per day.  He exercises with enough " "effort to increase his heart rate 3 or less days per week.   He is taking medications regularly.         Acute Illness  Acute illness concerns: congestion, cough and chest feels tight  Onset/Duration: 2 weeks  Symptoms:  Fever: No  Chills/Sweats: No  Headache (location?): No  Sinus Pressure: YES- slightly  Conjunctivitis:  No  Ear Pain: no  Rhinorrhea: YES  Congestion: YES- nasal and chest congestion  Sore Throat: No  Cough: YES - dry cough  Wheeze: YES  Decreased Appetite: No  Nausea: No  Vomiting: No  Diarrhea: No  Dysuria/Freq.: No  Dysuria or Hematuria: No  Fatigue/Achiness: No  Sick/Strep Exposure: No  Therapies tried and outcome: Cough and Cold OTC medication, Afrin nasal spray                Review of Systems        Objective    /87   Pulse 79   Temp 97.7  F (36.5  C) (Oral)   Resp 20   Ht 1.759 m (5' 9.25\")   Wt 133.6 kg (294 lb 9.6 oz)   SpO2 96%   BMI 43.19 kg/m    Body mass index is 43.19 kg/m .  Physical Exam   GENERAL: alert and no distress  EYES: Eyes grossly normal to inspection, PERRL and conjunctivae and sclerae normal  HENT: ear canals and TM's normal, nose and mouth without ulcers or lesions  NECK: no adenopathy, no asymmetry, masses, or scars  RESP: rhonchi throughout  CV: regular rate and rhythm, normal S1 S2, no S3 or S4, no murmur, click or rub, no peripheral edema  ABDOMEN: soft, nontender, no hepatosplenomegaly, no masses and bowel sounds normal  MS: no gross musculoskeletal defects noted, no edema  SKIN: no suspicious lesions or rashes  NEURO: Normal strength and tone, mentation intact and speech normal  PSYCH: mentation appears normal, affect normal/bright            Signed Electronically by: Kalie Constantino PA-C    "

## 2024-05-16 NOTE — NURSING NOTE
Arturo Rahman's goals for this visit include:   Chief Complaint   Patient presents with    Follow Up     DM       He requests these members of his care team be copied on today's visit information: yes    PCP: Charlee Crane    Referring Provider:  Charlee Crane MD  [address] [place] [address]    BP (!) 130/90 (BP Location: Left arm, Patient Position: Sitting, Cuff Size: Adult Large)   Pulse 89   Resp 16   Wt 131.7 kg (290 lb 6.4 oz)   SpO2 96%   BMI 42.58 kg/m      Do you need any medication refills at today's visit? None      Jay Graves, EMT

## 2024-05-16 NOTE — PROGRESS NOTES
Assessment/Plan :   Type 2 DM. Jayson continues to work on managing his blood sugars. He is doing great. His blood sugars are very stable. We had previously discussed going back on metformin, to see if we can get him off insulin. He would like to give it a try. I will send in a new prescription for metformin  mg twice daily. He is to start with 1 tablet with breakfast or dinner. If he is tolerating the once daily dosing, he can increase to two tablets after 1 week. We also discussed how to titrate down on insulin based on his morning blood sugars. If he has any problems, he is to contact our office. We will follow-up in 6 mos.       I have independently reviewed and interpreted labs, imaging as indicated.      Chief complaint:  Arturo is a 68 year old male who returns for follow-up of Type 2 DM.     I have reviewed Care Everywhere including [facility], [facility], [facility], [facility], [place], [facility], [facility] , [facility], [place] lab reports, imaging reports and provider notes as indicated.      HISTORY OF PRESENT ILLNESS  Jayson continues to work on managing his blood sugars. He is currently taking 35 unit(s) of Lantus daily. This dose seems to be working really well. He is monitoring his blood sugars closely with the FreeStyle Raoul 3 sensor. He has not had any problems with severe hyperglycemia and/or hypoglycemia. His current time in target range is over 95%.    Jayson also denies any problems with blurred vision. He also denies any issues with numbness/tingling in his feet. He has been trying to eat healthy and he really works hard to keep his blood sugars within target range. He has been battling a head cold and chest congestion for the last two weeks. He was surprised that his blood sugars have not been more elevated.     Jayson was diagnosed with diabetes in July of 2023. He had started losing weight without making any adjustments to his diet and exercise. He then developed dry mouth and excess  thirst. His wife has diabetes so he used her meter to check his blood sugar and it was very elevated. He went to an urgent care and his blood sugar was over 500 mg/dl. He was sent to the ER for possible DKA. He did not have any signs of significant acidosis. He was given IV fluids while at the ER and he was discharged with a prescription for twice daily metformin. He had a follow-up with his primary care provider who was concerned about overall kidney function, so she discontinued metformin and started him on Lantus and Ozempic. He had an allergic reaction to the Ozempic and so he has been managing his blood sugars with Lantus, alone.     Endocrine relevant labs are as follows:   Latest Reference Range & Units 03/14/24 07:59   Hemoglobin A1C 0.0 - 5.6 % 6.9 (H)   (H): Data is abnormally high   Latest Reference Range & Units 11/30/23 08:43   Hemoglobin A1C 0.0 - 5.6 % 6.4 (H)   (H): Data is abnormally high   Latest Reference Range & Units 09/19/23 07:28   Albumin Urine mg/L mg/L <12.0     REVIEW OF SYSTEMS    Endocrine: positive for diabetes and obesity  Skin: negative  Eyes: negative for, visual blurring, redness, tearing  Ears/Nose/Throat: negative  Respiratory: No shortness of breath, dyspnea on exertion, cough, or hemoptysis  Cardiovascular: negative for, chest pain, dyspnea on exertion, lower extremity edema, and exercise intolerance  Gastrointestinal: negative for, nausea, vomiting, constipation, and diarrhea  Genitourinary: negative for, nocturia, dysuria, frequency, and urgency  Musculoskeletal: negative for, muscular weakness, nocturnal cramping, and foot pain  Neurologic: negative for, local weakness, numbness or tingling of hands, and numbness or tingling of feet  Psychiatric: negative  Hematologic/Lymphatic/Immunologic: negative    Past Medical History  Past Medical History:   Diagnosis Date    Allergies     Asthma, moderate persistent     FH: colon cancer     colonoscopy q 5yr.  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    HTN (hypertension)     Hyperlipidemia        Medications  Current Outpatient Medications   Medication Sig Dispense Refill    albuterol (PROAIR HFA/PROVENTIL HFA/VENTOLIN HFA) 108 (90 Base) MCG/ACT inhaler Inhale 2 puffs into the lungs every 6 hours as needed for shortness of breath or wheezing 18 g 0    amLODIPine (NORVASC) 10 MG tablet Take 1 tablet (10 mg) by mouth once daily for blood pressure 90 tablet 0    atorvastatin (LIPITOR) 20 MG tablet Take 1 tablet (20 mg) by mouth daily 90 tablet 3    blood glucose (NO BRAND SPECIFIED) test strip Use to test blood sugar 3 times daily or as directed. To accompany: Blood Glucose Monitor Brands: per insurance. 100 strip 6    blood glucose monitoring (NO BRAND SPECIFIED) meter device kit Use to test blood sugar 3 times daily or as directed. Preferred blood glucose meter OR supplies to accompany: Blood Glucose Monitor Brands: per insurance. 1 kit 0    Continuous Blood Gluc Sensor (FREESTYLE GONZALES 2 SENSOR) MISC 1 each every 14 days Use 1 sensor every 14 days. Use to read blood sugars per 's instructions. 6 each 3    Continuous Blood Gluc Sensor (FREESTYLE GONZALES 3 SENSOR) MISC 1 each every 14 days Use 1 sensor every 14 days. Use to read blood sugars per 's instructions. 6 each 3    desloratadine (CLARINEX) 5 MG tablet Take 1 tablet (5 mg) by mouth daily 90 tablet 0    ELIQUIS ANTICOAGULANT 5 MG tablet Take 1 tablet (5 mg) by mouth 2 times daily 180 tablet 3    esomeprazole (NEXIUM) 40 MG DR capsule Take 1 capsule (40 mg) by mouth every morning (before breakfast) Take 30-60 minutes before eating. 90 capsule 1    fluticasone (FLONASE) 50 MCG/ACT nasal spray Spray 1 spray into both nostrils daily 32 g 4    hydrochlorothiazide (HYDRODIURIL) 25 MG tablet Take 1 tablet (25 mg) by mouth daily 90 tablet 3    LANTUS SOLOSTAR 100 UNIT/ML soln Inject 41 Units Subcutaneous  At Bedtime 45 mL 0    losartan (COZAAR) 100 MG tablet Take 1 tablet (100 mg) by mouth daily 90 tablet 3    montelukast (SINGULAIR) 10 MG tablet Take 1 tablet (10 mg) by mouth daily 90 tablet 1    Multiple Vitamins-Minerals (MULTIVITAL PO) Take 1 tablet by mouth daily      thin (NO BRAND SPECIFIED) lancets Use with lanceting device. To accompany: Blood Glucose Monitor Brands: per insurance. 100 each 6    ULTIGUARD SAFEPACK PEN NEEDLE 32G X 4 MM miscellaneous use 1 each daily 100 each 0       Allergies  Allergies   Allergen Reactions    Ozempic (0.25 Or 0.5 Mg-Dose) [Semaglutide(0.25 Or 0.5mg-Dos)] GI Disturbance     Bacteria overgrowth in intestines - was hospitalized for this    Lisinopril Cough         Family History  family history includes Allergies in his daughter; Arthritis in his mother; Asthma in his daughter; Cancer - colorectal in his sister; Cerebrovascular Disease in his father; Hypertension in his father and sister.    Social History  Social History     Tobacco Use    Smoking status: Former     Current packs/day: 0.00     Types: Cigarettes     Quit date: 10/28/1991     Years since quittin.5    Smokeless tobacco: Never   Vaping Use    Vaping status: Never Used   Substance Use Topics    Alcohol use: Yes     Comment: rare    Drug use: No       Physical Exam  BP (!) 130/90 (BP Location: Left arm, Patient Position: Sitting, Cuff Size: Adult Large)   Pulse 89   Resp 16   Wt 131.7 kg (290 lb 6.4 oz)   SpO2 96%   BMI 42.58 kg/m    Body mass index is 42.58 kg/m .  GENERAL :  In no apparent distress  SKIN: Normal color, normal temperature, texture.  No hirsutism, alopecia or purple striae.     EYES: PERRL, EOMI, No scleral icterus,  No proptosis, conjunctival redness, stare, retraction  RESP: Lungs clear to auscultation bilaterally  CARDIAC: Regular rate and rhythm, normal S1 S2, without murmurs, rubs or gallops        NEURO: awake, alert, responds appropriately to questions.  Cranial nerves intact.    Moves all extremities; Gait normal.  No tremor of the outstretched hand.   EXTREMITIES: No clubbing, cyanosis or edema.    DATA REVIEW  FreeStyle LibreView  Time in target range 96%  High 4%  Current Ave  mg/dl

## 2024-06-23 ENCOUNTER — HEALTH MAINTENANCE LETTER (OUTPATIENT)
Age: 69
End: 2024-06-23

## 2024-06-24 DIAGNOSIS — Z79.4 TYPE 2 DIABETES MELLITUS WITH HYPERGLYCEMIA, WITH LONG-TERM CURRENT USE OF INSULIN (H): ICD-10-CM

## 2024-06-24 DIAGNOSIS — E11.65 TYPE 2 DIABETES MELLITUS WITH HYPERGLYCEMIA, WITH LONG-TERM CURRENT USE OF INSULIN (H): ICD-10-CM

## 2024-06-24 RX ORDER — ATORVASTATIN CALCIUM 20 MG/1
20 TABLET, FILM COATED ORAL DAILY
Qty: 90 TABLET | Refills: 2 | Status: SHIPPED | OUTPATIENT
Start: 2024-06-24

## 2024-07-13 DIAGNOSIS — I10 HYPERTENSION, GOAL BELOW 140/90: ICD-10-CM

## 2024-07-13 DIAGNOSIS — J30.1 CHRONIC SEASONAL ALLERGIC RHINITIS DUE TO POLLEN: ICD-10-CM

## 2024-07-15 RX ORDER — AMLODIPINE BESYLATE 10 MG/1
TABLET ORAL
Qty: 90 TABLET | Refills: 0 | Status: SHIPPED | OUTPATIENT
Start: 2024-07-15

## 2024-07-16 ENCOUNTER — ALLIED HEALTH/NURSE VISIT (OUTPATIENT)
Dept: EDUCATION SERVICES | Facility: CLINIC | Age: 69
End: 2024-07-16
Payer: COMMERCIAL

## 2024-07-16 DIAGNOSIS — Z79.4 TYPE 2 DIABETES MELLITUS WITH HYPERGLYCEMIA, WITH LONG-TERM CURRENT USE OF INSULIN (H): Primary | ICD-10-CM

## 2024-07-16 DIAGNOSIS — E11.65 TYPE 2 DIABETES MELLITUS WITH HYPERGLYCEMIA, WITH LONG-TERM CURRENT USE OF INSULIN (H): Primary | ICD-10-CM

## 2024-07-16 PROCEDURE — G0108 DIAB MANAGE TRN  PER INDIV: HCPCS | Mod: AE | Performed by: DIETITIAN, REGISTERED

## 2024-07-16 RX ORDER — DESLORATADINE 5 MG/1
5 TABLET ORAL DAILY
Qty: 90 TABLET | Refills: 0 | Status: SHIPPED | OUTPATIENT
Start: 2024-07-16

## 2024-07-16 NOTE — LETTER
7/16/2024         RE: Arturo Rahman  [address] [facility] [address]        Dear Colleague,    Thank you for referring your patient, Arturo Rahman, to the [facility]. Please see a copy of my visit note below.      **Ask Veronica if want any other labs added to A1C**      Diabetes Self-Management Education & Support    Presents for: CGM Review    Type of Service: In Person Visit      REPORTS:            Pt verbalized understanding of concepts discussed and recommendations provided today.       Continue education with the following diabetes management concepts: Taking Medication    ASSESSMENT:  Jayson continues to see good blood glucose control on only insulin. Says about 1 month after he saw Veronica and Metformin was prescribed, he was battling pneumonia and didn't want to be on both antibiotics and Metformin. He says he started with 1 tablet a day for a week and then went up to 2 tablets a day and tried to stay on this for 2-3 weeks. Says it was causing more frequent bowel movements and soft stools, and he didn't notice improved blood glucose, so stopped taking it. He is seeing good blood glucose control right now on insulin alone and did not see a big change in blood glucose values the past 8 weeks- overall blood glucose maybe 5 mg/dL lower on Metformin.     Discussed treatment options today:  1) Continue only Lantus as seeing >90% time in range and no hypoglycemia on Lantus alone. He saw some high post-prandial blood glucose but felt it was food related (fried food at dinner and ice-cream for night snack). He is aware that in future, may need to add something to better target post-prandial blood glucose and for right now, can try to keep an eye on any problem meals.    2) Trial adding back Metformin and see if 1 tablet over more times makes an improvement. If wants to try 2 tablets a day, recommended ensuring no GI side effects present from 1 tablet before increasing.  As he is seeing good blood glucose with insulin alone, no rush to have to increase Metformin.    3) Try a different medication all together if wants to try to wean off insulin (SGLT-2, possibly DPP-4 inhibitor). Did review how each of these medications help to lower blood glucose. Since slight risk that patient may have some slower digestion with DPP-4, he'd prefer trying the SGLT-2 over DPP-4 as did not do well on Ozempic with too slowed GI transit.    Jayson will retry Metformin for now but is ok with taking Lantus. He finds Lantus easy to use since not have to take with food and only 1 time a day. He plans to see Veronica in November and will follow up with CDE in September to see how Lantus or Lantus and Metformin is working for him He is liking the Raoul 3 better than Raoul 2. Pt verbalized understanding of concepts discussed and recommendations provided.       Glucose Patterns & Trends:  Time in range of  mg/dL, 91% of the time. and Time below range of 70 mg/dL, 0% of the time.    PLAN    -Continue 35 units of Lantus at bedtime.  -Restart 1 tablet of Metformin at night. If doing well with it (no GI side effects, then try 2 tablets at night).  -Continue using Raoul 3 to monitor blood glucose.    Topics to cover at upcoming visits: Healthy Eating, Taking Medication, and Problem Solving    Follow-up: 9/17/24    See Care Plan for co-developed, patient-state behavior change goals.  AVS provided for patient today.    Education Materials Provided:  No new materials provided today      SUBJECTIVE/OBJECTIVE:  Presents for: CGM Review  Accompanied by: Self  Diabetes education in the past 24mo: Yes  Focus of Visit: CGM  Type of CGM visit: Personal CGM Follow-up  Diabetes type: Type 2  Date of diagnosis: 7/11/23  Disease course: Stable  How confident are you filling out medical forms by yourself:: Extremely  Diabetes management related comments/concerns: Says he had pneumonia when he saw Veronica so did not start for 3-4  "weeks after meeting with her.    Says he started taking Metformin but it caused stomach upset so stopped it again. Taking 35 units Lantus at bedtime. Says pneumonia is improved.     Says no low blood glucose since started the Raoul 3.     Transportation concerns: No  Difficulty affording diabetes medication?: No  Difficulty affording diabetes testing supplies?: No  Other concerns:: Glasses  Cultural Influences/Ethnic Background:  Choose not to answer      Diabetes Symptoms & Complications:  Diabetes Related Symptoms: None  Weight trend: Stable  Symptom course: Stable  Disease course: Stable  Complications assessed today?: Yes  Heart disease: Yes    Patient Problem List and Family Medical History reviewed for relevant medical history, current medical status, and diabetes risk factors.    Vitals:  There were no vitals taken for this visit.  Estimated body mass index is 43.19 kg/m  as calculated from the following:    Height as of 5/16/24: 1.759 m (5' 9.25\").    Weight as of 5/16/24: 133.6 kg (294 lb 9.6 oz).   Last 3 BP:   BP Readings from Last 3 Encounters:   05/16/24 124/87   05/16/24 (!) 130/90   01/12/24 123/81       History   Smoking Status     Former     Types: Cigarettes   Smokeless Tobacco     Never       Labs:  Lab Results   Component Value Date    A1C 6.9 03/14/2024    A1C 6.7 06/10/2021     Lab Results   Component Value Date     03/14/2024     01/09/2023     06/10/2021     Lab Results   Component Value Date    LDL 76 09/19/2023    LDL 98 07/11/2022    LDL 78 06/10/2021     HDL Cholesterol   Date Value Ref Range Status   06/10/2021 44 >39 mg/dL Final     Direct Measure HDL   Date Value Ref Range Status   09/19/2023 39 (L) >=40 mg/dL Final   ]  GFR Estimate   Date Value Ref Range Status   03/14/2024 65 >60 mL/min/1.73m2 Final   06/10/2021 61 >60 mL/min/[1.73_m2] Final     Comment:     Non  GFR Calc  Starting 12/18/2018, serum creatinine based estimated GFR (eGFR) will be " "  calculated using the Chronic Kidney Disease Epidemiology Collaboration   (CKD-EPI) equation.       GFR Estimate If Black   Date Value Ref Range Status   06/10/2021 70 >60 mL/min/[1.73_m2] Final     Comment:      GFR Calc  Starting 12/18/2018, serum creatinine based estimated GFR (eGFR) will be   calculated using the Chronic Kidney Disease Epidemiology Collaboration   (CKD-EPI) equation.       Lab Results   Component Value Date    CR 1.23 03/14/2024    CR 1.25 06/10/2021     No results found for: \"MICROALBUMIN\"    Healthy Eating:  Healthy Eating Assessed Today: Yes  Cultural/Presybeterian diet restrictions?: No  Meal planning/habits: Smaller portions, Avoiding sweets  Who cooks/prepares meals for you?: Self, Spouse  Who purchases food in  your home?: Self, Spouse  Breakfast: Rice Krispies bar OR eggs and ham, sometimes toast OR yogurt  Lunch: sandwich OR leftovers  Dinner: breaded fish and hashbrowns OR sub sandwich and cookies  Snacks: ice-cream and strawberries - limits 1x/week or less OR chips OR ice-cream bar (small) OR chips and salsa- small plate  Beverages: Water, Coffee, Diet soda    Being Active:  Being Active Assessed Today: No  Barrier to exercise: None    Monitoring:  Monitoring Assessed Today: Yes  Blood Glucose Meter: Accu-chek, CGM (Raoul 3)  Times checking blood sugar at home (number): 3  Times checking blood sugar at home (per): Day      Taking Medications:  Diabetes Medication(s)       Biguanides       metFORMIN (GLUCOPHAGE XR) 500 MG 24 hr tablet Take 1 tablet (500 mg) by mouth 2 times daily (with meals)       Insulin       LANTUS SOLOSTAR 100 UNIT/ML soln Inject 41 Units Subcutaneous At Bedtime            Taking Medication Assessed Today: Yes  Current Treatments: Insulin Injections (At bedtime)  Problems taking diabetes medications regularly?: No  Diabetes medication side effects?: No    Problem Solving:  Problem Solving Assessed Today: Yes  Is the patient at risk for hypoglycemia?: " No  Hypoglycemia Frequency: Rarely    Hypoglycemia Symptoms  Hypoglycemia: None    Hypoglycemia Complications  Hypoglycemia Complications: None    Reducing Risks:  Reducing Risks Assessed Today: No  Diabetes Risks: Age over 45 years  Has dilated eye exam at least once a year?: Yes  Sees dentist every 6 months?: No  Feet checked by healthcare provider in the last year?: No    Healthy Coping:  Healthy Coping Assessed Today: Yes  Emotional response to diabetes: Ready to learn, Acceptance, Concern for health and well-being  Informal Support system:: Friends, Spouse  Stage of change: ACTION (Actively working towards change)  Patient Activation Measure Survey Score:      6/13/2012     9:00 AM   GEOVANY Score (Last Two)   GEOVANY Raw Score 47   Activation Score 77.5   GEOVANY Level 4         Care Plan and Education Provided:  Healthy Eating: Balanced meals and Portion control, Monitoring: Blood glucose versus Continuous Glucose Monitoring, Individual glucose targets, and Log and interpret results, Taking Medication: Action of prescribed medication(s), Proper site selection and rotation for injections, Side effects of prescribed medication(s), and When to take medication(s), and Problem Solving: High glucose - causes, signs/symptoms, treatment and prevention and Low glucose - causes, signs/symptoms, treatment and prevention  Care Plan: Diabetes   Updates made by Sharmila Thompson RD since 7/16/2024 12:00 AM        Problem: Diabetes Self-Management Education Needed to Optimize Self-Care Behaviors         Goal: Monitoring - monitor glucose and ketones as directed    This Visit's Progress: 100%   Recent Progress: 0%   Note:    Try the Raoul 3 and see if it works better for you.         Sharmila Thompson RDN, LD, CDCES     Time Spent: 65 minutes  Encounter Type: Individual    Any diabetes medication dose changes were made via the CDE Protocol per the patient's referring provider. A copy of this encounter was shared with the provider.

## 2024-07-16 NOTE — PROGRESS NOTES
**Ask Veronica if want any other labs added to A1C**      Diabetes Self-Management Education & Support    Presents for: CGM Review    Type of Service: In Person Visit      REPORTS:            Pt verbalized understanding of concepts discussed and recommendations provided today.       Continue education with the following diabetes management concepts: Taking Medication    ASSESSMENT:  Jayson continues to see good blood glucose control on only insulin. Says about 1 month after he saw Veronica and Metformin was prescribed, he was battling pneumonia and didn't want to be on both antibiotics and Metformin. He says he started with 1 tablet a day for a week and then went up to 2 tablets a day and tried to stay on this for 2-3 weeks. Says it was causing more frequent bowel movements and soft stools, and he didn't notice improved blood glucose, so stopped taking it. He is seeing good blood glucose control right now on insulin alone and did not see a big change in blood glucose values the past 8 weeks- overall blood glucose maybe 5 mg/dL lower on Metformin.     Discussed treatment options today:  1) Continue only Lantus as seeing >90% time in range and no hypoglycemia on Lantus alone. He saw some high post-prandial blood glucose but felt it was food related (fried food at dinner and ice-cream for night snack). He is aware that in future, may need to add something to better target post-prandial blood glucose and for right now, can try to keep an eye on any problem meals.    2) Trial adding back Metformin and see if 1 tablet over more times makes an improvement. If wants to try 2 tablets a day, recommended ensuring no GI side effects present from 1 tablet before increasing. As he is seeing good blood glucose with insulin alone, no rush to have to increase Metformin.    3) Try a different medication all together if wants to try to wean off insulin (SGLT-2, possibly DPP-4 inhibitor). Did review how each of these medications help to  lower blood glucose. Since slight risk that patient may have some slower digestion with DPP-4, he'd prefer trying the SGLT-2 over DPP-4 as did not do well on Ozempic with too slowed GI transit.    Jayson will retry Metformin for now but is ok with taking Lantus. He finds Lantus easy to use since not have to take with food and only 1 time a day. He plans to see Veronica in November and will follow up with CDE in September to see how Lantus or Lantus and Metformin is working for him He is liking the Raoul 3 better than Raoul 2. Pt verbalized understanding of concepts discussed and recommendations provided.       Glucose Patterns & Trends:  Time in range of  mg/dL, 91% of the time. and Time below range of 70 mg/dL, 0% of the time.    PLAN    -Continue 35 units of Lantus at bedtime.  -Restart 1 tablet of Metformin at night. If doing well with it (no GI side effects, then try 2 tablets at night).  -Continue using Raoul 3 to monitor blood glucose.    Topics to cover at upcoming visits: Healthy Eating, Taking Medication, and Problem Solving    Follow-up: 9/17/24    See Care Plan for co-developed, patient-state behavior change goals.  AVS provided for patient today.    Education Materials Provided:  No new materials provided today      SUBJECTIVE/OBJECTIVE:  Presents for: CGM Review  Accompanied by: Self  Diabetes education in the past 24mo: Yes  Focus of Visit: CGM  Type of CGM visit: Personal CGM Follow-up  Diabetes type: Type 2  Date of diagnosis: 7/11/23  Disease course: Stable  How confident are you filling out medical forms by yourself:: Extremely  Diabetes management related comments/concerns: Says he had pneumonia when he saw Veronica so did not start for 3-4 weeks after meeting with her.    Says he started taking Metformin but it caused stomach upset so stopped it again. Taking 35 units Lantus at bedtime. Says pneumonia is improved.     Says no low blood glucose since started the Raoul 3.     Transportation concerns:  "No  Difficulty affording diabetes medication?: No  Difficulty affording diabetes testing supplies?: No  Other concerns:: Glasses  Cultural Influences/Ethnic Background:  Choose not to answer      Diabetes Symptoms & Complications:  Diabetes Related Symptoms: None  Weight trend: Stable  Symptom course: Stable  Disease course: Stable  Complications assessed today?: Yes  Heart disease: Yes    Patient Problem List and Family Medical History reviewed for relevant medical history, current medical status, and diabetes risk factors.    Vitals:  There were no vitals taken for this visit.  Estimated body mass index is 43.19 kg/m  as calculated from the following:    Height as of 5/16/24: 1.759 m (5' 9.25\").    Weight as of 5/16/24: 133.6 kg (294 lb 9.6 oz).   Last 3 BP:   BP Readings from Last 3 Encounters:   05/16/24 124/87   05/16/24 (!) 130/90   01/12/24 123/81       History   Smoking Status    Former    Types: Cigarettes   Smokeless Tobacco    Never       Labs:  Lab Results   Component Value Date    A1C 6.9 03/14/2024    A1C 6.7 06/10/2021     Lab Results   Component Value Date     03/14/2024     01/09/2023     06/10/2021     Lab Results   Component Value Date    LDL 76 09/19/2023    LDL 98 07/11/2022    LDL 78 06/10/2021     HDL Cholesterol   Date Value Ref Range Status   06/10/2021 44 >39 mg/dL Final     Direct Measure HDL   Date Value Ref Range Status   09/19/2023 39 (L) >=40 mg/dL Final   ]  GFR Estimate   Date Value Ref Range Status   03/14/2024 65 >60 mL/min/1.73m2 Final   06/10/2021 61 >60 mL/min/[1.73_m2] Final     Comment:     Non  GFR Calc  Starting 12/18/2018, serum creatinine based estimated GFR (eGFR) will be   calculated using the Chronic Kidney Disease Epidemiology Collaboration   (CKD-EPI) equation.       GFR Estimate If Black   Date Value Ref Range Status   06/10/2021 70 >60 mL/min/[1.73_m2] Final     Comment:      GFR Calc  Starting 12/18/2018, serum " "creatinine based estimated GFR (eGFR) will be   calculated using the Chronic Kidney Disease Epidemiology Collaboration   (CKD-EPI) equation.       Lab Results   Component Value Date    CR 1.23 03/14/2024    CR 1.25 06/10/2021     No results found for: \"MICROALBUMIN\"    Healthy Eating:  Healthy Eating Assessed Today: Yes  Cultural/Orthodoxy diet restrictions?: No  Meal planning/habits: Smaller portions, Avoiding sweets  Who cooks/prepares meals for you?: Self, Spouse  Who purchases food in  your home?: Self, Spouse  Breakfast: Rice Krispies bar OR eggs and ham, sometimes toast OR yogurt  Lunch: sandwich OR leftovers  Dinner: breaded fish and hashbrowns OR sub sandwich and cookies  Snacks: ice-cream and strawberries - limits 1x/week or less OR chips OR ice-cream bar (small) OR chips and salsa- small plate  Beverages: Water, Coffee, Diet soda    Being Active:  Being Active Assessed Today: No  Barrier to exercise: None    Monitoring:  Monitoring Assessed Today: Yes  Blood Glucose Meter: Accu-chek, CGM (Raoul 3)  Times checking blood sugar at home (number): 3  Times checking blood sugar at home (per): Day      Taking Medications:  Diabetes Medication(s)       Biguanides       metFORMIN (GLUCOPHAGE XR) 500 MG 24 hr tablet Take 1 tablet (500 mg) by mouth 2 times daily (with meals)       Insulin       LANTUS SOLOSTAR 100 UNIT/ML soln Inject 41 Units Subcutaneous At Bedtime            Taking Medication Assessed Today: Yes  Current Treatments: Insulin Injections (At bedtime)  Problems taking diabetes medications regularly?: No  Diabetes medication side effects?: No    Problem Solving:  Problem Solving Assessed Today: Yes  Is the patient at risk for hypoglycemia?: No  Hypoglycemia Frequency: Rarely    Hypoglycemia Symptoms  Hypoglycemia: None    Hypoglycemia Complications  Hypoglycemia Complications: None    Reducing Risks:  Reducing Risks Assessed Today: No  Diabetes Risks: Age over 45 years  Has dilated eye exam at least " once a year?: Yes  Sees dentist every 6 months?: No  Feet checked by healthcare provider in the last year?: No    Healthy Coping:  Healthy Coping Assessed Today: Yes  Emotional response to diabetes: Ready to learn, Acceptance, Concern for health and well-being  Informal Support system:: Friends, Spouse  Stage of change: ACTION (Actively working towards change)  Patient Activation Measure Survey Score:      6/13/2012     9:00 AM   GEOVANY Score (Last Two)   GEOVANY Raw Score 47   Activation Score 77.5   GEOVANY Level 4         Care Plan and Education Provided:  Healthy Eating: Balanced meals and Portion control, Monitoring: Blood glucose versus Continuous Glucose Monitoring, Individual glucose targets, and Log and interpret results, Taking Medication: Action of prescribed medication(s), Proper site selection and rotation for injections, Side effects of prescribed medication(s), and When to take medication(s), and Problem Solving: High glucose - causes, signs/symptoms, treatment and prevention and Low glucose - causes, signs/symptoms, treatment and prevention  Care Plan: Diabetes   Updates made by Sharmila Thompson RD since 7/16/2024 12:00 AM        Problem: Diabetes Self-Management Education Needed to Optimize Self-Care Behaviors         Goal: Monitoring - monitor glucose and ketones as directed    This Visit's Progress: 100%   Recent Progress: 0%   Note:    Try the Raoul 3 and see if it works better for you.         Sharmila Thompson RDN, LD, [facility]     Time Spent: 65 minutes  Encounter Type: Individual    Any diabetes medication dose changes were made via the CDE Protocol per the patient's referring provider. A copy of this encounter was shared with the provider.

## 2024-07-16 NOTE — PATIENT INSTRUCTIONS
Ok to stop Metformin if not tolerating as you are seeing great blood glucose control right now with only insulin.    2. If you want to retry Metformin, start with 1 tablet a day and wait to increase until no GI symptoms remaining. If you have more bowel movements/softer stools, no need to go up and can consider stopping again.    3. Check out cost of Jardiance.     4. Goal is to have >70% time in range and average blood glucose <154 mg/dL.     FOLLOW UP APPOINTMENT: In person follow up on Tuesday, September 17th at 9am at [facility].    Sharmila Thompson RDN, LD, [facility]   [phone]

## 2024-08-13 DIAGNOSIS — I26.94 MULTIPLE SUBSEGMENTAL PULMONARY EMBOLI WITHOUT ACUTE COR PULMONALE (H): ICD-10-CM

## 2024-08-13 DIAGNOSIS — I10 HYPERTENSION, GOAL BELOW 140/90: ICD-10-CM

## 2024-08-13 RX ORDER — LOSARTAN POTASSIUM 100 MG/1
100 TABLET ORAL DAILY
Qty: 90 TABLET | Refills: 1 | Status: SHIPPED | OUTPATIENT
Start: 2024-08-13

## 2024-08-13 RX ORDER — HYDROCHLOROTHIAZIDE 25 MG/1
25 TABLET ORAL DAILY
Qty: 90 TABLET | Refills: 1 | Status: SHIPPED | OUTPATIENT
Start: 2024-08-13

## 2024-08-13 RX ORDER — APIXABAN 5 MG/1
5 TABLET, FILM COATED ORAL 2 TIMES DAILY
Qty: 180 TABLET | Refills: 1 | Status: SHIPPED | OUTPATIENT
Start: 2024-08-13

## 2024-08-30 ENCOUNTER — TELEPHONE (OUTPATIENT)
Dept: FAMILY MEDICINE | Facility: CLINIC | Age: 69
End: 2024-08-30
Payer: COMMERCIAL

## 2024-08-30 NOTE — TELEPHONE ENCOUNTER
Retail Pharmacy Prior Authorization Team   Phone: [phone]    Prior Authorization Specialty Medication Request    Medication/Dose: Continuous Blood Gluc Sensor (FREESTYLE GONZALES 3 SENSOR) MISC  Diagnosis and ICD code (if different than what is on RX):    New/renewal/insurance change PA/secondary ins. PA:  Previously Tried and Failed:      Important Lab Values:   Rationale:     Insurance   Primary:   Insurance ID:      Secondary (if applicable):  Insurance ID:      Pharmacy Information (if different than what is on RX)  Name:    Phone:    Fax:

## 2024-09-03 NOTE — TELEPHONE ENCOUNTER
Prior Authorization Approval    Medication: FREESTYLE GONZALES 3 SENSOR MISC  Authorization Effective Date: 9/3/2024  Authorization Expiration Date: 12/31/2024  Approved Dose/Quantity:   Reference #:     Insurance Company: [employer] Part D - Phone [phone] Fax [phone]  Expected CoPay: $    CoPay Card Available:      Financial Assistance Needed:   Which Pharmacy is filling the prescription: [id number] - [place], [place] - [address]  Pharmacy Notified: YES  Patient Notified: **Instructed pharmacy to notify patient when script is ready to /ship.**

## 2024-09-03 NOTE — TELEPHONE ENCOUNTER
PA Initiation    Medication: FREESTYLE GONZALES 3 SENSOR [facility]  [place] Company: [employer] Part D - Phone [phone] Fax [phone]  Pharmacy Filling the Rx: [id number] - [place], [place] - [address]  Filling Pharmacy Phone: [phone]  Filling Pharmacy Fax: [phone]  Start Date: 9/3/2024

## 2024-09-26 ENCOUNTER — PATIENT OUTREACH (OUTPATIENT)
Dept: CARE COORDINATION | Facility: CLINIC | Age: 69
End: 2024-09-26
Payer: COMMERCIAL

## 2024-10-02 ENCOUNTER — OFFICE VISIT (OUTPATIENT)
Dept: PULMONOLOGY | Facility: CLINIC | Age: 69
End: 2024-10-02
Payer: COMMERCIAL

## 2024-10-02 VITALS — OXYGEN SATURATION: 96 % | SYSTOLIC BLOOD PRESSURE: 138 MMHG | HEART RATE: 84 BPM | DIASTOLIC BLOOD PRESSURE: 83 MMHG

## 2024-10-02 DIAGNOSIS — I26.94 MULTIPLE SUBSEGMENTAL PULMONARY EMBOLI WITHOUT ACUTE COR PULMONALE (H): Primary | ICD-10-CM

## 2024-10-02 DIAGNOSIS — R06.09 DYSPNEA ON EXERTION: ICD-10-CM

## 2024-10-02 PROCEDURE — 99215 OFFICE O/P EST HI 40 MIN: CPT | Performed by: INTERNAL MEDICINE

## 2024-10-02 PROCEDURE — G2211 COMPLEX E/M VISIT ADD ON: HCPCS | Performed by: INTERNAL MEDICINE

## 2024-10-02 NOTE — PROGRESS NOTES
Pulmonary Clinic Return Patient Visit  Reason for Visit: Pulmonary embolism/SOB  History of Present Illness  Mr. Arturo Negron is a very pleasant 68-year-old who presents to the clinic for follow-up for pulmonary embolism.  I last saw him in clinic in 4/2023  Hx of endobronchial hematoma status post cryodebulking in 2018 with concern for renarrowing of the left upper lobe bronchus and there are talks about possible bronchoscopy with reexploration to evaluate the need for further debulking, however at Jayson noted that he was asymptomatic and further exploration was not done.  He was diagnosed with multiple bilateral subsegmental emboli following sudden onset of increasing shortness of breath and he was started on Eliquis at the time. There was radiological evidence of mild heart strain although troponin/proBNP were unremarkable.   He did not require any admission. He had done well with Elliquis and interval improvement in SOB.  Today, still has remnant SOB which limits him from strenuous activities. He has appeared to have had accompanying deconditioning and some weight gain in the last few years. During the last clinic visit, we discussed ways to help with weight loss including weight loss medications. He was given Ozempic but caused GI complications.   He had previously attended pulmonary rehab but didn't find it helpful. I will check an echocardiogram to rule out cor-pulmonale as he does have some pulmonary edema.     Review of Systems:  10 of 14 systems reviewed and are negative unless otherwise stated in HPI.    Past Medical History:   Diagnosis Date    Allergies     Asthma, moderate persistent     FH: colon cancer     colonoscopy q 5yr.  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    HTN (hypertension)     Hyperlipidemia        Past Surgical History:   Procedure Laterality Date    ARTHROSCOPY KNEE Right 11/16/2018    Procedure: Right knee  arthroscopy, medial meniscal and chondral debridement;  Surgeon: Manoj Augustin MD;  Location: [facility] OR    BRONCHOSCOPY FLEXIBLE AND RIGID N/A 2017    Procedure: BRONCHOSCOPY FLEXIBLE AND RIGID;   Bronchoscopy, Tumor Debulking with Cryoprobe and Argon Plasma Coagulation;  Surgeon: Albino Crump MD;  Location: [facility] OR    BRONCHOSCOPY FLEXIBLE,L CRYOBIOPSY N/A 2018    Procedure: BRONCHOSCOPY FLEXIBLE, CRYOBIOPSY;  Flexible Bronchoscopy, Rigid Bronchoscopy, Tumor Debulking With Cryoprobe;  Surgeon: Albino Crump MD;  Location: [facility] OR    COLONOSCOPY      + polyp, +FH, repeat in 5 years    COMBINED ENDOSCOPY UPPER WITH ARGON PLASMA COAGULATOR (APC) N/A 2017    Procedure: COMBINED ENDOSCOPY UPPER WITH ARGON PLASMA COAGULATOR (APC);;  Surgeon: Albino Crump MD;  Location: [facility] OR    TONSILLECTOMY      VASECTOMY         Family History   Problem Relation Age of Onset    Arthritis Mother     Hypertension Father     Cerebrovascular Disease Father     Asthma Daughter     Allergies Daughter     Hypertension Sister     Cancer - colorectal Sister         dx age 50       Social History     Socioeconomic History    Marital status:     Number of children: 2   Occupational History     Employer: [employer]   Tobacco Use    Smoking status: Former Smoker     Quit date: 10/28/1991     Years since quittin.4    Smokeless tobacco: Never Used   Substance and Sexual Activity    Alcohol use: Yes     Comment: rare    Drug use: No    Sexual activity: Yes     Partners: Female     Birth control/protection: None   Other Topics Concern    Parent/sibling w/ CABG, MI or angioplasty before 65F 55M? No         Allergies   Allergen Reactions    Ozempic (0.25 Or 0.5 Mg-Dose) [Semaglutide(0.25 Or 0.5mg-Dos)] GI Disturbance     Bacteria overgrowth in intestines - was hospitalized for this    Lisinopril Cough         Current Outpatient Medications:     albuterol (PROAIR HFA/PROVENTIL HFA/VENTOLIN HFA)  108 (90 Base) MCG/ACT inhaler, Inhale 2 puffs into the lungs every 6 hours as needed for shortness of breath or wheezing, Disp: 18 g, Rfl: 0    amLODIPine (NORVASC) 10 MG tablet, TAKE 1 TABLET (10 MG) BY MOUTH ONCE DAILY FOR BLOOD PRESSURE, Disp: 90 tablet, Rfl: 0    apixaban ANTICOAGULANT (ELIQUIS ANTICOAGULANT) 5 MG tablet, Take 1 tablet (5 mg) by mouth 2 times daily, Disp: 180 tablet, Rfl: 1    atorvastatin (LIPITOR) 20 MG tablet, Take 1 tablet (20 mg) by mouth daily, Disp: 90 tablet, Rfl: 2    blood glucose (NO BRAND SPECIFIED) test strip, Use to test blood sugar 3 times daily or as directed. To accompany: Blood Glucose Monitor Brands: per insurance., Disp: 100 strip, Rfl: 6    blood glucose monitoring (NO BRAND SPECIFIED) meter device kit, Use to test blood sugar 3 times daily or as directed. Preferred blood glucose meter OR supplies to accompany: Blood Glucose Monitor Brands: per insurance., Disp: 1 kit, Rfl: 0    Continuous Blood Gluc Sensor (FREESTYLE GONZALES 3 SENSOR) [facility], 1 each every 14 days Use 1 sensor every 14 days. Use to read blood sugars per 's instructions., Disp: 6 each, Rfl: 3    desloratadine (CLARINEX) 5 MG tablet, Take 1 tablet (5 mg) by mouth daily, Disp: 90 tablet, Rfl: 0    esomeprazole (NEXIUM) 40 MG DR capsule, Take 1 capsule (40 mg) by mouth every morning (before breakfast) Take 30-60 minutes before eating., Disp: 90 capsule, Rfl: 1    fluticasone (FLONASE) 50 MCG/ACT nasal spray, Spray 1 spray into both nostrils daily, Disp: 32 g, Rfl: 4    hydrochlorothiazide (HYDRODIURIL) 25 MG tablet, Take 1 tablet (25 mg) by mouth daily, Disp: 90 tablet, Rfl: 1    LANTUS SOLOSTAR 100 UNIT/ML soln, Inject 41 Units Subcutaneous At Bedtime, Disp: 45 mL, Rfl: 0    losartan (COZAAR) 100 MG tablet, Take 1 tablet (100 mg) by mouth daily, Disp: 90 tablet, Rfl: 1    metFORMIN (GLUCOPHAGE XR) 500 MG 24 hr tablet, Take 1 tablet (500 mg) by mouth 2 times daily (with meals), Disp: 180 tablet, Rfl: 1     montelukast (SINGULAIR) 10 MG tablet, Take 1 tablet (10 mg) by mouth daily, Disp: 90 tablet, Rfl: 1    Multiple Vitamins-Minerals (MULTIVITAL PO), Take 1 tablet by mouth daily, Disp: , Rfl:     thin (NO BRAND SPECIFIED) lancets, Use with lanceting device. To accompany: Blood Glucose Monitor Brands: per insurance., Disp: 100 each, Rfl: 6    ULTIGUARD SAFEPACK PEN NEEDLE 32G X 4 MM miscellaneous, use 1 each daily, Disp: 100 each, Rfl: 0    Current Facility-Administered Medications:     lidocaine (PF) (XYLOCAINE) 1 % injection 4 mL, 4 mL, , , Bill Ortiz MD, 4 mL at 07/31/18 1422    Facility-Administered Medications Ordered in Other Visits:     sodium chloride (PF) 0.9% PF flush 10 mL, 10 mL, Intravenous, Once, Manjinder Luevano MD      Physical Exam:  /83 (BP Location: Left arm, Patient Position: Chair, Cuff Size: Adult Large)   Pulse 84   SpO2 96%   GENERAL: Well developed, well nourished, alert, and in no apparent distress.  HEENT: Normocephalic, atraumatic. PERRL, EOMI. Oral mucosa is moist. No perioral cyanosis.  NECK: supple, no masses, no thyromegaly.  RESP:  Normal respiratory effort.  CTAB.  No rales, wheezes, rhonchi.  No cyanosis or clubbing.  CV: Normal S1, S2, regular rhythm, normal rate. No murmur.  No LE edema.   ABDOMEN:  Soft, non-tender, non-distended.   SKIN: warm and dry. No rash.  NEURO: AAOx3.  Normal gait.  Fluent speech.  PSYCH: mentation appears normal.     Results:  Imaging (personally reviewed in clinic today): Echocardiography Study 11/15/2021 01:29 PM  Interpretation Summary  Global and regional left ventricular function is normal with an EF of 55-60%.  Right ventricular function, chamber size, wall motion, and thickness are normal.  Pulmonary artery systolic pressure cannot be assessed.  No pericardial effusion is present.    IMPRESSION:   1. Exam is negative for acute pulmonary embolism.    2. Sequela of prior granulomatous disease.   3. Stable 6 mm  pulmonary nodule in the right middle lobe, likely a fissural lymph node.   4. Stable fibrotic changes and no wall thickening/bronchiectasis in the lingula. No new acute consolidation.  5. Probable pulmonary artery hypertension, with pulmonary artery caliber of 3.5 cm.    Assessment and Plan:   Bilateral pulmonary emboli/SOB  Previous CT chest shows interval resolution of bilateral pulmonary embolism and he is on long term anticoagulation with Elliquis. Pulmonary artery was slightly elevated at 3.3 cm however previous echocardiogram did not show any evidence of overt right heart strain although there was some difficulty with pulmonary artery pressure estimation. I will repeat echocardiogram for evaluation for cor-pulmonale.   He had previously attended pulmonary rehab and I advised him to continue some of the exercises he has learned on his own to get back in shape as well as also try to lose some weight with healthy living and improved activity.   History of endobronchial hematoma status post cryodebulking in 2018   Previously seen by Dr. Alberto in 2019.  On review of his most recent scans, there is stable mild narrowing of the left upper lobe since 2019.    All questions were reviewed and addressed with patient  RTC as needed    I spent 40 minutes on the date of the encounter doing chart review, history and exam, documentation and further coordination as noted above exclusive of time interpreting PFT, Chest Xray, CT Chest.     Harry Law MD  Pulmonary, Critical Care and Sleep Medicine  [facility]  Pager: [phone]         The above note was dictated using voice recognition software and may include typographical errors. Please contact the author for any clarifications.

## 2024-10-02 NOTE — NURSING NOTE
Arturo Rahman's goals for this visit include:   Chief Complaint   Patient presents with    Follow Up       He requests these members of his care team be copied on today's visit information: no     PCP: Charlee Crane    Referring Provider:  Referred Self, MD  No address on file    /83 (BP Location: Left arm, Patient Position: Chair, Cuff Size: Adult Large)   Pulse 84   SpO2 96%     Do you need any medication refills at today's visit? No     LIVAN Miller   Neph/Pulm Teams  [employer]

## 2024-10-10 DIAGNOSIS — I10 HYPERTENSION, GOAL BELOW 140/90: ICD-10-CM

## 2024-10-10 RX ORDER — AMLODIPINE BESYLATE 10 MG/1
TABLET ORAL
Qty: 90 TABLET | Refills: 0 | Status: SHIPPED | OUTPATIENT
Start: 2024-10-10

## 2024-10-16 DIAGNOSIS — K21.00 GASTROESOPHAGEAL REFLUX DISEASE WITH ESOPHAGITIS, UNSPECIFIED WHETHER HEMORRHAGE: ICD-10-CM

## 2024-10-16 RX ORDER — ESOMEPRAZOLE MAGNESIUM 40 MG/1
40 CAPSULE, DELAYED RELEASE ORAL
Qty: 90 CAPSULE | Refills: 1 | Status: SHIPPED | OUTPATIENT
Start: 2024-10-16

## 2024-10-17 DIAGNOSIS — Z79.4 TYPE 2 DIABETES MELLITUS WITH HYPERGLYCEMIA, WITH LONG-TERM CURRENT USE OF INSULIN (H): ICD-10-CM

## 2024-10-17 DIAGNOSIS — E11.65 TYPE 2 DIABETES MELLITUS WITH HYPERGLYCEMIA, WITH LONG-TERM CURRENT USE OF INSULIN (H): ICD-10-CM

## 2024-10-17 RX ORDER — INSULIN GLARGINE 100 [IU]/ML
41 INJECTION, SOLUTION SUBCUTANEOUS AT BEDTIME
Qty: 45 ML | Refills: 0 | Status: SHIPPED | OUTPATIENT
Start: 2024-10-17

## 2024-10-18 ENCOUNTER — ANCILLARY PROCEDURE (OUTPATIENT)
Dept: CARDIOLOGY | Facility: CLINIC | Age: 69
End: 2024-10-18
Attending: INTERNAL MEDICINE
Payer: COMMERCIAL

## 2024-10-18 DIAGNOSIS — I26.94 MULTIPLE SUBSEGMENTAL PULMONARY EMBOLI WITHOUT ACUTE COR PULMONALE (H): ICD-10-CM

## 2024-10-18 DIAGNOSIS — R06.09 DYSPNEA ON EXERTION: ICD-10-CM

## 2024-10-18 LAB — LVEF ECHO: NORMAL

## 2024-10-18 PROCEDURE — 93306 TTE W/DOPPLER COMPLETE: CPT | Performed by: INTERNAL MEDICINE

## 2024-10-21 DIAGNOSIS — J45.40 MODERATE PERSISTENT ASTHMA WITHOUT COMPLICATION: ICD-10-CM

## 2024-10-21 RX ORDER — MONTELUKAST SODIUM 10 MG/1
1 TABLET ORAL DAILY
Qty: 90 TABLET | Refills: 0 | Status: SHIPPED | OUTPATIENT
Start: 2024-10-21

## 2024-10-26 NOTE — PROGRESS NOTES
"Chief Complaint   Patient presents with     Right Knee - Surgical Followup     RIght knee arthroscopy, medial meniscus debridement. DOS: 11/16/18. Doing pretty well, feeling better a little pain just below the knee cap.        SURGERY: right knee arthroscopy. ( [facility] )  1. Right knee arthroscopic partial medial meniscectomy  2. Right knee shaving chondroplasty of the medial femoral condyle.  DATE OF SURGERY: 11/16/2018.      HISTORY OF PRESENT ILLNESS:  Arturo \"Jayson\" ASHER Rahman is a 63 year old male seen for postoperative evaluation of a right knee arthroscopy and partial medial meniscectomy and shaving chondroplasty for right knee complex medial meniscus tear, chondromalacia and chondrocalcinosis. Surgery occurred 5 weeks ago. Returns today stating he is doing well. Minimal pain today, rated a 1/10. Pain is located over the anterior knee, under the kneecap. He thinks physical therapy may have contributed to the pain as well as putting the legs up while at rest. Otherwise is doing well. Returns today for workability update.        OR FINDINGS:  No effusion.  Mild synovitis.  No significant medial plica.  Some grade 3 chondrosis of the superior pole of the patella.  Grade 2 chondrosis elsewhere.  Grade 1 chondrosis of the femoral trochlea.  No loose bodies.  Intact ACL within the notch.  Prominent fat pad.  Diffuse calcifications throughout the knee.  Lateral compartment with intact lateral meniscus.  Grade 1 and 2 chondrosis.  Medial compartment with complex tear of the posterior horn/body junction with a displaced undersurface flap in a radial component.  Diffuse grade 3 chondrosis of the medial femoral condyle.     Past Medical History:   Diagnosis Date     Allergies      Asthma, moderate persistent      FH: colon cancer     colonoscopy q 5yr.  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     HTN (hypertension)      " Lab Results   Component Value Date    EGFR 32 10/26/2024    EGFR 30 10/25/2024    EGFR 29 10/22/2024    CREATININE 1.85 (H) 10/26/2024    CREATININE 1.94 (H) 10/25/2024    CREATININE 2.01 (H) 10/22/2024   Continue to monitor creatinine closely in light of IV Lasix use   Hyperlipidemia        Past Surgical History:   Procedure Laterality Date     ARTHROSCOPY KNEE Right 11/16/2018    Procedure: Right knee arthroscopy, medial meniscal and chondral debridement;  Surgeon: Manoj Augustin MD;  Location: [facility] OR     BRONCHOSCOPY FLEXIBLE AND RIGID N/A 7/19/2017    Procedure: BRONCHOSCOPY FLEXIBLE AND RIGID;   Bronchoscopy, Tumor Debulking with Cryoprobe and Argon Plasma Coagulation;  Surgeon: Albino Crump MD;  Location: [facility] OR     BRONCHOSCOPY FLEXIBLE,L CRYOBIOPSY N/A 1/11/2018    Procedure: BRONCHOSCOPY FLEXIBLE, CRYOBIOPSY;  Flexible Bronchoscopy, Rigid Bronchoscopy, Tumor Debulking With Cryoprobe;  Surgeon: Albino Crump MD;  Location: [facility] OR     COLONOSCOPY  11/08    + polyp, +FH, repeat in 5 years     COMBINED ENDOSCOPY UPPER WITH ARGON PLASMA COAGULATOR (APC) N/A 7/19/2017    Procedure: COMBINED ENDOSCOPY UPPER WITH ARGON PLASMA COAGULATOR (APC);;  Surgeon: Albino Crump MD;  Location: [facility] OR     TONSILLECTOMY       VASECTOMY         Medications:   Current Outpatient Medications:      amoxicillin-clavulanate (AUGMENTIN) 875-125 MG per tablet, Take 1 tablet by mouth 2 times daily for possible sinus infection., Disp: 20 tablet, Rfl: 0     desloratadine (CLARINEX) 5 MG tablet, Take 1 tablet (5 mg) by mouth 2 times daily as needed for allergies, Disp: 180 tablet, Rfl: 0     esomeprazole (NEXIUM) 40 MG DR capsule, TAKE ONE CAPSULE BY MOUTH IN THE MORNING BEFORE BREAKFAST, Disp: 90 capsule, Rfl: 1     fluticasone (FLONASE) 50 MCG/ACT spray, Spray 1 spray into both nostrils 2 times daily, Disp: 1 Bottle, Rfl: 3     HYDROcodone-acetaminophen (NORCO) 5-325 MG per tablet, Take 1-2 tablets by mouth every 6 hours as needed for moderate to severe pain, Disp: 20 tablet, Rfl: 0     losartan-hydrochlorothiazide (HYZAAR) 100-25 MG tablet, TAKE ONE TABLET BY MOUTH ONE TIME DAILY, Disp: 90 tablet, Rfl: 0     montelukast (SINGULAIR) 10 MG tablet, TAKE ONE TABLET BY MOUTH ONE  TIME DAILY., Disp: 90 tablet, Rfl: 1     Multiple Vitamins-Minerals (MULTIVITAL PO), Take 1 tablet by mouth daily, Disp: , Rfl:      senna-docusate (SENOKOT-S;PERICOLACE) 8.6-50 MG per tablet, Take 1-2 tablets by mouth 2 times daily, Disp: 30 tablet, Rfl: 0     simvastatin (ZOCOR) 10 MG tablet, Take 1 tablet (10 mg) by mouth At Bedtime, Disp: 90 tablet, Rfl: 3     VENTOLIN  (90 Base) MCG/ACT Inhaler, inhale 2 puffs by mouth every 6 hours as needed for shortness of breath/dyspnea, Disp: 18 g, Rfl: 2    Current Facility-Administered Medications:      lidocaine (PF) (XYLOCAINE) 1 % injection 4 mL, 4 mL, , , Bill Ortiz MD, 4 mL at 07/31/18 1422     methylPREDNISolone acetate (DEPO-MEDROL) injection 80 mg, 80 mg, , , Bill Ortiz MD, 80 mg at 07/31/18 1422    Allergies:   Allergies   Allergen Reactions     Lisinopril Cough       REVIEW OF SYSTEMS:   CONSTITUTIONAL:NEGATIVE for fever, chills, night sweats  INTEGUMENTARY/SKIN: NEGATIVE for worrisome wound problems or redness.  MUSCULOSKELETAL:See HPI above  NEURO: NEGATIVE for weakness, dizziness or paresthesias    This document serves as a record of the services and decisions personally performed and made by Manoj Augustin MD. It was created on his behalf by Robyn Quarles, a trained medical scribe. The creation of this document is based the provider's statements to the medical scribe.    Scribe Robyn Quarles 2:42 PM 12/19/2018     PHYSICAL EXAM:  /88   Resp 16   Wt 131.1 kg (289 lb)   BMI 43.62 kg/m     GENERAL APPEARANCE: healthy, alert, no distress  SKIN: no suspicious lesions or rashes  NEURO: Normal strength and tone, mentation intact and speech normal  PSYCH:  mentation appears normal and affect normal, not anxious  RESPIRATORY: No increased work of breathing.    RIGHT LOWER EXTREMITY:  Gait: mildy favoring right  No Quad atrophy, strength normal.  Intact sensation deep peroneal nerve, superficial peroneal nerve, med/lat tibial nerve,  sural nerve, saphenous nerve  Intact EHL, EDL, TA, FHL, GS, quadriceps hamstrings and hip flexors  Toes warm and well perfused, brisk capillary refill. Palpable 2+ dp pulses.  calf soft and nttp or squeeze.  Edema: none    RIGHT KNEE EXAM:    Incision: healed. Skin: intact, no ecchymosis or erythema  ROM:  0 extension to 125 flexion  Effusion: minimal  Tender: NTTP med/lat joint line, anterior or posterior knee  Mild patello-femoral grind.      X-RAY: none indicated.      Impression: Arturo Rahman is a 63 year old male 5 weeks status post right knee arthroscopy and partial medial meniscectomy and shaving chondroplasty, doing well.       Plan: routine postoperative knee arthroscopy  * WB status: weightbearing as tolerated. No restrictions.    * Rest  * Activity modification - avoid activities that aggravate symptoms.  * NSAIDS - regular use for inflammation, with food, as long as no contra-indications. Please discuss with pcp if needed.  * OK to drive if patient feels comfortable.  * Ice twice daily to three times daily as needed.  * Compression wrap as needed.  * Elevation of extremity to reduce swelling as needed.  * PT for strengthening, stretching and range of motion exercises, effusion control.  * Tylenol as needed for pain  * Workability update: Ok to return to work with no restrictions 12/23/2018.   * return to clinic as needed  * Patient to follow up with Primary Care provider regarding elevated blood pressure       * We did also discuss that based on the amount of arthritic changes seen at the time of surgery, may have continued knee pain due to the arthritis. Once recovered from the knee arthroscopy, and arthritic symptoms persist, consider full treatment of arthritis starting with Physical Therapy and injections, NSAIDS, activity modification, bracing.    The information in this document, created by a scribe for me, accurately reflects the services I personally performed and the decisions made by me. I  have reviewed and approved this document for accuracy.        Manoj Augustin M.D., M.S.  Dept. of Orthopaedic Surgery  [facility]

## 2024-10-30 NOTE — RESULT ENCOUNTER NOTE
Global and regional left ventricular function is normal with an EF of 60-65%.  Left ventricular diastolic function is normal.  Right ventricular function, chamber size, wall motion, and thickness are  normal.  The inferior vena cava is normal.

## 2024-11-09 DIAGNOSIS — Z79.4 TYPE 2 DIABETES MELLITUS WITH HYPERGLYCEMIA, WITH LONG-TERM CURRENT USE OF INSULIN (H): ICD-10-CM

## 2024-11-09 DIAGNOSIS — E11.65 TYPE 2 DIABETES MELLITUS WITH HYPERGLYCEMIA, WITH LONG-TERM CURRENT USE OF INSULIN (H): ICD-10-CM

## 2024-11-10 ENCOUNTER — HEALTH MAINTENANCE LETTER (OUTPATIENT)
Age: 69
End: 2024-11-10

## 2024-11-12 NOTE — TELEPHONE ENCOUNTER
metFORMIN HCl ER Oral Tablet Extended Release 24 Hour 500 MG     Last Written Prescription Date:  5/16/24  Last Fill Quantity: 180,   # refills: 1  Last Office Visit : 5/16/24  Future Office visit:  11/21/24    Routing refill request to provider for review/approval because:  A1c DUE( ORDER PLACED 5/16/24 BY PCP), LAST DONE 3/14/24( 6.9)    Hemoglobin A1C   Date Value Ref Range Status   03/14/2024 6.9 (H) 0.0 - 5.6 % Final     Comment:     Normal <5.7%   Prediabetes 5.7-6.4%    Diabetes 6.5% or higher     Note: Adopted from ADA consensus guidelines.   06/10/2021 6.7 (H) 0 - 5.6 % Final     Comment:     Normal <5.7% Prediabetes 5.7-6.4%  Diabetes 6.5% or higher - adopted from ADA   consensus guidelines.

## 2024-11-13 RX ORDER — METFORMIN HYDROCHLORIDE 500 MG/1
500 TABLET, EXTENDED RELEASE ORAL 2 TIMES DAILY WITH MEALS
Qty: 180 TABLET | Refills: 0 | Status: SHIPPED | OUTPATIENT
Start: 2024-11-13

## 2024-11-17 ENCOUNTER — TELEPHONE (OUTPATIENT)
Dept: FAMILY MEDICINE | Facility: CLINIC | Age: 69
End: 2024-11-17
Payer: COMMERCIAL

## 2024-11-17 DIAGNOSIS — Z79.4 TYPE 2 DIABETES MELLITUS WITH HYPERGLYCEMIA, WITH LONG-TERM CURRENT USE OF INSULIN (H): ICD-10-CM

## 2024-11-17 DIAGNOSIS — E11.65 TYPE 2 DIABETES MELLITUS WITH HYPERGLYCEMIA, WITH LONG-TERM CURRENT USE OF INSULIN (H): ICD-10-CM

## 2024-11-17 DIAGNOSIS — E11.65 TYPE 2 DIABETES MELLITUS WITH HYPERGLYCEMIA, WITH LONG-TERM CURRENT USE OF INSULIN (H): Primary | ICD-10-CM

## 2024-11-17 DIAGNOSIS — Z79.4 TYPE 2 DIABETES MELLITUS WITH HYPERGLYCEMIA, WITH LONG-TERM CURRENT USE OF INSULIN (H): Primary | ICD-10-CM

## 2024-11-17 NOTE — TELEPHONE ENCOUNTER
Continuous Blood Gluc Sensor (FREESTYLE GONZALES 3 SENSOR) [facility]     Dear Provider,  We are faxing to let you know that Freestyle Gonzales 3 sensors have been on backorder. The good new is that Freestyle now has a Gonzales 3 PLUS sensor that seems a little more available at the moment. Biggest difference is the PLUS is worn for 15 days instead of 14.    Please take a look at the following sensor comparison guide for more information.  Also see attached patient info and send rx for Freestyle Gonzales 3 PLUS if the provider is okay with the switch (as stated before, we currently are out of stock on the plain Gonzales 3 sensors and can't get this patient's rx fulfilled without the change).    [facility] [place]  PH # [phone]

## 2024-11-18 RX ORDER — FLURBIPROFEN SODIUM 0.3 MG/ML
SOLUTION/ DROPS OPHTHALMIC
Qty: 100 EACH | Refills: 1 | Status: SHIPPED | OUTPATIENT
Start: 2024-11-18

## 2024-11-20 RX ORDER — KETOROLAC TROMETHAMINE 30 MG/ML
1 INJECTION, SOLUTION INTRAMUSCULAR; INTRAVENOUS ONCE
Qty: 1 EACH | Refills: 0 | Status: SHIPPED | OUTPATIENT
Start: 2024-11-20 | End: 2024-11-20

## 2024-11-20 RX ORDER — HYDROCHLOROTHIAZIDE 12.5 MG/1
CAPSULE ORAL
Qty: 6 EACH | Refills: 3 | Status: SHIPPED | OUTPATIENT
Start: 2024-11-20

## 2024-11-21 ENCOUNTER — OFFICE VISIT (OUTPATIENT)
Dept: ENDOCRINOLOGY | Facility: CLINIC | Age: 69
End: 2024-11-21
Payer: COMMERCIAL

## 2024-11-21 VITALS
OXYGEN SATURATION: 97 % | WEIGHT: 290 LBS | SYSTOLIC BLOOD PRESSURE: 139 MMHG | RESPIRATION RATE: 16 BRPM | BODY MASS INDEX: 42.51 KG/M2 | DIASTOLIC BLOOD PRESSURE: 87 MMHG | HEART RATE: 84 BPM

## 2024-11-21 DIAGNOSIS — E11.65 TYPE 2 DIABETES MELLITUS WITH HYPERGLYCEMIA, WITH LONG-TERM CURRENT USE OF INSULIN (H): Primary | ICD-10-CM

## 2024-11-21 DIAGNOSIS — E66.01 MORBID OBESITY (H): ICD-10-CM

## 2024-11-21 DIAGNOSIS — Z79.4 TYPE 2 DIABETES MELLITUS WITH HYPERGLYCEMIA, WITH LONG-TERM CURRENT USE OF INSULIN (H): Primary | ICD-10-CM

## 2024-11-21 LAB
ANION GAP SERPL CALCULATED.3IONS-SCNC: 10 MMOL/L (ref 7–15)
BUN SERPL-MCNC: 15.3 MG/DL (ref 8–23)
CALCIUM SERPL-MCNC: 9.1 MG/DL (ref 8.8–10.4)
CHLORIDE SERPL-SCNC: 106 MMOL/L (ref 98–107)
CHOLEST SERPL-MCNC: 129 MG/DL
CREAT SERPL-MCNC: 1.22 MG/DL (ref 0.67–1.17)
CREAT UR-MCNC: 59 MG/DL
EGFRCR SERPLBLD CKD-EPI 2021: 65 ML/MIN/1.73M2
EST. AVERAGE GLUCOSE BLD GHB EST-MCNC: 148 MG/DL
FASTING STATUS PATIENT QL REPORTED: NO
FASTING STATUS PATIENT QL REPORTED: NO
GLUCOSE SERPL-MCNC: 113 MG/DL (ref 70–99)
HBA1C MFR BLD: 6.8 %
HCO3 SERPL-SCNC: 26 MMOL/L (ref 22–29)
HDLC SERPL-MCNC: 46 MG/DL
LDLC SERPL CALC-MCNC: 66 MG/DL
MICROALBUMIN UR-MCNC: <12 MG/L
MICROALBUMIN/CREAT UR: NORMAL MG/G{CREAT}
NONHDLC SERPL-MCNC: 83 MG/DL
POTASSIUM SERPL-SCNC: 3.7 MMOL/L (ref 3.4–5.3)
SODIUM SERPL-SCNC: 142 MMOL/L (ref 135–145)
TRIGL SERPL-MCNC: 86 MG/DL

## 2024-11-21 NOTE — PROGRESS NOTES
Assessment/Plan :   Type 2 DM. Jayson tried the metformin but it just didn't sit well with his stomach. We reviewed his recent CGMS report and he seems to be doing well with 38 unit(s) of Lantus daily. We reviewed the importance of diet and exercise in glucose management. I encouraged him to try and take a small walk after dinner to help with his frequent post-prandial spike. Otherwise, I do not see any reason to make adjustments to his current medications. He is due for routine laboratory testing and I will place an order today. We will follow-up in 6 mos.       I have independently reviewed and interpreted labs, imaging as indicated.      Chief complaint:  Arturo is a 69 year old male who returns for follow-up of Type 2 DM, with long term use of insulin.    I have reviewed Care Everywhere including [facility], [facility], [facility], [facility], [facility], [facility] , [facility], [facility] lab reports, imaging reports and provider notes as indicated.      HISTORY OF PRESENT ILLNESS  Jayson is doing well. At our last visit, we had discussed trying metformin, again. I was hopeful that it would help improve insulin resistance and that it may help with weight loss. He states he tried it with and without food, on a few different occasions, but it always led to stomach pain and loose stools. He did notice that his blood sugars would not spike as high on the metformin but he just could not get over the stomach issues, so he discontinued the medication. He has continued to manage his blood sugars with 38 unit(s) of Lantus daily.    Jayson monitors his blood sugars closely with the FreeStyle Raoul 3 sensor. He was told by his pharmacy that he would need to upgrade to the Raoul 3 plus. He has not had any problems with the sensor. He also has not had any issues with severe hyperglycemia and/or hypoglycemia. He really tries to make healthy dietary decisions and he always knows why his blood sugars are elevated. He has  not had any problems with blurred vision or an increase in numbness/tingling in his feet. He is in the process of moving and it has been tiring but he is doing okay.    Jayson was diagnosed with diabetes in July of 2023. He had started losing weight without making any adjustments to his diet and exercise. He then developed dry mouth and excess thirst. His wife has diabetes so he used her meter to check his blood sugar and it was very elevated. He went to an urgent care and his blood sugar was over 500 mg/dl. He was sent to the ER for possible DKA. He did not have any signs of significant acidosis. He was given IV fluids while at the ER and he was discharged with a prescription for twice daily metformin. He had a follow-up with his primary care provider who was concerned about overall kidney function, so she discontinued metformin and started him on Lantus and Ozempic. He had an allergic reaction to the Ozempic and so he has been managing his blood sugars with Lantus, alone. His diabetes is complicated by obesity, hyperlipidemia, HTN, and GERD.    Endocrine relevant labs are as follows:   Latest Reference Range & Units 11/21/24 09:07   Afinion Hemoglobin A1c POCT <=5.7 % 6.8 (H)   (H): Data is abnormally high   Latest Reference Range & Units 03/14/24 07:59   Hemoglobin A1C 0.0 - 5.6 % 6.9 (H)   (H): Data is abnormally high   Latest Reference Range & Units 11/30/23 08:43   Hemoglobin A1C 0.0 - 5.6 % 6.4 (H)   (H): Data is abnormally high   Latest Reference Range & Units 09/19/23 07:28   Albumin Urine mg/L mg/L <12.0     REVIEW OF SYSTEMS    Endocrine: positive for diabetes and obesity  Skin: negative  Eyes: negative for, visual blurring, redness, tearing  Ears/Nose/Throat: negative  Respiratory: No shortness of breath, dyspnea on exertion, cough, or hemoptysis  Cardiovascular: negative for, chest pain, dyspnea on exertion, lower extremity edema, and exercise intolerance  Gastrointestinal: negative for, nausea, vomiting,  constipation, and diarrhea  Genitourinary: negative for, nocturia, dysuria, frequency, and urgency  Musculoskeletal: negative for, muscular weakness, nocturnal cramping, and foot pain  Neurologic: negative for, local weakness, numbness or tingling of hands, and numbness or tingling of feet  Psychiatric: negative  Hematologic/Lymphatic/Immunologic: negative    Past Medical History  Past Medical History:   Diagnosis Date    Allergies     Asthma, moderate persistent     FH: colon cancer     colonoscopy q 5yr.  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    HTN (hypertension)     Hyperlipidemia        Medications  Current Outpatient Medications   Medication Sig Dispense Refill    albuterol (PROAIR HFA/PROVENTIL HFA/VENTOLIN HFA) 108 (90 Base) MCG/ACT inhaler Inhale 2 puffs into the lungs every 6 hours as needed for shortness of breath or wheezing 18 g 0    amLODIPine (NORVASC) 10 MG tablet TAKE 1 TABLET (10 MG) BY MOUTH ONCE DAILY FOR BLOOD PRESSURE 90 tablet 0    apixaban ANTICOAGULANT (ELIQUIS ANTICOAGULANT) 5 MG tablet Take 1 tablet (5 mg) by mouth 2 times daily 180 tablet 1    atorvastatin (LIPITOR) 20 MG tablet Take 1 tablet (20 mg) by mouth daily 90 tablet 2    blood glucose (NO BRAND SPECIFIED) test strip Use to test blood sugar 3 times daily or as directed. To accompany: Blood Glucose Monitor Brands: per insurance. 100 strip 6    blood glucose monitoring (NO BRAND SPECIFIED) meter device kit Use to test blood sugar 3 times daily or as directed. Preferred blood glucose meter OR supplies to accompany: Blood Glucose Monitor Brands: per insurance. 1 kit 0    Continuous Blood Gluc Sensor (FREESTYLE GONZALES 3 SENSOR) [facility] 1 each every 14 days Use 1 sensor every 14 days. Use to read blood sugars per 's instructions. 6 each 3    Continuous Glucose Sensor (FREESTYLE GONZALES 3 PLUS SENSOR) MISC Use 1 sensor every 15 days. Use to read blood sugars per  's instructions. 6 each 3    desloratadine (CLARINEX) 5 MG tablet Take 1 tablet (5 mg) by mouth daily 90 tablet 0    esomeprazole (NEXIUM) 40 MG DR capsule Take 1 capsule (40 mg) by mouth every morning (before breakfast) Take 30-60 minutes before eating. 90 capsule 1    fluticasone (FLONASE) 50 MCG/ACT nasal spray Spray 1 spray into both nostrils daily 32 g 4    hydrochlorothiazide (HYDRODIURIL) 25 MG tablet Take 1 tablet (25 mg) by mouth daily 90 tablet 1    insulin pen needle (ULTIGUARD SAFEPACK PEN NEEDLE) 32G X 4 MM miscellaneous Use 1 pen needle daily or as directed. 100 each 1    LANTUS SOLOSTAR 100 UNIT/ML soln Inject 41 Units Subcutaneous At Bedtime 45 mL 0    losartan (COZAAR) 100 MG tablet Take 1 tablet (100 mg) by mouth daily 90 tablet 1    metFORMIN (GLUCOPHAGE XR) 500 MG 24 hr tablet Take 1 tablet (500 mg) by mouth 2 times daily (with meals) 180 tablet 0    montelukast (SINGULAIR) 10 MG tablet TAKE ONE TABLET BY MOUTH ONE TIME DAILY 90 tablet 0    Multiple Vitamins-Minerals (MULTIVITAL PO) Take 1 tablet by mouth daily      thin (NO BRAND SPECIFIED) lancets Use with lanceting device. To accompany: Blood Glucose Monitor Brands: per insurance. 100 each 6       Allergies  Allergies   Allergen Reactions    Ozempic (0.25 Or 0.5 Mg-Dose) [Semaglutide(0.25 Or 0.5mg-Dos)] GI Disturbance     Bacteria overgrowth in intestines - was hospitalized for this    Lisinopril Cough         Family History  family history includes Allergies in his daughter; Arthritis in his mother; Asthma in his daughter; Cancer - colorectal in his sister; Cerebrovascular Disease in his father; Hypertension in his father and sister.    Social History  Social History     Tobacco Use    Smoking status: Former     Current packs/day: 0.00     Types: Cigarettes     Quit date: 10/28/1991     Years since quittin.0    Smokeless tobacco: Never   Vaping Use    Vaping status: Never Used   Substance Use Topics    Alcohol use: Yes      Comment: rare    Drug use: No       Physical Exam  /87   Pulse 84   Resp 16   Wt 131.5 kg (290 lb)   SpO2 97%   BMI 42.51 kg/m    Body mass index is 42.51 kg/m .  GENERAL :  In no apparent distress  SKIN: Normal color, normal temperature, texture.  No hirsutism, alopecia or purple striae.     EYES: PERRL, EOMI, No scleral icterus,  No proptosis, conjunctival redness, stare, retraction  RESP: Lungs clear to auscultation bilaterally  CARDIAC: Regular rate and rhythm, normal S1 S2, without murmurs, rubs or gallops    NEURO: awake, alert, responds appropriately to questions.  Cranial nerves intact.   Moves all extremities; Gait normal.  No tremor of the outstretched hand.  EXTREMITIES: No clubbing, cyanosis or edema.    DATA REVIEW  FreeStyle LibreView  Time in target range 87%  High 13%  Current Ave  mg/dl

## 2024-11-21 NOTE — LETTER
11/21/2024      Arturo Rahman  [address] [place] [address]      Dear Colleague,    Thank you for referring your patient, Arturo Rahman, to the [facility]. Please see a copy of my visit note below.           Assessment/Plan :   Type 2 DM. Jayson tried the metformin but it just didn't sit well with his stomach. We reviewed his recent CGMS report and he seems to be doing well with 38 unit(s) of Lantus daily. We reviewed the importance of diet and exercise in glucose management. I encouraged him to try and take a small walk after dinner to help with his frequent post-prandial spike. Otherwise, I do not see any reason to make adjustments to his current medications. He is due for routine laboratory testing and I will place an order today. We will follow-up in 6 mos.       I have independently reviewed and interpreted labs, imaging as indicated.      Chief complaint:  Arturo is a 69 year old male who returns for follow-up of Type 2 DM, with long term use of insulin.    I have reviewed Care Everywhere including [facility], [facility], [facility], [facility], [facility] , [facility], [place] lab reports, imaging reports and provider notes as indicated.      HISTORY OF PRESENT ILLNESS  Jayson is doing well. At our last visit, we had discussed trying metformin, again. I was hopeful that it would help improve insulin resistance and that it may help with weight loss. He states he tried it with and without food, on a few different occasions, but it always led to stomach pain and loose stools. He did notice that his blood sugars would not spike as high on the metformin but he just could not get over the stomach issues, so he discontinued the medication. He has continued to manage his blood sugars with 38 unit(s) of Lantus daily.    Jayson monitors his blood sugars closely with the FreeStyle Raoul 3 sensor. He was told by his pharmacy that he would need to upgrade  to the Raoul 3 plus. He has not had any problems with the sensor. He also has not had any issues with severe hyperglycemia and/or hypoglycemia. He really tries to make healthy dietary decisions and he always knows why his blood sugars are elevated. He has not had any problems with blurred vision or an increase in numbness/tingling in his feet. He is in the process of moving and it has been tiring but he is doing okay.    Jayson was diagnosed with diabetes in July of 2023. He had started losing weight without making any adjustments to his diet and exercise. He then developed dry mouth and excess thirst. His wife has diabetes so he used her meter to check his blood sugar and it was very elevated. He went to an urgent care and his blood sugar was over 500 mg/dl. He was sent to the ER for possible DKA. He did not have any signs of significant acidosis. He was given IV fluids while at the ER and he was discharged with a prescription for twice daily metformin. He had a follow-up with his primary care provider who was concerned about overall kidney function, so she discontinued metformin and started him on Lantus and Ozempic. He had an allergic reaction to the Ozempic and so he has been managing his blood sugars with Lantus, alone. His diabetes is complicated by obesity, hyperlipidemia, HTN, and GERD.    Endocrine relevant labs are as follows:   Latest Reference Range & Units 11/21/24 09:07   Afinion Hemoglobin A1c POCT <=5.7 % 6.8 (H)   (H): Data is abnormally high   Latest Reference Range & Units 03/14/24 07:59   Hemoglobin A1C 0.0 - 5.6 % 6.9 (H)   (H): Data is abnormally high   Latest Reference Range & Units 11/30/23 08:43   Hemoglobin A1C 0.0 - 5.6 % 6.4 (H)   (H): Data is abnormally high   Latest Reference Range & Units 09/19/23 07:28   Albumin Urine mg/L mg/L <12.0     REVIEW OF SYSTEMS    Endocrine: positive for diabetes and obesity  Skin: negative  Eyes: negative for, visual blurring, redness,  tearing  Ears/Nose/Throat: negative  Respiratory: No shortness of breath, dyspnea on exertion, cough, or hemoptysis  Cardiovascular: negative for, chest pain, dyspnea on exertion, lower extremity edema, and exercise intolerance  Gastrointestinal: negative for, nausea, vomiting, constipation, and diarrhea  Genitourinary: negative for, nocturia, dysuria, frequency, and urgency  Musculoskeletal: negative for, muscular weakness, nocturnal cramping, and foot pain  Neurologic: negative for, local weakness, numbness or tingling of hands, and numbness or tingling of feet  Psychiatric: negative  Hematologic/Lymphatic/Immunologic: negative    Past Medical History  Past Medical History:   Diagnosis Date     Allergies      Asthma, moderate persistent      FH: colon cancer     colonoscopy q 5yr.  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     HTN (hypertension)      Hyperlipidemia        Medications  Current Outpatient Medications   Medication Sig Dispense Refill     albuterol (PROAIR HFA/PROVENTIL HFA/VENTOLIN HFA) 108 (90 Base) MCG/ACT inhaler Inhale 2 puffs into the lungs every 6 hours as needed for shortness of breath or wheezing 18 g 0     amLODIPine (NORVASC) 10 MG tablet TAKE 1 TABLET (10 MG) BY MOUTH ONCE DAILY FOR BLOOD PRESSURE 90 tablet 0     apixaban ANTICOAGULANT (ELIQUIS ANTICOAGULANT) 5 MG tablet Take 1 tablet (5 mg) by mouth 2 times daily 180 tablet 1     atorvastatin (LIPITOR) 20 MG tablet Take 1 tablet (20 mg) by mouth daily 90 tablet 2     blood glucose (NO BRAND SPECIFIED) test strip Use to test blood sugar 3 times daily or as directed. To accompany: Blood Glucose Monitor Brands: per insurance. 100 strip 6     blood glucose monitoring (NO BRAND SPECIFIED) meter device kit Use to test blood sugar 3 times daily or as directed. Preferred blood glucose meter OR supplies to accompany: Blood Glucose Monitor Brands: per insurance. 1 kit 0      Continuous Blood Gluc Sensor (FREESTYLE GONZALES 3 SENSOR) [facility] 1 each every 14 days Use 1 sensor every 14 days. Use to read blood sugars per 's instructions. 6 each 3     Continuous Glucose Sensor (FREESTYLE GONZALES 3 PLUS SENSOR) MISC Use 1 sensor every 15 days. Use to read blood sugars per 's instructions. 6 each 3     desloratadine (CLARINEX) 5 MG tablet Take 1 tablet (5 mg) by mouth daily 90 tablet 0     esomeprazole (NEXIUM) 40 MG DR capsule Take 1 capsule (40 mg) by mouth every morning (before breakfast) Take 30-60 minutes before eating. 90 capsule 1     fluticasone (FLONASE) 50 MCG/ACT nasal spray Spray 1 spray into both nostrils daily 32 g 4     hydrochlorothiazide (HYDRODIURIL) 25 MG tablet Take 1 tablet (25 mg) by mouth daily 90 tablet 1     insulin pen needle ([employer] SAFEPACK PEN NEEDLE) 32G X 4 MM miscellaneous Use 1 pen needle daily or as directed. 100 each 1     LANTUS SOLOSTAR 100 UNIT/ML soln Inject 41 Units Subcutaneous At Bedtime 45 mL 0     losartan (COZAAR) 100 MG tablet Take 1 tablet (100 mg) by mouth daily 90 tablet 1     metFORMIN (GLUCOPHAGE XR) 500 MG 24 hr tablet Take 1 tablet (500 mg) by mouth 2 times daily (with meals) 180 tablet 0     montelukast (SINGULAIR) 10 MG tablet TAKE ONE TABLET BY MOUTH ONE TIME DAILY 90 tablet 0     Multiple Vitamins-Minerals (MULTIVITAL PO) Take 1 tablet by mouth daily       thin (NO BRAND SPECIFIED) lancets Use with lanceting device. To accompany: Blood Glucose Monitor Brands: per insurance. 100 each 6       Allergies  Allergies   Allergen Reactions     Ozempic (0.25 Or 0.5 Mg-Dose) [Semaglutide(0.25 Or 0.5mg-Dos)] GI Disturbance     Bacteria overgrowth in intestines - was hospitalized for this     Lisinopril Cough         Family History  family history includes Allergies in his daughter; Arthritis in his mother; Asthma in his daughter; Cancer - colorectal in his sister; Cerebrovascular Disease in his father; Hypertension in his father  and sister.    Social History  Social History     Tobacco Use     Smoking status: Former     Current packs/day: 0.00     Types: Cigarettes     Quit date: 10/28/1991     Years since quittin.0     Smokeless tobacco: Never   Vaping Use     Vaping status: Never Used   Substance Use Topics     Alcohol use: Yes     Comment: rare     Drug use: No       Physical Exam  /87   Pulse 84   Resp 16   Wt 131.5 kg (290 lb)   SpO2 97%   BMI 42.51 kg/m    Body mass index is 42.51 kg/m .  GENERAL :  In no apparent distress  SKIN: Normal color, normal temperature, texture.  No hirsutism, alopecia or purple striae.     EYES: PERRL, EOMI, No scleral icterus,  No proptosis, conjunctival redness, stare, retraction  RESP: Lungs clear to auscultation bilaterally  CARDIAC: Regular rate and rhythm, normal S1 S2, without murmurs, rubs or gallops    NEURO: awake, alert, responds appropriately to questions.  Cranial nerves intact.   Moves all extremities; Gait normal.  No tremor of the outstretched hand.  EXTREMITIES: No clubbing, cyanosis or edema.    DATA REVIEW  FreeStyle LibreView  Time in target range 87%  High 13%  Current Ave  mg/dl        Again, thank you for allowing me to participate in the care of your patient.        Sincerely,        Veronica Pradhan PA-C

## 2024-11-21 NOTE — PATIENT INSTRUCTIONS
Welcome to the [facility]     Our Endocrinology Clinics are here to provide you with a team-based, collaborative approach in the diagnosis and treatment of patients with diabetes and endocrine disorders. The team is made up of Physicians, Physician Assistants, Certified Diabetes Educators, Registered Nurses, Medical Assistants, Emergency Medical Technicians, and many others, all of whom have the unified goal of providing our patients with high quality care.     Please see below for some helpful tips to best navigate and use the [facility] Endocrinology clinic:     [place] Respect: At [facility], we are committed to a respectful and safe space for all patients, visitors, and staff.  We believe that mutual respect between patients and their care team is the foundation of quality care.  It is our expectation that you will be treated with respect by your care team.  In turn, we ask that all communication with the care team (written and verbal) be respectful and free from profanity, threatening, or abusive language.  Disrespectful communication undermines our therapeutic relationship with you and may result in us being unable to continue to provide your care.    Refills: A provider must see you at least annually to prescribe and refill medications. This is to ensure your safety as well as meet insurance and compliance regulations.    Scheduling: Many of our Providers offer both in-person or video visits. Please call to schedule any needed follow ups as soon as possible because our provider schedules fill up very quickly. Our care team has the right to require an in-person visit when they believe that it is medically necessary. Please remember that for any virtual visits, you must be in the [facility] at the time of the visit, otherwise we are unable to see you and you will need to be rescheduled.    Missed Appointments: If you need to cancel or miss your  scheduled appointment, please call the clinic at [phone] to reschedule.  Please note if you repeatedly miss appointments or repeatedly miss appointments without calling to inform us ahead of time (no-show), the clinic may elect to not allow you to reschedule without speaking to a manager, may require a Partnership In Care Agreement prior to rescheduling, or could result in you no longer being able to receive care from the clinic. Providing the clinic with timely notification if you have to miss an appointment, allows us to better serve the needs of all of our patients.    Primary Care Provider: Our Endocrinologists are Specialists in their field. We expect you to have a Primary Care Provider established to handle any needs outside of your diabetes and endocrine care.  We would be happy to assist you find a Primary Care Provider, if you do not have one.    Med[employer]: [employer] is a wonderful resource that allows you access to your Care Team via online or the lucila. Please ask a member of the team if you would like help creating an account. Please note that it may take up to 2 business days for a response. MedServe messages are not reviewed on weekends or after business hours.  Emergent or urgent care needs should never be communicated via Med[employer].  If you experience a medical emergency call 911 or go to the nearest emergency room.    Labs: It is recommended that you stay within the [facility] for labs but you are welcome to obtain ordered labs (with some exceptions) from any location of your choice as long as they are able to complete and process the needed labs. If you need us to fax orders to your preferred lab, please provide us the name and fax number of the lab you would like to go to so we can fax the orders. If your labs are drawn outside of the [facility], please have them fax the results to [phone] ([place]) or [phone] ([place]) or via [facility]. It is your  responsibility to ensure that outside lab results are sent to us.    We look forward to working with you. Please do not hesitate to reach out with any questions.    Thank you,    The Endocrine Team    [facility] Address:   [place] Address:     [address] [place] [address]    Phone: [phone]  Fax: [phone] [address] [place] [address]    Phone: [phone]  Fax: [phone]     [facility] Cost Estimate Phone Number: [phone]    General Lab and Imaging Scheduling Phone Number: [phone]

## 2024-11-21 NOTE — NURSING NOTE
Arturo Rahman's goals for this visit include:   Chief Complaint   Patient presents with    Follow Up    Diabetes       He requests these members of his care team be copied on today's visit information: yes    PCP: Charlee Crane    Referring Provider:  Referred Self, MD  No address on file    /87   Pulse 84   Resp 16   Wt 131.5 kg (290 lb)   SpO2 97%   BMI 42.51 kg/m      Do you need any medication refills at today's visit? Unsure    Jay Graves, EMT

## (undated) DEVICE — BNDG ESMARK 6" STERILE

## (undated) DEVICE — SYR 10ML SLIP TIP W/O NDL 303134

## (undated) DEVICE — GLOVE PROTEXIS BLUE W/NEU-THERA 7.5  2D73EB75

## (undated) DEVICE — SPECIMEN TRAP MUCOUS 40ML LUKI C30200A

## (undated) DEVICE — ENDO VALVE SUCTION BRONCH EVIS MAJ-209

## (undated) DEVICE — KIT ENDO FIRST STEP DISINFECTANT 200ML W/POUCH EP-4

## (undated) DEVICE — SU PROLENE 3-0 PS-2 18" 8687H

## (undated) DEVICE — ESU GROUND PAD ADULT W/CORD E7507

## (undated) DEVICE — DRSG TELFA 3"X8" NON25720

## (undated) DEVICE — SOL NACL 0.9% IRRIG 1000ML BOTTLE 2F7124

## (undated) DEVICE — SOL WATER IRRIG 1000ML BOTTLE 07139-09

## (undated) DEVICE — LABEL MEDICATION SYSTEM 3303-P

## (undated) DEVICE — BLADE DYONICS INCISOR PLUS ELITE 3.5MM 72200095

## (undated) DEVICE — INFLATION DEVICE BIG 60 ENDO-AN6012

## (undated) DEVICE — ENDO TUBING CO2 SMARTCAP STERILE DISP 100145CO2EXT

## (undated) DEVICE — ENDO ADPT BRONCH SWIVEL Y A1002

## (undated) DEVICE — BNDG ELASTIC 6"X5YDS UNSTERILE 6611-60

## (undated) DEVICE — ENDO BITE BLOCK ADULT OMNI-BLOC

## (undated) DEVICE — Device

## (undated) DEVICE — PITCHER STERILE 1000ML  SSK9004A

## (undated) DEVICE — SPONGE RAY-TEC 4X8" 7318

## (undated) DEVICE — GLOVE PROTEXIS POWDER FREE 8.0 ORTHOPEDIC 2D73ET80

## (undated) DEVICE — GLOVE PROTEXIS W/NEU-THERA 7.5  2D73TE75

## (undated) DEVICE — DRAPE STERI U 1015

## (undated) DEVICE — ENDO TOOTH GUARD SAC2001

## (undated) DEVICE — SOL WATER IRRIG 1000ML BOTTLE 2F7114

## (undated) DEVICE — DRSG TELFA 3X8" 1238

## (undated) DEVICE — PACK ARTHROSCOPY KNEE SOP15AKFSM

## (undated) DEVICE — ENDO VALVE BX EVIS MAJ-210

## (undated) DEVICE — CUP AND LID 2PK 2OZ STERILE  SSK9006A

## (undated) DEVICE — CATH BALLOON ELATION PULMONARY 3CM 8-9-10MMX100CM P8L30

## (undated) DEVICE — GLOVE PROTEXIS W/NEU-THERA 8.0  2D73TE80

## (undated) DEVICE — ESU PROBE CIRCUMFERENTIAL FIRE FLEX 2.3MMX220CM 20132-218

## (undated) DEVICE — NDL BLUNT 18GA 1" W/O FILTER 305181

## (undated) DEVICE — DRAPE SHEET 3/4 78X60"

## (undated) DEVICE — SOL NACL 0.9% IRRIG 3000ML BAG 07972-08

## (undated) DEVICE — TUBING SUCTION 10'X3/16" N510

## (undated) DEVICE — DRSG STERI STRIP 1/2X4" R1547

## (undated) RX ORDER — ONDANSETRON 2 MG/ML
INJECTION INTRAMUSCULAR; INTRAVENOUS
Status: DISPENSED
Start: 2017-07-19

## (undated) RX ORDER — FENTANYL CITRATE 50 UG/ML
INJECTION, SOLUTION INTRAMUSCULAR; INTRAVENOUS
Status: DISPENSED
Start: 2018-11-16

## (undated) RX ORDER — DEXAMETHASONE SODIUM PHOSPHATE 4 MG/ML
INJECTION, SOLUTION INTRA-ARTICULAR; INTRALESIONAL; INTRAMUSCULAR; INTRAVENOUS; SOFT TISSUE
Status: DISPENSED
Start: 2018-11-16

## (undated) RX ORDER — PROPOFOL 10 MG/ML
INJECTION, EMULSION INTRAVENOUS
Status: DISPENSED
Start: 2018-11-16

## (undated) RX ORDER — ALBUTEROL SULFATE 0.83 MG/ML
SOLUTION RESPIRATORY (INHALATION)
Status: DISPENSED
Start: 2017-07-19

## (undated) RX ORDER — FENTANYL CITRATE 50 UG/ML
INJECTION, SOLUTION INTRAMUSCULAR; INTRAVENOUS
Status: DISPENSED
Start: 2017-06-30

## (undated) RX ORDER — PHENYLEPHRINE HCL IN 0.9% NACL 1 MG/10 ML
SYRINGE (ML) INTRAVENOUS
Status: DISPENSED
Start: 2018-01-11

## (undated) RX ORDER — CEFAZOLIN SODIUM 1 G/3ML
INJECTION, POWDER, FOR SOLUTION INTRAMUSCULAR; INTRAVENOUS
Status: DISPENSED
Start: 2018-11-16

## (undated) RX ORDER — ONDANSETRON 2 MG/ML
INJECTION INTRAMUSCULAR; INTRAVENOUS
Status: DISPENSED
Start: 2018-11-16

## (undated) RX ORDER — CITRIC ACID/SODIUM CITRATE 334-500MG
SOLUTION, ORAL ORAL
Status: DISPENSED
Start: 2018-01-11

## (undated) RX ORDER — DIPHENHYDRAMINE HYDROCHLORIDE 50 MG/ML
INJECTION INTRAMUSCULAR; INTRAVENOUS
Status: DISPENSED
Start: 2017-06-30

## (undated) RX ORDER — OXYCODONE HYDROCHLORIDE 5 MG/1
TABLET ORAL
Status: DISPENSED
Start: 2018-11-16

## (undated) RX ORDER — ONDANSETRON 2 MG/ML
INJECTION INTRAMUSCULAR; INTRAVENOUS
Status: DISPENSED
Start: 2018-01-11

## (undated) RX ORDER — PROPOFOL 10 MG/ML
INJECTION, EMULSION INTRAVENOUS
Status: DISPENSED
Start: 2018-01-11

## (undated) RX ORDER — FENTANYL CITRATE 50 UG/ML
INJECTION, SOLUTION INTRAMUSCULAR; INTRAVENOUS
Status: DISPENSED
Start: 2018-01-11

## (undated) RX ORDER — LIDOCAINE HYDROCHLORIDE 10 MG/ML
INJECTION, SOLUTION EPIDURAL; INFILTRATION; INTRACAUDAL; PERINEURAL
Status: DISPENSED
Start: 2017-06-30

## (undated) RX ORDER — PROPOFOL 10 MG/ML
INJECTION, EMULSION INTRAVENOUS
Status: DISPENSED
Start: 2017-07-19

## (undated) RX ORDER — FENTANYL CITRATE 50 UG/ML
INJECTION, SOLUTION INTRAMUSCULAR; INTRAVENOUS
Status: DISPENSED
Start: 2017-07-19

## (undated) RX ORDER — GABAPENTIN 300 MG/1
CAPSULE ORAL
Status: DISPENSED
Start: 2018-11-16

## (undated) RX ORDER — LIDOCAINE HYDROCHLORIDE 20 MG/ML
INJECTION, SOLUTION EPIDURAL; INFILTRATION; INTRACAUDAL; PERINEURAL
Status: DISPENSED
Start: 2017-07-19

## (undated) RX ORDER — LIDOCAINE HYDROCHLORIDE 40 MG/ML
SOLUTION TOPICAL
Status: DISPENSED
Start: 2017-06-30

## (undated) RX ORDER — LIDOCAINE HYDROCHLORIDE AND EPINEPHRINE 10; 10 MG/ML; UG/ML
INJECTION, SOLUTION INFILTRATION; PERINEURAL
Status: DISPENSED
Start: 2017-06-30

## (undated) RX ORDER — ALBUTEROL SULFATE 0.83 MG/ML
SOLUTION RESPIRATORY (INHALATION)
Status: DISPENSED
Start: 2017-06-30

## (undated) RX ORDER — ACETAMINOPHEN 325 MG/1
TABLET ORAL
Status: DISPENSED
Start: 2018-11-16